# Patient Record
Sex: MALE | Race: WHITE | NOT HISPANIC OR LATINO | Employment: OTHER | URBAN - NONMETROPOLITAN AREA
[De-identification: names, ages, dates, MRNs, and addresses within clinical notes are randomized per-mention and may not be internally consistent; named-entity substitution may affect disease eponyms.]

---

## 2020-01-01 ENCOUNTER — APPOINTMENT (OUTPATIENT)
Dept: GENERAL RADIOLOGY | Facility: HOSPITAL | Age: 60
End: 2020-01-01

## 2020-01-01 ENCOUNTER — ANESTHESIA (OUTPATIENT)
Dept: PERIOP | Facility: HOSPITAL | Age: 60
End: 2020-01-01

## 2020-01-01 ENCOUNTER — PREP FOR SURGERY (OUTPATIENT)
Dept: OTHER | Facility: HOSPITAL | Age: 60
End: 2020-01-01

## 2020-01-01 ENCOUNTER — APPOINTMENT (OUTPATIENT)
Dept: ULTRASOUND IMAGING | Facility: HOSPITAL | Age: 60
End: 2020-01-01

## 2020-01-01 ENCOUNTER — APPOINTMENT (OUTPATIENT)
Dept: CT IMAGING | Facility: HOSPITAL | Age: 60
End: 2020-01-01

## 2020-01-01 ENCOUNTER — APPOINTMENT (OUTPATIENT)
Dept: CARDIOLOGY | Facility: HOSPITAL | Age: 60
End: 2020-01-01

## 2020-01-01 ENCOUNTER — ANESTHESIA (OUTPATIENT)
Dept: ICU | Facility: HOSPITAL | Age: 60
End: 2020-01-01

## 2020-01-01 ENCOUNTER — HOSPITAL ENCOUNTER (OUTPATIENT)
Facility: HOSPITAL | Age: 60
Setting detail: SURGERY ADMIT
End: 2020-01-01
Attending: SPECIALIST | Admitting: SPECIALIST

## 2020-01-01 ENCOUNTER — APPOINTMENT (OUTPATIENT)
Dept: INTERVENTIONAL RADIOLOGY/VASCULAR | Facility: HOSPITAL | Age: 60
End: 2020-01-01

## 2020-01-01 ENCOUNTER — TRANSCRIBE ORDERS (OUTPATIENT)
Dept: GENERAL RADIOLOGY | Facility: HOSPITAL | Age: 60
End: 2020-01-01

## 2020-01-01 ENCOUNTER — ANESTHESIA EVENT (OUTPATIENT)
Dept: PERIOP | Facility: HOSPITAL | Age: 60
End: 2020-01-01

## 2020-01-01 ENCOUNTER — HOSPITAL ENCOUNTER (INPATIENT)
Facility: HOSPITAL | Age: 60
LOS: 8 days | End: 2020-11-15
Attending: EMERGENCY MEDICINE | Admitting: HOSPITALIST

## 2020-01-01 ENCOUNTER — ANESTHESIA EVENT (OUTPATIENT)
Dept: ICU | Facility: HOSPITAL | Age: 60
End: 2020-01-01

## 2020-01-01 ENCOUNTER — HOSPITAL ENCOUNTER (OUTPATIENT)
Facility: HOSPITAL | Age: 60
Setting detail: HOSPITAL OUTPATIENT SURGERY
End: 2020-01-01
Attending: THORACIC SURGERY (CARDIOTHORACIC VASCULAR SURGERY) | Admitting: THORACIC SURGERY (CARDIOTHORACIC VASCULAR SURGERY)

## 2020-01-01 ENCOUNTER — HOSPITAL ENCOUNTER (OUTPATIENT)
Facility: HOSPITAL | Age: 60
Discharge: HOME HEALTH | End: 2020-09-22
Attending: INTERNAL MEDICINE | Admitting: INTERNAL MEDICINE

## 2020-01-01 ENCOUNTER — LAB (OUTPATIENT)
Dept: LAB | Facility: HOSPITAL | Age: 60
End: 2020-01-01

## 2020-01-01 ENCOUNTER — OFFICE VISIT (OUTPATIENT)
Dept: CARDIAC SURGERY | Facility: CLINIC | Age: 60
End: 2020-01-01

## 2020-01-01 ENCOUNTER — HOSPITAL ENCOUNTER (INPATIENT)
Facility: HOSPITAL | Age: 60
LOS: 6 days | Discharge: NURSING, REHAB OR LONG-TERM CARE | End: 2020-08-12
Attending: FAMILY MEDICINE | Admitting: FAMILY MEDICINE

## 2020-01-01 VITALS
DIASTOLIC BLOOD PRESSURE: 72 MMHG | HEIGHT: 71 IN | HEART RATE: 60 BPM | SYSTOLIC BLOOD PRESSURE: 174 MMHG | WEIGHT: 207.5 LBS | TEMPERATURE: 96.8 F | OXYGEN SATURATION: 96 % | RESPIRATION RATE: 14 BRPM | BODY MASS INDEX: 29.05 KG/M2

## 2020-01-01 VITALS
HEART RATE: 72 BPM | OXYGEN SATURATION: 97 % | TEMPERATURE: 97.9 F | BODY MASS INDEX: 24.74 KG/M2 | DIASTOLIC BLOOD PRESSURE: 86 MMHG | SYSTOLIC BLOOD PRESSURE: 188 MMHG | RESPIRATION RATE: 18 BRPM | HEIGHT: 71 IN | WEIGHT: 176.7 LBS

## 2020-01-01 VITALS
BODY MASS INDEX: 31.08 KG/M2 | DIASTOLIC BLOOD PRESSURE: 70 MMHG | HEIGHT: 71 IN | TEMPERATURE: 95.4 F | RESPIRATION RATE: 14 BRPM | SYSTOLIC BLOOD PRESSURE: 155 MMHG | WEIGHT: 222 LBS

## 2020-01-01 VITALS
HEART RATE: 73 BPM | OXYGEN SATURATION: 100 % | SYSTOLIC BLOOD PRESSURE: 134 MMHG | BODY MASS INDEX: 27.27 KG/M2 | HEIGHT: 71 IN | DIASTOLIC BLOOD PRESSURE: 76 MMHG | WEIGHT: 194.8 LBS

## 2020-01-01 DIAGNOSIS — J96.01 ACUTE RESPIRATORY FAILURE WITH HYPOXIA (HCC): ICD-10-CM

## 2020-01-01 DIAGNOSIS — J96.22 ACUTE ON CHRONIC RESPIRATORY FAILURE WITH HYPOXIA AND HYPERCAPNIA (HCC): Primary | ICD-10-CM

## 2020-01-01 DIAGNOSIS — F17.200 TOBACCO DEPENDENCE: ICD-10-CM

## 2020-01-01 DIAGNOSIS — Z99.11: Primary | ICD-10-CM

## 2020-01-01 DIAGNOSIS — I70.235 ATHEROSCLEROSIS OF NATIVE ARTERY OF RIGHT LOWER EXTREMITY WITH ULCERATION OF OTHER PART OF FOOT (HCC): ICD-10-CM

## 2020-01-01 DIAGNOSIS — E78.2 MIXED HYPERLIPIDEMIA: ICD-10-CM

## 2020-01-01 DIAGNOSIS — J96.21 ACUTE ON CHRONIC RESPIRATORY FAILURE WITH HYPOXIA AND HYPERCAPNIA (HCC): Primary | ICD-10-CM

## 2020-01-01 DIAGNOSIS — J44.1 COPD WITH ACUTE EXACERBATION (HCC): Primary | ICD-10-CM

## 2020-01-01 DIAGNOSIS — R06.89 RESPIRATORY INSUFFICIENCY: Primary | ICD-10-CM

## 2020-01-01 DIAGNOSIS — Z01.818 PREOP TESTING: ICD-10-CM

## 2020-01-01 DIAGNOSIS — R13.10 DYSPHAGIA, UNSPECIFIED TYPE: ICD-10-CM

## 2020-01-01 DIAGNOSIS — R06.89 RESPIRATORY INSUFFICIENCY: ICD-10-CM

## 2020-01-01 DIAGNOSIS — L97.511 DIABETIC ULCER OF TOE OF RIGHT FOOT ASSOCIATED WITH TYPE 2 DIABETES MELLITUS, LIMITED TO BREAKDOWN OF SKIN (HCC): ICD-10-CM

## 2020-01-01 DIAGNOSIS — J41.0 SIMPLE CHRONIC BRONCHITIS (HCC): ICD-10-CM

## 2020-01-01 DIAGNOSIS — I70.235 ATHEROSCLEROSIS OF NATIVE ARTERY OF RIGHT LOWER EXTREMITY WITH ULCERATION OF OTHER PART OF FOOT (HCC): Primary | ICD-10-CM

## 2020-01-01 DIAGNOSIS — I50.23 ACUTE ON CHRONIC SYSTOLIC CONGESTIVE HEART FAILURE (HCC): ICD-10-CM

## 2020-01-01 DIAGNOSIS — F20.0 PARANOID SCHIZOPHRENIA (HCC): ICD-10-CM

## 2020-01-01 DIAGNOSIS — J96.01 SEPSIS WITH ACUTE HYPOXIC RESPIRATORY FAILURE WITHOUT SEPTIC SHOCK, DUE TO UNSPECIFIED ORGANISM (HCC): ICD-10-CM

## 2020-01-01 DIAGNOSIS — Z01.818 PREOP TESTING: Primary | ICD-10-CM

## 2020-01-01 DIAGNOSIS — I73.9 PVD (PERIPHERAL VASCULAR DISEASE) (HCC): Primary | ICD-10-CM

## 2020-01-01 DIAGNOSIS — Z99.11 VENTILATOR DEPENDENCE (HCC): ICD-10-CM

## 2020-01-01 DIAGNOSIS — E11.621 DIABETIC ULCER OF TOE OF RIGHT FOOT ASSOCIATED WITH TYPE 2 DIABETES MELLITUS, LIMITED TO BREAKDOWN OF SKIN (HCC): ICD-10-CM

## 2020-01-01 DIAGNOSIS — I10 ESSENTIAL HYPERTENSION: ICD-10-CM

## 2020-01-01 DIAGNOSIS — R65.20 SEPSIS WITH ACUTE HYPOXIC RESPIRATORY FAILURE WITHOUT SEPTIC SHOCK, DUE TO UNSPECIFIED ORGANISM (HCC): ICD-10-CM

## 2020-01-01 DIAGNOSIS — A41.9 SEPSIS WITH ACUTE HYPOXIC RESPIRATORY FAILURE WITHOUT SEPTIC SHOCK, DUE TO UNSPECIFIED ORGANISM (HCC): ICD-10-CM

## 2020-01-01 LAB
% EOS HANSEL STAIN: 2 %
ABO GROUP BLD: NORMAL
ABO GROUP BLD: NORMAL
ALBUMIN SERPL-MCNC: 2.8 G/DL (ref 3.5–5.2)
ALBUMIN SERPL-MCNC: 2.9 G/DL (ref 3.5–5.2)
ALBUMIN SERPL-MCNC: 3 G/DL (ref 3.5–5.2)
ALBUMIN SERPL-MCNC: 3 G/DL (ref 3.5–5.2)
ALBUMIN SERPL-MCNC: 3.1 G/DL (ref 3.5–5.2)
ALBUMIN SERPL-MCNC: 3.2 G/DL (ref 3.5–5.2)
ALBUMIN SERPL-MCNC: 3.3 G/DL (ref 3.5–5.2)
ALBUMIN SERPL-MCNC: 3.6 G/DL (ref 3.5–5.2)
ALBUMIN SERPL-MCNC: 3.8 G/DL (ref 3.5–5.2)
ALBUMIN/GLOB SERPL: 0.9 G/DL
ALBUMIN/GLOB SERPL: 1 G/DL
ALBUMIN/GLOB SERPL: 1.1 G/DL
ALBUMIN/GLOB SERPL: 1.1 G/DL
ALBUMIN/GLOB SERPL: 1.2 G/DL
ALBUMIN/GLOB SERPL: 1.3 G/DL
ALBUMIN/GLOB SERPL: 1.4 G/DL
ALP SERPL-CCNC: 40 U/L (ref 39–117)
ALP SERPL-CCNC: 42 U/L (ref 39–117)
ALP SERPL-CCNC: 43 U/L (ref 39–117)
ALP SERPL-CCNC: 45 U/L (ref 39–117)
ALP SERPL-CCNC: 49 U/L (ref 39–117)
ALP SERPL-CCNC: 50 U/L (ref 39–117)
ALP SERPL-CCNC: 52 U/L (ref 39–117)
ALP SERPL-CCNC: 56 U/L (ref 39–117)
ALP SERPL-CCNC: 60 U/L (ref 39–117)
ALP SERPL-CCNC: 61 U/L (ref 39–117)
ALP SERPL-CCNC: 64 U/L (ref 39–117)
ALP SERPL-CCNC: 64 U/L (ref 39–117)
ALP SERPL-CCNC: 65 U/L (ref 39–117)
ALP SERPL-CCNC: 66 U/L (ref 39–117)
ALP SERPL-CCNC: 81 U/L (ref 39–117)
ALP SERPL-CCNC: 88 U/L (ref 39–117)
ALT SERPL W P-5'-P-CCNC: 10 U/L (ref 1–41)
ALT SERPL W P-5'-P-CCNC: 11 U/L (ref 1–41)
ALT SERPL W P-5'-P-CCNC: 11 U/L (ref 1–41)
ALT SERPL W P-5'-P-CCNC: 13 U/L (ref 1–41)
ALT SERPL W P-5'-P-CCNC: 26 U/L (ref 1–41)
ALT SERPL W P-5'-P-CCNC: 31 U/L (ref 1–41)
ALT SERPL W P-5'-P-CCNC: 36 U/L (ref 1–41)
ALT SERPL W P-5'-P-CCNC: 43 U/L (ref 1–41)
ALT SERPL W P-5'-P-CCNC: 47 U/L (ref 1–41)
ALT SERPL W P-5'-P-CCNC: 50 U/L (ref 1–41)
ALT SERPL W P-5'-P-CCNC: 55 U/L (ref 1–41)
ALT SERPL W P-5'-P-CCNC: 6 U/L (ref 1–41)
ALT SERPL W P-5'-P-CCNC: 8 U/L (ref 1–41)
ALT SERPL W P-5'-P-CCNC: 9 U/L (ref 1–41)
ANION GAP SERPL CALCULATED.3IONS-SCNC: 10 MMOL/L (ref 5–15)
ANION GAP SERPL CALCULATED.3IONS-SCNC: 11 MMOL/L (ref 5–15)
ANION GAP SERPL CALCULATED.3IONS-SCNC: 12 MMOL/L (ref 5–15)
ANION GAP SERPL CALCULATED.3IONS-SCNC: 13 MMOL/L (ref 5–15)
ANION GAP SERPL CALCULATED.3IONS-SCNC: 15 MMOL/L (ref 5–15)
ANION GAP SERPL CALCULATED.3IONS-SCNC: 15 MMOL/L (ref 5–15)
ANION GAP SERPL CALCULATED.3IONS-SCNC: 16 MMOL/L (ref 5–15)
ANION GAP SERPL CALCULATED.3IONS-SCNC: 17 MMOL/L (ref 5–15)
ANION GAP SERPL CALCULATED.3IONS-SCNC: 17 MMOL/L (ref 5–15)
ANION GAP SERPL CALCULATED.3IONS-SCNC: 18 MMOL/L (ref 5–15)
ANION GAP SERPL CALCULATED.3IONS-SCNC: 8 MMOL/L (ref 5–15)
ANION GAP SERPL CALCULATED.3IONS-SCNC: 9 MMOL/L (ref 5–15)
ARTERIAL PATENCY WRIST A: ABNORMAL
ARTERIAL PATENCY WRIST A: POSITIVE
AST SERPL-CCNC: 104 U/L (ref 1–40)
AST SERPL-CCNC: 11 U/L (ref 1–40)
AST SERPL-CCNC: 13 U/L (ref 1–40)
AST SERPL-CCNC: 13 U/L (ref 1–40)
AST SERPL-CCNC: 137 U/L (ref 1–40)
AST SERPL-CCNC: 15 U/L (ref 1–40)
AST SERPL-CCNC: 16 U/L (ref 1–40)
AST SERPL-CCNC: 17 U/L (ref 1–40)
AST SERPL-CCNC: 17 U/L (ref 1–40)
AST SERPL-CCNC: 18 U/L (ref 1–40)
AST SERPL-CCNC: 20 U/L (ref 1–40)
AST SERPL-CCNC: 22 U/L (ref 1–40)
AST SERPL-CCNC: 30 U/L (ref 1–40)
AST SERPL-CCNC: 32 U/L (ref 1–40)
AST SERPL-CCNC: 35 U/L (ref 1–40)
AST SERPL-CCNC: 64 U/L (ref 1–40)
ATMOSPHERIC PRESS: 739 MMHG
ATMOSPHERIC PRESS: 744 MMHG
ATMOSPHERIC PRESS: 745 MMHG
ATMOSPHERIC PRESS: 748 MMHG
ATMOSPHERIC PRESS: 748 MMHG
ATMOSPHERIC PRESS: 749 MMHG
ATMOSPHERIC PRESS: 750 MMHG
ATMOSPHERIC PRESS: 751 MMHG
ATMOSPHERIC PRESS: 752 MMHG
ATMOSPHERIC PRESS: 753 MMHG
B PARAPERT DNA SPEC QL NAA+PROBE: NOT DETECTED
B PERT DNA SPEC QL NAA+PROBE: NOT DETECTED
BACTERIA SPEC AEROBE CULT: ABNORMAL
BACTERIA SPEC AEROBE CULT: NO GROWTH
BACTERIA SPEC AEROBE CULT: NORMAL
BACTERIA SPEC RESP CULT: ABNORMAL
BACTERIA SPEC RESP CULT: NORMAL
BACTERIA SPEC RESP CULT: NORMAL
BACTERIA UR QL AUTO: ABNORMAL /HPF
BASE EXCESS BLDA CALC-SCNC: -0.1 MMOL/L (ref 0–2)
BASE EXCESS BLDA CALC-SCNC: -1.1 MMOL/L (ref 0–2)
BASE EXCESS BLDA CALC-SCNC: -1.6 MMOL/L (ref 0–2)
BASE EXCESS BLDA CALC-SCNC: -1.7 MMOL/L (ref 0–2)
BASE EXCESS BLDA CALC-SCNC: -1.8 MMOL/L (ref 0–2)
BASE EXCESS BLDA CALC-SCNC: -10.1 MMOL/L (ref 0–2)
BASE EXCESS BLDA CALC-SCNC: -14.8 MMOL/L (ref 0–2)
BASE EXCESS BLDA CALC-SCNC: -2 MMOL/L (ref 0–2)
BASE EXCESS BLDA CALC-SCNC: -3 MMOL/L (ref 0–2)
BASE EXCESS BLDA CALC-SCNC: -4.6 MMOL/L (ref 0–2)
BASE EXCESS BLDA CALC-SCNC: -4.8 MMOL/L (ref 0–2)
BASE EXCESS BLDA CALC-SCNC: -5.2 MMOL/L (ref 0–2)
BASE EXCESS BLDA CALC-SCNC: -5.8 MMOL/L (ref 0–2)
BASE EXCESS BLDA CALC-SCNC: -6.3 MMOL/L (ref 0–2)
BASE EXCESS BLDA CALC-SCNC: -9.8 MMOL/L (ref 0–2)
BASE EXCESS BLDA CALC-SCNC: 0.2 MMOL/L (ref 0–2)
BASE EXCESS BLDA CALC-SCNC: 0.9 MMOL/L (ref 0–2)
BASE EXCESS BLDA CALC-SCNC: 1 MMOL/L (ref 0–2)
BASE EXCESS BLDA CALC-SCNC: 1.3 MMOL/L (ref 0–2)
BASE EXCESS BLDA CALC-SCNC: 1.7 MMOL/L (ref 0–2)
BASE EXCESS BLDA CALC-SCNC: 2.4 MMOL/L (ref 0–2)
BASE EXCESS BLDA CALC-SCNC: 2.5 MMOL/L (ref 0–2)
BASE EXCESS BLDA CALC-SCNC: 2.6 MMOL/L (ref 0–2)
BASE EXCESS BLDA CALC-SCNC: 2.6 MMOL/L (ref 0–2)
BASE EXCESS BLDA CALC-SCNC: 3.3 MMOL/L (ref 0–2)
BASE EXCESS BLDA CALC-SCNC: 5.6 MMOL/L (ref 0–2)
BASE EXCESS BLDA CALC-SCNC: 8 MMOL/L (ref 0–2)
BASE EXCESS BLDA CALC-SCNC: 9.5 MMOL/L (ref 0–2)
BASOPHILS # BLD AUTO: 0 10*3/MM3 (ref 0–0.2)
BASOPHILS # BLD AUTO: 0.01 10*3/MM3 (ref 0–0.2)
BASOPHILS # BLD AUTO: 0.02 10*3/MM3 (ref 0–0.2)
BASOPHILS # BLD AUTO: 0.03 10*3/MM3 (ref 0–0.2)
BASOPHILS # BLD AUTO: 0.04 10*3/MM3 (ref 0–0.2)
BASOPHILS # BLD AUTO: 0.05 10*3/MM3 (ref 0–0.2)
BASOPHILS # BLD AUTO: 0.06 10*3/MM3 (ref 0–0.2)
BASOPHILS # BLD AUTO: 0.07 10*3/MM3 (ref 0–0.2)
BASOPHILS # BLD AUTO: 0.08 10*3/MM3 (ref 0–0.2)
BASOPHILS # BLD AUTO: 0.1 10*3/MM3 (ref 0–0.2)
BASOPHILS # BLD AUTO: 0.11 10*3/MM3 (ref 0–0.2)
BASOPHILS # BLD AUTO: 0.13 10*3/MM3 (ref 0–0.2)
BASOPHILS # BLD AUTO: 0.14 10*3/MM3 (ref 0–0.2)
BASOPHILS # BLD MANUAL: 0.17 10*3/MM3 (ref 0–0.2)
BASOPHILS NFR BLD AUTO: 0 % (ref 0–1.5)
BASOPHILS NFR BLD AUTO: 0.1 % (ref 0–1.5)
BASOPHILS NFR BLD AUTO: 0.2 % (ref 0–1.5)
BASOPHILS NFR BLD AUTO: 0.3 % (ref 0–1.5)
BASOPHILS NFR BLD AUTO: 0.4 % (ref 0–1.5)
BASOPHILS NFR BLD AUTO: 0.5 % (ref 0–1.5)
BASOPHILS NFR BLD AUTO: 0.6 % (ref 0–1.5)
BASOPHILS NFR BLD AUTO: 0.6 % (ref 0–1.5)
BASOPHILS NFR BLD AUTO: 0.7 % (ref 0–1.5)
BASOPHILS NFR BLD AUTO: 0.8 % (ref 0–1.5)
BASOPHILS NFR BLD AUTO: 0.8 % (ref 0–1.5)
BASOPHILS NFR BLD AUTO: 1 % (ref 0–1.5)
BASOPHILS NFR BLD AUTO: 1.1 % (ref 0–1.5)
BASOPHILS NFR BLD AUTO: 1.1 % (ref 0–1.5)
BASOPHILS NFR BLD AUTO: 1.4 % (ref 0–1.5)
BDY SITE: ABNORMAL
BH BB BLOOD EXPIRATION DATE: NORMAL
BH BB BLOOD EXPIRATION DATE: NORMAL
BH BB BLOOD TYPE BARCODE: 5100
BH BB BLOOD TYPE BARCODE: 5100
BH BB DISPENSE STATUS: NORMAL
BH BB DISPENSE STATUS: NORMAL
BH BB PRODUCT CODE: NORMAL
BH BB PRODUCT CODE: NORMAL
BH BB UNIT NUMBER: NORMAL
BH BB UNIT NUMBER: NORMAL
BH CV ECHO MEAS - AO MAX PG (FULL): 5.6 MMHG
BH CV ECHO MEAS - AO MAX PG: 12.5 MMHG
BH CV ECHO MEAS - AO MEAN PG (FULL): 1 MMHG
BH CV ECHO MEAS - AO MEAN PG: 5 MMHG
BH CV ECHO MEAS - AO ROOT AREA (BSA CORRECTED): 1.7
BH CV ECHO MEAS - AO ROOT AREA: 10.2 CM^2
BH CV ECHO MEAS - AO ROOT DIAM: 3.6 CM
BH CV ECHO MEAS - AO V2 MAX: 177 CM/SEC
BH CV ECHO MEAS - AO V2 MEAN: 104 CM/SEC
BH CV ECHO MEAS - AO V2 VTI: 33.9 CM
BH CV ECHO MEAS - AVA(I,A): 2.9 CM^2
BH CV ECHO MEAS - AVA(I,D): 2.9 CM^2
BH CV ECHO MEAS - AVA(V,A): 2.8 CM^2
BH CV ECHO MEAS - AVA(V,D): 2.8 CM^2
BH CV ECHO MEAS - BSA(HAYCOCK): 2.2 M^2
BH CV ECHO MEAS - BSA: 2.2 M^2
BH CV ECHO MEAS - BZI_BMI: 29.8 KILOGRAMS/M^2
BH CV ECHO MEAS - BZI_METRIC_HEIGHT: 180.3 CM
BH CV ECHO MEAS - BZI_METRIC_WEIGHT: 97.1 KG
BH CV ECHO MEAS - EDV(CUBED): 135.8 ML
BH CV ECHO MEAS - EDV(MOD-SP4): 174 ML
BH CV ECHO MEAS - EDV(TEICH): 126.1 ML
BH CV ECHO MEAS - EF(CUBED): 43.5 %
BH CV ECHO MEAS - EF(MOD-SP4): 57.5 %
BH CV ECHO MEAS - EF(TEICH): 35.9 %
BH CV ECHO MEAS - ESV(CUBED): 76.8 ML
BH CV ECHO MEAS - ESV(MOD-SP4): 74 ML
BH CV ECHO MEAS - ESV(TEICH): 80.8 ML
BH CV ECHO MEAS - FS: 17.3 %
BH CV ECHO MEAS - IVS/LVPW: 0.84
BH CV ECHO MEAS - IVSD: 0.79 CM
BH CV ECHO MEAS - LA DIMENSION: 4 CM
BH CV ECHO MEAS - LA/AO: 1.1
BH CV ECHO MEAS - LAT PEAK E' VEL: 11.3 CM/SEC
BH CV ECHO MEAS - LV DIASTOLIC VOL/BSA (35-75): 80.2 ML/M^2
BH CV ECHO MEAS - LV MASS(C)D: 158.2 GRAMS
BH CV ECHO MEAS - LV MASS(C)DI: 72.9 GRAMS/M^2
BH CV ECHO MEAS - LV MAX PG: 7 MMHG
BH CV ECHO MEAS - LV MEAN PG: 4 MMHG
BH CV ECHO MEAS - LV SYSTOLIC VOL/BSA (12-30): 34.1 ML/M^2
BH CV ECHO MEAS - LV V1 MAX: 132 CM/SEC
BH CV ECHO MEAS - LV V1 MEAN: 92 CM/SEC
BH CV ECHO MEAS - LV V1 VTI: 26.2 CM
BH CV ECHO MEAS - LVIDD: 5.1 CM
BH CV ECHO MEAS - LVIDS: 4.3 CM
BH CV ECHO MEAS - LVLD AP4: 9.5 CM
BH CV ECHO MEAS - LVLS AP4: 8.2 CM
BH CV ECHO MEAS - LVOT AREA (M): 3.8 CM^2
BH CV ECHO MEAS - LVOT AREA: 3.8 CM^2
BH CV ECHO MEAS - LVOT DIAM: 2.2 CM
BH CV ECHO MEAS - LVPWD: 0.94 CM
BH CV ECHO MEAS - MED PEAK E' VEL: 9.57 CM/SEC
BH CV ECHO MEAS - MR MAX PG: 122.8 MMHG
BH CV ECHO MEAS - MR MAX VEL: 554 CM/SEC
BH CV ECHO MEAS - MV A MAX VEL: 84.8 CM/SEC
BH CV ECHO MEAS - MV DEC TIME: 0.18 SEC
BH CV ECHO MEAS - MV E MAX VEL: 161 CM/SEC
BH CV ECHO MEAS - MV E/A: 1.9
BH CV ECHO MEAS - SI(AO): 159 ML/M^2
BH CV ECHO MEAS - SI(CUBED): 27.2 ML/M^2
BH CV ECHO MEAS - SI(LVOT): 45.9 ML/M^2
BH CV ECHO MEAS - SI(MOD-SP4): 46.1 ML/M^2
BH CV ECHO MEAS - SI(TEICH): 20.9 ML/M^2
BH CV ECHO MEAS - SV(AO): 345.1 ML
BH CV ECHO MEAS - SV(CUBED): 59 ML
BH CV ECHO MEAS - SV(LVOT): 99.6 ML
BH CV ECHO MEAS - SV(MOD-SP4): 100 ML
BH CV ECHO MEAS - SV(TEICH): 45.3 ML
BH CV ECHO MEAS - TR MAX VEL: 108 CM/SEC
BH CV ECHO MEASUREMENTS AVERAGE E/E' RATIO: 15.43
BILIRUB SERPL-MCNC: 0.2 MG/DL (ref 0–1.2)
BILIRUB SERPL-MCNC: 0.3 MG/DL (ref 0–1.2)
BILIRUB SERPL-MCNC: 0.4 MG/DL (ref 0–1.2)
BILIRUB SERPL-MCNC: 0.7 MG/DL (ref 0–1.2)
BILIRUB UR QL STRIP: NEGATIVE
BLD GP AB SCN SERPL QL: NEGATIVE
BLD GP AB SCN SERPL QL: NEGATIVE
BODY TEMPERATURE: 37 C
BUN SERPL-MCNC: 14 MG/DL (ref 6–20)
BUN SERPL-MCNC: 16 MG/DL (ref 6–20)
BUN SERPL-MCNC: 16 MG/DL (ref 6–20)
BUN SERPL-MCNC: 19 MG/DL (ref 6–20)
BUN SERPL-MCNC: 21 MG/DL (ref 6–20)
BUN SERPL-MCNC: 34 MG/DL (ref 6–20)
BUN SERPL-MCNC: 36 MG/DL (ref 6–20)
BUN SERPL-MCNC: 37 MG/DL (ref 6–20)
BUN SERPL-MCNC: 39 MG/DL (ref 6–20)
BUN SERPL-MCNC: 40 MG/DL (ref 6–20)
BUN SERPL-MCNC: 41 MG/DL (ref 6–20)
BUN SERPL-MCNC: 42 MG/DL (ref 6–20)
BUN SERPL-MCNC: 43 MG/DL (ref 6–20)
BUN SERPL-MCNC: 43 MG/DL (ref 6–20)
BUN SERPL-MCNC: 44 MG/DL (ref 6–20)
BUN SERPL-MCNC: 44 MG/DL (ref 6–20)
BUN SERPL-MCNC: 47 MG/DL (ref 6–20)
BUN SERPL-MCNC: 47 MG/DL (ref 6–20)
BUN SERPL-MCNC: 49 MG/DL (ref 6–20)
BUN SERPL-MCNC: 49 MG/DL (ref 6–20)
BUN SERPL-MCNC: 50 MG/DL (ref 6–20)
BUN SERPL-MCNC: 52 MG/DL (ref 6–20)
BUN SERPL-MCNC: 53 MG/DL (ref 6–20)
BUN SERPL-MCNC: 55 MG/DL (ref 6–20)
BUN SERPL-MCNC: 55 MG/DL (ref 6–20)
BUN SERPL-MCNC: 56 MG/DL (ref 6–20)
BUN SERPL-MCNC: 58 MG/DL (ref 6–20)
BUN SERPL-MCNC: 59 MG/DL (ref 6–20)
BUN SERPL-MCNC: 59 MG/DL (ref 6–20)
BUN SERPL-MCNC: 61 MG/DL (ref 6–20)
BUN SERPL-MCNC: 63 MG/DL (ref 6–20)
BUN SERPL-MCNC: 63 MG/DL (ref 6–20)
BUN SERPL-MCNC: 64 MG/DL (ref 6–20)
BUN SERPL-MCNC: 66 MG/DL (ref 6–20)
BUN SERPL-MCNC: 67 MG/DL (ref 6–20)
BUN SERPL-MCNC: 72 MG/DL (ref 6–20)
BUN SERPL-MCNC: 87 MG/DL (ref 6–20)
BUN SERPL-MCNC: 92 MG/DL (ref 6–20)
BUN SERPL-MCNC: 92 MG/DL (ref 6–20)
BUN SERPL-MCNC: 93 MG/DL (ref 6–20)
BUN SERPL-MCNC: 97 MG/DL (ref 6–20)
BUN/CREAT SERPL: 10.4 (ref 7–25)
BUN/CREAT SERPL: 11.8 (ref 7–25)
BUN/CREAT SERPL: 12.6 (ref 7–25)
BUN/CREAT SERPL: 13.2 (ref 7–25)
BUN/CREAT SERPL: 14.2 (ref 7–25)
BUN/CREAT SERPL: 16.1 (ref 7–25)
BUN/CREAT SERPL: 16.5 (ref 7–25)
BUN/CREAT SERPL: 16.5 (ref 7–25)
BUN/CREAT SERPL: 17.1 (ref 7–25)
BUN/CREAT SERPL: 17.1 (ref 7–25)
BUN/CREAT SERPL: 17.4 (ref 7–25)
BUN/CREAT SERPL: 17.7 (ref 7–25)
BUN/CREAT SERPL: 18.2 (ref 7–25)
BUN/CREAT SERPL: 18.5 (ref 7–25)
BUN/CREAT SERPL: 18.9 (ref 7–25)
BUN/CREAT SERPL: 19.4 (ref 7–25)
BUN/CREAT SERPL: 20.3 (ref 7–25)
BUN/CREAT SERPL: 21.7 (ref 7–25)
BUN/CREAT SERPL: 22 (ref 7–25)
BUN/CREAT SERPL: 22.2 (ref 7–25)
BUN/CREAT SERPL: 22.3 (ref 7–25)
BUN/CREAT SERPL: 22.5 (ref 7–25)
BUN/CREAT SERPL: 22.7 (ref 7–25)
BUN/CREAT SERPL: 23.2 (ref 7–25)
BUN/CREAT SERPL: 25 (ref 7–25)
BUN/CREAT SERPL: 25.7 (ref 7–25)
BUN/CREAT SERPL: 26.1 (ref 7–25)
BUN/CREAT SERPL: 29.2 (ref 7–25)
BUN/CREAT SERPL: 29.4 (ref 7–25)
BUN/CREAT SERPL: 29.8 (ref 7–25)
BUN/CREAT SERPL: 30.1 (ref 7–25)
BUN/CREAT SERPL: 30.1 (ref 7–25)
BUN/CREAT SERPL: 30.8 (ref 7–25)
BUN/CREAT SERPL: 32 (ref 7–25)
BUN/CREAT SERPL: 32.7 (ref 7–25)
BUN/CREAT SERPL: 34.6 (ref 7–25)
BUN/CREAT SERPL: 34.9 (ref 7–25)
BUN/CREAT SERPL: 35.5 (ref 7–25)
BUN/CREAT SERPL: 36 (ref 7–25)
BUN/CREAT SERPL: 36.6 (ref 7–25)
BUN/CREAT SERPL: 37.2 (ref 7–25)
BUN/CREAT SERPL: 37.2 (ref 7–25)
BUN/CREAT SERPL: 37.3 (ref 7–25)
BUN/CREAT SERPL: 40.4 (ref 7–25)
BUN/CREAT SERPL: 46.8 (ref 7–25)
BURR CELLS BLD QL SMEAR: ABNORMAL
C PNEUM DNA NPH QL NAA+NON-PROBE: NOT DETECTED
CALCIUM SPEC-SCNC: 7.7 MG/DL (ref 8.6–10.5)
CALCIUM SPEC-SCNC: 7.8 MG/DL (ref 8.6–10.5)
CALCIUM SPEC-SCNC: 7.8 MG/DL (ref 8.6–10.5)
CALCIUM SPEC-SCNC: 8 MG/DL (ref 8.6–10.5)
CALCIUM SPEC-SCNC: 8.1 MG/DL (ref 8.6–10.5)
CALCIUM SPEC-SCNC: 8.2 MG/DL (ref 8.6–10.5)
CALCIUM SPEC-SCNC: 8.3 MG/DL (ref 8.6–10.5)
CALCIUM SPEC-SCNC: 8.4 MG/DL (ref 8.6–10.5)
CALCIUM SPEC-SCNC: 8.5 MG/DL (ref 8.6–10.5)
CALCIUM SPEC-SCNC: 8.6 MG/DL (ref 8.6–10.5)
CALCIUM SPEC-SCNC: 8.7 MG/DL (ref 8.6–10.5)
CALCIUM SPEC-SCNC: 8.7 MG/DL (ref 8.6–10.5)
CALCIUM SPEC-SCNC: 8.8 MG/DL (ref 8.6–10.5)
CALCIUM SPEC-SCNC: 8.8 MG/DL (ref 8.6–10.5)
CALCIUM SPEC-SCNC: 8.9 MG/DL (ref 8.6–10.5)
CALCIUM SPEC-SCNC: 9.1 MG/DL (ref 8.6–10.5)
CALCIUM SPEC-SCNC: 9.7 MG/DL (ref 8.6–10.5)
CALCIUM SPEC-SCNC: 9.8 MG/DL (ref 8.6–10.5)
CHLORIDE SERPL-SCNC: 100 MMOL/L (ref 98–107)
CHLORIDE SERPL-SCNC: 100 MMOL/L (ref 98–107)
CHLORIDE SERPL-SCNC: 101 MMOL/L (ref 98–107)
CHLORIDE SERPL-SCNC: 101 MMOL/L (ref 98–107)
CHLORIDE SERPL-SCNC: 102 MMOL/L (ref 98–107)
CHLORIDE SERPL-SCNC: 103 MMOL/L (ref 98–107)
CHLORIDE SERPL-SCNC: 104 MMOL/L (ref 98–107)
CHLORIDE SERPL-SCNC: 107 MMOL/L (ref 98–107)
CHLORIDE SERPL-SCNC: 107 MMOL/L (ref 98–107)
CHLORIDE SERPL-SCNC: 82 MMOL/L (ref 98–107)
CHLORIDE SERPL-SCNC: 83 MMOL/L (ref 98–107)
CHLORIDE SERPL-SCNC: 84 MMOL/L (ref 98–107)
CHLORIDE SERPL-SCNC: 84 MMOL/L (ref 98–107)
CHLORIDE SERPL-SCNC: 85 MMOL/L (ref 98–107)
CHLORIDE SERPL-SCNC: 87 MMOL/L (ref 98–107)
CHLORIDE SERPL-SCNC: 88 MMOL/L (ref 98–107)
CHLORIDE SERPL-SCNC: 89 MMOL/L (ref 98–107)
CHLORIDE SERPL-SCNC: 89 MMOL/L (ref 98–107)
CHLORIDE SERPL-SCNC: 90 MMOL/L (ref 98–107)
CHLORIDE SERPL-SCNC: 90 MMOL/L (ref 98–107)
CHLORIDE SERPL-SCNC: 92 MMOL/L (ref 98–107)
CHLORIDE SERPL-SCNC: 94 MMOL/L (ref 98–107)
CHLORIDE SERPL-SCNC: 95 MMOL/L (ref 98–107)
CHLORIDE SERPL-SCNC: 95 MMOL/L (ref 98–107)
CHLORIDE SERPL-SCNC: 96 MMOL/L (ref 98–107)
CHLORIDE SERPL-SCNC: 97 MMOL/L (ref 98–107)
CHLORIDE SERPL-SCNC: 98 MMOL/L (ref 98–107)
CHLORIDE SERPL-SCNC: 98 MMOL/L (ref 98–107)
CHLORIDE SERPL-SCNC: 99 MMOL/L (ref 98–107)
CLARITY UR: ABNORMAL
CLARITY UR: CLEAR
CO2 SERPL-SCNC: 16 MMOL/L (ref 22–29)
CO2 SERPL-SCNC: 17 MMOL/L (ref 22–29)
CO2 SERPL-SCNC: 17 MMOL/L (ref 22–29)
CO2 SERPL-SCNC: 18 MMOL/L (ref 22–29)
CO2 SERPL-SCNC: 18 MMOL/L (ref 22–29)
CO2 SERPL-SCNC: 19 MMOL/L (ref 22–29)
CO2 SERPL-SCNC: 20 MMOL/L (ref 22–29)
CO2 SERPL-SCNC: 21 MMOL/L (ref 22–29)
CO2 SERPL-SCNC: 22 MMOL/L (ref 22–29)
CO2 SERPL-SCNC: 22 MMOL/L (ref 22–29)
CO2 SERPL-SCNC: 23 MMOL/L (ref 22–29)
CO2 SERPL-SCNC: 23 MMOL/L (ref 22–29)
CO2 SERPL-SCNC: 24 MMOL/L (ref 22–29)
CO2 SERPL-SCNC: 25 MMOL/L (ref 22–29)
CO2 SERPL-SCNC: 26 MMOL/L (ref 22–29)
CO2 SERPL-SCNC: 27 MMOL/L (ref 22–29)
CO2 SERPL-SCNC: 28 MMOL/L (ref 22–29)
CO2 SERPL-SCNC: 29 MMOL/L (ref 22–29)
CO2 SERPL-SCNC: 30 MMOL/L (ref 22–29)
CO2 SERPL-SCNC: 31 MMOL/L (ref 22–29)
CO2 SERPL-SCNC: 32 MMOL/L (ref 22–29)
CO2 SERPL-SCNC: 32 MMOL/L (ref 22–29)
CO2 SERPL-SCNC: 34 MMOL/L (ref 22–29)
COARSE GRAN CASTS URNS QL MICRO: ABNORMAL /LPF
COLOR UR: ABNORMAL
COLOR UR: YELLOW
COVID LABCORP PRIORITY: NORMAL
CRE SCREEN PCR: NOT DETECTED
CREAT SERPL-MCNC: 1.21 MG/DL (ref 0.76–1.27)
CREAT SERPL-MCNC: 1.35 MG/DL (ref 0.76–1.27)
CREAT SERPL-MCNC: 1.36 MG/DL (ref 0.76–1.27)
CREAT SERPL-MCNC: 1.46 MG/DL (ref 0.76–1.27)
CREAT SERPL-MCNC: 1.48 MG/DL (ref 0.76–1.27)
CREAT SERPL-MCNC: 1.51 MG/DL (ref 0.76–1.27)
CREAT SERPL-MCNC: 1.52 MG/DL (ref 0.76–1.27)
CREAT SERPL-MCNC: 1.57 MG/DL (ref 0.76–1.27)
CREAT SERPL-MCNC: 1.6 MG/DL (ref 0.76–1.27)
CREAT SERPL-MCNC: 1.64 MG/DL (ref 0.76–1.27)
CREAT SERPL-MCNC: 1.66 MG/DL (ref 0.76–1.27)
CREAT SERPL-MCNC: 1.67 MG/DL (ref 0.76–1.27)
CREAT SERPL-MCNC: 1.69 MG/DL (ref 0.76–1.27)
CREAT SERPL-MCNC: 1.72 MG/DL (ref 0.76–1.27)
CREAT SERPL-MCNC: 1.72 MG/DL (ref 0.76–1.27)
CREAT SERPL-MCNC: 1.75 MG/DL (ref 0.76–1.27)
CREAT SERPL-MCNC: 1.81 MG/DL (ref 0.76–1.27)
CREAT SERPL-MCNC: 1.83 MG/DL (ref 0.76–1.27)
CREAT SERPL-MCNC: 1.86 MG/DL (ref 0.76–1.27)
CREAT SERPL-MCNC: 1.87 MG/DL (ref 0.76–1.27)
CREAT SERPL-MCNC: 1.88 MG/DL (ref 0.76–1.27)
CREAT SERPL-MCNC: 1.96 MG/DL (ref 0.76–1.27)
CREAT SERPL-MCNC: 1.98 MG/DL (ref 0.76–1.27)
CREAT SERPL-MCNC: 1.99 MG/DL (ref 0.76–1.27)
CREAT SERPL-MCNC: 2.05 MG/DL (ref 0.76–1.27)
CREAT SERPL-MCNC: 2.08 MG/DL (ref 0.76–1.27)
CREAT SERPL-MCNC: 2.11 MG/DL (ref 0.76–1.27)
CREAT SERPL-MCNC: 2.16 MG/DL (ref 0.76–1.27)
CREAT SERPL-MCNC: 2.26 MG/DL (ref 0.76–1.27)
CREAT SERPL-MCNC: 2.28 MG/DL (ref 0.76–1.27)
CREAT SERPL-MCNC: 2.33 MG/DL (ref 0.76–1.27)
CREAT SERPL-MCNC: 2.36 MG/DL (ref 0.76–1.27)
CREAT SERPL-MCNC: 2.41 MG/DL (ref 0.76–1.27)
CREAT SERPL-MCNC: 2.41 MG/DL (ref 0.76–1.27)
CREAT SERPL-MCNC: 2.42 MG/DL (ref 0.76–1.27)
CREAT SERPL-MCNC: 2.46 MG/DL (ref 0.76–1.27)
CREAT SERPL-MCNC: 2.48 MG/DL (ref 0.76–1.27)
CREAT SERPL-MCNC: 2.5 MG/DL (ref 0.76–1.27)
CREAT SERPL-MCNC: 2.54 MG/DL (ref 0.76–1.27)
CREAT SERPL-MCNC: 2.54 MG/DL (ref 0.76–1.27)
CREAT SERPL-MCNC: 2.55 MG/DL (ref 0.76–1.27)
CREAT SERPL-MCNC: 2.58 MG/DL (ref 0.76–1.27)
CREAT SERPL-MCNC: 2.59 MG/DL (ref 0.76–1.27)
CREAT SERPL-MCNC: 2.59 MG/DL (ref 0.76–1.27)
CREAT SERPL-MCNC: 2.6 MG/DL (ref 0.76–1.27)
CREAT SERPL-MCNC: 2.76 MG/DL (ref 0.76–1.27)
CREAT UR-MCNC: 103.8 MG/DL
CROSSMATCH INTERPRETATION: NORMAL
CROSSMATCH INTERPRETATION: NORMAL
CRP SERPL-MCNC: 0.86 MG/DL (ref 0–0.5)
D-DIMER, QUANTITATIVE (MAD,POW, STR): 1748 NG/ML (FEU) (ref 0–470)
D-LACTATE SERPL-SCNC: 1 MMOL/L (ref 0.5–2)
D-LACTATE SERPL-SCNC: 1.1 MMOL/L (ref 0.5–2)
D-LACTATE SERPL-SCNC: 1.3 MMOL/L (ref 0.5–2)
D-LACTATE SERPL-SCNC: 1.3 MMOL/L (ref 0.5–2)
DEPRECATED RDW RBC AUTO: 37.3 FL (ref 37–54)
DEPRECATED RDW RBC AUTO: 38.6 FL (ref 37–54)
DEPRECATED RDW RBC AUTO: 38.7 FL (ref 37–54)
DEPRECATED RDW RBC AUTO: 38.9 FL (ref 37–54)
DEPRECATED RDW RBC AUTO: 39.4 FL (ref 37–54)
DEPRECATED RDW RBC AUTO: 41 FL (ref 37–54)
DEPRECATED RDW RBC AUTO: 41.1 FL (ref 37–54)
DEPRECATED RDW RBC AUTO: 41.9 FL (ref 37–54)
DEPRECATED RDW RBC AUTO: 42.5 FL (ref 37–54)
DEPRECATED RDW RBC AUTO: 42.7 FL (ref 37–54)
DEPRECATED RDW RBC AUTO: 43 FL (ref 37–54)
DEPRECATED RDW RBC AUTO: 45 FL (ref 37–54)
DEPRECATED RDW RBC AUTO: 45.8 FL (ref 37–54)
DEPRECATED RDW RBC AUTO: 45.9 FL (ref 37–54)
DEPRECATED RDW RBC AUTO: 46 FL (ref 37–54)
DEPRECATED RDW RBC AUTO: 46.4 FL (ref 37–54)
DEPRECATED RDW RBC AUTO: 46.7 FL (ref 37–54)
DEPRECATED RDW RBC AUTO: 46.8 FL (ref 37–54)
DEPRECATED RDW RBC AUTO: 46.9 FL (ref 37–54)
DEPRECATED RDW RBC AUTO: 46.9 FL (ref 37–54)
DEPRECATED RDW RBC AUTO: 47 FL (ref 37–54)
DEPRECATED RDW RBC AUTO: 47.1 FL (ref 37–54)
DEPRECATED RDW RBC AUTO: 47.3 FL (ref 37–54)
DEPRECATED RDW RBC AUTO: 47.8 FL (ref 37–54)
DEPRECATED RDW RBC AUTO: 47.9 FL (ref 37–54)
DEPRECATED RDW RBC AUTO: 48.2 FL (ref 37–54)
DEPRECATED RDW RBC AUTO: 48.8 FL (ref 37–54)
DEPRECATED RDW RBC AUTO: 50.3 FL (ref 37–54)
DEPRECATED RDW RBC AUTO: 50.8 FL (ref 37–54)
DEPRECATED RDW RBC AUTO: 51.5 FL (ref 37–54)
DEPRECATED RDW RBC AUTO: 51.6 FL (ref 37–54)
DEPRECATED RDW RBC AUTO: 51.7 FL (ref 37–54)
DEPRECATED RDW RBC AUTO: 53.6 FL (ref 37–54)
DEPRECATED RDW RBC AUTO: 53.8 FL (ref 37–54)
EOSINOPHIL # BLD AUTO: 0 10*3/MM3 (ref 0–0.4)
EOSINOPHIL # BLD AUTO: 0.01 10*3/MM3 (ref 0–0.4)
EOSINOPHIL # BLD AUTO: 0.02 10*3/MM3 (ref 0–0.4)
EOSINOPHIL # BLD AUTO: 0.02 10*3/MM3 (ref 0–0.4)
EOSINOPHIL # BLD AUTO: 0.03 10*3/MM3 (ref 0–0.4)
EOSINOPHIL # BLD AUTO: 0.04 10*3/MM3 (ref 0–0.4)
EOSINOPHIL # BLD AUTO: 0.05 10*3/MM3 (ref 0–0.4)
EOSINOPHIL # BLD AUTO: 0.05 10*3/MM3 (ref 0–0.4)
EOSINOPHIL # BLD AUTO: 0.06 10*3/MM3 (ref 0–0.4)
EOSINOPHIL # BLD AUTO: 0.07 10*3/MM3 (ref 0–0.4)
EOSINOPHIL # BLD AUTO: 0.08 10*3/MM3 (ref 0–0.4)
EOSINOPHIL # BLD AUTO: 0.1 10*3/MM3 (ref 0–0.4)
EOSINOPHIL # BLD AUTO: 0.11 10*3/MM3 (ref 0–0.4)
EOSINOPHIL # BLD AUTO: 0.12 10*3/MM3 (ref 0–0.4)
EOSINOPHIL # BLD AUTO: 0.12 10*3/MM3 (ref 0–0.4)
EOSINOPHIL # BLD AUTO: 0.13 10*3/MM3 (ref 0–0.4)
EOSINOPHIL # BLD AUTO: 0.22 10*3/MM3 (ref 0–0.4)
EOSINOPHIL # BLD AUTO: 0.31 10*3/MM3 (ref 0–0.4)
EOSINOPHIL # BLD AUTO: 0.32 10*3/MM3 (ref 0–0.4)
EOSINOPHIL # BLD AUTO: 0.34 10*3/MM3 (ref 0–0.4)
EOSINOPHIL # BLD AUTO: 0.41 10*3/MM3 (ref 0–0.4)
EOSINOPHIL # BLD AUTO: 0.57 10*3/MM3 (ref 0–0.4)
EOSINOPHIL # BLD AUTO: 0.58 10*3/MM3 (ref 0–0.4)
EOSINOPHIL # BLD AUTO: 0.58 10*3/MM3 (ref 0–0.4)
EOSINOPHIL # BLD AUTO: 0.59 10*3/MM3 (ref 0–0.4)
EOSINOPHIL # BLD AUTO: 0.65 10*3/MM3 (ref 0–0.4)
EOSINOPHIL # BLD AUTO: 0.66 10*3/MM3 (ref 0–0.4)
EOSINOPHIL # BLD AUTO: 0.7 10*3/MM3 (ref 0–0.4)
EOSINOPHIL # BLD AUTO: 0.83 10*3/MM3 (ref 0–0.4)
EOSINOPHIL # BLD AUTO: 0.86 10*3/MM3 (ref 0–0.4)
EOSINOPHIL # BLD AUTO: 1.33 10*3/MM3 (ref 0–0.4)
EOSINOPHIL # BLD AUTO: 1.34 10*3/MM3 (ref 0–0.4)
EOSINOPHIL # BLD AUTO: 1.46 10*3/MM3 (ref 0–0.4)
EOSINOPHIL NFR BLD AUTO: 0 % (ref 0.3–6.2)
EOSINOPHIL NFR BLD AUTO: 0 % (ref 0.3–6.2)
EOSINOPHIL NFR BLD AUTO: 0.1 % (ref 0.3–6.2)
EOSINOPHIL NFR BLD AUTO: 0.3 % (ref 0.3–6.2)
EOSINOPHIL NFR BLD AUTO: 0.4 % (ref 0.3–6.2)
EOSINOPHIL NFR BLD AUTO: 0.6 % (ref 0.3–6.2)
EOSINOPHIL NFR BLD AUTO: 0.6 % (ref 0.3–6.2)
EOSINOPHIL NFR BLD AUTO: 0.7 % (ref 0.3–6.2)
EOSINOPHIL NFR BLD AUTO: 0.8 % (ref 0.3–6.2)
EOSINOPHIL NFR BLD AUTO: 0.9 % (ref 0.3–6.2)
EOSINOPHIL NFR BLD AUTO: 1.8 % (ref 0.3–6.2)
EOSINOPHIL NFR BLD AUTO: 2.1 % (ref 0.3–6.2)
EOSINOPHIL NFR BLD AUTO: 2.5 % (ref 0.3–6.2)
EOSINOPHIL NFR BLD AUTO: 2.6 % (ref 0.3–6.2)
EOSINOPHIL NFR BLD AUTO: 2.9 % (ref 0.3–6.2)
EOSINOPHIL NFR BLD AUTO: 3.2 % (ref 0.3–6.2)
EOSINOPHIL NFR BLD AUTO: 4.6 % (ref 0.3–6.2)
EOSINOPHIL NFR BLD AUTO: 6.1 % (ref 0.3–6.2)
EOSINOPHIL NFR BLD AUTO: 6.2 % (ref 0.3–6.2)
EOSINOPHIL NFR BLD AUTO: 6.5 % (ref 0.3–6.2)
EOSINOPHIL NFR BLD AUTO: 6.6 % (ref 0.3–6.2)
EOSINOPHIL NFR BLD AUTO: 6.8 % (ref 0.3–6.2)
EOSINOPHIL NFR BLD AUTO: 7.1 % (ref 0.3–6.2)
EOSINOPHIL NFR BLD AUTO: 8.7 % (ref 0.3–6.2)
EOSINOPHIL NFR BLD AUTO: 8.9 % (ref 0.3–6.2)
EOSINOPHIL NFR BLD AUTO: 9 % (ref 0.3–6.2)
EOSINOPHIL NFR BLD AUTO: 9.7 % (ref 0.3–6.2)
EPAP: 10
EPAP: 10
ERYTHROCYTE [DISTWIDTH] IN BLOOD BY AUTOMATED COUNT: 11.8 % (ref 12.3–15.4)
ERYTHROCYTE [DISTWIDTH] IN BLOOD BY AUTOMATED COUNT: 11.9 % (ref 12.3–15.4)
ERYTHROCYTE [DISTWIDTH] IN BLOOD BY AUTOMATED COUNT: 12 % (ref 12.3–15.4)
ERYTHROCYTE [DISTWIDTH] IN BLOOD BY AUTOMATED COUNT: 12 % (ref 12.3–15.4)
ERYTHROCYTE [DISTWIDTH] IN BLOOD BY AUTOMATED COUNT: 12.1 % (ref 12.3–15.4)
ERYTHROCYTE [DISTWIDTH] IN BLOOD BY AUTOMATED COUNT: 12.4 % (ref 12.3–15.4)
ERYTHROCYTE [DISTWIDTH] IN BLOOD BY AUTOMATED COUNT: 12.4 % (ref 12.3–15.4)
ERYTHROCYTE [DISTWIDTH] IN BLOOD BY AUTOMATED COUNT: 12.9 % (ref 12.3–15.4)
ERYTHROCYTE [DISTWIDTH] IN BLOOD BY AUTOMATED COUNT: 12.9 % (ref 12.3–15.4)
ERYTHROCYTE [DISTWIDTH] IN BLOOD BY AUTOMATED COUNT: 13.1 % (ref 12.3–15.4)
ERYTHROCYTE [DISTWIDTH] IN BLOOD BY AUTOMATED COUNT: 13.2 % (ref 12.3–15.4)
ERYTHROCYTE [DISTWIDTH] IN BLOOD BY AUTOMATED COUNT: 13.4 % (ref 12.3–15.4)
ERYTHROCYTE [DISTWIDTH] IN BLOOD BY AUTOMATED COUNT: 13.4 % (ref 12.3–15.4)
ERYTHROCYTE [DISTWIDTH] IN BLOOD BY AUTOMATED COUNT: 13.5 % (ref 12.3–15.4)
ERYTHROCYTE [DISTWIDTH] IN BLOOD BY AUTOMATED COUNT: 13.8 % (ref 12.3–15.4)
ERYTHROCYTE [DISTWIDTH] IN BLOOD BY AUTOMATED COUNT: 13.8 % (ref 12.3–15.4)
ERYTHROCYTE [DISTWIDTH] IN BLOOD BY AUTOMATED COUNT: 13.9 % (ref 12.3–15.4)
ERYTHROCYTE [DISTWIDTH] IN BLOOD BY AUTOMATED COUNT: 13.9 % (ref 12.3–15.4)
ERYTHROCYTE [DISTWIDTH] IN BLOOD BY AUTOMATED COUNT: 14 % (ref 12.3–15.4)
ERYTHROCYTE [DISTWIDTH] IN BLOOD BY AUTOMATED COUNT: 14.1 % (ref 12.3–15.4)
ERYTHROCYTE [DISTWIDTH] IN BLOOD BY AUTOMATED COUNT: 14.4 % (ref 12.3–15.4)
ERYTHROCYTE [DISTWIDTH] IN BLOOD BY AUTOMATED COUNT: 14.6 % (ref 12.3–15.4)
ERYTHROCYTE [DISTWIDTH] IN BLOOD BY AUTOMATED COUNT: 14.8 % (ref 12.3–15.4)
ERYTHROCYTE [DISTWIDTH] IN BLOOD BY AUTOMATED COUNT: 14.9 % (ref 12.3–15.4)
ERYTHROCYTE [DISTWIDTH] IN BLOOD BY AUTOMATED COUNT: 15.1 % (ref 12.3–15.4)
ERYTHROCYTE [DISTWIDTH] IN BLOOD BY AUTOMATED COUNT: 15.1 % (ref 12.3–15.4)
ERYTHROCYTE [DISTWIDTH] IN BLOOD BY AUTOMATED COUNT: 15.6 % (ref 12.3–15.4)
ERYTHROCYTE [DISTWIDTH] IN BLOOD BY AUTOMATED COUNT: 16.2 % (ref 12.3–15.4)
ERYTHROCYTE [SEDIMENTATION RATE] IN BLOOD: 40 MM/HR (ref 0–15)
FLUAV H1 2009 PAND RNA NPH QL NAA+PROBE: NOT DETECTED
FLUAV H1 HA GENE NPH QL NAA+PROBE: NOT DETECTED
FLUAV H3 RNA NPH QL NAA+PROBE: NOT DETECTED
FLUAV SUBTYP SPEC NAA+PROBE: NOT DETECTED
FLUBV RNA ISLT QL NAA+PROBE: NOT DETECTED
FOLATE SERPL-MCNC: 13.6 NG/ML (ref 4.78–24.2)
GAS FLOW AIRWAY: 15 LPM
GFR SERPL CREATININE-BSD FRML MDRD: 24 ML/MIN/1.73
GFR SERPL CREATININE-BSD FRML MDRD: 25 ML/MIN/1.73
GFR SERPL CREATININE-BSD FRML MDRD: 26 ML/MIN/1.73
GFR SERPL CREATININE-BSD FRML MDRD: 27 ML/MIN/1.73
GFR SERPL CREATININE-BSD FRML MDRD: 28 ML/MIN/1.73
GFR SERPL CREATININE-BSD FRML MDRD: 29 ML/MIN/1.73
GFR SERPL CREATININE-BSD FRML MDRD: 30 ML/MIN/1.73
GFR SERPL CREATININE-BSD FRML MDRD: 30 ML/MIN/1.73
GFR SERPL CREATININE-BSD FRML MDRD: 32 ML/MIN/1.73
GFR SERPL CREATININE-BSD FRML MDRD: 32 ML/MIN/1.73
GFR SERPL CREATININE-BSD FRML MDRD: 33 ML/MIN/1.73
GFR SERPL CREATININE-BSD FRML MDRD: 34 ML/MIN/1.73
GFR SERPL CREATININE-BSD FRML MDRD: 34 ML/MIN/1.73
GFR SERPL CREATININE-BSD FRML MDRD: 35 ML/MIN/1.73
GFR SERPL CREATININE-BSD FRML MDRD: 37 ML/MIN/1.73
GFR SERPL CREATININE-BSD FRML MDRD: 37 ML/MIN/1.73
GFR SERPL CREATININE-BSD FRML MDRD: 38 ML/MIN/1.73
GFR SERPL CREATININE-BSD FRML MDRD: 38 ML/MIN/1.73
GFR SERPL CREATININE-BSD FRML MDRD: 39 ML/MIN/1.73
GFR SERPL CREATININE-BSD FRML MDRD: 40 ML/MIN/1.73
GFR SERPL CREATININE-BSD FRML MDRD: 41 ML/MIN/1.73
GFR SERPL CREATININE-BSD FRML MDRD: 41 ML/MIN/1.73
GFR SERPL CREATININE-BSD FRML MDRD: 42 ML/MIN/1.73
GFR SERPL CREATININE-BSD FRML MDRD: 42 ML/MIN/1.73
GFR SERPL CREATININE-BSD FRML MDRD: 43 ML/MIN/1.73
GFR SERPL CREATININE-BSD FRML MDRD: 43 ML/MIN/1.73
GFR SERPL CREATININE-BSD FRML MDRD: 45 ML/MIN/1.73
GFR SERPL CREATININE-BSD FRML MDRD: 46 ML/MIN/1.73
GFR SERPL CREATININE-BSD FRML MDRD: 47 ML/MIN/1.73
GFR SERPL CREATININE-BSD FRML MDRD: 48 ML/MIN/1.73
GFR SERPL CREATININE-BSD FRML MDRD: 49 ML/MIN/1.73
GFR SERPL CREATININE-BSD FRML MDRD: 50 ML/MIN/1.73
GFR SERPL CREATININE-BSD FRML MDRD: 54 ML/MIN/1.73
GFR SERPL CREATININE-BSD FRML MDRD: 54 ML/MIN/1.73
GFR SERPL CREATININE-BSD FRML MDRD: 62 ML/MIN/1.73
GLOBULIN UR ELPH-MCNC: 2.3 GM/DL
GLOBULIN UR ELPH-MCNC: 2.4 GM/DL
GLOBULIN UR ELPH-MCNC: 2.5 GM/DL
GLOBULIN UR ELPH-MCNC: 2.5 GM/DL
GLOBULIN UR ELPH-MCNC: 2.6 GM/DL
GLOBULIN UR ELPH-MCNC: 2.8 GM/DL
GLOBULIN UR ELPH-MCNC: 2.9 GM/DL
GLOBULIN UR ELPH-MCNC: 3 GM/DL
GLOBULIN UR ELPH-MCNC: 3 GM/DL
GLOBULIN UR ELPH-MCNC: 3.2 GM/DL
GLOBULIN UR ELPH-MCNC: 3.2 GM/DL
GLOBULIN UR ELPH-MCNC: 3.3 GM/DL
GLUCOSE BLDC GLUCOMTR-MCNC: 102 MG/DL (ref 70–130)
GLUCOSE BLDC GLUCOMTR-MCNC: 104 MG/DL (ref 70–130)
GLUCOSE BLDC GLUCOMTR-MCNC: 105 MG/DL (ref 70–130)
GLUCOSE BLDC GLUCOMTR-MCNC: 109 MG/DL (ref 70–130)
GLUCOSE BLDC GLUCOMTR-MCNC: 109 MG/DL (ref 70–130)
GLUCOSE BLDC GLUCOMTR-MCNC: 110 MG/DL (ref 70–130)
GLUCOSE BLDC GLUCOMTR-MCNC: 110 MG/DL (ref 70–130)
GLUCOSE BLDC GLUCOMTR-MCNC: 113 MG/DL (ref 70–130)
GLUCOSE BLDC GLUCOMTR-MCNC: 115 MG/DL (ref 70–130)
GLUCOSE BLDC GLUCOMTR-MCNC: 119 MG/DL (ref 70–130)
GLUCOSE BLDC GLUCOMTR-MCNC: 120 MG/DL (ref 70–130)
GLUCOSE BLDC GLUCOMTR-MCNC: 121 MG/DL (ref 70–130)
GLUCOSE BLDC GLUCOMTR-MCNC: 121 MG/DL (ref 70–130)
GLUCOSE BLDC GLUCOMTR-MCNC: 124 MG/DL (ref 70–130)
GLUCOSE BLDC GLUCOMTR-MCNC: 124 MG/DL (ref 70–130)
GLUCOSE BLDC GLUCOMTR-MCNC: 125 MG/DL (ref 70–130)
GLUCOSE BLDC GLUCOMTR-MCNC: 126 MG/DL (ref 70–130)
GLUCOSE BLDC GLUCOMTR-MCNC: 127 MG/DL (ref 70–130)
GLUCOSE BLDC GLUCOMTR-MCNC: 127 MG/DL (ref 70–130)
GLUCOSE BLDC GLUCOMTR-MCNC: 129 MG/DL (ref 70–130)
GLUCOSE BLDC GLUCOMTR-MCNC: 131 MG/DL (ref 70–130)
GLUCOSE BLDC GLUCOMTR-MCNC: 134 MG/DL (ref 70–130)
GLUCOSE BLDC GLUCOMTR-MCNC: 136 MG/DL (ref 70–130)
GLUCOSE BLDC GLUCOMTR-MCNC: 137 MG/DL (ref 70–130)
GLUCOSE BLDC GLUCOMTR-MCNC: 139 MG/DL (ref 70–130)
GLUCOSE BLDC GLUCOMTR-MCNC: 140 MG/DL (ref 70–130)
GLUCOSE BLDC GLUCOMTR-MCNC: 143 MG/DL (ref 70–130)
GLUCOSE BLDC GLUCOMTR-MCNC: 144 MG/DL (ref 70–130)
GLUCOSE BLDC GLUCOMTR-MCNC: 145 MG/DL (ref 70–130)
GLUCOSE BLDC GLUCOMTR-MCNC: 147 MG/DL (ref 70–130)
GLUCOSE BLDC GLUCOMTR-MCNC: 147 MG/DL (ref 70–130)
GLUCOSE BLDC GLUCOMTR-MCNC: 148 MG/DL (ref 70–130)
GLUCOSE BLDC GLUCOMTR-MCNC: 148 MG/DL (ref 70–130)
GLUCOSE BLDC GLUCOMTR-MCNC: 149 MG/DL (ref 70–130)
GLUCOSE BLDC GLUCOMTR-MCNC: 150 MG/DL (ref 70–130)
GLUCOSE BLDC GLUCOMTR-MCNC: 151 MG/DL (ref 70–130)
GLUCOSE BLDC GLUCOMTR-MCNC: 152 MG/DL (ref 70–130)
GLUCOSE BLDC GLUCOMTR-MCNC: 153 MG/DL (ref 70–130)
GLUCOSE BLDC GLUCOMTR-MCNC: 155 MG/DL (ref 70–130)
GLUCOSE BLDC GLUCOMTR-MCNC: 155 MG/DL (ref 70–130)
GLUCOSE BLDC GLUCOMTR-MCNC: 156 MG/DL (ref 70–130)
GLUCOSE BLDC GLUCOMTR-MCNC: 157 MG/DL (ref 70–130)
GLUCOSE BLDC GLUCOMTR-MCNC: 157 MG/DL (ref 70–130)
GLUCOSE BLDC GLUCOMTR-MCNC: 158 MG/DL (ref 70–130)
GLUCOSE BLDC GLUCOMTR-MCNC: 159 MG/DL (ref 70–130)
GLUCOSE BLDC GLUCOMTR-MCNC: 160 MG/DL (ref 70–130)
GLUCOSE BLDC GLUCOMTR-MCNC: 161 MG/DL (ref 70–130)
GLUCOSE BLDC GLUCOMTR-MCNC: 162 MG/DL (ref 70–130)
GLUCOSE BLDC GLUCOMTR-MCNC: 162 MG/DL (ref 70–130)
GLUCOSE BLDC GLUCOMTR-MCNC: 164 MG/DL (ref 70–130)
GLUCOSE BLDC GLUCOMTR-MCNC: 165 MG/DL (ref 70–130)
GLUCOSE BLDC GLUCOMTR-MCNC: 165 MG/DL (ref 70–130)
GLUCOSE BLDC GLUCOMTR-MCNC: 166 MG/DL (ref 70–130)
GLUCOSE BLDC GLUCOMTR-MCNC: 168 MG/DL (ref 70–130)
GLUCOSE BLDC GLUCOMTR-MCNC: 169 MG/DL (ref 70–130)
GLUCOSE BLDC GLUCOMTR-MCNC: 170 MG/DL (ref 70–130)
GLUCOSE BLDC GLUCOMTR-MCNC: 171 MG/DL (ref 70–130)
GLUCOSE BLDC GLUCOMTR-MCNC: 172 MG/DL (ref 70–130)
GLUCOSE BLDC GLUCOMTR-MCNC: 172 MG/DL (ref 70–130)
GLUCOSE BLDC GLUCOMTR-MCNC: 173 MG/DL (ref 70–130)
GLUCOSE BLDC GLUCOMTR-MCNC: 175 MG/DL (ref 70–130)
GLUCOSE BLDC GLUCOMTR-MCNC: 175 MG/DL (ref 70–130)
GLUCOSE BLDC GLUCOMTR-MCNC: 176 MG/DL (ref 70–130)
GLUCOSE BLDC GLUCOMTR-MCNC: 176 MG/DL (ref 70–130)
GLUCOSE BLDC GLUCOMTR-MCNC: 178 MG/DL (ref 70–130)
GLUCOSE BLDC GLUCOMTR-MCNC: 179 MG/DL (ref 70–130)
GLUCOSE BLDC GLUCOMTR-MCNC: 179 MG/DL (ref 70–130)
GLUCOSE BLDC GLUCOMTR-MCNC: 180 MG/DL (ref 70–130)
GLUCOSE BLDC GLUCOMTR-MCNC: 181 MG/DL (ref 70–130)
GLUCOSE BLDC GLUCOMTR-MCNC: 181 MG/DL (ref 70–130)
GLUCOSE BLDC GLUCOMTR-MCNC: 182 MG/DL (ref 70–130)
GLUCOSE BLDC GLUCOMTR-MCNC: 183 MG/DL (ref 70–130)
GLUCOSE BLDC GLUCOMTR-MCNC: 184 MG/DL (ref 70–130)
GLUCOSE BLDC GLUCOMTR-MCNC: 185 MG/DL (ref 70–130)
GLUCOSE BLDC GLUCOMTR-MCNC: 186 MG/DL (ref 70–130)
GLUCOSE BLDC GLUCOMTR-MCNC: 187 MG/DL (ref 70–130)
GLUCOSE BLDC GLUCOMTR-MCNC: 188 MG/DL (ref 70–130)
GLUCOSE BLDC GLUCOMTR-MCNC: 189 MG/DL (ref 70–130)
GLUCOSE BLDC GLUCOMTR-MCNC: 189 MG/DL (ref 70–130)
GLUCOSE BLDC GLUCOMTR-MCNC: 192 MG/DL (ref 70–130)
GLUCOSE BLDC GLUCOMTR-MCNC: 193 MG/DL (ref 70–130)
GLUCOSE BLDC GLUCOMTR-MCNC: 195 MG/DL (ref 70–130)
GLUCOSE BLDC GLUCOMTR-MCNC: 196 MG/DL (ref 70–130)
GLUCOSE BLDC GLUCOMTR-MCNC: 196 MG/DL (ref 70–130)
GLUCOSE BLDC GLUCOMTR-MCNC: 197 MG/DL (ref 70–130)
GLUCOSE BLDC GLUCOMTR-MCNC: 198 MG/DL (ref 70–130)
GLUCOSE BLDC GLUCOMTR-MCNC: 198 MG/DL (ref 70–130)
GLUCOSE BLDC GLUCOMTR-MCNC: 199 MG/DL (ref 70–130)
GLUCOSE BLDC GLUCOMTR-MCNC: 200 MG/DL (ref 70–130)
GLUCOSE BLDC GLUCOMTR-MCNC: 200 MG/DL (ref 70–130)
GLUCOSE BLDC GLUCOMTR-MCNC: 203 MG/DL (ref 70–130)
GLUCOSE BLDC GLUCOMTR-MCNC: 204 MG/DL (ref 70–130)
GLUCOSE BLDC GLUCOMTR-MCNC: 204 MG/DL (ref 70–130)
GLUCOSE BLDC GLUCOMTR-MCNC: 205 MG/DL (ref 70–130)
GLUCOSE BLDC GLUCOMTR-MCNC: 209 MG/DL (ref 70–130)
GLUCOSE BLDC GLUCOMTR-MCNC: 210 MG/DL (ref 70–130)
GLUCOSE BLDC GLUCOMTR-MCNC: 212 MG/DL (ref 70–130)
GLUCOSE BLDC GLUCOMTR-MCNC: 214 MG/DL (ref 70–130)
GLUCOSE BLDC GLUCOMTR-MCNC: 214 MG/DL (ref 70–130)
GLUCOSE BLDC GLUCOMTR-MCNC: 217 MG/DL (ref 70–130)
GLUCOSE BLDC GLUCOMTR-MCNC: 220 MG/DL (ref 70–130)
GLUCOSE BLDC GLUCOMTR-MCNC: 223 MG/DL (ref 70–130)
GLUCOSE BLDC GLUCOMTR-MCNC: 223 MG/DL (ref 70–130)
GLUCOSE BLDC GLUCOMTR-MCNC: 224 MG/DL (ref 70–130)
GLUCOSE BLDC GLUCOMTR-MCNC: 225 MG/DL (ref 70–130)
GLUCOSE BLDC GLUCOMTR-MCNC: 228 MG/DL (ref 70–130)
GLUCOSE BLDC GLUCOMTR-MCNC: 229 MG/DL (ref 70–130)
GLUCOSE BLDC GLUCOMTR-MCNC: 230 MG/DL (ref 70–130)
GLUCOSE BLDC GLUCOMTR-MCNC: 232 MG/DL (ref 70–130)
GLUCOSE BLDC GLUCOMTR-MCNC: 232 MG/DL (ref 70–130)
GLUCOSE BLDC GLUCOMTR-MCNC: 235 MG/DL (ref 70–130)
GLUCOSE BLDC GLUCOMTR-MCNC: 237 MG/DL (ref 70–130)
GLUCOSE BLDC GLUCOMTR-MCNC: 245 MG/DL (ref 70–130)
GLUCOSE BLDC GLUCOMTR-MCNC: 256 MG/DL (ref 70–130)
GLUCOSE BLDC GLUCOMTR-MCNC: 256 MG/DL (ref 70–130)
GLUCOSE BLDC GLUCOMTR-MCNC: 257 MG/DL (ref 70–130)
GLUCOSE BLDC GLUCOMTR-MCNC: 257 MG/DL (ref 70–130)
GLUCOSE BLDC GLUCOMTR-MCNC: 263 MG/DL (ref 70–130)
GLUCOSE BLDC GLUCOMTR-MCNC: 278 MG/DL (ref 70–130)
GLUCOSE BLDC GLUCOMTR-MCNC: 294 MG/DL (ref 70–130)
GLUCOSE BLDC GLUCOMTR-MCNC: 360 MG/DL (ref 70–130)
GLUCOSE BLDC GLUCOMTR-MCNC: 88 MG/DL (ref 70–130)
GLUCOSE BLDC GLUCOMTR-MCNC: 90 MG/DL (ref 70–130)
GLUCOSE BLDC GLUCOMTR-MCNC: 96 MG/DL (ref 70–130)
GLUCOSE SERPL-MCNC: 109 MG/DL (ref 65–99)
GLUCOSE SERPL-MCNC: 111 MG/DL (ref 65–99)
GLUCOSE SERPL-MCNC: 113 MG/DL (ref 65–99)
GLUCOSE SERPL-MCNC: 114 MG/DL (ref 65–99)
GLUCOSE SERPL-MCNC: 116 MG/DL (ref 65–99)
GLUCOSE SERPL-MCNC: 116 MG/DL (ref 65–99)
GLUCOSE SERPL-MCNC: 117 MG/DL (ref 65–99)
GLUCOSE SERPL-MCNC: 121 MG/DL (ref 65–99)
GLUCOSE SERPL-MCNC: 122 MG/DL (ref 65–99)
GLUCOSE SERPL-MCNC: 125 MG/DL (ref 65–99)
GLUCOSE SERPL-MCNC: 129 MG/DL (ref 65–99)
GLUCOSE SERPL-MCNC: 133 MG/DL (ref 65–99)
GLUCOSE SERPL-MCNC: 136 MG/DL (ref 65–99)
GLUCOSE SERPL-MCNC: 138 MG/DL (ref 65–99)
GLUCOSE SERPL-MCNC: 139 MG/DL (ref 65–99)
GLUCOSE SERPL-MCNC: 140 MG/DL (ref 65–99)
GLUCOSE SERPL-MCNC: 146 MG/DL (ref 65–99)
GLUCOSE SERPL-MCNC: 146 MG/DL (ref 65–99)
GLUCOSE SERPL-MCNC: 148 MG/DL (ref 65–99)
GLUCOSE SERPL-MCNC: 152 MG/DL (ref 65–99)
GLUCOSE SERPL-MCNC: 152 MG/DL (ref 65–99)
GLUCOSE SERPL-MCNC: 153 MG/DL (ref 65–99)
GLUCOSE SERPL-MCNC: 157 MG/DL (ref 65–99)
GLUCOSE SERPL-MCNC: 161 MG/DL (ref 65–99)
GLUCOSE SERPL-MCNC: 162 MG/DL (ref 65–99)
GLUCOSE SERPL-MCNC: 163 MG/DL (ref 65–99)
GLUCOSE SERPL-MCNC: 171 MG/DL (ref 65–99)
GLUCOSE SERPL-MCNC: 172 MG/DL (ref 65–99)
GLUCOSE SERPL-MCNC: 174 MG/DL (ref 65–99)
GLUCOSE SERPL-MCNC: 180 MG/DL (ref 65–99)
GLUCOSE SERPL-MCNC: 186 MG/DL (ref 65–99)
GLUCOSE SERPL-MCNC: 189 MG/DL (ref 65–99)
GLUCOSE SERPL-MCNC: 196 MG/DL (ref 65–99)
GLUCOSE SERPL-MCNC: 196 MG/DL (ref 65–99)
GLUCOSE SERPL-MCNC: 208 MG/DL (ref 65–99)
GLUCOSE SERPL-MCNC: 208 MG/DL (ref 65–99)
GLUCOSE SERPL-MCNC: 226 MG/DL (ref 65–99)
GLUCOSE SERPL-MCNC: 229 MG/DL (ref 65–99)
GLUCOSE SERPL-MCNC: 230 MG/DL (ref 65–99)
GLUCOSE SERPL-MCNC: 35 MG/DL (ref 65–99)
GLUCOSE SERPL-MCNC: 352 MG/DL (ref 65–99)
GLUCOSE SERPL-MCNC: 96 MG/DL (ref 65–99)
GLUCOSE SERPL-MCNC: 98 MG/DL (ref 65–99)
GLUCOSE UR STRIP-MCNC: NEGATIVE MG/DL
GRAM STN SPEC: ABNORMAL
GRAM STN SPEC: NORMAL
HADV DNA SPEC NAA+PROBE: NOT DETECTED
HANSEL STAIN: POSITIVE
HAPTOGLOB SERPL-MCNC: 262 MG/DL (ref 30–200)
HCO3 BLDA-SCNC: 14.6 MMOL/L (ref 20–26)
HCO3 BLDA-SCNC: 16.5 MMOL/L (ref 20–26)
HCO3 BLDA-SCNC: 17.6 MMOL/L (ref 20–26)
HCO3 BLDA-SCNC: 19.6 MMOL/L (ref 20–26)
HCO3 BLDA-SCNC: 20 MMOL/L (ref 20–26)
HCO3 BLDA-SCNC: 20.2 MMOL/L (ref 20–26)
HCO3 BLDA-SCNC: 21.6 MMOL/L (ref 20–26)
HCO3 BLDA-SCNC: 21.7 MMOL/L (ref 20–26)
HCO3 BLDA-SCNC: 21.7 MMOL/L (ref 20–26)
HCO3 BLDA-SCNC: 22.7 MMOL/L (ref 20–26)
HCO3 BLDA-SCNC: 23.4 MMOL/L (ref 20–26)
HCO3 BLDA-SCNC: 24.1 MMOL/L (ref 20–26)
HCO3 BLDA-SCNC: 25.2 MMOL/L (ref 20–26)
HCO3 BLDA-SCNC: 25.2 MMOL/L (ref 20–26)
HCO3 BLDA-SCNC: 25.7 MMOL/L (ref 20–26)
HCO3 BLDA-SCNC: 25.8 MMOL/L (ref 20–26)
HCO3 BLDA-SCNC: 26.2 MMOL/L (ref 20–26)
HCO3 BLDA-SCNC: 26.9 MMOL/L (ref 20–26)
HCO3 BLDA-SCNC: 27.1 MMOL/L (ref 20–26)
HCO3 BLDA-SCNC: 27.4 MMOL/L (ref 20–26)
HCO3 BLDA-SCNC: 27.7 MMOL/L (ref 20–26)
HCO3 BLDA-SCNC: 27.7 MMOL/L (ref 20–26)
HCO3 BLDA-SCNC: 28.2 MMOL/L (ref 20–26)
HCO3 BLDA-SCNC: 28.2 MMOL/L (ref 20–26)
HCO3 BLDA-SCNC: 28.3 MMOL/L (ref 20–26)
HCO3 BLDA-SCNC: 30.4 MMOL/L (ref 20–26)
HCO3 BLDA-SCNC: 32.1 MMOL/L (ref 20–26)
HCO3 BLDA-SCNC: 33.4 MMOL/L (ref 20–26)
HCOV 229E RNA SPEC QL NAA+PROBE: NOT DETECTED
HCOV HKU1 RNA SPEC QL NAA+PROBE: NOT DETECTED
HCOV NL63 RNA SPEC QL NAA+PROBE: NOT DETECTED
HCOV OC43 RNA SPEC QL NAA+PROBE: NOT DETECTED
HCT VFR BLD AUTO: 19.3 % (ref 37.5–51)
HCT VFR BLD AUTO: 19.7 % (ref 37.5–51)
HCT VFR BLD AUTO: 19.9 % (ref 37.5–51)
HCT VFR BLD AUTO: 21 % (ref 37.5–51)
HCT VFR BLD AUTO: 21 % (ref 37.5–51)
HCT VFR BLD AUTO: 21.1 % (ref 37.5–51)
HCT VFR BLD AUTO: 21.3 % (ref 37.5–51)
HCT VFR BLD AUTO: 21.4 % (ref 37.5–51)
HCT VFR BLD AUTO: 21.6 % (ref 37.5–51)
HCT VFR BLD AUTO: 22 % (ref 37.5–51)
HCT VFR BLD AUTO: 22.3 % (ref 37.5–51)
HCT VFR BLD AUTO: 22.4 % (ref 37.5–51)
HCT VFR BLD AUTO: 22.5 % (ref 37.5–51)
HCT VFR BLD AUTO: 22.7 % (ref 37.5–51)
HCT VFR BLD AUTO: 22.8 % (ref 37.5–51)
HCT VFR BLD AUTO: 22.8 % (ref 37.5–51)
HCT VFR BLD AUTO: 22.9 % (ref 37.5–51)
HCT VFR BLD AUTO: 22.9 % (ref 37.5–51)
HCT VFR BLD AUTO: 23.1 % (ref 37.5–51)
HCT VFR BLD AUTO: 23.2 % (ref 37.5–51)
HCT VFR BLD AUTO: 23.4 % (ref 37.5–51)
HCT VFR BLD AUTO: 23.4 % (ref 37.5–51)
HCT VFR BLD AUTO: 24.2 % (ref 37.5–51)
HCT VFR BLD AUTO: 24.3 % (ref 37.5–51)
HCT VFR BLD AUTO: 24.7 % (ref 37.5–51)
HCT VFR BLD AUTO: 25.1 % (ref 37.5–51)
HCT VFR BLD AUTO: 25.1 % (ref 37.5–51)
HCT VFR BLD AUTO: 25.2 % (ref 37.5–51)
HCT VFR BLD AUTO: 25.3 % (ref 37.5–51)
HCT VFR BLD AUTO: 25.4 % (ref 37.5–51)
HCT VFR BLD AUTO: 26 % (ref 37.5–51)
HCT VFR BLD AUTO: 26.2 % (ref 37.5–51)
HCT VFR BLD AUTO: 26.4 % (ref 37.5–51)
HCT VFR BLD AUTO: 26.5 % (ref 37.5–51)
HCT VFR BLD AUTO: 26.5 % (ref 37.5–51)
HCT VFR BLD AUTO: 26.7 % (ref 37.5–51)
HCT VFR BLD AUTO: 26.9 % (ref 37.5–51)
HCT VFR BLD AUTO: 27.1 % (ref 37.5–51)
HCT VFR BLD AUTO: 27.5 % (ref 37.5–51)
HCT VFR BLD AUTO: 27.6 % (ref 37.5–51)
HCT VFR BLD AUTO: 27.6 % (ref 37.5–51)
HCT VFR BLD AUTO: 27.7 % (ref 37.5–51)
HCT VFR BLD AUTO: 27.8 % (ref 37.5–51)
HCT VFR BLD AUTO: 28.7 % (ref 37.5–51)
HCT VFR BLD AUTO: 29.2 % (ref 37.5–51)
HCT VFR BLD AUTO: 29.6 % (ref 37.5–51)
HCT VFR BLD AUTO: 29.9 % (ref 37.5–51)
HCT VFR BLD AUTO: 29.9 % (ref 37.5–51)
HCT VFR BLD AUTO: 30.9 % (ref 37.5–51)
HEMOCCULT STL QL: NEGATIVE
HEMOCCULT STL QL: NEGATIVE
HEMOCCULT STL QL: POSITIVE
HGB BLD-MCNC: 10 G/DL (ref 13–17.7)
HGB BLD-MCNC: 10.1 G/DL (ref 13–17.7)
HGB BLD-MCNC: 10.1 G/DL (ref 13–17.7)
HGB BLD-MCNC: 10.6 G/DL (ref 13–17.7)
HGB BLD-MCNC: 6.8 G/DL (ref 13–17.7)
HGB BLD-MCNC: 6.9 G/DL (ref 13–17.7)
HGB BLD-MCNC: 7 G/DL (ref 13–17.7)
HGB BLD-MCNC: 7.1 G/DL (ref 13–17.7)
HGB BLD-MCNC: 7.1 G/DL (ref 13–17.7)
HGB BLD-MCNC: 7.2 G/DL (ref 13–17.7)
HGB BLD-MCNC: 7.3 G/DL (ref 13–17.7)
HGB BLD-MCNC: 7.4 G/DL (ref 13–17.7)
HGB BLD-MCNC: 7.5 G/DL (ref 13–17.7)
HGB BLD-MCNC: 7.5 G/DL (ref 13–17.7)
HGB BLD-MCNC: 7.6 G/DL (ref 13–17.7)
HGB BLD-MCNC: 7.7 G/DL (ref 13–17.7)
HGB BLD-MCNC: 7.8 G/DL (ref 13–17.7)
HGB BLD-MCNC: 7.9 G/DL (ref 13–17.7)
HGB BLD-MCNC: 7.9 G/DL (ref 13–17.7)
HGB BLD-MCNC: 8 G/DL (ref 13–17.7)
HGB BLD-MCNC: 8.1 G/DL (ref 13–17.7)
HGB BLD-MCNC: 8.2 G/DL (ref 13–17.7)
HGB BLD-MCNC: 8.3 G/DL (ref 13–17.7)
HGB BLD-MCNC: 8.5 G/DL (ref 13–17.7)
HGB BLD-MCNC: 8.5 G/DL (ref 13–17.7)
HGB BLD-MCNC: 8.6 G/DL (ref 13–17.7)
HGB BLD-MCNC: 8.6 G/DL (ref 13–17.7)
HGB BLD-MCNC: 8.7 G/DL (ref 13–17.7)
HGB BLD-MCNC: 8.9 G/DL (ref 13–17.7)
HGB BLD-MCNC: 9 G/DL (ref 13–17.7)
HGB BLD-MCNC: 9.2 G/DL (ref 13–17.7)
HGB BLD-MCNC: 9.2 G/DL (ref 13–17.7)
HGB BLD-MCNC: 9.3 G/DL (ref 13–17.7)
HGB BLD-MCNC: 9.5 G/DL (ref 13–17.7)
HGB BLD-MCNC: 9.6 G/DL (ref 13–17.7)
HGB BLD-MCNC: 9.6 G/DL (ref 13–17.7)
HGB BLD-MCNC: 9.9 G/DL (ref 13–17.7)
HGB UR QL STRIP.AUTO: ABNORMAL
HGB UR QL STRIP.AUTO: ABNORMAL
HGB UR QL STRIP.AUTO: NEGATIVE
HGB UR QL STRIP.AUTO: NEGATIVE
HMPV RNA NPH QL NAA+NON-PROBE: NOT DETECTED
HOLD SPECIMEN: NORMAL
HPIV1 RNA SPEC QL NAA+PROBE: NOT DETECTED
HPIV2 RNA SPEC QL NAA+PROBE: NOT DETECTED
HPIV3 RNA NPH QL NAA+PROBE: NOT DETECTED
HPIV4 P GENE NPH QL NAA+PROBE: NOT DETECTED
HYALINE CASTS UR QL AUTO: ABNORMAL /LPF
IMM GRANULOCYTES # BLD AUTO: 0.02 10*3/MM3 (ref 0–0.05)
IMM GRANULOCYTES # BLD AUTO: 0.02 10*3/MM3 (ref 0–0.05)
IMM GRANULOCYTES # BLD AUTO: 0.03 10*3/MM3 (ref 0–0.05)
IMM GRANULOCYTES # BLD AUTO: 0.04 10*3/MM3 (ref 0–0.05)
IMM GRANULOCYTES # BLD AUTO: 0.04 10*3/MM3 (ref 0–0.05)
IMM GRANULOCYTES # BLD AUTO: 0.05 10*3/MM3 (ref 0–0.05)
IMM GRANULOCYTES # BLD AUTO: 0.05 10*3/MM3 (ref 0–0.05)
IMM GRANULOCYTES # BLD AUTO: 0.06 10*3/MM3 (ref 0–0.05)
IMM GRANULOCYTES # BLD AUTO: 0.07 10*3/MM3 (ref 0–0.05)
IMM GRANULOCYTES # BLD AUTO: 0.09 10*3/MM3 (ref 0–0.05)
IMM GRANULOCYTES # BLD AUTO: 0.11 10*3/MM3 (ref 0–0.05)
IMM GRANULOCYTES # BLD AUTO: 0.12 10*3/MM3 (ref 0–0.05)
IMM GRANULOCYTES # BLD AUTO: 0.12 10*3/MM3 (ref 0–0.05)
IMM GRANULOCYTES # BLD AUTO: 0.13 10*3/MM3 (ref 0–0.05)
IMM GRANULOCYTES # BLD AUTO: 0.13 10*3/MM3 (ref 0–0.05)
IMM GRANULOCYTES # BLD AUTO: 0.16 10*3/MM3 (ref 0–0.05)
IMM GRANULOCYTES # BLD AUTO: 0.17 10*3/MM3 (ref 0–0.05)
IMM GRANULOCYTES # BLD AUTO: 0.19 10*3/MM3 (ref 0–0.05)
IMM GRANULOCYTES # BLD AUTO: 0.19 10*3/MM3 (ref 0–0.05)
IMM GRANULOCYTES # BLD AUTO: 0.24 10*3/MM3 (ref 0–0.05)
IMM GRANULOCYTES # BLD AUTO: 0.26 10*3/MM3 (ref 0–0.05)
IMM GRANULOCYTES # BLD AUTO: 0.26 10*3/MM3 (ref 0–0.05)
IMM GRANULOCYTES # BLD AUTO: 0.28 10*3/MM3 (ref 0–0.05)
IMM GRANULOCYTES # BLD AUTO: 0.32 10*3/MM3 (ref 0–0.05)
IMM GRANULOCYTES # BLD AUTO: 0.34 10*3/MM3 (ref 0–0.05)
IMM GRANULOCYTES # BLD AUTO: 0.38 10*3/MM3 (ref 0–0.05)
IMM GRANULOCYTES # BLD AUTO: 0.46 10*3/MM3 (ref 0–0.05)
IMM GRANULOCYTES # BLD AUTO: 0.48 10*3/MM3 (ref 0–0.05)
IMM GRANULOCYTES # BLD AUTO: 0.54 10*3/MM3 (ref 0–0.05)
IMM GRANULOCYTES NFR BLD AUTO: 0.2 % (ref 0–0.5)
IMM GRANULOCYTES NFR BLD AUTO: 0.4 % (ref 0–0.5)
IMM GRANULOCYTES NFR BLD AUTO: 0.5 % (ref 0–0.5)
IMM GRANULOCYTES NFR BLD AUTO: 0.6 % (ref 0–0.5)
IMM GRANULOCYTES NFR BLD AUTO: 0.7 % (ref 0–0.5)
IMM GRANULOCYTES NFR BLD AUTO: 0.9 % (ref 0–0.5)
IMM GRANULOCYTES NFR BLD AUTO: 1 % (ref 0–0.5)
IMM GRANULOCYTES NFR BLD AUTO: 1.1 % (ref 0–0.5)
IMM GRANULOCYTES NFR BLD AUTO: 1.2 % (ref 0–0.5)
IMM GRANULOCYTES NFR BLD AUTO: 1.7 % (ref 0–0.5)
IMM GRANULOCYTES NFR BLD AUTO: 1.8 % (ref 0–0.5)
IMM GRANULOCYTES NFR BLD AUTO: 2.1 % (ref 0–0.5)
IMM GRANULOCYTES NFR BLD AUTO: 2.9 % (ref 0–0.5)
IMM GRANULOCYTES NFR BLD AUTO: 3.1 % (ref 0–0.5)
IMP STRAIN: NOT DETECTED
INHALED O2 CONCENTRATION: 100 %
INHALED O2 CONCENTRATION: 30 %
INHALED O2 CONCENTRATION: 35 %
INHALED O2 CONCENTRATION: 40 %
INHALED O2 CONCENTRATION: 50 %
INHALED O2 CONCENTRATION: 60 %
INHALED O2 CONCENTRATION: 60 %
INHALED O2 CONCENTRATION: 80 %
IRON 24H UR-MRATE: 25 MCG/DL (ref 59–158)
IRON 24H UR-MRATE: 50 MCG/DL (ref 59–158)
IRON SATN MFR SERPL: 11 % (ref 20–50)
IRON SATN MFR SERPL: 27 % (ref 20–50)
KETONES UR QL STRIP: NEGATIVE
KPC STRAIN: NOT DETECTED
L PNEUMO1 AG UR QL IA: NEGATIVE
LEFT ATRIUM VOLUME INDEX: 42.3 ML/M2
LEFT ATRIUM VOLUME: 91.8 CM3
LEUKOCYTE ESTERASE UR QL STRIP.AUTO: ABNORMAL
LEUKOCYTE ESTERASE UR QL STRIP.AUTO: NEGATIVE
LYMPHOCYTES # BLD AUTO: 0.33 10*3/MM3 (ref 0.7–3.1)
LYMPHOCYTES # BLD AUTO: 0.38 10*3/MM3 (ref 0.7–3.1)
LYMPHOCYTES # BLD AUTO: 0.51 10*3/MM3 (ref 0.7–3.1)
LYMPHOCYTES # BLD AUTO: 0.66 10*3/MM3 (ref 0.7–3.1)
LYMPHOCYTES # BLD AUTO: 0.69 10*3/MM3 (ref 0.7–3.1)
LYMPHOCYTES # BLD AUTO: 0.7 10*3/MM3 (ref 0.7–3.1)
LYMPHOCYTES # BLD AUTO: 0.71 10*3/MM3 (ref 0.7–3.1)
LYMPHOCYTES # BLD AUTO: 0.73 10*3/MM3 (ref 0.7–3.1)
LYMPHOCYTES # BLD AUTO: 0.75 10*3/MM3 (ref 0.7–3.1)
LYMPHOCYTES # BLD AUTO: 0.8 10*3/MM3 (ref 0.7–3.1)
LYMPHOCYTES # BLD AUTO: 0.82 10*3/MM3 (ref 0.7–3.1)
LYMPHOCYTES # BLD AUTO: 0.86 10*3/MM3 (ref 0.7–3.1)
LYMPHOCYTES # BLD AUTO: 0.87 10*3/MM3 (ref 0.7–3.1)
LYMPHOCYTES # BLD AUTO: 0.96 10*3/MM3 (ref 0.7–3.1)
LYMPHOCYTES # BLD AUTO: 1.09 10*3/MM3 (ref 0.7–3.1)
LYMPHOCYTES # BLD AUTO: 1.1 10*3/MM3 (ref 0.7–3.1)
LYMPHOCYTES # BLD AUTO: 1.14 10*3/MM3 (ref 0.7–3.1)
LYMPHOCYTES # BLD AUTO: 1.45 10*3/MM3 (ref 0.7–3.1)
LYMPHOCYTES # BLD AUTO: 1.5 10*3/MM3 (ref 0.7–3.1)
LYMPHOCYTES # BLD AUTO: 1.52 10*3/MM3 (ref 0.7–3.1)
LYMPHOCYTES # BLD AUTO: 1.53 10*3/MM3 (ref 0.7–3.1)
LYMPHOCYTES # BLD AUTO: 1.57 10*3/MM3 (ref 0.7–3.1)
LYMPHOCYTES # BLD AUTO: 1.63 10*3/MM3 (ref 0.7–3.1)
LYMPHOCYTES # BLD AUTO: 1.64 10*3/MM3 (ref 0.7–3.1)
LYMPHOCYTES # BLD AUTO: 1.76 10*3/MM3 (ref 0.7–3.1)
LYMPHOCYTES # BLD AUTO: 1.8 10*3/MM3 (ref 0.7–3.1)
LYMPHOCYTES # BLD AUTO: 1.86 10*3/MM3 (ref 0.7–3.1)
LYMPHOCYTES # BLD AUTO: 1.95 10*3/MM3 (ref 0.7–3.1)
LYMPHOCYTES # BLD AUTO: 2.01 10*3/MM3 (ref 0.7–3.1)
LYMPHOCYTES # BLD AUTO: 2.07 10*3/MM3 (ref 0.7–3.1)
LYMPHOCYTES # BLD AUTO: 2.09 10*3/MM3 (ref 0.7–3.1)
LYMPHOCYTES # BLD AUTO: 2.13 10*3/MM3 (ref 0.7–3.1)
LYMPHOCYTES # BLD AUTO: 2.24 10*3/MM3 (ref 0.7–3.1)
LYMPHOCYTES # BLD AUTO: 2.3 10*3/MM3 (ref 0.7–3.1)
LYMPHOCYTES # BLD AUTO: 2.4 10*3/MM3 (ref 0.7–3.1)
LYMPHOCYTES # BLD MANUAL: 1.41 10*3/MM3 (ref 0.7–3.1)
LYMPHOCYTES NFR BLD AUTO: 10 % (ref 19.6–45.3)
LYMPHOCYTES NFR BLD AUTO: 10.5 % (ref 19.6–45.3)
LYMPHOCYTES NFR BLD AUTO: 11.2 % (ref 19.6–45.3)
LYMPHOCYTES NFR BLD AUTO: 12.3 % (ref 19.6–45.3)
LYMPHOCYTES NFR BLD AUTO: 13.7 % (ref 19.6–45.3)
LYMPHOCYTES NFR BLD AUTO: 13.9 % (ref 19.6–45.3)
LYMPHOCYTES NFR BLD AUTO: 14.4 % (ref 19.6–45.3)
LYMPHOCYTES NFR BLD AUTO: 15.1 % (ref 19.6–45.3)
LYMPHOCYTES NFR BLD AUTO: 15.6 % (ref 19.6–45.3)
LYMPHOCYTES NFR BLD AUTO: 16.2 % (ref 19.6–45.3)
LYMPHOCYTES NFR BLD AUTO: 16.7 % (ref 19.6–45.3)
LYMPHOCYTES NFR BLD AUTO: 17.3 % (ref 19.6–45.3)
LYMPHOCYTES NFR BLD AUTO: 17.6 % (ref 19.6–45.3)
LYMPHOCYTES NFR BLD AUTO: 2.6 % (ref 19.6–45.3)
LYMPHOCYTES NFR BLD AUTO: 21.2 % (ref 19.6–45.3)
LYMPHOCYTES NFR BLD AUTO: 22.6 % (ref 19.6–45.3)
LYMPHOCYTES NFR BLD AUTO: 3 % (ref 19.6–45.3)
LYMPHOCYTES NFR BLD AUTO: 3.9 % (ref 19.6–45.3)
LYMPHOCYTES NFR BLD AUTO: 4.1 % (ref 19.6–45.3)
LYMPHOCYTES NFR BLD AUTO: 4.1 % (ref 19.6–45.3)
LYMPHOCYTES NFR BLD AUTO: 4.2 % (ref 19.6–45.3)
LYMPHOCYTES NFR BLD AUTO: 4.2 % (ref 19.6–45.3)
LYMPHOCYTES NFR BLD AUTO: 4.5 % (ref 19.6–45.3)
LYMPHOCYTES NFR BLD AUTO: 4.9 % (ref 19.6–45.3)
LYMPHOCYTES NFR BLD AUTO: 5 % (ref 19.6–45.3)
LYMPHOCYTES NFR BLD AUTO: 5.2 % (ref 19.6–45.3)
LYMPHOCYTES NFR BLD AUTO: 5.2 % (ref 19.6–45.3)
LYMPHOCYTES NFR BLD AUTO: 5.3 % (ref 19.6–45.3)
LYMPHOCYTES NFR BLD AUTO: 5.7 % (ref 19.6–45.3)
LYMPHOCYTES NFR BLD AUTO: 5.8 % (ref 19.6–45.3)
LYMPHOCYTES NFR BLD AUTO: 6.4 % (ref 19.6–45.3)
LYMPHOCYTES NFR BLD AUTO: 7.2 % (ref 19.6–45.3)
LYMPHOCYTES NFR BLD AUTO: 7.7 % (ref 19.6–45.3)
LYMPHOCYTES NFR BLD AUTO: 8 % (ref 19.6–45.3)
LYMPHOCYTES NFR BLD AUTO: 9 % (ref 19.6–45.3)
LYMPHOCYTES NFR BLD MANUAL: 6.1 % (ref 5–12)
LYMPHOCYTES NFR BLD MANUAL: 8.1 % (ref 19.6–45.3)
Lab: ABNORMAL
Lab: NORMAL
M PNEUMO IGG SER IA-ACNC: NOT DETECTED
MAGNESIUM SERPL-MCNC: 1.6 MG/DL (ref 1.6–2.6)
MAGNESIUM SERPL-MCNC: 1.6 MG/DL (ref 1.6–2.6)
MAGNESIUM SERPL-MCNC: 1.7 MG/DL (ref 1.6–2.6)
MAGNESIUM SERPL-MCNC: 1.8 MG/DL (ref 1.6–2.6)
MAGNESIUM SERPL-MCNC: 1.9 MG/DL (ref 1.6–2.6)
MAGNESIUM SERPL-MCNC: 2.3 MG/DL (ref 1.6–2.6)
MAXIMAL PREDICTED HEART RATE: 162 BPM
MCH RBC QN AUTO: 30.2 PG (ref 26.6–33)
MCH RBC QN AUTO: 30.5 PG (ref 26.6–33)
MCH RBC QN AUTO: 30.7 PG (ref 26.6–33)
MCH RBC QN AUTO: 30.8 PG (ref 26.6–33)
MCH RBC QN AUTO: 30.9 PG (ref 26.6–33)
MCH RBC QN AUTO: 30.9 PG (ref 26.6–33)
MCH RBC QN AUTO: 31 PG (ref 26.6–33)
MCH RBC QN AUTO: 31.1 PG (ref 26.6–33)
MCH RBC QN AUTO: 31.2 PG (ref 26.6–33)
MCH RBC QN AUTO: 31.2 PG (ref 26.6–33)
MCH RBC QN AUTO: 31.3 PG (ref 26.6–33)
MCH RBC QN AUTO: 31.4 PG (ref 26.6–33)
MCH RBC QN AUTO: 31.5 PG (ref 26.6–33)
MCH RBC QN AUTO: 31.6 PG (ref 26.6–33)
MCH RBC QN AUTO: 31.7 PG (ref 26.6–33)
MCH RBC QN AUTO: 31.8 PG (ref 26.6–33)
MCH RBC QN AUTO: 32.5 PG (ref 26.6–33)
MCHC RBC AUTO-ENTMCNC: 31.9 G/DL (ref 31.5–35.7)
MCHC RBC AUTO-ENTMCNC: 32 G/DL (ref 31.5–35.7)
MCHC RBC AUTO-ENTMCNC: 32.5 G/DL (ref 31.5–35.7)
MCHC RBC AUTO-ENTMCNC: 33 G/DL (ref 31.5–35.7)
MCHC RBC AUTO-ENTMCNC: 33.1 G/DL (ref 31.5–35.7)
MCHC RBC AUTO-ENTMCNC: 33.2 G/DL (ref 31.5–35.7)
MCHC RBC AUTO-ENTMCNC: 33.3 G/DL (ref 31.5–35.7)
MCHC RBC AUTO-ENTMCNC: 33.3 G/DL (ref 31.5–35.7)
MCHC RBC AUTO-ENTMCNC: 33.4 G/DL (ref 31.5–35.7)
MCHC RBC AUTO-ENTMCNC: 33.4 G/DL (ref 31.5–35.7)
MCHC RBC AUTO-ENTMCNC: 33.5 G/DL (ref 31.5–35.7)
MCHC RBC AUTO-ENTMCNC: 33.5 G/DL (ref 31.5–35.7)
MCHC RBC AUTO-ENTMCNC: 33.7 G/DL (ref 31.5–35.7)
MCHC RBC AUTO-ENTMCNC: 33.7 G/DL (ref 31.5–35.7)
MCHC RBC AUTO-ENTMCNC: 33.8 G/DL (ref 31.5–35.7)
MCHC RBC AUTO-ENTMCNC: 33.9 G/DL (ref 31.5–35.7)
MCHC RBC AUTO-ENTMCNC: 34 G/DL (ref 31.5–35.7)
MCHC RBC AUTO-ENTMCNC: 34 G/DL (ref 31.5–35.7)
MCHC RBC AUTO-ENTMCNC: 34.1 G/DL (ref 31.5–35.7)
MCHC RBC AUTO-ENTMCNC: 34.2 G/DL (ref 31.5–35.7)
MCHC RBC AUTO-ENTMCNC: 34.3 G/DL (ref 31.5–35.7)
MCHC RBC AUTO-ENTMCNC: 34.3 G/DL (ref 31.5–35.7)
MCHC RBC AUTO-ENTMCNC: 34.4 G/DL (ref 31.5–35.7)
MCHC RBC AUTO-ENTMCNC: 34.4 G/DL (ref 31.5–35.7)
MCHC RBC AUTO-ENTMCNC: 34.5 G/DL (ref 31.5–35.7)
MCHC RBC AUTO-ENTMCNC: 34.5 G/DL (ref 31.5–35.7)
MCHC RBC AUTO-ENTMCNC: 34.7 G/DL (ref 31.5–35.7)
MCHC RBC AUTO-ENTMCNC: 34.7 G/DL (ref 31.5–35.7)
MCHC RBC AUTO-ENTMCNC: 35.2 G/DL (ref 31.5–35.7)
MCHC RBC AUTO-ENTMCNC: 36.4 G/DL (ref 31.5–35.7)
MCHC RBC AUTO-ENTMCNC: 36.5 G/DL (ref 31.5–35.7)
MCHC RBC AUTO-ENTMCNC: 36.8 G/DL (ref 31.5–35.7)
MCV RBC AUTO: 86.5 FL (ref 79–97)
MCV RBC AUTO: 86.8 FL (ref 79–97)
MCV RBC AUTO: 87.3 FL (ref 79–97)
MCV RBC AUTO: 87.5 FL (ref 79–97)
MCV RBC AUTO: 88.4 FL (ref 79–97)
MCV RBC AUTO: 88.9 FL (ref 79–97)
MCV RBC AUTO: 89.8 FL (ref 79–97)
MCV RBC AUTO: 90.4 FL (ref 79–97)
MCV RBC AUTO: 90.5 FL (ref 79–97)
MCV RBC AUTO: 90.6 FL (ref 79–97)
MCV RBC AUTO: 90.8 FL (ref 79–97)
MCV RBC AUTO: 90.9 FL (ref 79–97)
MCV RBC AUTO: 91 FL (ref 79–97)
MCV RBC AUTO: 91.1 FL (ref 79–97)
MCV RBC AUTO: 91.3 FL (ref 79–97)
MCV RBC AUTO: 91.3 FL (ref 79–97)
MCV RBC AUTO: 91.4 FL (ref 79–97)
MCV RBC AUTO: 91.5 FL (ref 79–97)
MCV RBC AUTO: 91.5 FL (ref 79–97)
MCV RBC AUTO: 91.6 FL (ref 79–97)
MCV RBC AUTO: 92.4 FL (ref 79–97)
MCV RBC AUTO: 92.9 FL (ref 79–97)
MCV RBC AUTO: 93.1 FL (ref 79–97)
MCV RBC AUTO: 93.1 FL (ref 79–97)
MCV RBC AUTO: 94 FL (ref 79–97)
MCV RBC AUTO: 94 FL (ref 79–97)
MCV RBC AUTO: 94.1 FL (ref 79–97)
MCV RBC AUTO: 94.1 FL (ref 79–97)
MCV RBC AUTO: 94.2 FL (ref 79–97)
MCV RBC AUTO: 94.6 FL (ref 79–97)
MCV RBC AUTO: 95.3 FL (ref 79–97)
MCV RBC AUTO: 95.4 FL (ref 79–97)
MCV RBC AUTO: 95.6 FL (ref 79–97)
MCV RBC AUTO: 96 FL (ref 79–97)
MCV RBC AUTO: 96.8 FL (ref 79–97)
MCV RBC AUTO: 97.9 FL (ref 79–97)
MODALITY: ABNORMAL
MONOCYTES # BLD AUTO: 0.42 10*3/MM3 (ref 0.1–0.9)
MONOCYTES # BLD AUTO: 0.51 10*3/MM3 (ref 0.1–0.9)
MONOCYTES # BLD AUTO: 0.81 10*3/MM3 (ref 0.1–0.9)
MONOCYTES # BLD AUTO: 0.87 10*3/MM3 (ref 0.1–0.9)
MONOCYTES # BLD AUTO: 0.91 10*3/MM3 (ref 0.1–0.9)
MONOCYTES # BLD AUTO: 0.94 10*3/MM3 (ref 0.1–0.9)
MONOCYTES # BLD AUTO: 0.98 10*3/MM3 (ref 0.1–0.9)
MONOCYTES # BLD AUTO: 1.02 10*3/MM3 (ref 0.1–0.9)
MONOCYTES # BLD AUTO: 1.06 10*3/MM3 (ref 0.1–0.9)
MONOCYTES # BLD AUTO: 1.12 10*3/MM3 (ref 0.1–0.9)
MONOCYTES # BLD AUTO: 1.12 10*3/MM3 (ref 0.1–0.9)
MONOCYTES # BLD AUTO: 1.13 10*3/MM3 (ref 0.1–0.9)
MONOCYTES # BLD AUTO: 1.15 10*3/MM3 (ref 0.1–0.9)
MONOCYTES # BLD AUTO: 1.17 10*3/MM3 (ref 0.1–0.9)
MONOCYTES # BLD AUTO: 1.18 10*3/MM3 (ref 0.1–0.9)
MONOCYTES # BLD AUTO: 1.23 10*3/MM3 (ref 0.1–0.9)
MONOCYTES # BLD AUTO: 1.33 10*3/MM3 (ref 0.1–0.9)
MONOCYTES # BLD AUTO: 1.34 10*3/MM3 (ref 0.1–0.9)
MONOCYTES # BLD AUTO: 1.49 10*3/MM3 (ref 0.1–0.9)
MONOCYTES # BLD AUTO: 1.49 10*3/MM3 (ref 0.1–0.9)
MONOCYTES # BLD AUTO: 1.51 10*3/MM3 (ref 0.1–0.9)
MONOCYTES # BLD AUTO: 1.53 10*3/MM3 (ref 0.1–0.9)
MONOCYTES # BLD AUTO: 1.57 10*3/MM3 (ref 0.1–0.9)
MONOCYTES # BLD AUTO: 1.65 10*3/MM3 (ref 0.1–0.9)
MONOCYTES # BLD AUTO: 1.67 10*3/MM3 (ref 0.1–0.9)
MONOCYTES # BLD AUTO: 1.68 10*3/MM3 (ref 0.1–0.9)
MONOCYTES # BLD AUTO: 1.72 10*3/MM3 (ref 0.1–0.9)
MONOCYTES # BLD AUTO: 1.76 10*3/MM3 (ref 0.1–0.9)
MONOCYTES # BLD AUTO: 1.82 10*3/MM3 (ref 0.1–0.9)
MONOCYTES # BLD AUTO: 1.84 10*3/MM3 (ref 0.1–0.9)
MONOCYTES # BLD AUTO: 1.96 10*3/MM3 (ref 0.1–0.9)
MONOCYTES # BLD AUTO: 2.08 10*3/MM3 (ref 0.1–0.9)
MONOCYTES # BLD AUTO: 2.08 10*3/MM3 (ref 0.1–0.9)
MONOCYTES # BLD AUTO: 2.09 10*3/MM3 (ref 0.1–0.9)
MONOCYTES # BLD AUTO: 2.72 10*3/MM3 (ref 0.1–0.9)
MONOCYTES # BLD AUTO: 3.25 10*3/MM3 (ref 0.1–0.9)
MONOCYTES NFR BLD AUTO: 10 % (ref 5–12)
MONOCYTES NFR BLD AUTO: 10.2 % (ref 5–12)
MONOCYTES NFR BLD AUTO: 10.3 % (ref 5–12)
MONOCYTES NFR BLD AUTO: 10.6 % (ref 5–12)
MONOCYTES NFR BLD AUTO: 10.9 % (ref 5–12)
MONOCYTES NFR BLD AUTO: 11.3 % (ref 5–12)
MONOCYTES NFR BLD AUTO: 11.3 % (ref 5–12)
MONOCYTES NFR BLD AUTO: 11.4 % (ref 5–12)
MONOCYTES NFR BLD AUTO: 11.5 % (ref 5–12)
MONOCYTES NFR BLD AUTO: 11.5 % (ref 5–12)
MONOCYTES NFR BLD AUTO: 11.9 % (ref 5–12)
MONOCYTES NFR BLD AUTO: 12.3 % (ref 5–12)
MONOCYTES NFR BLD AUTO: 13.3 % (ref 5–12)
MONOCYTES NFR BLD AUTO: 13.8 % (ref 5–12)
MONOCYTES NFR BLD AUTO: 13.8 % (ref 5–12)
MONOCYTES NFR BLD AUTO: 14.1 % (ref 5–12)
MONOCYTES NFR BLD AUTO: 2.6 % (ref 5–12)
MONOCYTES NFR BLD AUTO: 3 % (ref 5–12)
MONOCYTES NFR BLD AUTO: 5.9 % (ref 5–12)
MONOCYTES NFR BLD AUTO: 6.1 % (ref 5–12)
MONOCYTES NFR BLD AUTO: 6.2 % (ref 5–12)
MONOCYTES NFR BLD AUTO: 7.1 % (ref 5–12)
MONOCYTES NFR BLD AUTO: 7.5 % (ref 5–12)
MONOCYTES NFR BLD AUTO: 7.8 % (ref 5–12)
MONOCYTES NFR BLD AUTO: 8.2 % (ref 5–12)
MONOCYTES NFR BLD AUTO: 8.5 % (ref 5–12)
MONOCYTES NFR BLD AUTO: 9 % (ref 5–12)
MONOCYTES NFR BLD AUTO: 9.1 % (ref 5–12)
MONOCYTES NFR BLD AUTO: 9.2 % (ref 5–12)
MONOCYTES NFR BLD AUTO: 9.2 % (ref 5–12)
MONOCYTES NFR BLD AUTO: 9.5 % (ref 5–12)
MONOCYTES NFR BLD AUTO: 9.7 % (ref 5–12)
MONOCYTES NFR BLD AUTO: 9.9 % (ref 5–12)
MRSA DNA SPEC QL NAA+PROBE: NEGATIVE
MUCOUS THREADS URNS QL MICRO: ABNORMAL /HPF
NDM STRAIN: NOT DETECTED
NEUTROPHILS # BLD AUTO: 14.77 10*3/MM3 (ref 1.7–7)
NEUTROPHILS NFR BLD AUTO: 10.37 10*3/MM3 (ref 1.7–7)
NEUTROPHILS NFR BLD AUTO: 10.99 10*3/MM3 (ref 1.7–7)
NEUTROPHILS NFR BLD AUTO: 11.07 10*3/MM3 (ref 1.7–7)
NEUTROPHILS NFR BLD AUTO: 11.18 10*3/MM3 (ref 1.7–7)
NEUTROPHILS NFR BLD AUTO: 11.38 10*3/MM3 (ref 1.7–7)
NEUTROPHILS NFR BLD AUTO: 11.59 10*3/MM3 (ref 1.7–7)
NEUTROPHILS NFR BLD AUTO: 12.07 10*3/MM3 (ref 1.7–7)
NEUTROPHILS NFR BLD AUTO: 12.39 10*3/MM3 (ref 1.7–7)
NEUTROPHILS NFR BLD AUTO: 12.41 10*3/MM3 (ref 1.7–7)
NEUTROPHILS NFR BLD AUTO: 12.82 10*3/MM3 (ref 1.7–7)
NEUTROPHILS NFR BLD AUTO: 12.91 10*3/MM3 (ref 1.7–7)
NEUTROPHILS NFR BLD AUTO: 13.98 10*3/MM3 (ref 1.7–7)
NEUTROPHILS NFR BLD AUTO: 15.03 10*3/MM3 (ref 1.7–7)
NEUTROPHILS NFR BLD AUTO: 15.11 10*3/MM3 (ref 1.7–7)
NEUTROPHILS NFR BLD AUTO: 15.62 10*3/MM3 (ref 1.7–7)
NEUTROPHILS NFR BLD AUTO: 17.6 10*3/MM3 (ref 1.7–7)
NEUTROPHILS NFR BLD AUTO: 18.08 10*3/MM3 (ref 1.7–7)
NEUTROPHILS NFR BLD AUTO: 20.47 10*3/MM3 (ref 1.7–7)
NEUTROPHILS NFR BLD AUTO: 21.53 10*3/MM3 (ref 1.7–7)
NEUTROPHILS NFR BLD AUTO: 22.85 10*3/MM3 (ref 1.7–7)
NEUTROPHILS NFR BLD AUTO: 23.21 10*3/MM3 (ref 1.7–7)
NEUTROPHILS NFR BLD AUTO: 5.13 10*3/MM3 (ref 1.7–7)
NEUTROPHILS NFR BLD AUTO: 5.23 10*3/MM3 (ref 1.7–7)
NEUTROPHILS NFR BLD AUTO: 5.77 10*3/MM3 (ref 1.7–7)
NEUTROPHILS NFR BLD AUTO: 5.97 10*3/MM3 (ref 1.7–7)
NEUTROPHILS NFR BLD AUTO: 54.5 % (ref 42.7–76)
NEUTROPHILS NFR BLD AUTO: 60.2 % (ref 42.7–76)
NEUTROPHILS NFR BLD AUTO: 61.2 % (ref 42.7–76)
NEUTROPHILS NFR BLD AUTO: 61.3 % (ref 42.7–76)
NEUTROPHILS NFR BLD AUTO: 61.6 % (ref 42.7–76)
NEUTROPHILS NFR BLD AUTO: 62.3 % (ref 42.7–76)
NEUTROPHILS NFR BLD AUTO: 62.9 % (ref 42.7–76)
NEUTROPHILS NFR BLD AUTO: 62.9 % (ref 42.7–76)
NEUTROPHILS NFR BLD AUTO: 63.5 % (ref 42.7–76)
NEUTROPHILS NFR BLD AUTO: 66.3 % (ref 42.7–76)
NEUTROPHILS NFR BLD AUTO: 67.3 % (ref 42.7–76)
NEUTROPHILS NFR BLD AUTO: 7.13 10*3/MM3 (ref 1.7–7)
NEUTROPHILS NFR BLD AUTO: 7.18 10*3/MM3 (ref 1.7–7)
NEUTROPHILS NFR BLD AUTO: 71.8 % (ref 42.7–76)
NEUTROPHILS NFR BLD AUTO: 72.9 % (ref 42.7–76)
NEUTROPHILS NFR BLD AUTO: 75.9 % (ref 42.7–76)
NEUTROPHILS NFR BLD AUTO: 76.3 % (ref 42.7–76)
NEUTROPHILS NFR BLD AUTO: 78.1 % (ref 42.7–76)
NEUTROPHILS NFR BLD AUTO: 79.5 % (ref 42.7–76)
NEUTROPHILS NFR BLD AUTO: 79.7 % (ref 42.7–76)
NEUTROPHILS NFR BLD AUTO: 8.09 10*3/MM3 (ref 1.7–7)
NEUTROPHILS NFR BLD AUTO: 8.34 10*3/MM3 (ref 1.7–7)
NEUTROPHILS NFR BLD AUTO: 8.34 10*3/MM3 (ref 1.7–7)
NEUTROPHILS NFR BLD AUTO: 8.47 10*3/MM3 (ref 1.7–7)
NEUTROPHILS NFR BLD AUTO: 80.4 % (ref 42.7–76)
NEUTROPHILS NFR BLD AUTO: 80.9 % (ref 42.7–76)
NEUTROPHILS NFR BLD AUTO: 81.9 % (ref 42.7–76)
NEUTROPHILS NFR BLD AUTO: 82.3 % (ref 42.7–76)
NEUTROPHILS NFR BLD AUTO: 83.3 % (ref 42.7–76)
NEUTROPHILS NFR BLD AUTO: 83.4 % (ref 42.7–76)
NEUTROPHILS NFR BLD AUTO: 84.3 % (ref 42.7–76)
NEUTROPHILS NFR BLD AUTO: 85.7 % (ref 42.7–76)
NEUTROPHILS NFR BLD AUTO: 85.8 % (ref 42.7–76)
NEUTROPHILS NFR BLD AUTO: 85.9 % (ref 42.7–76)
NEUTROPHILS NFR BLD AUTO: 86.1 % (ref 42.7–76)
NEUTROPHILS NFR BLD AUTO: 87.1 % (ref 42.7–76)
NEUTROPHILS NFR BLD AUTO: 87.6 % (ref 42.7–76)
NEUTROPHILS NFR BLD AUTO: 88.9 % (ref 42.7–76)
NEUTROPHILS NFR BLD AUTO: 89.7 % (ref 42.7–76)
NEUTROPHILS NFR BLD AUTO: 9.04 10*3/MM3 (ref 1.7–7)
NEUTROPHILS NFR BLD AUTO: 9.05 10*3/MM3 (ref 1.7–7)
NEUTROPHILS NFR BLD AUTO: 9.21 10*3/MM3 (ref 1.7–7)
NEUTROPHILS NFR BLD AUTO: 9.9 10*3/MM3 (ref 1.7–7)
NEUTROPHILS NFR BLD AUTO: 90.5 % (ref 42.7–76)
NEUTROPHILS NFR BLD AUTO: 90.8 % (ref 42.7–76)
NEUTROPHILS NFR BLD MANUAL: 84.8 % (ref 42.7–76)
NITRITE UR QL STRIP: NEGATIVE
NOTIFIED BY: ABNORMAL
NOTIFIED WHO: ABNORMAL
NRBC BLD AUTO-RTO: 0 /100 WBC (ref 0–0.2)
NRBC BLD AUTO-RTO: 0.1 /100 WBC (ref 0–0.2)
NT-PROBNP SERPL-MCNC: 6805 PG/ML (ref 0–900)
NT-PROBNP SERPL-MCNC: ABNORMAL PG/ML (ref 0–900)
OSMOLALITY SERPL: 257 MOSM/KG (ref 289–308)
OSMOLALITY UR: 269 MOSM/KG (ref 601–850)
OXA 48 STRAIN: NOT DETECTED
PAW @ PEAK INSP FLOW SETTING VENT: 18 CMH2O
PAW @ PEAK INSP FLOW SETTING VENT: 21 CMH2O
PAW @ PEAK INSP FLOW SETTING VENT: 25 CMH2O
PAW @ PEAK INSP FLOW SETTING VENT: 29 CMH2O
PCO2 BLDA: 32.8 MM HG (ref 35–45)
PCO2 BLDA: 34.6 MM HG (ref 35–45)
PCO2 BLDA: 36.1 MM HG (ref 35–45)
PCO2 BLDA: 36.2 MM HG (ref 35–45)
PCO2 BLDA: 36.4 MM HG (ref 35–45)
PCO2 BLDA: 36.4 MM HG (ref 35–45)
PCO2 BLDA: 36.8 MM HG (ref 35–45)
PCO2 BLDA: 38.7 MM HG (ref 35–45)
PCO2 BLDA: 41.6 MM HG (ref 35–45)
PCO2 BLDA: 41.8 MM HG (ref 35–45)
PCO2 BLDA: 41.8 MM HG (ref 35–45)
PCO2 BLDA: 41.9 MM HG (ref 35–45)
PCO2 BLDA: 41.9 MM HG (ref 35–45)
PCO2 BLDA: 43.7 MM HG (ref 35–45)
PCO2 BLDA: 44 MM HG (ref 35–45)
PCO2 BLDA: 45.3 MM HG (ref 35–45)
PCO2 BLDA: 45.4 MM HG (ref 35–45)
PCO2 BLDA: 47 MM HG (ref 35–45)
PCO2 BLDA: 47.9 MM HG (ref 35–45)
PCO2 BLDA: 48.5 MM HG (ref 35–45)
PCO2 BLDA: 50.4 MM HG (ref 35–45)
PCO2 BLDA: 51.9 MM HG (ref 35–45)
PCO2 BLDA: 51.9 MM HG (ref 35–45)
PCO2 BLDA: 52.2 MM HG (ref 35–45)
PCO2 BLDA: 54.3 MM HG (ref 35–45)
PCO2 BLDA: 56 MM HG (ref 35–45)
PCO2 BLDA: 56.9 MM HG (ref 35–45)
PCO2 BLDA: 59.8 MM HG (ref 35–45)
PCO2 TEMP ADJ BLD: 34.6 MM HG (ref 35–45)
PCO2 TEMP ADJ BLD: 36.1 MM HG (ref 35–45)
PCO2 TEMP ADJ BLD: 36.2 MM HG (ref 35–45)
PCO2 TEMP ADJ BLD: 36.4 MM HG (ref 35–45)
PCO2 TEMP ADJ BLD: 36.4 MM HG (ref 35–45)
PCO2 TEMP ADJ BLD: 36.8 MM HG (ref 35–45)
PCO2 TEMP ADJ BLD: 38.7 MM HG (ref 35–45)
PCO2 TEMP ADJ BLD: 41.8 MM HG (ref 35–45)
PCO2 TEMP ADJ BLD: 41.8 MM HG (ref 35–45)
PCO2 TEMP ADJ BLD: 41.9 MM HG (ref 35–45)
PCO2 TEMP ADJ BLD: 41.9 MM HG (ref 35–45)
PCO2 TEMP ADJ BLD: 44 MM HG (ref 35–45)
PCO2 TEMP ADJ BLD: 45.3 MM HG (ref 35–45)
PCO2 TEMP ADJ BLD: 45.4 MM HG (ref 35–45)
PCO2 TEMP ADJ BLD: 47.9 MM HG (ref 35–45)
PCO2 TEMP ADJ BLD: 48.5 MM HG (ref 35–45)
PCO2 TEMP ADJ BLD: 50.4 MM HG (ref 35–45)
PCO2 TEMP ADJ BLD: 51.9 MM HG (ref 35–45)
PCO2 TEMP ADJ BLD: 52.2 MM HG (ref 35–45)
PCO2 TEMP ADJ BLD: 54.3 MM HG (ref 35–45)
PCO2 TEMP ADJ BLD: 56.9 MM HG (ref 35–45)
PCO2 TEMP ADJ BLD: 59.8 MM HG (ref 35–45)
PEEP RESPIRATORY: 10 CM[H2O]
PEEP RESPIRATORY: 10 CM[H2O]
PEEP RESPIRATORY: 15 CM[H2O]
PEEP RESPIRATORY: 5 CM[H2O]
PH BLDA: 7.07 PH UNITS (ref 7.35–7.45)
PH BLDA: 7.18 PH UNITS (ref 7.35–7.45)
PH BLDA: 7.2 PH UNITS (ref 7.35–7.45)
PH BLDA: 7.23 PH UNITS (ref 7.35–7.45)
PH BLDA: 7.26 PH UNITS (ref 7.35–7.45)
PH BLDA: 7.26 PH UNITS (ref 7.35–7.45)
PH BLDA: 7.28 PH UNITS (ref 7.35–7.45)
PH BLDA: 7.29 PH UNITS (ref 7.35–7.45)
PH BLDA: 7.29 PH UNITS (ref 7.35–7.45)
PH BLDA: 7.33 PH UNITS (ref 7.35–7.45)
PH BLDA: 7.35 PH UNITS (ref 7.35–7.45)
PH BLDA: 7.36 PH UNITS (ref 7.35–7.45)
PH BLDA: 7.37 PH UNITS (ref 7.35–7.45)
PH BLDA: 7.38 PH UNITS (ref 7.35–7.45)
PH BLDA: 7.39 PH UNITS (ref 7.35–7.45)
PH BLDA: 7.41 PH UNITS (ref 7.35–7.45)
PH BLDA: 7.42 PH UNITS (ref 7.35–7.45)
PH BLDA: 7.43 PH UNITS (ref 7.35–7.45)
PH BLDA: 7.44 PH UNITS (ref 7.35–7.45)
PH BLDA: 7.46 PH UNITS (ref 7.35–7.45)
PH BLDA: 7.49 PH UNITS (ref 7.35–7.45)
PH BLDA: 7.51 PH UNITS (ref 7.35–7.45)
PH UR STRIP.AUTO: <=5 [PH] (ref 5–8)
PH, TEMP CORRECTED: 7.07 PH UNITS (ref 7.35–7.45)
PH, TEMP CORRECTED: 7.26 PH UNITS (ref 7.35–7.45)
PH, TEMP CORRECTED: 7.26 PH UNITS (ref 7.35–7.45)
PH, TEMP CORRECTED: 7.28 PH UNITS (ref 7.35–7.45)
PH, TEMP CORRECTED: 7.29 PH UNITS (ref 7.35–7.45)
PH, TEMP CORRECTED: 7.29 PH UNITS (ref 7.35–7.45)
PH, TEMP CORRECTED: 7.33 PH UNITS (ref 7.35–7.45)
PH, TEMP CORRECTED: 7.35 PH UNITS (ref 7.35–7.45)
PH, TEMP CORRECTED: 7.37 PH UNITS (ref 7.35–7.45)
PH, TEMP CORRECTED: 7.38 PH UNITS (ref 7.35–7.45)
PH, TEMP CORRECTED: 7.38 PH UNITS (ref 7.35–7.45)
PH, TEMP CORRECTED: 7.39 PH UNITS (ref 7.35–7.45)
PH, TEMP CORRECTED: 7.41 PH UNITS (ref 7.35–7.45)
PH, TEMP CORRECTED: 7.42 PH UNITS (ref 7.35–7.45)
PH, TEMP CORRECTED: 7.43 PH UNITS (ref 7.35–7.45)
PH, TEMP CORRECTED: 7.44 PH UNITS (ref 7.35–7.45)
PH, TEMP CORRECTED: 7.46 PH UNITS (ref 7.35–7.45)
PH, TEMP CORRECTED: 7.49 PH UNITS (ref 7.35–7.45)
PH, TEMP CORRECTED: 7.51 PH UNITS (ref 7.35–7.45)
PHOSPHATE SERPL-MCNC: 2.5 MG/DL (ref 2.5–4.5)
PHOSPHATE SERPL-MCNC: 4.7 MG/DL (ref 2.5–4.5)
PHOSPHATE SERPL-MCNC: 4.9 MG/DL (ref 2.5–4.5)
PHOSPHATE SERPL-MCNC: 6 MG/DL (ref 2.5–4.5)
PLAT MORPH BLD: NORMAL
PLATELET # BLD AUTO: 163 10*3/MM3 (ref 140–450)
PLATELET # BLD AUTO: 172 10*3/MM3 (ref 140–450)
PLATELET # BLD AUTO: 178 10*3/MM3 (ref 140–450)
PLATELET # BLD AUTO: 209 10*3/MM3 (ref 140–450)
PLATELET # BLD AUTO: 214 10*3/MM3 (ref 140–450)
PLATELET # BLD AUTO: 233 10*3/MM3 (ref 140–450)
PLATELET # BLD AUTO: 238 10*3/MM3 (ref 140–450)
PLATELET # BLD AUTO: 245 10*3/MM3 (ref 140–450)
PLATELET # BLD AUTO: 245 10*3/MM3 (ref 140–450)
PLATELET # BLD AUTO: 256 10*3/MM3 (ref 140–450)
PLATELET # BLD AUTO: 259 10*3/MM3 (ref 140–450)
PLATELET # BLD AUTO: 262 10*3/MM3 (ref 140–450)
PLATELET # BLD AUTO: 268 10*3/MM3 (ref 140–450)
PLATELET # BLD AUTO: 285 10*3/MM3 (ref 140–450)
PLATELET # BLD AUTO: 287 10*3/MM3 (ref 140–450)
PLATELET # BLD AUTO: 289 10*3/MM3 (ref 140–450)
PLATELET # BLD AUTO: 294 10*3/MM3 (ref 140–450)
PLATELET # BLD AUTO: 304 10*3/MM3 (ref 140–450)
PLATELET # BLD AUTO: 308 10*3/MM3 (ref 140–450)
PLATELET # BLD AUTO: 313 10*3/MM3 (ref 140–450)
PLATELET # BLD AUTO: 313 10*3/MM3 (ref 140–450)
PLATELET # BLD AUTO: 316 10*3/MM3 (ref 140–450)
PLATELET # BLD AUTO: 320 10*3/MM3 (ref 140–450)
PLATELET # BLD AUTO: 321 10*3/MM3 (ref 140–450)
PLATELET # BLD AUTO: 334 10*3/MM3 (ref 140–450)
PLATELET # BLD AUTO: 342 10*3/MM3 (ref 140–450)
PLATELET # BLD AUTO: 342 10*3/MM3 (ref 140–450)
PLATELET # BLD AUTO: 346 10*3/MM3 (ref 140–450)
PLATELET # BLD AUTO: 352 10*3/MM3 (ref 140–450)
PLATELET # BLD AUTO: 358 10*3/MM3 (ref 140–450)
PLATELET # BLD AUTO: 358 10*3/MM3 (ref 140–450)
PLATELET # BLD AUTO: 362 10*3/MM3 (ref 140–450)
PLATELET # BLD AUTO: 364 10*3/MM3 (ref 140–450)
PLATELET # BLD AUTO: 375 10*3/MM3 (ref 140–450)
PLATELET # BLD AUTO: 375 10*3/MM3 (ref 140–450)
PLATELET # BLD AUTO: 417 10*3/MM3 (ref 140–450)
PMV BLD AUTO: 10 FL (ref 6–12)
PMV BLD AUTO: 10.1 FL (ref 6–12)
PMV BLD AUTO: 10.2 FL (ref 6–12)
PMV BLD AUTO: 10.3 FL (ref 6–12)
PMV BLD AUTO: 10.4 FL (ref 6–12)
PMV BLD AUTO: 10.4 FL (ref 6–12)
PMV BLD AUTO: 10.6 FL (ref 6–12)
PMV BLD AUTO: 10.7 FL (ref 6–12)
PMV BLD AUTO: 11.2 FL (ref 6–12)
PMV BLD AUTO: 11.3 FL (ref 6–12)
PMV BLD AUTO: 11.4 FL (ref 6–12)
PMV BLD AUTO: 11.4 FL (ref 6–12)
PMV BLD AUTO: 11.5 FL (ref 6–12)
PMV BLD AUTO: 11.5 FL (ref 6–12)
PMV BLD AUTO: 11.6 FL (ref 6–12)
PMV BLD AUTO: 11.7 FL (ref 6–12)
PMV BLD AUTO: 12 FL (ref 6–12)
PMV BLD AUTO: 9.6 FL (ref 6–12)
PMV BLD AUTO: 9.7 FL (ref 6–12)
PMV BLD AUTO: 9.7 FL (ref 6–12)
PMV BLD AUTO: 9.8 FL (ref 6–12)
PMV BLD AUTO: 9.9 FL (ref 6–12)
PMV BLD AUTO: 9.9 FL (ref 6–12)
PO2 BLDA: 121 MM HG (ref 83–108)
PO2 BLDA: 126 MM HG (ref 83–108)
PO2 BLDA: 133 MM HG (ref 83–108)
PO2 BLDA: 150 MM HG (ref 83–108)
PO2 BLDA: 153 MM HG (ref 83–108)
PO2 BLDA: 170 MM HG (ref 83–108)
PO2 BLDA: 230 MM HG (ref 83–108)
PO2 BLDA: 272 MM HG (ref 83–108)
PO2 BLDA: 46.6 MM HG (ref 83–108)
PO2 BLDA: 49.3 MM HG (ref 83–108)
PO2 BLDA: 52.2 MM HG (ref 83–108)
PO2 BLDA: 59 MM HG (ref 83–108)
PO2 BLDA: 59.9 MM HG (ref 83–108)
PO2 BLDA: 65.6 MM HG (ref 83–108)
PO2 BLDA: 67 MM HG (ref 83–108)
PO2 BLDA: 67.2 MM HG (ref 83–108)
PO2 BLDA: 70.8 MM HG (ref 83–108)
PO2 BLDA: 75.2 MM HG (ref 83–108)
PO2 BLDA: 76 MM HG (ref 83–108)
PO2 BLDA: 78.4 MM HG (ref 83–108)
PO2 BLDA: 81.5 MM HG (ref 83–108)
PO2 BLDA: 81.6 MM HG (ref 83–108)
PO2 BLDA: 82.7 MM HG (ref 83–108)
PO2 BLDA: 92.7 MM HG (ref 83–108)
PO2 BLDA: 94.4 MM HG (ref 83–108)
PO2 BLDA: 97.2 MM HG (ref 83–108)
PO2 BLDA: 97.2 MM HG (ref 83–108)
PO2 BLDA: 97.5 MM HG (ref 83–108)
PO2 TEMP ADJ BLD: 133 MM HG (ref 83–108)
PO2 TEMP ADJ BLD: 150 MM HG (ref 83–108)
PO2 TEMP ADJ BLD: 153 MM HG (ref 83–108)
PO2 TEMP ADJ BLD: 170 MM HG (ref 83–108)
PO2 TEMP ADJ BLD: 272 MM HG (ref 83–108)
PO2 TEMP ADJ BLD: 46.6 MM HG (ref 83–108)
PO2 TEMP ADJ BLD: 49.3 MM HG (ref 83–108)
PO2 TEMP ADJ BLD: 52.2 MM HG (ref 83–108)
PO2 TEMP ADJ BLD: 59 MM HG (ref 83–108)
PO2 TEMP ADJ BLD: 65.6 MM HG (ref 83–108)
PO2 TEMP ADJ BLD: 67 MM HG (ref 83–108)
PO2 TEMP ADJ BLD: 67.2 MM HG (ref 83–108)
PO2 TEMP ADJ BLD: 70.8 MM HG (ref 83–108)
PO2 TEMP ADJ BLD: 75.2 MM HG (ref 83–108)
PO2 TEMP ADJ BLD: 76 MM HG (ref 83–108)
PO2 TEMP ADJ BLD: 78.4 MM HG (ref 83–108)
PO2 TEMP ADJ BLD: 81.5 MM HG (ref 83–108)
PO2 TEMP ADJ BLD: 81.6 MM HG (ref 83–108)
PO2 TEMP ADJ BLD: 82.7 MM HG (ref 83–108)
PO2 TEMP ADJ BLD: 94.4 MM HG (ref 83–108)
PO2 TEMP ADJ BLD: 97.2 MM HG (ref 83–108)
PO2 TEMP ADJ BLD: 97.5 MM HG (ref 83–108)
POIKILOCYTOSIS BLD QL SMEAR: ABNORMAL
POLYCHROMASIA BLD QL SMEAR: ABNORMAL
POTASSIUM SERPL-SCNC: 3.4 MMOL/L (ref 3.5–5.2)
POTASSIUM SERPL-SCNC: 3.4 MMOL/L (ref 3.5–5.2)
POTASSIUM SERPL-SCNC: 3.5 MMOL/L (ref 3.5–5.2)
POTASSIUM SERPL-SCNC: 3.6 MMOL/L (ref 3.5–5.2)
POTASSIUM SERPL-SCNC: 3.6 MMOL/L (ref 3.5–5.2)
POTASSIUM SERPL-SCNC: 3.7 MMOL/L (ref 3.5–5.2)
POTASSIUM SERPL-SCNC: 3.7 MMOL/L (ref 3.5–5.2)
POTASSIUM SERPL-SCNC: 3.8 MMOL/L (ref 3.5–5.2)
POTASSIUM SERPL-SCNC: 3.9 MMOL/L (ref 3.5–5.2)
POTASSIUM SERPL-SCNC: 4 MMOL/L (ref 3.5–5.2)
POTASSIUM SERPL-SCNC: 4.1 MMOL/L (ref 3.5–5.2)
POTASSIUM SERPL-SCNC: 4.1 MMOL/L (ref 3.5–5.2)
POTASSIUM SERPL-SCNC: 4.2 MMOL/L (ref 3.5–5.2)
POTASSIUM SERPL-SCNC: 4.5 MMOL/L (ref 3.5–5.2)
POTASSIUM SERPL-SCNC: 4.5 MMOL/L (ref 3.5–5.2)
POTASSIUM SERPL-SCNC: 4.7 MMOL/L (ref 3.5–5.2)
POTASSIUM SERPL-SCNC: 4.8 MMOL/L (ref 3.5–5.2)
POTASSIUM SERPL-SCNC: 4.9 MMOL/L (ref 3.5–5.2)
POTASSIUM SERPL-SCNC: 5 MMOL/L (ref 3.5–5.2)
POTASSIUM SERPL-SCNC: 5.1 MMOL/L (ref 3.5–5.2)
POTASSIUM SERPL-SCNC: 5.2 MMOL/L (ref 3.5–5.2)
POTASSIUM SERPL-SCNC: 5.2 MMOL/L (ref 3.5–5.2)
POTASSIUM SERPL-SCNC: 5.3 MMOL/L (ref 3.5–5.2)
POTASSIUM SERPL-SCNC: 5.4 MMOL/L (ref 3.5–5.2)
POTASSIUM SERPL-SCNC: 5.4 MMOL/L (ref 3.5–5.2)
POTASSIUM SERPL-SCNC: 5.5 MMOL/L (ref 3.5–5.2)
POTASSIUM SERPL-SCNC: 5.6 MMOL/L (ref 3.5–5.2)
POTASSIUM SERPL-SCNC: 5.6 MMOL/L (ref 3.5–5.2)
POTASSIUM SERPL-SCNC: 5.7 MMOL/L (ref 3.5–5.2)
POTASSIUM SERPL-SCNC: 5.7 MMOL/L (ref 3.5–5.2)
POTASSIUM SERPL-SCNC: 6.2 MMOL/L (ref 3.5–5.2)
PREALB SERPL-MCNC: 39.1 MG/DL (ref 20–40)
PROCALCITONIN SERPL-MCNC: 0.14 NG/ML (ref 0–0.25)
PROT SERPL-MCNC: 5.1 G/DL (ref 6–8.5)
PROT SERPL-MCNC: 5.4 G/DL (ref 6–8.5)
PROT SERPL-MCNC: 5.6 G/DL (ref 6–8.5)
PROT SERPL-MCNC: 5.7 G/DL (ref 6–8.5)
PROT SERPL-MCNC: 5.8 G/DL (ref 6–8.5)
PROT SERPL-MCNC: 5.9 G/DL (ref 6–8.5)
PROT SERPL-MCNC: 6 G/DL (ref 6–8.5)
PROT SERPL-MCNC: 6.1 G/DL (ref 6–8.5)
PROT SERPL-MCNC: 6.2 G/DL (ref 6–8.5)
PROT SERPL-MCNC: 6.2 G/DL (ref 6–8.5)
PROT SERPL-MCNC: 6.3 G/DL (ref 6–8.5)
PROT SERPL-MCNC: 6.6 G/DL (ref 6–8.5)
PROT UR QL STRIP: ABNORMAL
PROT UR-MCNC: 139.8 MG/DL
QT INTERVAL: 352 MS
QT INTERVAL: 366 MS
QTC INTERVAL: 474 MS
QTC INTERVAL: 486 MS
RBC # BLD AUTO: 2.2 10*6/MM3 (ref 4.14–5.8)
RBC # BLD AUTO: 2.2 10*6/MM3 (ref 4.14–5.8)
RBC # BLD AUTO: 2.23 10*6/MM3 (ref 4.14–5.8)
RBC # BLD AUTO: 2.27 10*6/MM3 (ref 4.14–5.8)
RBC # BLD AUTO: 2.36 10*6/MM3 (ref 4.14–5.8)
RBC # BLD AUTO: 2.38 10*6/MM3 (ref 4.14–5.8)
RBC # BLD AUTO: 2.4 10*6/MM3 (ref 4.14–5.8)
RBC # BLD AUTO: 2.41 10*6/MM3 (ref 4.14–5.8)
RBC # BLD AUTO: 2.42 10*6/MM3 (ref 4.14–5.8)
RBC # BLD AUTO: 2.45 10*6/MM3 (ref 4.14–5.8)
RBC # BLD AUTO: 2.49 10*6/MM3 (ref 4.14–5.8)
RBC # BLD AUTO: 2.55 10*6/MM3 (ref 4.14–5.8)
RBC # BLD AUTO: 2.55 10*6/MM3 (ref 4.14–5.8)
RBC # BLD AUTO: 2.56 10*6/MM3 (ref 4.14–5.8)
RBC # BLD AUTO: 2.6 10*6/MM3 (ref 4.14–5.8)
RBC # BLD AUTO: 2.63 10*6/MM3 (ref 4.14–5.8)
RBC # BLD AUTO: 2.7 10*6/MM3 (ref 4.14–5.8)
RBC # BLD AUTO: 2.74 10*6/MM3 (ref 4.14–5.8)
RBC # BLD AUTO: 2.76 10*6/MM3 (ref 4.14–5.8)
RBC # BLD AUTO: 2.76 10*6/MM3 (ref 4.14–5.8)
RBC # BLD AUTO: 2.77 10*6/MM3 (ref 4.14–5.8)
RBC # BLD AUTO: 2.79 10*6/MM3 (ref 4.14–5.8)
RBC # BLD AUTO: 2.8 10*6/MM3 (ref 4.14–5.8)
RBC # BLD AUTO: 2.85 10*6/MM3 (ref 4.14–5.8)
RBC # BLD AUTO: 2.89 10*6/MM3 (ref 4.14–5.8)
RBC # BLD AUTO: 2.89 10*6/MM3 (ref 4.14–5.8)
RBC # BLD AUTO: 2.91 10*6/MM3 (ref 4.14–5.8)
RBC # BLD AUTO: 2.94 10*6/MM3 (ref 4.14–5.8)
RBC # BLD AUTO: 3.02 10*6/MM3 (ref 4.14–5.8)
RBC # BLD AUTO: 3.05 10*6/MM3 (ref 4.14–5.8)
RBC # BLD AUTO: 3.18 10*6/MM3 (ref 4.14–5.8)
RBC # BLD AUTO: 3.21 10*6/MM3 (ref 4.14–5.8)
RBC # BLD AUTO: 3.23 10*6/MM3 (ref 4.14–5.8)
RBC # BLD AUTO: 3.23 10*6/MM3 (ref 4.14–5.8)
RBC # UR: ABNORMAL /HPF
REF LAB TEST METHOD: ABNORMAL
RETICS # AUTO: 0.07 10*6/MM3 (ref 0.02–0.13)
RETICS/RBC NFR AUTO: 3.18 % (ref 0.7–1.9)
RH BLD: POSITIVE
RH BLD: POSITIVE
RHINOVIRUS RNA SPEC NAA+PROBE: NOT DETECTED
RSV RNA NPH QL NAA+NON-PROBE: NOT DETECTED
S PNEUM AG SPEC QL LA: NEGATIVE
SAO2 % BLDCOA: 100 % (ref 94–99)
SAO2 % BLDCOA: 79.1 % (ref 94–99)
SAO2 % BLDCOA: 80.9 % (ref 94–99)
SAO2 % BLDCOA: 86.5 % (ref 94–99)
SAO2 % BLDCOA: 89.1 % (ref 94–99)
SAO2 % BLDCOA: 89.9 % (ref 94–99)
SAO2 % BLDCOA: 90 % (ref 94–99)
SAO2 % BLDCOA: 91.5 % (ref 94–99)
SAO2 % BLDCOA: 93.5 % (ref 94–99)
SAO2 % BLDCOA: 94.9 % (ref 94–99)
SAO2 % BLDCOA: 95.4 % (ref 94–99)
SAO2 % BLDCOA: 95.5 % (ref 94–99)
SAO2 % BLDCOA: 95.8 % (ref 94–99)
SAO2 % BLDCOA: 95.9 % (ref 94–99)
SAO2 % BLDCOA: 96.1 % (ref 94–99)
SAO2 % BLDCOA: 96.6 % (ref 94–99)
SAO2 % BLDCOA: 96.9 % (ref 94–99)
SAO2 % BLDCOA: 97.4 % (ref 94–99)
SAO2 % BLDCOA: 97.5 % (ref 94–99)
SAO2 % BLDCOA: 98 % (ref 94–99)
SAO2 % BLDCOA: 98.2 % (ref 94–99)
SAO2 % BLDCOA: 98.5 % (ref 94–99)
SAO2 % BLDCOA: 98.9 % (ref 94–99)
SAO2 % BLDCOA: 98.9 % (ref 94–99)
SAO2 % BLDCOA: 99.2 % (ref 94–99)
SAO2 % BLDCOA: 99.5 % (ref 94–99)
SARS-COV-2 N GENE RESP QL NAA+PROBE: NOT DETECTED
SARS-COV-2 RDRP RESP QL NAA+PROBE: NOT DETECTED
SARS-COV-2 RDRP RESP QL NAA+PROBE: NOT DETECTED
SARS-COV-2 RNA RESP QL NAA+PROBE: NOT DETECTED
SARS-COV-2 RNA RESP QL NAA+PROBE: NOT DETECTED
SET MECH RESP RATE: 10
SET MECH RESP RATE: 12
SET MECH RESP RATE: 14
SET MECH RESP RATE: 16
SET MECH RESP RATE: 18
SET MECH RESP RATE: 20
SET MECH RESP RATE: 22
SODIUM SERPL-SCNC: 114 MMOL/L (ref 136–145)
SODIUM SERPL-SCNC: 115 MMOL/L (ref 136–145)
SODIUM SERPL-SCNC: 115 MMOL/L (ref 136–145)
SODIUM SERPL-SCNC: 116 MMOL/L (ref 136–145)
SODIUM SERPL-SCNC: 116 MMOL/L (ref 136–145)
SODIUM SERPL-SCNC: 117 MMOL/L (ref 136–145)
SODIUM SERPL-SCNC: 117 MMOL/L (ref 136–145)
SODIUM SERPL-SCNC: 119 MMOL/L (ref 136–145)
SODIUM SERPL-SCNC: 121 MMOL/L (ref 136–145)
SODIUM SERPL-SCNC: 122 MMOL/L (ref 136–145)
SODIUM SERPL-SCNC: 123 MMOL/L (ref 136–145)
SODIUM SERPL-SCNC: 125 MMOL/L (ref 136–145)
SODIUM SERPL-SCNC: 125 MMOL/L (ref 136–145)
SODIUM SERPL-SCNC: 126 MMOL/L (ref 136–145)
SODIUM SERPL-SCNC: 126 MMOL/L (ref 136–145)
SODIUM SERPL-SCNC: 127 MMOL/L (ref 136–145)
SODIUM SERPL-SCNC: 127 MMOL/L (ref 136–145)
SODIUM SERPL-SCNC: 128 MMOL/L (ref 136–145)
SODIUM SERPL-SCNC: 129 MMOL/L (ref 136–145)
SODIUM SERPL-SCNC: 130 MMOL/L (ref 136–145)
SODIUM SERPL-SCNC: 131 MMOL/L (ref 136–145)
SODIUM SERPL-SCNC: 132 MMOL/L (ref 136–145)
SODIUM SERPL-SCNC: 134 MMOL/L (ref 136–145)
SODIUM SERPL-SCNC: 136 MMOL/L (ref 136–145)
SODIUM SERPL-SCNC: 137 MMOL/L (ref 136–145)
SODIUM SERPL-SCNC: 138 MMOL/L (ref 136–145)
SODIUM SERPL-SCNC: 138 MMOL/L (ref 136–145)
SODIUM SERPL-SCNC: 139 MMOL/L (ref 136–145)
SODIUM SERPL-SCNC: 140 MMOL/L (ref 136–145)
SODIUM SERPL-SCNC: 141 MMOL/L (ref 136–145)
SODIUM SERPL-SCNC: 142 MMOL/L (ref 136–145)
SODIUM SERPL-SCNC: 143 MMOL/L (ref 136–145)
SODIUM SERPL-SCNC: 146 MMOL/L (ref 136–145)
SODIUM SERPL-SCNC: 147 MMOL/L (ref 136–145)
SODIUM SERPL-SCNC: 150 MMOL/L (ref 136–145)
SODIUM SERPL-SCNC: 150 MMOL/L (ref 136–145)
SODIUM UR-SCNC: 52 MMOL/L
SODIUM UR-SCNC: <20 MMOL/L
SP GR UR STRIP: 1.01 (ref 1–1.03)
SP GR UR STRIP: 1.02 (ref 1–1.03)
SQUAMOUS #/AREA URNS HPF: ABNORMAL /HPF
STRESS TARGET HR: 138 BPM
T&S EXPIRATION DATE: NORMAL
T&S EXPIRATION DATE: NORMAL
TIBC SERPL-MCNC: 186 MCG/DL (ref 298–536)
TIBC SERPL-MCNC: 231 MCG/DL (ref 298–536)
TRANS CELLS #/AREA URNS HPF: ABNORMAL /HPF
TRANSFERRIN SERPL-MCNC: 125 MG/DL (ref 200–360)
TRANSFERRIN SERPL-MCNC: 155 MG/DL (ref 200–360)
TROPONIN T SERPL-MCNC: 0.07 NG/ML (ref 0–0.03)
TROPONIN T SERPL-MCNC: 0.26 NG/ML (ref 0–0.03)
TSH SERPL DL<=0.05 MIU/L-ACNC: 2.58 UIU/ML (ref 0.27–4.2)
UNIT  ABO: NORMAL
UNIT  ABO: NORMAL
UNIT  RH: NORMAL
UNIT  RH: NORMAL
URATE SERPL-MCNC: 7.4 MG/DL (ref 3.4–7)
URATE SERPL-MCNC: 7.4 MG/DL (ref 3.4–7)
URATE SERPL-MCNC: 7.5 MG/DL (ref 3.4–7)
UROBILINOGEN UR QL STRIP: ABNORMAL
UUN 24H UR-MCNC: 164 MG/DL
VANCOMYCIN PEAK SERPL-MCNC: 27.8 MCG/ML (ref 20–40)
VANCOMYCIN PEAK SERPL-MCNC: 32.2 MCG/ML (ref 20–40)
VANCOMYCIN PEAK SERPL-MCNC: 41.2 MCG/ML (ref 20–40)
VANCOMYCIN SERPL-MCNC: 19.9 MCG/ML (ref 5–40)
VANCOMYCIN SERPL-MCNC: 21.8 MCG/ML (ref 5–40)
VANCOMYCIN TROUGH SERPL-MCNC: 16.1 MCG/ML (ref 5–20)
VANCOMYCIN TROUGH SERPL-MCNC: 21.1 MCG/ML (ref 5–20)
VANCOMYCIN TROUGH SERPL-MCNC: 21.9 MCG/ML (ref 5–20)
VANCOMYCIN TROUGH SERPL-MCNC: 23 MCG/ML (ref 5–20)
VANCOMYCIN TROUGH SERPL-MCNC: 24.8 MCG/ML (ref 5–20)
VENTILATOR MODE: ABNORMAL
VENTILATOR MODE: AC
VIM STRAIN: NOT DETECTED
VIT B12 BLD-MCNC: 749 PG/ML (ref 211–946)
VT ON VENT VENT: 450 ML
VT ON VENT VENT: 500 ML
VT ON VENT VENT: 502 ML
VT ON VENT VENT: 550 ML
VT ON VENT VENT: 600 ML
WBC # BLD AUTO: 10.58 10*3/MM3 (ref 3.4–10.8)
WBC # BLD AUTO: 10.67 10*3/MM3 (ref 3.4–10.8)
WBC # BLD AUTO: 10.81 10*3/MM3 (ref 3.4–10.8)
WBC # BLD AUTO: 12.05 10*3/MM3 (ref 3.4–10.8)
WBC # BLD AUTO: 12.43 10*3/MM3 (ref 3.4–10.8)
WBC # BLD AUTO: 12.57 10*3/MM3 (ref 3.4–10.8)
WBC # BLD AUTO: 12.75 10*3/MM3 (ref 3.4–10.8)
WBC # BLD AUTO: 13.26 10*3/MM3 (ref 3.4–10.8)
WBC # BLD AUTO: 13.59 10*3/MM3 (ref 3.4–10.8)
WBC # BLD AUTO: 13.88 10*3/MM3 (ref 3.4–10.8)
WBC # BLD AUTO: 14.11 10*3/MM3 (ref 3.4–10.8)
WBC # BLD AUTO: 14.17 10*3/MM3 (ref 3.4–10.8)
WBC # BLD AUTO: 14.73 10*3/MM3 (ref 3.4–10.8)
WBC # BLD AUTO: 14.78 10*3/MM3 (ref 3.4–10.8)
WBC # BLD AUTO: 14.91 10*3/MM3 (ref 3.4–10.8)
WBC # BLD AUTO: 15.04 10*3/MM3 (ref 3.4–10.8)
WBC # BLD AUTO: 15.06 10*3/MM3 (ref 3.4–10.8)
WBC # BLD AUTO: 15.89 10*3/MM3 (ref 3.4–10.8)
WBC # BLD AUTO: 16.23 10*3/MM3 (ref 3.4–10.8)
WBC # BLD AUTO: 16.36 10*3/MM3 (ref 3.4–10.8)
WBC # BLD AUTO: 17.34 10*3/MM3 (ref 3.4–10.8)
WBC # BLD AUTO: 17.42 10*3/MM3 (ref 3.4–10.8)
WBC # BLD AUTO: 17.97 10*3/MM3 (ref 3.4–10.8)
WBC # BLD AUTO: 18.21 10*3/MM3 (ref 3.4–10.8)
WBC # BLD AUTO: 18.9 10*3/MM3 (ref 3.4–10.8)
WBC # BLD AUTO: 19.45 10*3/MM3 (ref 3.4–10.8)
WBC # BLD AUTO: 21.09 10*3/MM3 (ref 3.4–10.8)
WBC # BLD AUTO: 24.01 10*3/MM3 (ref 3.4–10.8)
WBC # BLD AUTO: 24.55 10*3/MM3 (ref 3.4–10.8)
WBC # BLD AUTO: 27.13 10*3/MM3 (ref 3.4–10.8)
WBC # BLD AUTO: 28.88 10*3/MM3 (ref 3.4–10.8)
WBC # BLD AUTO: 8.5 10*3/MM3 (ref 3.4–10.8)
WBC # BLD AUTO: 9.17 10*3/MM3 (ref 3.4–10.8)
WBC # BLD AUTO: 9.41 10*3/MM3 (ref 3.4–10.8)
WBC # BLD AUTO: 9.59 10*3/MM3 (ref 3.4–10.8)
WBC # BLD AUTO: 9.86 10*3/MM3 (ref 3.4–10.8)
WBC MORPH BLD: NORMAL
WBC UR QL AUTO: ABNORMAL /HPF
WHOLE BLOOD HOLD SPECIMEN: NORMAL

## 2020-01-01 PROCEDURE — 99233 SBSQ HOSP IP/OBS HIGH 50: CPT | Performed by: INTERNAL MEDICINE

## 2020-01-01 PROCEDURE — 94799 UNLISTED PULMONARY SVC/PX: CPT

## 2020-01-01 PROCEDURE — 92507 TX SP LANG VOICE COMM INDIV: CPT

## 2020-01-01 PROCEDURE — 84550 ASSAY OF BLOOD/URIC ACID: CPT | Performed by: NURSE PRACTITIONER

## 2020-01-01 PROCEDURE — 82962 GLUCOSE BLOOD TEST: CPT

## 2020-01-01 PROCEDURE — 85025 COMPLETE CBC W/AUTO DIFF WBC: CPT | Performed by: INTERNAL MEDICINE

## 2020-01-01 PROCEDURE — 97116 GAIT TRAINING THERAPY: CPT

## 2020-01-01 PROCEDURE — 63710000001 INSULIN LISPRO (HUMAN) PER 5 UNITS: Performed by: OTOLARYNGOLOGY

## 2020-01-01 PROCEDURE — 25010000002 ENOXAPARIN PER 10 MG: Performed by: EMERGENCY MEDICINE

## 2020-01-01 PROCEDURE — 25010000002 FENTANYL CITRATE (PF) 100 MCG/2ML SOLUTION 50 ML VIAL: Performed by: INTERNAL MEDICINE

## 2020-01-01 PROCEDURE — 84300 ASSAY OF URINE SODIUM: CPT | Performed by: INTERNAL MEDICINE

## 2020-01-01 PROCEDURE — 93306 TTE W/DOPPLER COMPLETE: CPT

## 2020-01-01 PROCEDURE — 94003 VENT MGMT INPAT SUBQ DAY: CPT

## 2020-01-01 PROCEDURE — C9803 HOPD COVID-19 SPEC COLLECT: HCPCS

## 2020-01-01 PROCEDURE — 81001 URINALYSIS AUTO W/SCOPE: CPT | Performed by: INTERNAL MEDICINE

## 2020-01-01 PROCEDURE — 87899 AGENT NOS ASSAY W/OPTIC: CPT | Performed by: INTERNAL MEDICINE

## 2020-01-01 PROCEDURE — 84540 ASSAY OF URINE/UREA-N: CPT | Performed by: INTERNAL MEDICINE

## 2020-01-01 PROCEDURE — 25010000002 FUROSEMIDE PER 20 MG: Performed by: INTERNAL MEDICINE

## 2020-01-01 PROCEDURE — 25010000002 LEVOFLOXACIN PER 250 MG: Performed by: NURSE PRACTITIONER

## 2020-01-01 PROCEDURE — 31600 PLANNED TRACHEOSTOMY: CPT | Performed by: OTOLARYNGOLOGY

## 2020-01-01 PROCEDURE — 99223 1ST HOSP IP/OBS HIGH 75: CPT | Performed by: INTERNAL MEDICINE

## 2020-01-01 PROCEDURE — 25010000002 ALBUMIN HUMAN 25% PER 50 ML: Performed by: INTERNAL MEDICINE

## 2020-01-01 PROCEDURE — 51702 INSERT TEMP BLADDER CATH: CPT

## 2020-01-01 PROCEDURE — 25010000002 METOCLOPRAMIDE PER 10 MG: Performed by: OTOLARYNGOLOGY

## 2020-01-01 PROCEDURE — 80048 BASIC METABOLIC PNL TOTAL CA: CPT | Performed by: INTERNAL MEDICINE

## 2020-01-01 PROCEDURE — 94760 N-INVAS EAR/PLS OXIMETRY 1: CPT

## 2020-01-01 PROCEDURE — 63710000001 PREDNISONE PER 1 MG: Performed by: NURSE PRACTITIONER

## 2020-01-01 PROCEDURE — 25010000002 LORAZEPAM PER 2 MG: Performed by: INTERNAL MEDICINE

## 2020-01-01 PROCEDURE — 36415 COLL VENOUS BLD VENIPUNCTURE: CPT | Performed by: STUDENT IN AN ORGANIZED HEALTH CARE EDUCATION/TRAINING PROGRAM

## 2020-01-01 PROCEDURE — 71045 X-RAY EXAM CHEST 1 VIEW: CPT

## 2020-01-01 PROCEDURE — 85018 HEMOGLOBIN: CPT | Performed by: INTERNAL MEDICINE

## 2020-01-01 PROCEDURE — 5A12012 PERFORMANCE OF CARDIAC OUTPUT, SINGLE, MANUAL: ICD-10-PCS | Performed by: SURGERY

## 2020-01-01 PROCEDURE — 25010000002 PIPERACILLIN SOD-TAZOBACTAM PER 1 G: Performed by: HOSPITALIST

## 2020-01-01 PROCEDURE — 80053 COMPREHEN METABOLIC PANEL: CPT | Performed by: INTERNAL MEDICINE

## 2020-01-01 PROCEDURE — 97535 SELF CARE MNGMENT TRAINING: CPT

## 2020-01-01 PROCEDURE — 87040 BLOOD CULTURE FOR BACTERIA: CPT | Performed by: INTERNAL MEDICINE

## 2020-01-01 PROCEDURE — 83735 ASSAY OF MAGNESIUM: CPT | Performed by: STUDENT IN AN ORGANIZED HEALTH CARE EDUCATION/TRAINING PROGRAM

## 2020-01-01 PROCEDURE — 25010000002 METHYLPREDNISOLONE PER 40 MG: Performed by: INTERNAL MEDICINE

## 2020-01-01 PROCEDURE — 25010000002 FUROSEMIDE PER 20 MG: Performed by: EMERGENCY MEDICINE

## 2020-01-01 PROCEDURE — 87205 SMEAR GRAM STAIN: CPT | Performed by: INTERNAL MEDICINE

## 2020-01-01 PROCEDURE — 63710000001 INSULIN DETEMIR PER 5 UNITS: Performed by: HOSPITALIST

## 2020-01-01 PROCEDURE — 82803 BLOOD GASES ANY COMBINATION: CPT

## 2020-01-01 PROCEDURE — 94770: CPT

## 2020-01-01 PROCEDURE — 97110 THERAPEUTIC EXERCISES: CPT

## 2020-01-01 PROCEDURE — 25010000002 VANCOMYCIN: Performed by: INTERNAL MEDICINE

## 2020-01-01 PROCEDURE — 85045 AUTOMATED RETICULOCYTE COUNT: CPT | Performed by: INTERNAL MEDICINE

## 2020-01-01 PROCEDURE — 83880 ASSAY OF NATRIURETIC PEPTIDE: CPT | Performed by: INTERNAL MEDICINE

## 2020-01-01 PROCEDURE — 87070 CULTURE OTHR SPECIMN AEROBIC: CPT | Performed by: HOSPITALIST

## 2020-01-01 PROCEDURE — 94002 VENT MGMT INPAT INIT DAY: CPT

## 2020-01-01 PROCEDURE — 63710000001 INSULIN LISPRO (HUMAN) PER 5 UNITS: Performed by: FAMILY MEDICINE

## 2020-01-01 PROCEDURE — 25010000002 CEFEPIME 2 G/NS 100 ML SOLUTION: Performed by: INTERNAL MEDICINE

## 2020-01-01 PROCEDURE — 80053 COMPREHEN METABOLIC PANEL: CPT | Performed by: STUDENT IN AN ORGANIZED HEALTH CARE EDUCATION/TRAINING PROGRAM

## 2020-01-01 PROCEDURE — 92526 ORAL FUNCTION THERAPY: CPT

## 2020-01-01 PROCEDURE — 25010000002 IRON SUCROSE PER 1 MG: Performed by: INTERNAL MEDICINE

## 2020-01-01 PROCEDURE — 5A1955Z RESPIRATORY VENTILATION, GREATER THAN 96 CONSECUTIVE HOURS: ICD-10-PCS | Performed by: FAMILY MEDICINE

## 2020-01-01 PROCEDURE — 63710000001 INSULIN ASPART PER 5 UNITS: Performed by: HOSPITALIST

## 2020-01-01 PROCEDURE — 25010000002 FUROSEMIDE PER 20 MG: Performed by: STUDENT IN AN ORGANIZED HEALTH CARE EDUCATION/TRAINING PROGRAM

## 2020-01-01 PROCEDURE — 83880 ASSAY OF NATRIURETIC PEPTIDE: CPT | Performed by: HOSPITALIST

## 2020-01-01 PROCEDURE — 73630 X-RAY EXAM OF FOOT: CPT

## 2020-01-01 PROCEDURE — 25010000002 METOCLOPRAMIDE PER 10 MG: Performed by: INTERNAL MEDICINE

## 2020-01-01 PROCEDURE — 92597 ORAL SPEECH DEVICE EVAL: CPT

## 2020-01-01 PROCEDURE — 84550 ASSAY OF BLOOD/URIC ACID: CPT | Performed by: INTERNAL MEDICINE

## 2020-01-01 PROCEDURE — 85014 HEMATOCRIT: CPT | Performed by: INTERNAL MEDICINE

## 2020-01-01 PROCEDURE — 25010000002 PROPOFOL 10 MG/ML EMULSION: Performed by: FAMILY MEDICINE

## 2020-01-01 PROCEDURE — 87186 SC STD MICRODIL/AGAR DIL: CPT | Performed by: EMERGENCY MEDICINE

## 2020-01-01 PROCEDURE — 63710000001 PREDNISONE PER 5 MG: Performed by: INTERNAL MEDICINE

## 2020-01-01 PROCEDURE — 80048 BASIC METABOLIC PNL TOTAL CA: CPT | Performed by: NURSE PRACTITIONER

## 2020-01-01 PROCEDURE — 25010000002 PROPOFOL 1000 MG/100ML EMULSION: Performed by: INTERNAL MEDICINE

## 2020-01-01 PROCEDURE — 25010000002 MORPHINE SULFATE (PF) 2 MG/ML SOLUTION: Performed by: FAMILY MEDICINE

## 2020-01-01 PROCEDURE — 74018 RADEX ABDOMEN 1 VIEW: CPT

## 2020-01-01 PROCEDURE — 25010000002 ONDANSETRON PER 1 MG: Performed by: EMERGENCY MEDICINE

## 2020-01-01 PROCEDURE — 25010000002 MORPHINE SULFATE (PF) 2 MG/ML SOLUTION: Performed by: INTERNAL MEDICINE

## 2020-01-01 PROCEDURE — 25010000002 PIPERACILLIN SOD-TAZOBACTAM PER 1 G: Performed by: INTERNAL MEDICINE

## 2020-01-01 PROCEDURE — 31500 INSERT EMERGENCY AIRWAY: CPT | Performed by: SURGERY

## 2020-01-01 PROCEDURE — 87070 CULTURE OTHR SPECIMN AEROBIC: CPT | Performed by: INTERNAL MEDICINE

## 2020-01-01 PROCEDURE — 25010000002 FENTANYL CITRATE (PF) 100 MCG/2ML SOLUTION: Performed by: NURSE ANESTHETIST, CERTIFIED REGISTERED

## 2020-01-01 PROCEDURE — 93005 ELECTROCARDIOGRAM TRACING: CPT | Performed by: EMERGENCY MEDICINE

## 2020-01-01 PROCEDURE — 25010000002 PIPERACILLIN SOD-TAZOBACTAM PER 1 G: Performed by: FAMILY MEDICINE

## 2020-01-01 PROCEDURE — 80048 BASIC METABOLIC PNL TOTAL CA: CPT | Performed by: OTOLARYNGOLOGY

## 2020-01-01 PROCEDURE — 25010000002 VANCOMYCIN 5 G RECONSTITUTED SOLUTION: Performed by: HOSPITALIST

## 2020-01-01 PROCEDURE — 25010000002 CALCIUM GLUCONATE PER 10 ML: Performed by: FAMILY MEDICINE

## 2020-01-01 PROCEDURE — 76775 US EXAM ABDO BACK WALL LIM: CPT

## 2020-01-01 PROCEDURE — 63710000001 INSULIN LISPRO (HUMAN) PER 5 UNITS: Performed by: INTERNAL MEDICINE

## 2020-01-01 PROCEDURE — 87205 SMEAR GRAM STAIN: CPT | Performed by: HOSPITALIST

## 2020-01-01 PROCEDURE — 84132 ASSAY OF SERUM POTASSIUM: CPT | Performed by: HOSPITALIST

## 2020-01-01 PROCEDURE — 85379 FIBRIN DEGRADATION QUANT: CPT | Performed by: EMERGENCY MEDICINE

## 2020-01-01 PROCEDURE — 25010000002 FUROSEMIDE PER 20 MG: Performed by: HOSPITALIST

## 2020-01-01 PROCEDURE — 82570 ASSAY OF URINE CREATININE: CPT | Performed by: INTERNAL MEDICINE

## 2020-01-01 PROCEDURE — 84100 ASSAY OF PHOSPHORUS: CPT | Performed by: INTERNAL MEDICINE

## 2020-01-01 PROCEDURE — 85025 COMPLETE CBC W/AUTO DIFF WBC: CPT | Performed by: STUDENT IN AN ORGANIZED HEALTH CARE EDUCATION/TRAINING PROGRAM

## 2020-01-01 PROCEDURE — 5A1945Z RESPIRATORY VENTILATION, 24-96 CONSECUTIVE HOURS: ICD-10-PCS | Performed by: SURGERY

## 2020-01-01 PROCEDURE — 63710000001 ONDANSETRON PER 8 MG: Performed by: STUDENT IN AN ORGANIZED HEALTH CARE EDUCATION/TRAINING PROGRAM

## 2020-01-01 PROCEDURE — 25010000002 MAGNESIUM SULFATE IN D5W 1G/100ML (PREMIX) 1-5 GM/100ML-% SOLUTION: Performed by: EMERGENCY MEDICINE

## 2020-01-01 PROCEDURE — 87185 SC STD ENZYME DETCJ PER NZM: CPT | Performed by: INTERNAL MEDICINE

## 2020-01-01 PROCEDURE — 87147 CULTURE TYPE IMMUNOLOGIC: CPT | Performed by: INTERNAL MEDICINE

## 2020-01-01 PROCEDURE — 83605 ASSAY OF LACTIC ACID: CPT | Performed by: EMERGENCY MEDICINE

## 2020-01-01 PROCEDURE — 25010000002 PROPOFOL 1000 MG/100ML EMULSION: Performed by: OTOLARYNGOLOGY

## 2020-01-01 PROCEDURE — 87186 SC STD MICRODIL/AGAR DIL: CPT | Performed by: INTERNAL MEDICINE

## 2020-01-01 PROCEDURE — 0BH17EZ INSERTION OF ENDOTRACHEAL AIRWAY INTO TRACHEA, VIA NATURAL OR ARTIFICIAL OPENING: ICD-10-PCS | Performed by: FAMILY MEDICINE

## 2020-01-01 PROCEDURE — 25010000002 FENTANYL CITRATE (PF) 100 MCG/2ML SOLUTION 5 ML VIAL: Performed by: INTERNAL MEDICINE

## 2020-01-01 PROCEDURE — 25010000002 PERFLUTREN 6.52 MG/ML SUSPENSION: Performed by: INTERNAL MEDICINE

## 2020-01-01 PROCEDURE — 97530 THERAPEUTIC ACTIVITIES: CPT

## 2020-01-01 PROCEDURE — 92950 HEART/LUNG RESUSCITATION CPR: CPT

## 2020-01-01 PROCEDURE — 99232 SBSQ HOSP IP/OBS MODERATE 35: CPT | Performed by: OTOLARYNGOLOGY

## 2020-01-01 PROCEDURE — C1751 CATH, INF, PER/CENT/MIDLINE: HCPCS

## 2020-01-01 PROCEDURE — 36600 WITHDRAWAL OF ARTERIAL BLOOD: CPT

## 2020-01-01 PROCEDURE — 25010000002 HYDRALAZINE PER 20 MG: Performed by: OTOLARYNGOLOGY

## 2020-01-01 PROCEDURE — 87641 MR-STAPH DNA AMP PROBE: CPT | Performed by: STUDENT IN AN ORGANIZED HEALTH CARE EDUCATION/TRAINING PROGRAM

## 2020-01-01 PROCEDURE — 25010000002 MORPHINE SULFATE (PF) 2 MG/ML SOLUTION: Performed by: OTOLARYNGOLOGY

## 2020-01-01 PROCEDURE — 97535 SELF CARE MNGMENT TRAINING: CPT | Performed by: OCCUPATIONAL THERAPIST

## 2020-01-01 PROCEDURE — 80053 COMPREHEN METABOLIC PANEL: CPT | Performed by: NURSE PRACTITIONER

## 2020-01-01 PROCEDURE — 86900 BLOOD TYPING SEROLOGIC ABO: CPT

## 2020-01-01 PROCEDURE — 86901 BLOOD TYPING SEROLOGIC RH(D): CPT | Performed by: INTERNAL MEDICINE

## 2020-01-01 PROCEDURE — 25010000002 MIDAZOLAM PER 1 MG

## 2020-01-01 PROCEDURE — 76937 US GUIDE VASCULAR ACCESS: CPT

## 2020-01-01 PROCEDURE — 86140 C-REACTIVE PROTEIN: CPT | Performed by: INTERNAL MEDICINE

## 2020-01-01 PROCEDURE — 80202 ASSAY OF VANCOMYCIN: CPT | Performed by: INTERNAL MEDICINE

## 2020-01-01 PROCEDURE — 83880 ASSAY OF NATRIURETIC PEPTIDE: CPT | Performed by: EMERGENCY MEDICINE

## 2020-01-01 PROCEDURE — 83605 ASSAY OF LACTIC ACID: CPT | Performed by: INTERNAL MEDICINE

## 2020-01-01 PROCEDURE — 83880 ASSAY OF NATRIURETIC PEPTIDE: CPT | Performed by: FAMILY MEDICINE

## 2020-01-01 PROCEDURE — 86923 COMPATIBILITY TEST ELECTRIC: CPT

## 2020-01-01 PROCEDURE — 25010000002 ONDANSETRON PER 1 MG: Performed by: INTERNAL MEDICINE

## 2020-01-01 PROCEDURE — 99285 EMERGENCY DEPT VISIT HI MDM: CPT

## 2020-01-01 PROCEDURE — 97163 PT EVAL HIGH COMPLEX 45 MIN: CPT | Performed by: PHYSICAL THERAPIST

## 2020-01-01 PROCEDURE — 36410 VNPNXR 3YR/> PHY/QHP DX/THER: CPT

## 2020-01-01 PROCEDURE — 86900 BLOOD TYPING SEROLOGIC ABO: CPT | Performed by: INTERNAL MEDICINE

## 2020-01-01 PROCEDURE — 25010000002 MORPHINE PER 10 MG: Performed by: EMERGENCY MEDICINE

## 2020-01-01 PROCEDURE — 25010000002 CEFEPIME PER 500 MG: Performed by: HOSPITALIST

## 2020-01-01 PROCEDURE — 84466 ASSAY OF TRANSFERRIN: CPT | Performed by: INTERNAL MEDICINE

## 2020-01-01 PROCEDURE — 94660 CPAP INITIATION&MGMT: CPT

## 2020-01-01 PROCEDURE — 99222 1ST HOSP IP/OBS MODERATE 55: CPT | Performed by: INTERNAL MEDICINE

## 2020-01-01 PROCEDURE — 99204 OFFICE O/P NEW MOD 45 MIN: CPT | Performed by: NURSE PRACTITIONER

## 2020-01-01 PROCEDURE — 85651 RBC SED RATE NONAUTOMATED: CPT | Performed by: INTERNAL MEDICINE

## 2020-01-01 PROCEDURE — 31500 INSERT EMERGENCY AIRWAY: CPT

## 2020-01-01 PROCEDURE — 04HY32Z INSERTION OF MONITORING DEVICE INTO LOWER ARTERY, PERCUTANEOUS APPROACH: ICD-10-PCS | Performed by: ANESTHESIOLOGY

## 2020-01-01 PROCEDURE — 99222 1ST HOSP IP/OBS MODERATE 55: CPT | Performed by: PODIATRIST

## 2020-01-01 PROCEDURE — 25010000002 HYDRALAZINE PER 20 MG: Performed by: INTERNAL MEDICINE

## 2020-01-01 PROCEDURE — 84484 ASSAY OF TROPONIN QUANT: CPT | Performed by: EMERGENCY MEDICINE

## 2020-01-01 PROCEDURE — 83735 ASSAY OF MAGNESIUM: CPT | Performed by: NURSE PRACTITIONER

## 2020-01-01 PROCEDURE — 80202 ASSAY OF VANCOMYCIN: CPT | Performed by: HOSPITALIST

## 2020-01-01 PROCEDURE — 74019 RADEX ABDOMEN 2 VIEWS: CPT

## 2020-01-01 PROCEDURE — 25010000002 MIDAZOLAM PER 1 MG: Performed by: INTERNAL MEDICINE

## 2020-01-01 PROCEDURE — 94640 AIRWAY INHALATION TREATMENT: CPT

## 2020-01-01 PROCEDURE — 83605 ASSAY OF LACTIC ACID: CPT | Performed by: FAMILY MEDICINE

## 2020-01-01 PROCEDURE — 25010000002 MIDAZOLAM 50 MG/10ML SOLUTION: Performed by: INTERNAL MEDICINE

## 2020-01-01 PROCEDURE — 25010000002 PIPERACILLIN SOD-TAZOBACTAM PER 1 G: Performed by: EMERGENCY MEDICINE

## 2020-01-01 PROCEDURE — 71275 CT ANGIOGRAPHY CHEST: CPT

## 2020-01-01 PROCEDURE — 84100 ASSAY OF PHOSPHORUS: CPT | Performed by: STUDENT IN AN ORGANIZED HEALTH CARE EDUCATION/TRAINING PROGRAM

## 2020-01-01 PROCEDURE — 87081 CULTURE SCREEN ONLY: CPT | Performed by: HOSPITALIST

## 2020-01-01 PROCEDURE — 87070 CULTURE OTHR SPECIMN AEROBIC: CPT | Performed by: EMERGENCY MEDICINE

## 2020-01-01 PROCEDURE — 85007 BL SMEAR W/DIFF WBC COUNT: CPT | Performed by: INTERNAL MEDICINE

## 2020-01-01 PROCEDURE — 85025 COMPLETE CBC W/AUTO DIFF WBC: CPT | Performed by: OTOLARYNGOLOGY

## 2020-01-01 PROCEDURE — 74230 X-RAY XM SWLNG FUNCJ C+: CPT

## 2020-01-01 PROCEDURE — 83010 ASSAY OF HAPTOGLOBIN QUANT: CPT | Performed by: INTERNAL MEDICINE

## 2020-01-01 PROCEDURE — 83935 ASSAY OF URINE OSMOLALITY: CPT | Performed by: INTERNAL MEDICINE

## 2020-01-01 PROCEDURE — 25010000002 FUROSEMIDE PER 20 MG

## 2020-01-01 PROCEDURE — 99222 1ST HOSP IP/OBS MODERATE 55: CPT | Performed by: OTOLARYNGOLOGY

## 2020-01-01 PROCEDURE — 25010000002 FUROSEMIDE PER 20 MG: Performed by: NURSE PRACTITIONER

## 2020-01-01 PROCEDURE — 93306 TTE W/DOPPLER COMPLETE: CPT | Performed by: INTERNAL MEDICINE

## 2020-01-01 PROCEDURE — 83540 ASSAY OF IRON: CPT | Performed by: INTERNAL MEDICINE

## 2020-01-01 PROCEDURE — 99201: CPT

## 2020-01-01 PROCEDURE — 82607 VITAMIN B-12: CPT | Performed by: INTERNAL MEDICINE

## 2020-01-01 PROCEDURE — 25010000002 DOPAMINE PER 40 MG: Performed by: NURSE PRACTITIONER

## 2020-01-01 PROCEDURE — 25010000002 VANCOMYCIN: Performed by: STUDENT IN AN ORGANIZED HEALTH CARE EDUCATION/TRAINING PROGRAM

## 2020-01-01 PROCEDURE — 25010000002 CEFTRIAXONE: Performed by: INTERNAL MEDICINE

## 2020-01-01 PROCEDURE — 87147 CULTURE TYPE IMMUNOLOGIC: CPT | Performed by: EMERGENCY MEDICINE

## 2020-01-01 PROCEDURE — P9047 ALBUMIN (HUMAN), 25%, 50ML: HCPCS | Performed by: INTERNAL MEDICINE

## 2020-01-01 PROCEDURE — 25010000002 CEFTRIAXONE PER 250 MG: Performed by: INTERNAL MEDICINE

## 2020-01-01 PROCEDURE — 97164 PT RE-EVAL EST PLAN CARE: CPT

## 2020-01-01 PROCEDURE — 84156 ASSAY OF PROTEIN URINE: CPT | Performed by: NURSE PRACTITIONER

## 2020-01-01 PROCEDURE — 87635 SARS-COV-2 COVID-19 AMP PRB: CPT | Performed by: INTERNAL MEDICINE

## 2020-01-01 PROCEDURE — 87077 CULTURE AEROBIC IDENTIFY: CPT | Performed by: EMERGENCY MEDICINE

## 2020-01-01 PROCEDURE — 25010000002 VANCOMYCIN 1 G RECONSTITUTED SOLUTION 1 EACH VIAL: Performed by: INTERNAL MEDICINE

## 2020-01-01 PROCEDURE — P9016 RBC LEUKOCYTES REDUCED: HCPCS

## 2020-01-01 PROCEDURE — 93005 ELECTROCARDIOGRAM TRACING: CPT | Performed by: FAMILY MEDICINE

## 2020-01-01 PROCEDURE — U0003 INFECTIOUS AGENT DETECTION BY NUCLEIC ACID (DNA OR RNA); SEVERE ACUTE RESPIRATORY SYNDROME CORONAVIRUS 2 (SARS-COV-2) (CORONAVIRUS DISEASE [COVID-19]), AMPLIFIED PROBE TECHNIQUE, MAKING USE OF HIGH THROUGHPUT TECHNOLOGIES AS DESCRIBED BY CMS-2020-01-R: HCPCS

## 2020-01-01 PROCEDURE — 25010000002 HALOPERIDOL LACTATE PER 5 MG: Performed by: HOSPITALIST

## 2020-01-01 PROCEDURE — 25010000002 METHYLPREDNISOLONE PER 125 MG: Performed by: EMERGENCY MEDICINE

## 2020-01-01 PROCEDURE — 05HY33Z INSERTION OF INFUSION DEVICE INTO UPPER VEIN, PERCUTANEOUS APPROACH: ICD-10-PCS | Performed by: HOSPITALIST

## 2020-01-01 PROCEDURE — 82272 OCCULT BLD FECES 1-3 TESTS: CPT | Performed by: INTERNAL MEDICINE

## 2020-01-01 PROCEDURE — 86850 RBC ANTIBODY SCREEN: CPT | Performed by: INTERNAL MEDICINE

## 2020-01-01 PROCEDURE — 87205 SMEAR GRAM STAIN: CPT | Performed by: EMERGENCY MEDICINE

## 2020-01-01 PROCEDURE — 83880 ASSAY OF NATRIURETIC PEPTIDE: CPT | Performed by: OTOLARYNGOLOGY

## 2020-01-01 PROCEDURE — 83735 ASSAY OF MAGNESIUM: CPT | Performed by: INTERNAL MEDICINE

## 2020-01-01 PROCEDURE — 87086 URINE CULTURE/COLONY COUNT: CPT | Performed by: INTERNAL MEDICINE

## 2020-01-01 PROCEDURE — 82565 ASSAY OF CREATININE: CPT | Performed by: INTERNAL MEDICINE

## 2020-01-01 PROCEDURE — 92610 EVALUATE SWALLOWING FUNCTION: CPT

## 2020-01-01 PROCEDURE — 82746 ASSAY OF FOLIC ACID SERUM: CPT | Performed by: INTERNAL MEDICINE

## 2020-01-01 PROCEDURE — 0 IOPAMIDOL PER 1 ML: Performed by: EMERGENCY MEDICINE

## 2020-01-01 PROCEDURE — 84134 ASSAY OF PREALBUMIN: CPT | Performed by: INTERNAL MEDICINE

## 2020-01-01 PROCEDURE — 25010000003 CEFAZOLIN PER 500 MG: Performed by: NURSE ANESTHETIST, CERTIFIED REGISTERED

## 2020-01-01 PROCEDURE — 86901 BLOOD TYPING SEROLOGIC RH(D): CPT

## 2020-01-01 PROCEDURE — 25010000002 VANCOMYCIN 1 G RECONSTITUTED SOLUTION: Performed by: HOSPITALIST

## 2020-01-01 PROCEDURE — 63710000001 PREDNISONE PER 5 MG: Performed by: OTOLARYNGOLOGY

## 2020-01-01 PROCEDURE — 97530 THERAPEUTIC ACTIVITIES: CPT | Performed by: OCCUPATIONAL THERAPIST

## 2020-01-01 PROCEDURE — 63710000001 INSULIN REGULAR HUMAN PER 5 UNITS: Performed by: FAMILY MEDICINE

## 2020-01-01 PROCEDURE — 80048 BASIC METABOLIC PNL TOTAL CA: CPT | Performed by: FAMILY MEDICINE

## 2020-01-01 PROCEDURE — 80202 ASSAY OF VANCOMYCIN: CPT | Performed by: STUDENT IN AN ORGANIZED HEALTH CARE EDUCATION/TRAINING PROGRAM

## 2020-01-01 PROCEDURE — 25010000002 MORPHINE PER 10 MG: Performed by: HOSPITALIST

## 2020-01-01 PROCEDURE — 84443 ASSAY THYROID STIM HORMONE: CPT | Performed by: CLINICAL NURSE SPECIALIST

## 2020-01-01 PROCEDURE — 0BH17EZ INSERTION OF ENDOTRACHEAL AIRWAY INTO TRACHEA, VIA NATURAL OR ARTIFICIAL OPENING: ICD-10-PCS | Performed by: SURGERY

## 2020-01-01 PROCEDURE — 84100 ASSAY OF PHOSPHORUS: CPT | Performed by: NURSE PRACTITIONER

## 2020-01-01 PROCEDURE — 93010 ELECTROCARDIOGRAM REPORT: CPT | Performed by: INTERNAL MEDICINE

## 2020-01-01 PROCEDURE — 80053 COMPREHEN METABOLIC PANEL: CPT | Performed by: EMERGENCY MEDICINE

## 2020-01-01 PROCEDURE — 87635 SARS-COV-2 COVID-19 AMP PRB: CPT | Performed by: FAMILY MEDICINE

## 2020-01-01 PROCEDURE — 93005 ELECTROCARDIOGRAM TRACING: CPT | Performed by: INTERNAL MEDICINE

## 2020-01-01 PROCEDURE — 87635 SARS-COV-2 COVID-19 AMP PRB: CPT | Performed by: EMERGENCY MEDICINE

## 2020-01-01 PROCEDURE — 25010000002 FENTANYL CITRATE (PF) 100 MCG/2ML SOLUTION: Performed by: ANESTHESIOLOGY

## 2020-01-01 PROCEDURE — 84550 ASSAY OF BLOOD/URIC ACID: CPT | Performed by: CLINICAL NURSE SPECIALIST

## 2020-01-01 PROCEDURE — 92611 MOTION FLUOROSCOPY/SWALLOW: CPT

## 2020-01-01 PROCEDURE — 97167 OT EVAL HIGH COMPLEX 60 MIN: CPT | Performed by: OCCUPATIONAL THERAPIST

## 2020-01-01 PROCEDURE — 85025 COMPLETE CBC W/AUTO DIFF WBC: CPT | Performed by: EMERGENCY MEDICINE

## 2020-01-01 PROCEDURE — 83735 ASSAY OF MAGNESIUM: CPT | Performed by: HOSPITALIST

## 2020-01-01 PROCEDURE — 25010000002 LORAZEPAM PER 2 MG: Performed by: HOSPITALIST

## 2020-01-01 PROCEDURE — 85025 COMPLETE CBC W/AUTO DIFF WBC: CPT | Performed by: FAMILY MEDICINE

## 2020-01-01 PROCEDURE — 0100U HC BIOFIRE FILMARRAY RESP PANEL 2: CPT | Performed by: INTERNAL MEDICINE

## 2020-01-01 PROCEDURE — 83930 ASSAY OF BLOOD OSMOLALITY: CPT | Performed by: INTERNAL MEDICINE

## 2020-01-01 PROCEDURE — 25010000002 VANCOMYCIN 5 G RECONSTITUTED SOLUTION: Performed by: EMERGENCY MEDICINE

## 2020-01-01 PROCEDURE — 87077 CULTURE AEROBIC IDENTIFY: CPT | Performed by: INTERNAL MEDICINE

## 2020-01-01 PROCEDURE — 86920 COMPATIBILITY TEST SPIN: CPT

## 2020-01-01 PROCEDURE — C1751 CATH, INF, PER/CENT/MIDLINE: HCPCS | Performed by: ANESTHESIOLOGY

## 2020-01-01 PROCEDURE — 25010000002 VANCOMYCIN 5 G RECONSTITUTED SOLUTION: Performed by: STUDENT IN AN ORGANIZED HEALTH CARE EDUCATION/TRAINING PROGRAM

## 2020-01-01 PROCEDURE — 87040 BLOOD CULTURE FOR BACTERIA: CPT | Performed by: EMERGENCY MEDICINE

## 2020-01-01 PROCEDURE — 25010000002 EPINEPHRINE 1 MG/10ML SOLUTION PREFILLED SYRINGE: Performed by: FAMILY MEDICINE

## 2020-01-01 PROCEDURE — 25010000002 METHYLNALTREXONE 12 MG/0.6ML SOLUTION: Performed by: INTERNAL MEDICINE

## 2020-01-01 PROCEDURE — 84145 PROCALCITONIN (PCT): CPT | Performed by: INTERNAL MEDICINE

## 2020-01-01 PROCEDURE — 97168 OT RE-EVAL EST PLAN CARE: CPT | Performed by: OCCUPATIONAL THERAPIST

## 2020-01-01 PROCEDURE — 02HV33Z INSERTION OF INFUSION DEVICE INTO SUPERIOR VENA CAVA, PERCUTANEOUS APPROACH: ICD-10-PCS | Performed by: ANESTHESIOLOGY

## 2020-01-01 RX ORDER — BUMETANIDE 0.25 MG/ML
4 INJECTION INTRAMUSCULAR; INTRAVENOUS ONCE
Status: DISCONTINUED | OUTPATIENT
Start: 2020-01-01 | End: 2020-01-01

## 2020-01-01 RX ORDER — PANTOPRAZOLE SODIUM 40 MG/10ML
40 INJECTION, POWDER, LYOPHILIZED, FOR SOLUTION INTRAVENOUS DAILY
Status: DISCONTINUED | OUTPATIENT
Start: 2020-01-01 | End: 2020-01-01 | Stop reason: ALTCHOICE

## 2020-01-01 RX ORDER — ONDANSETRON 2 MG/ML
4 INJECTION INTRAMUSCULAR; INTRAVENOUS AS NEEDED
Status: DISCONTINUED | OUTPATIENT
Start: 2020-01-01 | End: 2020-01-01 | Stop reason: HOSPADM

## 2020-01-01 RX ORDER — BENZTROPINE MESYLATE 1 MG/1
1 TABLET ORAL 2 TIMES DAILY
COMMUNITY

## 2020-01-01 RX ORDER — ASCORBIC ACID 500 MG
500 TABLET ORAL EVERY MORNING
Status: DISCONTINUED | OUTPATIENT
Start: 2020-01-01 | End: 2020-01-01

## 2020-01-01 RX ORDER — ACETAMINOPHEN 325 MG/1
650 TABLET ORAL EVERY 4 HOURS PRN
Status: DISCONTINUED | OUTPATIENT
Start: 2020-01-01 | End: 2020-01-01 | Stop reason: HOSPADM

## 2020-01-01 RX ORDER — EPINEPHRINE 0.1 MG/ML
SYRINGE (ML) INJECTION
Status: COMPLETED | OUTPATIENT
Start: 2020-01-01 | End: 2020-01-01

## 2020-01-01 RX ORDER — BUMETANIDE 0.25 MG/ML
1 INJECTION INTRAMUSCULAR; INTRAVENOUS
Status: DISPENSED | OUTPATIENT
Start: 2020-01-01 | End: 2020-01-01

## 2020-01-01 RX ORDER — ROPINIROLE 1 MG/1
2 TABLET, FILM COATED ORAL NIGHTLY
Status: DISCONTINUED | OUTPATIENT
Start: 2020-01-01 | End: 2020-01-01

## 2020-01-01 RX ORDER — AMOXICILLIN 250 MG
1 CAPSULE ORAL NIGHTLY PRN
Status: ON HOLD | COMMUNITY
End: 2020-01-01

## 2020-01-01 RX ORDER — ALBUMIN (HUMAN) 12.5 G/50ML
12.5 SOLUTION INTRAVENOUS 3 TIMES DAILY
Status: COMPLETED | OUTPATIENT
Start: 2020-01-01 | End: 2020-01-01

## 2020-01-01 RX ORDER — L.ACID,PARA/B.BIFIDUM/S.THERM 8B CELL
1 CAPSULE ORAL DAILY
Status: DISCONTINUED | OUTPATIENT
Start: 2020-01-01 | End: 2020-01-01

## 2020-01-01 RX ORDER — LISINOPRIL 10 MG/1
10 TABLET ORAL
Status: DISCONTINUED | OUTPATIENT
Start: 2020-01-01 | End: 2020-01-01

## 2020-01-01 RX ORDER — SODIUM CHLORIDE 9 MG/ML
250 INJECTION, SOLUTION INTRAVENOUS CONTINUOUS
Status: CANCELLED | OUTPATIENT
Start: 2020-01-01

## 2020-01-01 RX ORDER — L. ACIDOPHILUS/L.BULGARICUS 1MM CELL
1 TABLET ORAL DAILY
Status: DISCONTINUED | OUTPATIENT
Start: 2020-01-01 | End: 2020-01-01

## 2020-01-01 RX ORDER — HYDROXYZINE PAMOATE 50 MG/1
50 CAPSULE ORAL
Status: ON HOLD | COMMUNITY
End: 2020-01-01

## 2020-01-01 RX ORDER — MORPHINE SULFATE 2 MG/ML
2 INJECTION, SOLUTION INTRAMUSCULAR; INTRAVENOUS
Status: DISCONTINUED | OUTPATIENT
Start: 2020-01-01 | End: 2020-01-01

## 2020-01-01 RX ORDER — CETIRIZINE HYDROCHLORIDE 10 MG/1
10 TABLET ORAL DAILY
COMMUNITY

## 2020-01-01 RX ORDER — FUROSEMIDE 10 MG/ML
40 INJECTION INTRAMUSCULAR; INTRAVENOUS
Status: DISCONTINUED | OUTPATIENT
Start: 2020-01-01 | End: 2020-01-01

## 2020-01-01 RX ORDER — ONDANSETRON 4 MG/1
4 TABLET, FILM COATED ORAL EVERY 6 HOURS PRN
Status: DISCONTINUED | OUTPATIENT
Start: 2020-01-01 | End: 2020-01-01

## 2020-01-01 RX ORDER — LORAZEPAM 1 MG/1
1 TABLET ORAL EVERY 6 HOURS PRN
Status: DISCONTINUED | OUTPATIENT
Start: 2020-01-01 | End: 2020-01-01

## 2020-01-01 RX ORDER — PANTOPRAZOLE SODIUM 40 MG/10ML
40 INJECTION, POWDER, LYOPHILIZED, FOR SOLUTION INTRAVENOUS EVERY 12 HOURS SCHEDULED
Status: DISCONTINUED | OUTPATIENT
Start: 2020-01-01 | End: 2020-01-01

## 2020-01-01 RX ORDER — ASPIRIN 325 MG
325 TABLET ORAL DAILY
Status: DISCONTINUED | OUTPATIENT
Start: 2020-01-01 | End: 2020-01-01

## 2020-01-01 RX ORDER — LANSOPRAZOLE
30 KIT EVERY MORNING
Status: DISCONTINUED | OUTPATIENT
Start: 2020-01-01 | End: 2020-01-01 | Stop reason: HOSPADM

## 2020-01-01 RX ORDER — BUMETANIDE 1 MG/1
1 TABLET ORAL DAILY
Status: DISCONTINUED | OUTPATIENT
Start: 2020-01-01 | End: 2020-01-01

## 2020-01-01 RX ORDER — SODIUM POLYSTYRENE SULFONATE 15 G/60ML
15 SUSPENSION ORAL; RECTAL ONCE
Status: COMPLETED | OUTPATIENT
Start: 2020-01-01 | End: 2020-01-01

## 2020-01-01 RX ORDER — METHYLPREDNISOLONE SODIUM SUCCINATE 40 MG/ML
40 INJECTION, POWDER, LYOPHILIZED, FOR SOLUTION INTRAMUSCULAR; INTRAVENOUS EVERY 12 HOURS
Qty: 1 EACH | Refills: 0 | Status: ON HOLD
Start: 2020-01-01 | End: 2020-01-01

## 2020-01-01 RX ORDER — METOCLOPRAMIDE HYDROCHLORIDE 5 MG/ML
5 INJECTION INTRAMUSCULAR; INTRAVENOUS EVERY 8 HOURS SCHEDULED
Status: DISCONTINUED | OUTPATIENT
Start: 2020-01-01 | End: 2020-01-01 | Stop reason: SDUPTHER

## 2020-01-01 RX ORDER — DIPHENHYDRAMINE HCL 50 MG
50 CAPSULE ORAL NIGHTLY PRN
Status: ON HOLD | COMMUNITY
End: 2020-01-01

## 2020-01-01 RX ORDER — BUSPIRONE HYDROCHLORIDE 10 MG/1
10 TABLET ORAL 3 TIMES DAILY
COMMUNITY

## 2020-01-01 RX ORDER — BUDESONIDE AND FORMOTEROL FUMARATE DIHYDRATE 160; 4.5 UG/1; UG/1
2 AEROSOL RESPIRATORY (INHALATION)
COMMUNITY

## 2020-01-01 RX ORDER — AMLODIPINE BESYLATE 10 MG/1
10 TABLET ORAL
Status: DISCONTINUED | OUTPATIENT
Start: 2020-01-01 | End: 2020-01-01

## 2020-01-01 RX ORDER — HYDRALAZINE HYDROCHLORIDE 10 MG/1
5 TABLET, FILM COATED ORAL EVERY 8 HOURS SCHEDULED
Status: DISCONTINUED | OUTPATIENT
Start: 2020-01-01 | End: 2020-01-01

## 2020-01-01 RX ORDER — SODIUM CHLORIDE 9 MG/ML
75 INJECTION, SOLUTION INTRAVENOUS CONTINUOUS
Status: DISCONTINUED | OUTPATIENT
Start: 2020-01-01 | End: 2020-01-01

## 2020-01-01 RX ORDER — TAMSULOSIN HYDROCHLORIDE 0.4 MG/1
0.4 CAPSULE ORAL DAILY
Status: DISCONTINUED | OUTPATIENT
Start: 2020-01-01 | End: 2020-01-01

## 2020-01-01 RX ORDER — BUDESONIDE 0.5 MG/2ML
0.5 INHALANT ORAL
Status: DISCONTINUED | OUTPATIENT
Start: 2020-01-01 | End: 2020-01-01

## 2020-01-01 RX ORDER — IPRATROPIUM BROMIDE AND ALBUTEROL SULFATE 2.5; .5 MG/3ML; MG/3ML
3 SOLUTION RESPIRATORY (INHALATION)
Qty: 360 ML | Refills: 0
Start: 2020-01-01

## 2020-01-01 RX ORDER — SODIUM CHLORIDE FOR INHALATION 3 %
4 VIAL, NEBULIZER (ML) INHALATION
Status: DISCONTINUED | OUTPATIENT
Start: 2020-01-01 | End: 2020-01-01 | Stop reason: HOSPADM

## 2020-01-01 RX ORDER — RISPERIDONE 0.5 MG/1
1 TABLET ORAL NIGHTLY
Status: DISCONTINUED | OUTPATIENT
Start: 2020-01-01 | End: 2020-01-01

## 2020-01-01 RX ORDER — SODIUM CHLORIDE 0.9 % (FLUSH) 0.9 %
3 SYRINGE (ML) INJECTION EVERY 12 HOURS SCHEDULED
Status: DISCONTINUED | OUTPATIENT
Start: 2020-01-01 | End: 2020-01-01

## 2020-01-01 RX ORDER — DEXMEDETOMIDINE HYDROCHLORIDE 4 UG/ML
.2-1.5 INJECTION, SOLUTION INTRAVENOUS
Status: DISCONTINUED | OUTPATIENT
Start: 2020-01-01 | End: 2020-01-01

## 2020-01-01 RX ORDER — PREDNISONE 20 MG/1
20 TABLET ORAL EVERY MORNING
COMMUNITY

## 2020-01-01 RX ORDER — HYDRALAZINE HYDROCHLORIDE 25 MG/1
75 TABLET, FILM COATED ORAL EVERY 8 HOURS SCHEDULED
Status: DISCONTINUED | OUTPATIENT
Start: 2020-01-01 | End: 2020-01-01 | Stop reason: HOSPADM

## 2020-01-01 RX ORDER — HYDRALAZINE HYDROCHLORIDE 25 MG/1
50 TABLET, FILM COATED ORAL EVERY 8 HOURS SCHEDULED
Status: DISCONTINUED | OUTPATIENT
Start: 2020-01-01 | End: 2020-01-01

## 2020-01-01 RX ORDER — PREDNISONE 10 MG/1
10 TABLET ORAL DAILY
Status: DISCONTINUED | OUTPATIENT
Start: 2020-01-01 | End: 2020-01-01

## 2020-01-01 RX ORDER — FENTANYL CITRATE 50 UG/ML
25 INJECTION, SOLUTION INTRAMUSCULAR; INTRAVENOUS
Status: DISCONTINUED | OUTPATIENT
Start: 2020-01-01 | End: 2020-01-01 | Stop reason: HOSPADM

## 2020-01-01 RX ORDER — FUROSEMIDE 10 MG/ML
40 INJECTION INTRAMUSCULAR; INTRAVENOUS 2 TIMES DAILY
Status: DISCONTINUED | OUTPATIENT
Start: 2020-01-01 | End: 2020-01-01

## 2020-01-01 RX ORDER — ASPIRIN 81 MG/1
81 TABLET ORAL DAILY
Status: DISCONTINUED | OUTPATIENT
Start: 2020-01-01 | End: 2020-01-01

## 2020-01-01 RX ORDER — MAGNESIUM SULFATE 1 G/100ML
1 INJECTION INTRAVENOUS ONCE
Status: COMPLETED | OUTPATIENT
Start: 2020-01-01 | End: 2020-01-01

## 2020-01-01 RX ORDER — HYDRALAZINE HYDROCHLORIDE 25 MG/1
75 TABLET, FILM COATED ORAL EVERY 8 HOURS SCHEDULED
Status: DISCONTINUED | OUTPATIENT
Start: 2020-01-01 | End: 2020-01-01

## 2020-01-01 RX ORDER — RISPERIDONE 1 MG/1
0.5 TABLET ORAL EVERY 12 HOURS SCHEDULED
Status: DISCONTINUED | OUTPATIENT
Start: 2020-01-01 | End: 2020-01-01

## 2020-01-01 RX ORDER — SODIUM CHLORIDE 0.9 % (FLUSH) 0.9 %
10 SYRINGE (ML) INJECTION AS NEEDED
Status: DISCONTINUED | OUTPATIENT
Start: 2020-01-01 | End: 2020-01-01

## 2020-01-01 RX ORDER — FENTANYL 50 UG/H
1 PATCH TRANSDERMAL
Status: DISCONTINUED | OUTPATIENT
Start: 2020-01-01 | End: 2020-01-01

## 2020-01-01 RX ORDER — FINASTERIDE 5 MG/1
5 TABLET, FILM COATED ORAL DAILY
Status: ON HOLD | COMMUNITY
End: 2020-01-01

## 2020-01-01 RX ORDER — MELATONIN
1000 DAILY
Status: DISCONTINUED | OUTPATIENT
Start: 2020-01-01 | End: 2020-01-01

## 2020-01-01 RX ORDER — MECLIZINE HCL 12.5 MG/1
12.5 TABLET ORAL EVERY 4 HOURS PRN
COMMUNITY
End: 2020-01-01 | Stop reason: HOSPADM

## 2020-01-01 RX ORDER — BUMETANIDE 0.25 MG/ML
2 INJECTION INTRAMUSCULAR; INTRAVENOUS
Status: DISPENSED | OUTPATIENT
Start: 2020-01-01 | End: 2020-01-01

## 2020-01-01 RX ORDER — CETIRIZINE HYDROCHLORIDE 10 MG/1
10 TABLET ORAL DAILY
Status: DISCONTINUED | OUTPATIENT
Start: 2020-01-01 | End: 2020-01-01

## 2020-01-01 RX ORDER — SODIUM CHLORIDE 0.9 % (FLUSH) 0.9 %
10 SYRINGE (ML) INJECTION EVERY 12 HOURS SCHEDULED
Status: DISCONTINUED | OUTPATIENT
Start: 2020-01-01 | End: 2020-01-01

## 2020-01-01 RX ORDER — SPIRONOLACTONE 25 MG/1
25 TABLET ORAL DAILY
Status: CANCELLED | OUTPATIENT
Start: 2020-01-01

## 2020-01-01 RX ORDER — SODIUM CHLORIDE FOR INHALATION 3 %
4 VIAL, NEBULIZER (ML) INHALATION
Status: DISCONTINUED | OUTPATIENT
Start: 2020-01-01 | End: 2020-01-01

## 2020-01-01 RX ORDER — POTASSIUM CHLORIDE 750 MG/1
10 CAPSULE, EXTENDED RELEASE ORAL DAILY
Status: DISCONTINUED | OUTPATIENT
Start: 2020-01-01 | End: 2020-01-01 | Stop reason: HOSPADM

## 2020-01-01 RX ORDER — METHYLPREDNISOLONE SODIUM SUCCINATE 125 MG/2ML
125 INJECTION, POWDER, LYOPHILIZED, FOR SOLUTION INTRAMUSCULAR; INTRAVENOUS ONCE
Status: COMPLETED | OUTPATIENT
Start: 2020-01-01 | End: 2020-01-01

## 2020-01-01 RX ORDER — BUDESONIDE 0.5 MG/2ML
0.5 INHALANT ORAL
Status: DISCONTINUED | OUTPATIENT
Start: 2020-01-01 | End: 2020-01-01 | Stop reason: SDUPTHER

## 2020-01-01 RX ORDER — ROPINIROLE 2 MG/1
2 TABLET, FILM COATED ORAL NIGHTLY
COMMUNITY

## 2020-01-01 RX ORDER — WITCH HAZEL 50 %
1 PADS, MEDICATED (EA) TOPICAL 2 TIMES DAILY
COMMUNITY

## 2020-01-01 RX ORDER — LOPERAMIDE HYDROCHLORIDE 2 MG/1
2 CAPSULE ORAL 4 TIMES DAILY PRN
COMMUNITY

## 2020-01-01 RX ORDER — BUMETANIDE 1 MG/1
1 TABLET ORAL
Status: DISPENSED | OUTPATIENT
Start: 2020-01-01 | End: 2020-01-01

## 2020-01-01 RX ORDER — CARVEDILOL 12.5 MG/1
12.5 TABLET ORAL 2 TIMES DAILY WITH MEALS
Status: CANCELLED | OUTPATIENT
Start: 2020-01-01

## 2020-01-01 RX ORDER — METHYLPREDNISOLONE SODIUM SUCCINATE 40 MG/ML
40 INJECTION, POWDER, LYOPHILIZED, FOR SOLUTION INTRAMUSCULAR; INTRAVENOUS EVERY 12 HOURS
Status: DISCONTINUED | OUTPATIENT
Start: 2020-01-01 | End: 2020-01-01 | Stop reason: HOSPADM

## 2020-01-01 RX ORDER — LORAZEPAM 2 MG/ML
1 INJECTION INTRAMUSCULAR
Status: DISCONTINUED | OUTPATIENT
Start: 2020-01-01 | End: 2020-01-01

## 2020-01-01 RX ORDER — PREDNISONE 20 MG/1
20 TABLET ORAL EVERY 12 HOURS SCHEDULED
Status: DISCONTINUED | OUTPATIENT
Start: 2020-01-01 | End: 2020-01-01

## 2020-01-01 RX ORDER — FUROSEMIDE 10 MG/ML
80 INJECTION INTRAMUSCULAR; INTRAVENOUS ONCE
Status: COMPLETED | OUTPATIENT
Start: 2020-01-01 | End: 2020-01-01

## 2020-01-01 RX ORDER — FENTANYL CITRATE 50 UG/ML
INJECTION, SOLUTION INTRAMUSCULAR; INTRAVENOUS AS NEEDED
Status: DISCONTINUED | OUTPATIENT
Start: 2020-01-01 | End: 2020-01-01 | Stop reason: SURG

## 2020-01-01 RX ORDER — METOCLOPRAMIDE 5 MG/1
5 TABLET ORAL EVERY 8 HOURS SCHEDULED
Status: DISCONTINUED | OUTPATIENT
Start: 2020-01-01 | End: 2020-01-01

## 2020-01-01 RX ORDER — FLUTICASONE PROPIONATE 50 MCG
2 SPRAY, SUSPENSION (ML) NASAL DAILY
Status: DISCONTINUED | OUTPATIENT
Start: 2020-01-01 | End: 2020-01-01 | Stop reason: HOSPADM

## 2020-01-01 RX ORDER — ONDANSETRON 2 MG/ML
4 INJECTION INTRAMUSCULAR; INTRAVENOUS EVERY 6 HOURS PRN
Status: DISCONTINUED | OUTPATIENT
Start: 2020-01-01 | End: 2020-01-01

## 2020-01-01 RX ORDER — FUROSEMIDE 10 MG/ML
40 INJECTION INTRAMUSCULAR; INTRAVENOUS
Status: ACTIVE | OUTPATIENT
Start: 2020-01-01 | End: 2020-01-01

## 2020-01-01 RX ORDER — SODIUM CHLORIDE 450 MG/100ML
70 INJECTION, SOLUTION INTRAVENOUS CONTINUOUS
Status: DISCONTINUED | OUTPATIENT
Start: 2020-01-01 | End: 2020-01-01

## 2020-01-01 RX ORDER — MELATONIN
1000 DAILY
COMMUNITY

## 2020-01-01 RX ORDER — BUMETANIDE 0.25 MG/ML
1 INJECTION INTRAMUSCULAR; INTRAVENOUS DAILY
Status: DISCONTINUED | OUTPATIENT
Start: 2020-01-01 | End: 2020-01-01 | Stop reason: HOSPADM

## 2020-01-01 RX ORDER — SODIUM CHLORIDE 0.9 % (FLUSH) 0.9 %
3-10 SYRINGE (ML) INJECTION AS NEEDED
Status: DISCONTINUED | OUTPATIENT
Start: 2020-01-01 | End: 2020-01-01

## 2020-01-01 RX ORDER — PREDNISONE 10 MG/1
10 TABLET ORAL EVERY 12 HOURS SCHEDULED
Status: DISCONTINUED | OUTPATIENT
Start: 2020-01-01 | End: 2020-01-01

## 2020-01-01 RX ORDER — PANTOPRAZOLE SODIUM 40 MG/1
40 TABLET, DELAYED RELEASE ORAL EVERY MORNING
COMMUNITY

## 2020-01-01 RX ORDER — SODIUM CHLORIDE 9 MG/ML
125 INJECTION, SOLUTION INTRAVENOUS CONTINUOUS
Status: DISCONTINUED | OUTPATIENT
Start: 2020-01-01 | End: 2020-01-01

## 2020-01-01 RX ORDER — METOCLOPRAMIDE 5 MG/1
5 TABLET ORAL EVERY 8 HOURS SCHEDULED
Status: DISCONTINUED | OUTPATIENT
Start: 2020-01-01 | End: 2020-01-01 | Stop reason: HOSPADM

## 2020-01-01 RX ORDER — MORPHINE SULFATE 2 MG/ML
2 INJECTION, SOLUTION INTRAMUSCULAR; INTRAVENOUS
Status: DISCONTINUED | OUTPATIENT
Start: 2020-01-01 | End: 2020-01-01 | Stop reason: HOSPADM

## 2020-01-01 RX ORDER — ACETYLCYSTEINE 100 MG/ML
1200 SOLUTION ORAL; RESPIRATORY (INHALATION) ONCE
Status: CANCELLED | OUTPATIENT
Start: 2020-01-01

## 2020-01-01 RX ORDER — SODIUM POLYSTYRENE SULFONATE 15 G/60ML
30 SUSPENSION ORAL; RECTAL ONCE
Status: DISCONTINUED | OUTPATIENT
Start: 2020-01-01 | End: 2020-01-01

## 2020-01-01 RX ORDER — FAMOTIDINE 10 MG/ML
20 INJECTION, SOLUTION INTRAVENOUS EVERY 12 HOURS SCHEDULED
Status: DISCONTINUED | OUTPATIENT
Start: 2020-01-01 | End: 2020-01-01

## 2020-01-01 RX ORDER — BUSPIRONE HYDROCHLORIDE 5 MG/1
10 TABLET ORAL 3 TIMES DAILY
Status: ON HOLD | COMMUNITY
End: 2020-01-01

## 2020-01-01 RX ORDER — BUMETANIDE 0.25 MG/ML
4 INJECTION INTRAMUSCULAR; INTRAVENOUS
Status: DISCONTINUED | OUTPATIENT
Start: 2020-01-01 | End: 2020-01-01

## 2020-01-01 RX ORDER — ASPIRIN 325 MG
325 TABLET ORAL DAILY
Status: ON HOLD | COMMUNITY
End: 2020-01-01

## 2020-01-01 RX ORDER — LEVOFLOXACIN 5 MG/ML
750 INJECTION, SOLUTION INTRAVENOUS EVERY 24 HOURS
Status: DISCONTINUED | OUTPATIENT
Start: 2020-01-01 | End: 2020-01-01

## 2020-01-01 RX ORDER — METOCLOPRAMIDE 5 MG/1
5 TABLET ORAL
Status: DISCONTINUED | OUTPATIENT
Start: 2020-01-01 | End: 2020-01-01

## 2020-01-01 RX ORDER — CALCIUM CARBONATE 200(500)MG
2 TABLET,CHEWABLE ORAL 2 TIMES DAILY PRN
Status: DISCONTINUED | OUTPATIENT
Start: 2020-01-01 | End: 2020-01-01

## 2020-01-01 RX ORDER — POTASSIUM CHLORIDE 20MEQ/15ML
10 LIQUID (ML) ORAL DAILY
Status: DISCONTINUED | OUTPATIENT
Start: 2020-01-01 | End: 2020-01-01 | Stop reason: ALTCHOICE

## 2020-01-01 RX ORDER — TRAZODONE HYDROCHLORIDE 100 MG/1
100 TABLET ORAL NIGHTLY
COMMUNITY

## 2020-01-01 RX ORDER — FENTANYL 25 UG/H
1 PATCH TRANSDERMAL
Status: DISCONTINUED | OUTPATIENT
Start: 2020-01-01 | End: 2020-01-01 | Stop reason: SDUPTHER

## 2020-01-01 RX ORDER — HYDROCODONE BITARTRATE AND ACETAMINOPHEN 5; 325 MG/1; MG/1
1 TABLET ORAL EVERY 6 HOURS PRN
Status: DISCONTINUED | OUTPATIENT
Start: 2020-01-01 | End: 2020-01-01

## 2020-01-01 RX ORDER — RISPERIDONE 1 MG/1
1 TABLET ORAL
COMMUNITY

## 2020-01-01 RX ORDER — ZINC SULFATE 50(220)MG
220 CAPSULE ORAL DAILY
Status: DISCONTINUED | OUTPATIENT
Start: 2020-01-01 | End: 2020-01-01

## 2020-01-01 RX ORDER — NYSTATIN 100000 [USP'U]/G
1 POWDER TOPICAL 2 TIMES DAILY PRN
Status: ON HOLD | COMMUNITY
End: 2020-01-01

## 2020-01-01 RX ORDER — HYDRALAZINE HYDROCHLORIDE 25 MG/1
25 TABLET, FILM COATED ORAL EVERY 8 HOURS SCHEDULED
Status: DISCONTINUED | OUTPATIENT
Start: 2020-01-01 | End: 2020-01-01

## 2020-01-01 RX ORDER — LORAZEPAM 2 MG/ML
1 INJECTION INTRAMUSCULAR EVERY 4 HOURS PRN
Status: DISCONTINUED | OUTPATIENT
Start: 2020-01-01 | End: 2020-01-01

## 2020-01-01 RX ORDER — LABETALOL HYDROCHLORIDE 5 MG/ML
5 INJECTION, SOLUTION INTRAVENOUS
Status: DISCONTINUED | OUTPATIENT
Start: 2020-01-01 | End: 2020-01-01 | Stop reason: HOSPADM

## 2020-01-01 RX ORDER — BUSPIRONE HYDROCHLORIDE 10 MG/1
10 TABLET ORAL EVERY 8 HOURS SCHEDULED
Status: DISCONTINUED | OUTPATIENT
Start: 2020-01-01 | End: 2020-01-01

## 2020-01-01 RX ORDER — DEXTROSE MONOHYDRATE 50 MG/ML
20 INJECTION, SOLUTION INTRAVENOUS CONTINUOUS
Status: DISCONTINUED | OUTPATIENT
Start: 2020-01-01 | End: 2020-01-01

## 2020-01-01 RX ORDER — SODIUM CHLORIDE 0.9 % (FLUSH) 0.9 %
10 SYRINGE (ML) INJECTION AS NEEDED
Status: DISCONTINUED | OUTPATIENT
Start: 2020-01-01 | End: 2020-01-01 | Stop reason: HOSPADM

## 2020-01-01 RX ORDER — FENTANYL 12 UG/H
1 PATCH TRANSDERMAL
Status: DISPENSED | OUTPATIENT
Start: 2020-01-01 | End: 2020-01-01

## 2020-01-01 RX ORDER — POTASSIUM CHLORIDE 7.45 MG/ML
10 INJECTION INTRAVENOUS
Status: DISCONTINUED | OUTPATIENT
Start: 2020-01-01 | End: 2020-01-01

## 2020-01-01 RX ORDER — OMEPRAZOLE 20 MG/1
20 CAPSULE, DELAYED RELEASE ORAL DAILY
Status: ON HOLD | COMMUNITY
End: 2020-01-01

## 2020-01-01 RX ORDER — CHLORHEXIDINE GLUCONATE 0.12 MG/ML
15 RINSE ORAL EVERY 12 HOURS SCHEDULED
Status: DISCONTINUED | OUTPATIENT
Start: 2020-01-01 | End: 2020-01-01

## 2020-01-01 RX ORDER — HALOPERIDOL 5 MG/1
7.5 TABLET ORAL
COMMUNITY

## 2020-01-01 RX ORDER — MORPHINE SULFATE 2 MG/ML
2 INJECTION, SOLUTION INTRAMUSCULAR; INTRAVENOUS ONCE
Status: COMPLETED | OUTPATIENT
Start: 2020-01-01 | End: 2020-01-01

## 2020-01-01 RX ORDER — FAMOTIDINE 20 MG/1
20 TABLET, FILM COATED ORAL DAILY
Status: DISCONTINUED | OUTPATIENT
Start: 2020-01-01 | End: 2020-01-01

## 2020-01-01 RX ORDER — NICOTINE 21 MG/24HR
1 PATCH, TRANSDERMAL 24 HOURS TRANSDERMAL EVERY 24 HOURS
COMMUNITY

## 2020-01-01 RX ORDER — HYDRALAZINE HYDROCHLORIDE 50 MG/1
50 TABLET, FILM COATED ORAL ONCE
Status: COMPLETED | OUTPATIENT
Start: 2020-01-01 | End: 2020-01-01

## 2020-01-01 RX ORDER — FUROSEMIDE 10 MG/ML
40 INJECTION INTRAMUSCULAR; INTRAVENOUS ONCE
Status: COMPLETED | OUTPATIENT
Start: 2020-01-01 | End: 2020-01-01

## 2020-01-01 RX ORDER — SPIRONOLACTONE 25 MG/1
25 TABLET ORAL EVERY MORNING
COMMUNITY

## 2020-01-01 RX ORDER — ALBUTEROL SULFATE 2.5 MG/3ML
2.5 SOLUTION RESPIRATORY (INHALATION) EVERY 6 HOURS PRN
Status: DISCONTINUED | OUTPATIENT
Start: 2020-01-01 | End: 2020-01-01 | Stop reason: HOSPADM

## 2020-01-01 RX ORDER — IPRATROPIUM BROMIDE AND ALBUTEROL SULFATE 2.5; .5 MG/3ML; MG/3ML
3 SOLUTION RESPIRATORY (INHALATION)
Status: DISCONTINUED | OUTPATIENT
Start: 2020-01-01 | End: 2020-01-01

## 2020-01-01 RX ORDER — CEFAZOLIN SODIUM 1 G/3ML
INJECTION, POWDER, FOR SOLUTION INTRAMUSCULAR; INTRAVENOUS AS NEEDED
Status: DISCONTINUED | OUTPATIENT
Start: 2020-01-01 | End: 2020-01-01 | Stop reason: SURG

## 2020-01-01 RX ORDER — NICOTINE POLACRILEX 4 MG
15 LOZENGE BUCCAL
Status: DISCONTINUED | OUTPATIENT
Start: 2020-01-01 | End: 2020-01-01 | Stop reason: HOSPADM

## 2020-01-01 RX ORDER — ASPIRIN 81 MG/1
81 TABLET ORAL DAILY
COMMUNITY

## 2020-01-01 RX ORDER — HYDRALAZINE HYDROCHLORIDE 20 MG/ML
10 INJECTION INTRAMUSCULAR; INTRAVENOUS EVERY 6 HOURS PRN
Status: DISCONTINUED | OUTPATIENT
Start: 2020-01-01 | End: 2020-01-01 | Stop reason: HOSPADM

## 2020-01-01 RX ORDER — NICOTINE 21 MG/24HR
1 PATCH, TRANSDERMAL 24 HOURS TRANSDERMAL EVERY 24 HOURS
Status: DISCONTINUED | OUTPATIENT
Start: 2020-01-01 | End: 2020-01-01

## 2020-01-01 RX ORDER — ALBUTEROL SULFATE 90 UG/1
2 AEROSOL, METERED RESPIRATORY (INHALATION) 3 TIMES DAILY PRN
COMMUNITY

## 2020-01-01 RX ORDER — DEXTROSE MONOHYDRATE 25 G/50ML
25 INJECTION, SOLUTION INTRAVENOUS
Status: DISCONTINUED | OUTPATIENT
Start: 2020-01-01 | End: 2020-01-01 | Stop reason: HOSPADM

## 2020-01-01 RX ORDER — LIDOCAINE HYDROCHLORIDE AND EPINEPHRINE 10; 10 MG/ML; UG/ML
INJECTION, SOLUTION INFILTRATION; PERINEURAL AS NEEDED
Status: DISCONTINUED | OUTPATIENT
Start: 2020-01-01 | End: 2020-01-01 | Stop reason: HOSPADM

## 2020-01-01 RX ORDER — FOLIC ACID 1 MG/1
1 TABLET ORAL DAILY
Status: ON HOLD | COMMUNITY
End: 2020-01-01

## 2020-01-01 RX ORDER — RISPERIDONE 0.5 MG/1
1 TABLET ORAL NIGHTLY
Status: DISCONTINUED | OUTPATIENT
Start: 2020-01-01 | End: 2020-01-01 | Stop reason: HOSPADM

## 2020-01-01 RX ORDER — DICYCLOMINE HCL 20 MG
20 TABLET ORAL 3 TIMES DAILY
Status: ON HOLD | COMMUNITY
End: 2020-01-01

## 2020-01-01 RX ORDER — DEXTROSE MONOHYDRATE 25 G/50ML
25 INJECTION, SOLUTION INTRAVENOUS
Status: DISCONTINUED | OUTPATIENT
Start: 2020-01-01 | End: 2020-01-01

## 2020-01-01 RX ORDER — FENTANYL 12 UG/H
1 PATCH TRANSDERMAL
Status: DISCONTINUED | OUTPATIENT
Start: 2020-01-01 | End: 2020-01-01 | Stop reason: HOSPADM

## 2020-01-01 RX ORDER — SODIUM CHLORIDE 450 MG/100ML
1000 INJECTION, SOLUTION INTRAVENOUS
Status: DISPENSED | OUTPATIENT
Start: 2020-01-01 | End: 2020-01-01

## 2020-01-01 RX ORDER — FAMOTIDINE 10 MG/ML
20 INJECTION, SOLUTION INTRAVENOUS DAILY
Status: DISCONTINUED | OUTPATIENT
Start: 2020-01-01 | End: 2020-01-01 | Stop reason: HOSPADM

## 2020-01-01 RX ORDER — TAMSULOSIN HYDROCHLORIDE 0.4 MG/1
0.4 CAPSULE ORAL DAILY
Status: DISCONTINUED | OUTPATIENT
Start: 2020-01-01 | End: 2020-01-01 | Stop reason: SDUPTHER

## 2020-01-01 RX ORDER — DEXTROSE MONOHYDRATE 25 G/50ML
50 INJECTION, SOLUTION INTRAVENOUS ONCE
Status: COMPLETED | OUTPATIENT
Start: 2020-01-01 | End: 2020-01-01

## 2020-01-01 RX ORDER — POTASSIUM CHLORIDE 750 MG/1
40 CAPSULE, EXTENDED RELEASE ORAL AS NEEDED
Status: DISCONTINUED | OUTPATIENT
Start: 2020-01-01 | End: 2020-01-01

## 2020-01-01 RX ORDER — PANTOPRAZOLE SODIUM 40 MG/10ML
40 INJECTION, POWDER, LYOPHILIZED, FOR SOLUTION INTRAVENOUS
Status: DISCONTINUED | OUTPATIENT
Start: 2020-01-01 | End: 2020-01-01

## 2020-01-01 RX ORDER — ATORVASTATIN CALCIUM 40 MG/1
40 TABLET, FILM COATED ORAL NIGHTLY
COMMUNITY

## 2020-01-01 RX ORDER — ONDANSETRON 2 MG/ML
4 INJECTION INTRAMUSCULAR; INTRAVENOUS ONCE
Status: COMPLETED | OUTPATIENT
Start: 2020-01-01 | End: 2020-01-01

## 2020-01-01 RX ORDER — MORPHINE SULFATE 2 MG/ML
2 INJECTION, SOLUTION INTRAMUSCULAR; INTRAVENOUS
Status: DISCONTINUED | OUTPATIENT
Start: 2020-01-01 | End: 2020-01-01 | Stop reason: SDUPTHER

## 2020-01-01 RX ORDER — METHYLPREDNISOLONE SODIUM SUCCINATE 40 MG/ML
40 INJECTION, POWDER, LYOPHILIZED, FOR SOLUTION INTRAMUSCULAR; INTRAVENOUS EVERY 6 HOURS
Status: DISCONTINUED | OUTPATIENT
Start: 2020-01-01 | End: 2020-01-01

## 2020-01-01 RX ORDER — BUMETANIDE 0.25 MG/ML
1 INJECTION INTRAMUSCULAR; INTRAVENOUS DAILY
Status: DISCONTINUED | OUTPATIENT
Start: 2020-01-01 | End: 2020-01-01 | Stop reason: SDUPTHER

## 2020-01-01 RX ORDER — LIDOCAINE HYDROCHLORIDE 10 MG/ML
0.5 INJECTION, SOLUTION EPIDURAL; INFILTRATION; INTRACAUDAL; PERINEURAL ONCE AS NEEDED
Status: DISCONTINUED | OUTPATIENT
Start: 2020-01-01 | End: 2020-01-01

## 2020-01-01 RX ORDER — FLUTICASONE PROPIONATE 50 MCG
1 SPRAY, SUSPENSION (ML) NASAL DAILY
Status: ON HOLD | COMMUNITY
End: 2020-01-01

## 2020-01-01 RX ORDER — MIDAZOLAM HYDROCHLORIDE 1 MG/ML
1 INJECTION INTRAMUSCULAR; INTRAVENOUS ONCE
Status: COMPLETED | OUTPATIENT
Start: 2020-01-01 | End: 2020-01-01

## 2020-01-01 RX ORDER — BUSPIRONE HYDROCHLORIDE 5 MG/1
5 TABLET ORAL EVERY 8 HOURS SCHEDULED
Status: DISCONTINUED | OUTPATIENT
Start: 2020-01-01 | End: 2020-01-01

## 2020-01-01 RX ORDER — ALBUTEROL SULFATE 90 UG/1
2 AEROSOL, METERED RESPIRATORY (INHALATION) ONCE
Status: DISCONTINUED | OUTPATIENT
Start: 2020-01-01 | End: 2020-01-01

## 2020-01-01 RX ORDER — BENZTROPINE MESYLATE 1 MG/1
1 TABLET ORAL EVERY 12 HOURS SCHEDULED
Status: DISCONTINUED | OUTPATIENT
Start: 2020-01-01 | End: 2020-01-01

## 2020-01-01 RX ORDER — CITALOPRAM 20 MG/1
20 TABLET ORAL DAILY
Status: ON HOLD | COMMUNITY
End: 2020-01-01

## 2020-01-01 RX ORDER — MAGNESIUM HYDROXIDE 1200 MG/15ML
LIQUID ORAL AS NEEDED
Status: DISCONTINUED | OUTPATIENT
Start: 2020-01-01 | End: 2020-01-01 | Stop reason: HOSPADM

## 2020-01-01 RX ORDER — PANTOPRAZOLE SODIUM 40 MG/1
40 TABLET, DELAYED RELEASE ORAL EVERY MORNING
Status: DISCONTINUED | OUTPATIENT
Start: 2020-01-01 | End: 2020-01-01

## 2020-01-01 RX ORDER — NOREPINEPHRINE BIT/0.9 % NACL 8 MG/250ML
.02-.3 INFUSION BOTTLE (ML) INTRAVENOUS
Status: DISCONTINUED | OUTPATIENT
Start: 2020-01-01 | End: 2020-01-01

## 2020-01-01 RX ORDER — LANOLIN ALCOHOL/MO/W.PET/CERES
1000 CREAM (GRAM) TOPICAL DAILY
Status: ON HOLD | COMMUNITY
End: 2020-01-01

## 2020-01-01 RX ORDER — LORAZEPAM 2 MG/ML
2 INJECTION INTRAMUSCULAR
Status: DISCONTINUED | OUTPATIENT
Start: 2020-01-01 | End: 2020-01-01 | Stop reason: HOSPADM

## 2020-01-01 RX ORDER — IPRATROPIUM BROMIDE AND ALBUTEROL SULFATE 2.5; .5 MG/3ML; MG/3ML
3 SOLUTION RESPIRATORY (INHALATION)
Status: DISCONTINUED | OUTPATIENT
Start: 2020-01-01 | End: 2020-01-01 | Stop reason: SDUPTHER

## 2020-01-01 RX ORDER — ATORVASTATIN CALCIUM 40 MG/1
40 TABLET, FILM COATED ORAL DAILY
Status: DISCONTINUED | OUTPATIENT
Start: 2020-01-01 | End: 2020-01-01

## 2020-01-01 RX ORDER — AMLODIPINE BESYLATE 10 MG/1
10 TABLET ORAL
Status: DISCONTINUED | OUTPATIENT
Start: 2020-01-01 | End: 2020-01-01 | Stop reason: HOSPADM

## 2020-01-01 RX ORDER — NICOTINE 21 MG/24HR
1 PATCH, TRANSDERMAL 24 HOURS TRANSDERMAL
Status: DISCONTINUED | OUTPATIENT
Start: 2020-01-01 | End: 2020-01-01 | Stop reason: DRUGHIGH

## 2020-01-01 RX ORDER — POTASSIUM CHLORIDE 20MEQ/15ML
20 LIQUID (ML) ORAL DAILY
Status: DISCONTINUED | OUTPATIENT
Start: 2020-01-01 | End: 2020-01-01 | Stop reason: SDUPTHER

## 2020-01-01 RX ORDER — BUMETANIDE 1 MG/1
0.5 TABLET ORAL DAILY
Status: DISCONTINUED | OUTPATIENT
Start: 2020-01-01 | End: 2020-01-01 | Stop reason: HOSPADM

## 2020-01-01 RX ORDER — NALOXONE HCL 0.4 MG/ML
0.04 VIAL (ML) INJECTION AS NEEDED
Status: DISCONTINUED | OUTPATIENT
Start: 2020-01-01 | End: 2020-01-01 | Stop reason: HOSPADM

## 2020-01-01 RX ORDER — FAMOTIDINE 20 MG/1
20 TABLET, FILM COATED ORAL
Status: DISCONTINUED | OUTPATIENT
Start: 2020-01-01 | End: 2020-01-01

## 2020-01-01 RX ORDER — MIDAZOLAM HYDROCHLORIDE 1 MG/ML
INJECTION INTRAMUSCULAR; INTRAVENOUS
Status: COMPLETED
Start: 2020-01-01 | End: 2020-01-01

## 2020-01-01 RX ORDER — AMOXICILLIN 250 MG
1 CAPSULE ORAL 2 TIMES DAILY
Status: DISCONTINUED | OUTPATIENT
Start: 2020-01-01 | End: 2020-01-01

## 2020-01-01 RX ORDER — NITROGLYCERIN 20 MG/100ML
10-100 INJECTION INTRAVENOUS
Status: DISCONTINUED | OUTPATIENT
Start: 2020-01-01 | End: 2020-01-01

## 2020-01-01 RX ORDER — SACCHAROMYCES BOULARDII 250 MG
250 CAPSULE ORAL 2 TIMES DAILY
Status: DISCONTINUED | OUTPATIENT
Start: 2020-01-01 | End: 2020-01-01 | Stop reason: HOSPADM

## 2020-01-01 RX ORDER — HYDRALAZINE HYDROCHLORIDE 25 MG/1
25 TABLET, FILM COATED ORAL 3 TIMES DAILY
COMMUNITY

## 2020-01-01 RX ORDER — POTASSIUM CHLORIDE 1.5 G/1.77G
40 POWDER, FOR SOLUTION ORAL AS NEEDED
Status: DISCONTINUED | OUTPATIENT
Start: 2020-01-01 | End: 2020-01-01

## 2020-01-01 RX ORDER — ASCORBIC ACID 500 MG
500 TABLET ORAL EVERY MORNING
COMMUNITY

## 2020-01-01 RX ORDER — CARVEDILOL 12.5 MG/1
12.5 TABLET ORAL 2 TIMES DAILY WITH MEALS
COMMUNITY

## 2020-01-01 RX ORDER — METOCLOPRAMIDE 5 MG/1
5 TABLET ORAL 3 TIMES DAILY
COMMUNITY

## 2020-01-01 RX ORDER — SIMVASTATIN 20 MG
20 TABLET ORAL NIGHTLY
Status: ON HOLD | COMMUNITY
End: 2020-01-01

## 2020-01-01 RX ORDER — POTASSIUM CHLORIDE 20MEQ/15ML
40 LIQUID (ML) ORAL
Status: DISPENSED | OUTPATIENT
Start: 2020-01-01 | End: 2020-01-01

## 2020-01-01 RX ORDER — DOCUSATE SODIUM 100 MG/1
100 CAPSULE, LIQUID FILLED ORAL 2 TIMES DAILY PRN
Status: DISCONTINUED | OUTPATIENT
Start: 2020-01-01 | End: 2020-01-01

## 2020-01-01 RX ORDER — CITALOPRAM 20 MG/1
20 TABLET ORAL DAILY
Status: DISCONTINUED | OUTPATIENT
Start: 2020-01-01 | End: 2020-01-01

## 2020-01-01 RX ORDER — PANTOPRAZOLE SODIUM 40 MG/10ML
80 INJECTION, POWDER, LYOPHILIZED, FOR SOLUTION INTRAVENOUS ONCE
Status: DISCONTINUED | OUTPATIENT
Start: 2020-01-01 | End: 2020-01-01

## 2020-01-01 RX ORDER — HYDROCODONE BITARTRATE AND ACETAMINOPHEN 10; 325 MG/1; MG/1
1 TABLET ORAL EVERY 8 HOURS PRN
COMMUNITY

## 2020-01-01 RX ORDER — BUDESONIDE 0.25 MG/2ML
0.5 INHALANT ORAL
Qty: 2 ML | Refills: 0
Start: 2020-01-01

## 2020-01-01 RX ORDER — CETIRIZINE HYDROCHLORIDE 10 MG/1
10 TABLET ORAL DAILY
Status: DISCONTINUED | OUTPATIENT
Start: 2020-01-01 | End: 2020-01-01 | Stop reason: HOSPADM

## 2020-01-01 RX ORDER — ROCURONIUM BROMIDE 10 MG/ML
INJECTION, SOLUTION INTRAVENOUS AS NEEDED
Status: DISCONTINUED | OUTPATIENT
Start: 2020-01-01 | End: 2020-01-01 | Stop reason: SURG

## 2020-01-01 RX ORDER — BUDESONIDE 0.25 MG/2ML
0.5 INHALANT ORAL
Status: DISCONTINUED | OUTPATIENT
Start: 2020-01-01 | End: 2020-01-01 | Stop reason: HOSPADM

## 2020-01-01 RX ORDER — IPRATROPIUM BROMIDE AND ALBUTEROL SULFATE 2.5; .5 MG/3ML; MG/3ML
3 SOLUTION RESPIRATORY (INHALATION)
Status: DISCONTINUED | OUTPATIENT
Start: 2020-01-01 | End: 2020-01-01 | Stop reason: HOSPADM

## 2020-01-01 RX ORDER — INSULIN GLARGINE 100 [IU]/ML
14 INJECTION, SOLUTION SUBCUTANEOUS NIGHTLY
COMMUNITY

## 2020-01-01 RX ORDER — DOXYCYCLINE HYCLATE 100 MG/1
100 CAPSULE ORAL 2 TIMES DAILY
Qty: 28 CAPSULE | Refills: 0 | Status: SHIPPED | OUTPATIENT
Start: 2020-01-01

## 2020-01-01 RX ORDER — POLYETHYLENE GLYCOL 3350 17 G/17G
17 POWDER, FOR SOLUTION ORAL DAILY
Status: DISCONTINUED | OUTPATIENT
Start: 2020-01-01 | End: 2020-01-01

## 2020-01-01 RX ORDER — LORAZEPAM 2 MG/ML
1 INJECTION INTRAMUSCULAR
Status: DISPENSED | OUTPATIENT
Start: 2020-01-01 | End: 2020-01-01

## 2020-01-01 RX ORDER — CALCIUM POLYCARBOPHIL 625 MG 625 MG/1
625 TABLET ORAL 2 TIMES DAILY
Status: ON HOLD | COMMUNITY
End: 2020-01-01

## 2020-01-01 RX ORDER — ALBUTEROL SULFATE 90 UG/1
2 AEROSOL, METERED RESPIRATORY (INHALATION) 3 TIMES DAILY PRN
Status: DISCONTINUED | OUTPATIENT
Start: 2020-01-01 | End: 2020-01-01

## 2020-01-01 RX ORDER — BUMETANIDE 1 MG/1
2 TABLET ORAL DAILY
Status: CANCELLED | OUTPATIENT
Start: 2020-01-01

## 2020-01-01 RX ORDER — FAMOTIDINE 10 MG/ML
20 INJECTION, SOLUTION INTRAVENOUS DAILY
Status: ON HOLD
Start: 2020-01-01 | End: 2020-01-01

## 2020-01-01 RX ORDER — RISPERIDONE 0.5 MG/1
0.5 TABLET, ORALLY DISINTEGRATING ORAL 2 TIMES DAILY
Status: ON HOLD | COMMUNITY
End: 2020-01-01

## 2020-01-01 RX ORDER — HYDROCHLOROTHIAZIDE 25 MG/1
25 TABLET ORAL DAILY
Status: DISCONTINUED | OUTPATIENT
Start: 2020-01-01 | End: 2020-01-01 | Stop reason: ALTCHOICE

## 2020-01-01 RX ORDER — METHYLPREDNISOLONE SODIUM SUCCINATE 40 MG/ML
40 INJECTION, POWDER, LYOPHILIZED, FOR SOLUTION INTRAMUSCULAR; INTRAVENOUS EVERY 12 HOURS
Status: DISCONTINUED | OUTPATIENT
Start: 2020-01-01 | End: 2020-01-01

## 2020-01-01 RX ORDER — AMOXICILLIN AND CLAVULANATE POTASSIUM 875; 125 MG/1; MG/1
1 TABLET, FILM COATED ORAL EVERY 12 HOURS SCHEDULED
Status: DISCONTINUED | OUTPATIENT
Start: 2020-01-01 | End: 2020-01-01

## 2020-01-01 RX ORDER — RISPERIDONE 1 MG/1
1 TABLET ORAL NIGHTLY
Status: DISCONTINUED | OUTPATIENT
Start: 2020-01-01 | End: 2020-01-01

## 2020-01-01 RX ORDER — BUMETANIDE 0.25 MG/ML
1 INJECTION INTRAMUSCULAR; INTRAVENOUS EVERY 12 HOURS
Status: DISCONTINUED | OUTPATIENT
Start: 2020-01-01 | End: 2020-01-01

## 2020-01-01 RX ORDER — LACTULOSE 10 G/15ML
20 SOLUTION ORAL EVERY 6 HOURS PRN
Status: ON HOLD | COMMUNITY
End: 2020-01-01

## 2020-01-01 RX ORDER — ONDANSETRON 2 MG/ML
4 INJECTION INTRAMUSCULAR; INTRAVENOUS EVERY 6 HOURS PRN
Status: DISCONTINUED | OUTPATIENT
Start: 2020-01-01 | End: 2020-01-01 | Stop reason: HOSPADM

## 2020-01-01 RX ORDER — SODIUM POLYSTYRENE SULFONATE 15 G/60ML
15 SUSPENSION ORAL; RECTAL ONCE
Status: DISCONTINUED | OUTPATIENT
Start: 2020-01-01 | End: 2020-01-01 | Stop reason: SDUPTHER

## 2020-01-01 RX ORDER — TAMSULOSIN HYDROCHLORIDE 0.4 MG/1
1 CAPSULE ORAL
COMMUNITY

## 2020-01-01 RX ORDER — FAMOTIDINE 10 MG/ML
20 INJECTION, SOLUTION INTRAVENOUS DAILY
Status: DISCONTINUED | OUTPATIENT
Start: 2020-01-01 | End: 2020-01-01 | Stop reason: ALTCHOICE

## 2020-01-01 RX ORDER — MAGNESIUM CARB/ALUMINUM HYDROX 105-160MG
296 TABLET,CHEWABLE ORAL
Status: ACTIVE | OUTPATIENT
Start: 2020-01-01 | End: 2020-01-01

## 2020-01-01 RX ORDER — METHYLPREDNISOLONE SODIUM SUCCINATE 40 MG/ML
40 INJECTION, POWDER, LYOPHILIZED, FOR SOLUTION INTRAMUSCULAR; INTRAVENOUS EVERY 6 HOURS
Status: DISCONTINUED | OUTPATIENT
Start: 2020-01-01 | End: 2020-01-01 | Stop reason: SDUPTHER

## 2020-01-01 RX ORDER — ZINC GLUCONATE 50 MG
1 TABLET ORAL EVERY MORNING
COMMUNITY

## 2020-01-01 RX ORDER — SODIUM CHLORIDE, SODIUM LACTATE, POTASSIUM CHLORIDE, CALCIUM CHLORIDE 600; 310; 30; 20 MG/100ML; MG/100ML; MG/100ML; MG/100ML
INJECTION, SOLUTION INTRAVENOUS CONTINUOUS PRN
Status: DISCONTINUED | OUTPATIENT
Start: 2020-01-01 | End: 2020-01-01 | Stop reason: SURG

## 2020-01-01 RX ORDER — POLYETHYLENE GLYCOL 3350 17 G/17G
17 POWDER, FOR SOLUTION ORAL 2 TIMES DAILY PRN
Status: DISCONTINUED | OUTPATIENT
Start: 2020-01-01 | End: 2020-01-01 | Stop reason: HOSPADM

## 2020-01-01 RX ORDER — IPRATROPIUM BROMIDE AND ALBUTEROL SULFATE 2.5; .5 MG/3ML; MG/3ML
3 SOLUTION RESPIRATORY (INHALATION) EVERY 6 HOURS PRN
Status: DISCONTINUED | OUTPATIENT
Start: 2020-01-01 | End: 2020-01-01 | Stop reason: HOSPADM

## 2020-01-01 RX ORDER — ACETAMINOPHEN 160 MG/5ML
650 SOLUTION ORAL EVERY 4 HOURS PRN
Status: DISCONTINUED | OUTPATIENT
Start: 2020-01-01 | End: 2020-01-01

## 2020-01-01 RX ORDER — BISACODYL 10 MG
10 SUPPOSITORY, RECTAL RECTAL DAILY
Status: DISCONTINUED | OUTPATIENT
Start: 2020-01-01 | End: 2020-01-01

## 2020-01-01 RX ORDER — AMOXICILLIN 250 MG
1 CAPSULE ORAL 2 TIMES DAILY PRN
Status: DISCONTINUED | OUTPATIENT
Start: 2020-01-01 | End: 2020-01-01 | Stop reason: HOSPADM

## 2020-01-01 RX ORDER — SODIUM CHLORIDE 0.9 % (FLUSH) 0.9 %
20 SYRINGE (ML) INJECTION AS NEEDED
Status: DISCONTINUED | OUTPATIENT
Start: 2020-01-01 | End: 2020-01-01

## 2020-01-01 RX ORDER — SCOLOPAMINE TRANSDERMAL SYSTEM 1 MG/1
1 PATCH, EXTENDED RELEASE TRANSDERMAL
Status: DISCONTINUED | OUTPATIENT
Start: 2020-01-01 | End: 2020-01-01 | Stop reason: HOSPADM

## 2020-01-01 RX ORDER — FUROSEMIDE 10 MG/ML
INJECTION INTRAMUSCULAR; INTRAVENOUS
Status: DISPENSED
Start: 2020-01-01 | End: 2020-01-01

## 2020-01-01 RX ORDER — BUMETANIDE 2 MG/1
2 TABLET ORAL EVERY MORNING
COMMUNITY

## 2020-01-01 RX ORDER — FLUMAZENIL 0.1 MG/ML
0.2 INJECTION INTRAVENOUS AS NEEDED
Status: DISCONTINUED | OUTPATIENT
Start: 2020-01-01 | End: 2020-01-01 | Stop reason: HOSPADM

## 2020-01-01 RX ORDER — CELECOXIB 200 MG/1
200 CAPSULE ORAL DAILY
COMMUNITY
End: 2020-01-01 | Stop reason: HOSPADM

## 2020-01-01 RX ORDER — POLYETHYLENE GLYCOL 3350 17 G/17G
17 POWDER, FOR SOLUTION ORAL 2 TIMES DAILY
Status: DISCONTINUED | OUTPATIENT
Start: 2020-01-01 | End: 2020-01-01

## 2020-01-01 RX ORDER — SODIUM CHLORIDE, SODIUM LACTATE, POTASSIUM CHLORIDE, CALCIUM CHLORIDE 600; 310; 30; 20 MG/100ML; MG/100ML; MG/100ML; MG/100ML
100 INJECTION, SOLUTION INTRAVENOUS CONTINUOUS
Status: DISCONTINUED | OUTPATIENT
Start: 2020-01-01 | End: 2020-01-01

## 2020-01-01 RX ORDER — IBUPROFEN 400 MG/1
400 TABLET ORAL DAILY PRN
COMMUNITY
End: 2020-01-01 | Stop reason: HOSPADM

## 2020-01-01 RX ORDER — SODIUM CHLORIDE 9 MG/ML
INJECTION, SOLUTION INTRAVENOUS
Status: DISPENSED
Start: 2020-01-01 | End: 2020-01-01

## 2020-01-01 RX ORDER — HALOPERIDOL 5 MG/ML
1 INJECTION INTRAMUSCULAR
Status: DISCONTINUED | OUTPATIENT
Start: 2020-01-01 | End: 2020-01-01 | Stop reason: HOSPADM

## 2020-01-01 RX ORDER — ONDANSETRON 4 MG/1
4 TABLET, FILM COATED ORAL EVERY 6 HOURS PRN
Status: DISCONTINUED | OUTPATIENT
Start: 2020-01-01 | End: 2020-01-01 | Stop reason: HOSPADM

## 2020-01-01 RX ORDER — SODIUM CHLORIDE 0.9 % (FLUSH) 0.9 %
10 SYRINGE (ML) INJECTION EVERY 12 HOURS SCHEDULED
Status: DISCONTINUED | OUTPATIENT
Start: 2020-01-01 | End: 2020-01-01 | Stop reason: HOSPADM

## 2020-01-01 RX ORDER — BUMETANIDE 0.25 MG/ML
1 INJECTION INTRAMUSCULAR; INTRAVENOUS DAILY
Status: DISCONTINUED | OUTPATIENT
Start: 2020-01-01 | End: 2020-01-01

## 2020-01-01 RX ORDER — FUROSEMIDE 20 MG/1
20 TABLET ORAL DAILY
Status: ON HOLD | COMMUNITY
End: 2020-01-01

## 2020-01-01 RX ORDER — LISINOPRIL 20 MG/1
20 TABLET ORAL DAILY
COMMUNITY
End: 2020-01-01 | Stop reason: HOSPADM

## 2020-01-01 RX ORDER — FUROSEMIDE 10 MG/ML
40 INJECTION INTRAMUSCULAR; INTRAVENOUS
Status: DISPENSED | OUTPATIENT
Start: 2020-01-01 | End: 2020-01-01

## 2020-01-01 RX ORDER — ALBUTEROL SULFATE 90 UG/1
2 AEROSOL, METERED RESPIRATORY (INHALATION)
Status: DISCONTINUED | OUTPATIENT
Start: 2020-01-01 | End: 2020-01-01

## 2020-01-01 RX ORDER — NICOTINE 21 MG/24HR
1 PATCH, TRANSDERMAL 24 HOURS TRANSDERMAL
Status: DISCONTINUED | OUTPATIENT
Start: 2020-01-01 | End: 2020-01-01 | Stop reason: HOSPADM

## 2020-01-01 RX ORDER — CARVEDILOL 12.5 MG/1
12.5 TABLET ORAL 2 TIMES DAILY WITH MEALS
Status: DISCONTINUED | OUTPATIENT
Start: 2020-01-01 | End: 2020-01-01

## 2020-01-01 RX ORDER — LACTULOSE 10 G/15ML
20 SOLUTION ORAL 2 TIMES DAILY
Status: DISCONTINUED | OUTPATIENT
Start: 2020-01-01 | End: 2020-01-01

## 2020-01-01 RX ORDER — CHLORAL HYDRATE 500 MG
2 CAPSULE ORAL 2 TIMES DAILY
COMMUNITY

## 2020-01-01 RX ORDER — FUROSEMIDE 10 MG/ML
40 INJECTION INTRAMUSCULAR; INTRAVENOUS EVERY 12 HOURS
Status: DISCONTINUED | OUTPATIENT
Start: 2020-01-01 | End: 2020-01-01

## 2020-01-01 RX ORDER — NICOTINE POLACRILEX 4 MG
15 LOZENGE BUCCAL
Status: DISCONTINUED | OUTPATIENT
Start: 2020-01-01 | End: 2020-01-01

## 2020-01-01 RX ORDER — BUSPIRONE HYDROCHLORIDE 10 MG/1
10 TABLET ORAL EVERY 8 HOURS SCHEDULED
Status: DISCONTINUED | OUTPATIENT
Start: 2020-01-01 | End: 2020-01-01 | Stop reason: HOSPADM

## 2020-01-01 RX ORDER — ROPINIROLE 1 MG/1
2 TABLET, FILM COATED ORAL NIGHTLY
Status: DISCONTINUED | OUTPATIENT
Start: 2020-01-01 | End: 2020-01-01 | Stop reason: HOSPADM

## 2020-01-01 RX ORDER — CITALOPRAM 20 MG/1
20 TABLET ORAL DAILY
Status: DISCONTINUED | OUTPATIENT
Start: 2020-01-01 | End: 2020-01-01 | Stop reason: HOSPADM

## 2020-01-01 RX ORDER — DOPAMINE HYDROCHLORIDE 160 MG/100ML
2-5 INJECTION, SOLUTION INTRAVENOUS
Status: DISCONTINUED | OUTPATIENT
Start: 2020-01-01 | End: 2020-01-01

## 2020-01-01 RX ORDER — SPIRONOLACTONE 25 MG/1
25 TABLET ORAL EVERY MORNING
Status: DISCONTINUED | OUTPATIENT
Start: 2020-01-01 | End: 2020-01-01

## 2020-01-01 RX ORDER — EPHEDRINE SULFATE 50 MG/ML
INJECTION, SOLUTION INTRAVENOUS AS NEEDED
Status: DISCONTINUED | OUTPATIENT
Start: 2020-01-01 | End: 2020-01-01 | Stop reason: SURG

## 2020-01-01 RX ORDER — METOCLOPRAMIDE HYDROCHLORIDE 5 MG/ML
10 INJECTION INTRAMUSCULAR; INTRAVENOUS 3 TIMES DAILY
Status: DISCONTINUED | OUTPATIENT
Start: 2020-01-01 | End: 2020-01-01

## 2020-01-01 RX ADMIN — SODIUM CHLORIDE, PRESERVATIVE FREE 10 ML: 5 INJECTION INTRAVENOUS at 21:14

## 2020-01-01 RX ADMIN — METOCLOPRAMIDE HYDROCHLORIDE 5 MG: 5 TABLET ORAL at 11:25

## 2020-01-01 RX ADMIN — IPRATROPIUM BROMIDE AND ALBUTEROL SULFATE 3 ML: 2.5; .5 SOLUTION RESPIRATORY (INHALATION) at 19:07

## 2020-01-01 RX ADMIN — SODIUM CHLORIDE 30 MG/HR: 9 INJECTION, SOLUTION INTRAVENOUS at 05:38

## 2020-01-01 RX ADMIN — NICOTINE 1 PATCH: 21 PATCH, EXTENDED RELEASE TRANSDERMAL at 08:11

## 2020-01-01 RX ADMIN — PANTOPRAZOLE SODIUM 40 MG: 40 TABLET, DELAYED RELEASE ORAL at 06:52

## 2020-01-01 RX ADMIN — BUDESONIDE 0.5 MG: 0.25 INHALANT RESPIRATORY (INHALATION) at 20:33

## 2020-01-01 RX ADMIN — METHYLPREDNISOLONE SODIUM SUCCINATE 125 MG: 125 INJECTION, POWDER, FOR SOLUTION INTRAMUSCULAR; INTRAVENOUS at 20:10

## 2020-01-01 RX ADMIN — CITALOPRAM 20 MG: 20 TABLET, FILM COATED ORAL at 10:39

## 2020-01-01 RX ADMIN — SODIUM CHLORIDE, PRESERVATIVE FREE 10 ML: 5 INJECTION INTRAVENOUS at 20:33

## 2020-01-01 RX ADMIN — RISPERIDONE 1 MG: 1 TABLET ORAL at 21:18

## 2020-01-01 RX ADMIN — NICOTINE 1 PATCH: 21 PATCH, EXTENDED RELEASE TRANSDERMAL at 09:29

## 2020-01-01 RX ADMIN — METOCLOPRAMIDE HYDROCHLORIDE 5 MG: 5 TABLET ORAL at 17:12

## 2020-01-01 RX ADMIN — SODIUM CHLORIDE 75 ML/HR: 9 INJECTION, SOLUTION INTRAVENOUS at 07:18

## 2020-01-01 RX ADMIN — POTASSIUM CHLORIDE 40 MEQ: 1.5 POWDER, FOR SOLUTION ORAL at 06:10

## 2020-01-01 RX ADMIN — PERFLUTREN: 6.52 INJECTION, SUSPENSION INTRAVENOUS at 09:52

## 2020-01-01 RX ADMIN — CEFEPIME HYDROCHLORIDE 2 G: 2 INJECTION, POWDER, FOR SOLUTION INTRAVENOUS at 06:22

## 2020-01-01 RX ADMIN — HALOPERIDOL 7.5 MG: 5 TABLET ORAL at 02:43

## 2020-01-01 RX ADMIN — METOCLOPRAMIDE HYDROCHLORIDE 5 MG: 5 TABLET ORAL at 21:14

## 2020-01-01 RX ADMIN — ATORVASTATIN CALCIUM 40 MG: 40 TABLET, FILM COATED ORAL at 08:32

## 2020-01-01 RX ADMIN — FENTANYL CITRATE 100 MCG: 50 INJECTION, SOLUTION INTRAMUSCULAR; INTRAVENOUS at 07:55

## 2020-01-01 RX ADMIN — MORPHINE SULFATE 2 MG: 2 INJECTION, SOLUTION INTRAMUSCULAR; INTRAVENOUS at 19:56

## 2020-01-01 RX ADMIN — SODIUM CHLORIDE 12.5 MG/HR: 9 INJECTION, SOLUTION INTRAVENOUS at 11:37

## 2020-01-01 RX ADMIN — NICOTINE 1 PATCH: 14 PATCH, EXTENDED RELEASE TRANSDERMAL at 09:40

## 2020-01-01 RX ADMIN — HALOPERIDOL 7.5 MG: 5 TABLET ORAL at 21:47

## 2020-01-01 RX ADMIN — INSULIN DETEMIR 14 UNITS: 100 INJECTION, SOLUTION SUBCUTANEOUS at 21:49

## 2020-01-01 RX ADMIN — BISACODYL 10 MG: 10 SUPPOSITORY RECTAL at 09:03

## 2020-01-01 RX ADMIN — PROPOFOL 50 MCG/KG/MIN: 10 INJECTION, EMULSION INTRAVENOUS at 06:44

## 2020-01-01 RX ADMIN — Medication: at 20:47

## 2020-01-01 RX ADMIN — CARVEDILOL 12.5 MG: 12.5 TABLET, FILM COATED ORAL at 17:21

## 2020-01-01 RX ADMIN — HYDRALAZINE HYDROCHLORIDE 75 MG: 25 TABLET, FILM COATED ORAL at 14:19

## 2020-01-01 RX ADMIN — METOCLOPRAMIDE HYDROCHLORIDE 5 MG: 5 TABLET ORAL at 17:22

## 2020-01-01 RX ADMIN — HYDRALAZINE HYDROCHLORIDE 75 MG: 25 TABLET, FILM COATED ORAL at 21:12

## 2020-01-01 RX ADMIN — BUDESONIDE 0.5 MG: 0.5 INHALANT RESPIRATORY (INHALATION) at 10:31

## 2020-01-01 RX ADMIN — ACETAMINOPHEN 650 MG: 325 TABLET, FILM COATED ORAL at 17:21

## 2020-01-01 RX ADMIN — BUMETANIDE 1 MG: 0.25 INJECTION INTRAMUSCULAR; INTRAVENOUS at 08:03

## 2020-01-01 RX ADMIN — PROPOFOL 55 MCG/KG/MIN: 10 INJECTION, EMULSION INTRAVENOUS at 17:06

## 2020-01-01 RX ADMIN — SODIUM CHLORIDE, PRESERVATIVE FREE 10 ML: 5 INJECTION INTRAVENOUS at 09:40

## 2020-01-01 RX ADMIN — SODIUM CHLORIDE SOLN NEBU 3% 4 ML: 3 NEBU SOLN at 07:07

## 2020-01-01 RX ADMIN — ATORVASTATIN CALCIUM 40 MG: 40 TABLET, FILM COATED ORAL at 09:33

## 2020-01-01 RX ADMIN — Medication 220 MG: at 11:53

## 2020-01-01 RX ADMIN — VANCOMYCIN HYDROCHLORIDE 750 MG: 500 INJECTION, POWDER, LYOPHILIZED, FOR SOLUTION INTRAVENOUS at 16:55

## 2020-01-01 RX ADMIN — POTASSIUM CHLORIDE 40 MEQ: 1.5 POWDER, FOR SOLUTION ORAL at 14:41

## 2020-01-01 RX ADMIN — FENTANYL CITRATE 50 MCG/HR: 50 INJECTION, SOLUTION INTRAMUSCULAR; INTRAVENOUS at 11:04

## 2020-01-01 RX ADMIN — IPRATROPIUM BROMIDE AND ALBUTEROL SULFATE 3 ML: 2.5; .5 SOLUTION RESPIRATORY (INHALATION) at 06:40

## 2020-01-01 RX ADMIN — LISINOPRIL 10 MG: 10 TABLET ORAL at 08:28

## 2020-01-01 RX ADMIN — Medication 1 TABLET: at 09:02

## 2020-01-01 RX ADMIN — INSULIN LISPRO 2 UNITS: 100 INJECTION, SOLUTION INTRAVENOUS; SUBCUTANEOUS at 11:51

## 2020-01-01 RX ADMIN — Medication 1 MG: at 04:36

## 2020-01-01 RX ADMIN — IPRATROPIUM BROMIDE AND ALBUTEROL SULFATE 3 ML: 2.5; .5 SOLUTION RESPIRATORY (INHALATION) at 10:38

## 2020-01-01 RX ADMIN — CARVEDILOL 12.5 MG: 12.5 TABLET, FILM COATED ORAL at 18:17

## 2020-01-01 RX ADMIN — BUSPIRONE HYDROCHLORIDE 10 MG: 10 TABLET ORAL at 21:13

## 2020-01-01 RX ADMIN — TAMSULOSIN HYDROCHLORIDE 0.4 MG: 0.4 CAPSULE ORAL at 08:32

## 2020-01-01 RX ADMIN — ASPIRIN 81 MG: 81 TABLET ORAL at 10:36

## 2020-01-01 RX ADMIN — FAMOTIDINE 20 MG: 10 INJECTION INTRAVENOUS at 09:13

## 2020-01-01 RX ADMIN — SODIUM CHLORIDE, PRESERVATIVE FREE 10 ML: 5 INJECTION INTRAVENOUS at 09:13

## 2020-01-01 RX ADMIN — PROPOFOL 50 MCG/KG/MIN: 10 INJECTION, EMULSION INTRAVENOUS at 00:12

## 2020-01-01 RX ADMIN — Medication 1000 UNITS: at 09:00

## 2020-01-01 RX ADMIN — Medication 500 MG: at 06:24

## 2020-01-01 RX ADMIN — PROPOFOL 55 MCG/KG/MIN: 10 INJECTION, EMULSION INTRAVENOUS at 20:22

## 2020-01-01 RX ADMIN — SODIUM CHLORIDE, PRESERVATIVE FREE 10 ML: 5 INJECTION INTRAVENOUS at 08:24

## 2020-01-01 RX ADMIN — INSULIN ASPART 2 UNITS: 100 INJECTION, SOLUTION INTRAVENOUS; SUBCUTANEOUS at 06:47

## 2020-01-01 RX ADMIN — SODIUM BICARBONATE 50 MEQ: 84 INJECTION INTRAVENOUS at 15:36

## 2020-01-01 RX ADMIN — PROPOFOL 70 MCG/KG/MIN: 10 INJECTION, EMULSION INTRAVENOUS at 23:43

## 2020-01-01 RX ADMIN — PROPOFOL 40 MCG/KG/MIN: 10 INJECTION, EMULSION INTRAVENOUS at 16:57

## 2020-01-01 RX ADMIN — METHYLPREDNISOLONE SODIUM SUCCINATE 40 MG: 40 INJECTION, POWDER, FOR SOLUTION INTRAMUSCULAR; INTRAVENOUS at 17:05

## 2020-01-01 RX ADMIN — MIDAZOLAM HYDROCHLORIDE 1 MG: 2 INJECTION, SOLUTION INTRAMUSCULAR; INTRAVENOUS at 09:58

## 2020-01-01 RX ADMIN — BUDESONIDE 0.5 MG: 0.5 INHALANT RESPIRATORY (INHALATION) at 18:37

## 2020-01-01 RX ADMIN — LACTULOSE 20 G: 20 SOLUTION ORAL at 20:45

## 2020-01-01 RX ADMIN — SODIUM CHLORIDE SOLN NEBU 3% 4 ML: 3 NEBU SOLN at 13:35

## 2020-01-01 RX ADMIN — Medication 1000 UNITS: at 11:54

## 2020-01-01 RX ADMIN — POLYETHYLENE GLYCOL 3350 17 G: 17 POWDER, FOR SOLUTION ORAL at 08:27

## 2020-01-01 RX ADMIN — Medication 1 TABLET: at 08:36

## 2020-01-01 RX ADMIN — SODIUM CHLORIDE SOLN NEBU 3% 4 ML: 3 NEBU SOLN at 20:54

## 2020-01-01 RX ADMIN — MORPHINE SULFATE 2 MG: 2 INJECTION, SOLUTION INTRAMUSCULAR; INTRAVENOUS at 15:40

## 2020-01-01 RX ADMIN — POLYETHYLENE GLYCOL 3350 17 G: 17 POWDER, FOR SOLUTION ORAL at 09:29

## 2020-01-01 RX ADMIN — SODIUM CHLORIDE 10 MG/HR: 9 INJECTION, SOLUTION INTRAVENOUS at 21:27

## 2020-01-01 RX ADMIN — BUMETANIDE 1 MG: 0.25 INJECTION INTRAMUSCULAR; INTRAVENOUS at 13:13

## 2020-01-01 RX ADMIN — MIDAZOLAM HYDROCHLORIDE 1 MG: 2 INJECTION, SOLUTION INTRAMUSCULAR; INTRAVENOUS at 09:59

## 2020-01-01 RX ADMIN — PIPERACILLIN SODIUM AND TAZOBACTAM SODIUM 3.38 G: 3; .375 INJECTION, POWDER, LYOPHILIZED, FOR SOLUTION INTRAVENOUS at 12:15

## 2020-01-01 RX ADMIN — HALOPERIDOL 7.5 MG: 5 TABLET ORAL at 20:44

## 2020-01-01 RX ADMIN — SODIUM CHLORIDE, POTASSIUM CHLORIDE, SODIUM LACTATE AND CALCIUM CHLORIDE: 600; 310; 30; 20 INJECTION, SOLUTION INTRAVENOUS at 07:05

## 2020-01-01 RX ADMIN — METOCLOPRAMIDE HYDROCHLORIDE 5 MG: 5 TABLET ORAL at 21:17

## 2020-01-01 RX ADMIN — HYDRALAZINE HYDROCHLORIDE 75 MG: 25 TABLET, FILM COATED ORAL at 06:52

## 2020-01-01 RX ADMIN — PROPOFOL 60 MCG/KG/MIN: 10 INJECTION, EMULSION INTRAVENOUS at 02:54

## 2020-01-01 RX ADMIN — CARVEDILOL 12.5 MG: 12.5 TABLET, FILM COATED ORAL at 08:29

## 2020-01-01 RX ADMIN — BUSPIRONE HYDROCHLORIDE 10 MG: 10 TABLET ORAL at 13:34

## 2020-01-01 RX ADMIN — FAMOTIDINE 20 MG: 20 TABLET, FILM COATED ORAL at 10:37

## 2020-01-01 RX ADMIN — Medication 1000 UNITS: at 08:37

## 2020-01-01 RX ADMIN — ONDANSETRON HYDROCHLORIDE 4 MG: 4 TABLET, FILM COATED ORAL at 02:53

## 2020-01-01 RX ADMIN — PROPOFOL 60 MCG/KG/MIN: 10 INJECTION, EMULSION INTRAVENOUS at 00:25

## 2020-01-01 RX ADMIN — RISPERIDONE 1 MG: 1 TABLET ORAL at 21:31

## 2020-01-01 RX ADMIN — HYDRALAZINE HYDROCHLORIDE 75 MG: 25 TABLET, FILM COATED ORAL at 06:36

## 2020-01-01 RX ADMIN — LISINOPRIL 10 MG: 10 TABLET ORAL at 20:55

## 2020-01-01 RX ADMIN — METHYLPREDNISOLONE SODIUM SUCCINATE 40 MG: 40 INJECTION, POWDER, FOR SOLUTION INTRAMUSCULAR; INTRAVENOUS at 03:36

## 2020-01-01 RX ADMIN — METHYLPREDNISOLONE SODIUM SUCCINATE 40 MG: 40 INJECTION, POWDER, FOR SOLUTION INTRAMUSCULAR; INTRAVENOUS at 18:50

## 2020-01-01 RX ADMIN — DOXYCYCLINE 100 MG: 100 INJECTION, POWDER, LYOPHILIZED, FOR SOLUTION INTRAVENOUS at 19:25

## 2020-01-01 RX ADMIN — INSULIN LISPRO 2 UNITS: 100 INJECTION, SOLUTION INTRAVENOUS; SUBCUTANEOUS at 12:25

## 2020-01-01 RX ADMIN — Medication 220 MG: at 08:31

## 2020-01-01 RX ADMIN — RISPERIDONE 0.5 MG: 1 TABLET, FILM COATED ORAL at 10:36

## 2020-01-01 RX ADMIN — SODIUM CHLORIDE 75 ML/HR: 900 INJECTION, SOLUTION INTRAVENOUS at 04:16

## 2020-01-01 RX ADMIN — FUROSEMIDE 40 MG: 10 INJECTION, SOLUTION INTRAMUSCULAR; INTRAVENOUS at 13:24

## 2020-01-01 RX ADMIN — METOCLOPRAMIDE HYDROCHLORIDE 5 MG: 5 TABLET ORAL at 21:32

## 2020-01-01 RX ADMIN — BENZTROPINE MESYLATE 1 MG: 1 TABLET ORAL at 08:29

## 2020-01-01 RX ADMIN — IPRATROPIUM BROMIDE AND ALBUTEROL SULFATE 3 ML: 2.5; .5 SOLUTION RESPIRATORY (INHALATION) at 10:30

## 2020-01-01 RX ADMIN — FENTANYL CITRATE 250 MCG/HR: 50 INJECTION, SOLUTION INTRAMUSCULAR; INTRAVENOUS at 07:07

## 2020-01-01 RX ADMIN — METHYLPREDNISOLONE SODIUM SUCCINATE 40 MG: 40 INJECTION, POWDER, FOR SOLUTION INTRAMUSCULAR; INTRAVENOUS at 04:39

## 2020-01-01 RX ADMIN — HYDRALAZINE HYDROCHLORIDE 75 MG: 25 TABLET, FILM COATED ORAL at 15:34

## 2020-01-01 RX ADMIN — SODIUM CHLORIDE, PRESERVATIVE FREE 10 ML: 5 INJECTION INTRAVENOUS at 08:29

## 2020-01-01 RX ADMIN — INSULIN LISPRO 2 UNITS: 100 INJECTION, SOLUTION INTRAVENOUS; SUBCUTANEOUS at 00:15

## 2020-01-01 RX ADMIN — TAZOBACTAM SODIUM AND PIPERACILLIN SODIUM 3.38 G: 375; 3 INJECTION, SOLUTION INTRAVENOUS at 20:24

## 2020-01-01 RX ADMIN — Medication 500 MG: at 06:36

## 2020-01-01 RX ADMIN — PROPOFOL 50 MCG/KG/MIN: 10 INJECTION, EMULSION INTRAVENOUS at 17:19

## 2020-01-01 RX ADMIN — NICOTINE 1 PATCH: 21 PATCH, EXTENDED RELEASE TRANSDERMAL at 08:29

## 2020-01-01 RX ADMIN — SPIRONOLACTONE 25 MG: 25 TABLET ORAL at 06:36

## 2020-01-01 RX ADMIN — BENZTROPINE MESYLATE 1 MG: 1 TABLET ORAL at 21:55

## 2020-01-01 RX ADMIN — SODIUM BICARBONATE 100 MEQ: 84 INJECTION INTRAVENOUS at 02:57

## 2020-01-01 RX ADMIN — BUDESONIDE 0.5 MG: 0.25 INHALANT RESPIRATORY (INHALATION) at 06:26

## 2020-01-01 RX ADMIN — Medication 1000 UNITS: at 09:33

## 2020-01-01 RX ADMIN — IPRATROPIUM BROMIDE AND ALBUTEROL SULFATE 3 ML: 2.5; .5 SOLUTION RESPIRATORY (INHALATION) at 19:27

## 2020-01-01 RX ADMIN — SODIUM CHLORIDE 10 MG/HR: 9 INJECTION, SOLUTION INTRAVENOUS at 06:56

## 2020-01-01 RX ADMIN — BUDESONIDE 0.5 MG: 0.5 INHALANT RESPIRATORY (INHALATION) at 20:44

## 2020-01-01 RX ADMIN — ALBUMIN HUMAN 12.5 G: 0.25 SOLUTION INTRAVENOUS at 15:37

## 2020-01-01 RX ADMIN — SODIUM CHLORIDE, PRESERVATIVE FREE 10 ML: 5 INJECTION INTRAVENOUS at 21:41

## 2020-01-01 RX ADMIN — BUSPIRONE HYDROCHLORIDE 10 MG: 10 TABLET ORAL at 21:14

## 2020-01-01 RX ADMIN — HYDRALAZINE HYDROCHLORIDE 75 MG: 25 TABLET, FILM COATED ORAL at 06:05

## 2020-01-01 RX ADMIN — Medication 1 MG: at 04:33

## 2020-01-01 RX ADMIN — Medication 1000 UNITS: at 17:21

## 2020-01-01 RX ADMIN — COLLAGENASE SANTYL 1 APPLICATION: 250 OINTMENT TOPICAL at 20:18

## 2020-01-01 RX ADMIN — RISPERIDONE 1 MG: 1 TABLET ORAL at 02:16

## 2020-01-01 RX ADMIN — SCOPALAMINE 1 PATCH: 1 PATCH, EXTENDED RELEASE TRANSDERMAL at 15:19

## 2020-01-01 RX ADMIN — Medication 220 MG: at 09:34

## 2020-01-01 RX ADMIN — TAZOBACTAM SODIUM AND PIPERACILLIN SODIUM 3.38 G: 375; 3 INJECTION, SOLUTION INTRAVENOUS at 03:37

## 2020-01-01 RX ADMIN — BUDESONIDE 0.5 MG: 0.25 INHALANT RESPIRATORY (INHALATION) at 18:47

## 2020-01-01 RX ADMIN — HYDRALAZINE HYDROCHLORIDE 75 MG: 25 TABLET, FILM COATED ORAL at 21:07

## 2020-01-01 RX ADMIN — SODIUM CHLORIDE, PRESERVATIVE FREE 10 ML: 5 INJECTION INTRAVENOUS at 22:28

## 2020-01-01 RX ADMIN — TAZOBACTAM SODIUM AND PIPERACILLIN SODIUM 3.38 G: 375; 3 INJECTION, SOLUTION INTRAVENOUS at 03:57

## 2020-01-01 RX ADMIN — ROPINIROLE 2 MG: 1 TABLET, FILM COATED ORAL at 21:17

## 2020-01-01 RX ADMIN — IPRATROPIUM BROMIDE AND ALBUTEROL SULFATE 3 ML: 2.5; .5 SOLUTION RESPIRATORY (INHALATION) at 15:22

## 2020-01-01 RX ADMIN — NICOTINE 1 PATCH: 14 PATCH, EXTENDED RELEASE TRANSDERMAL at 10:35

## 2020-01-01 RX ADMIN — IRON SUCROSE 200 MG: 20 INJECTION, SOLUTION INTRAVENOUS at 12:25

## 2020-01-01 RX ADMIN — INSULIN ASPART 2 UNITS: 100 INJECTION, SOLUTION INTRAVENOUS; SUBCUTANEOUS at 06:35

## 2020-01-01 RX ADMIN — SODIUM CHLORIDE SOLN NEBU 3% 4 ML: 3 NEBU SOLN at 19:05

## 2020-01-01 RX ADMIN — ACETAMINOPHEN 650 MG: 325 TABLET, FILM COATED ORAL at 02:31

## 2020-01-01 RX ADMIN — HYDRALAZINE HYDROCHLORIDE 75 MG: 25 TABLET, FILM COATED ORAL at 13:34

## 2020-01-01 RX ADMIN — PIPERACILLIN SODIUM AND TAZOBACTAM SODIUM 3.38 G: 3; .375 INJECTION, POWDER, LYOPHILIZED, FOR SOLUTION INTRAVENOUS at 20:17

## 2020-01-01 RX ADMIN — MIDAZOLAM 2 MG/HR: 5 INJECTION INTRAMUSCULAR; INTRAVENOUS at 19:51

## 2020-01-01 RX ADMIN — COLLAGENASE SANTYL 1 APPLICATION: 250 OINTMENT TOPICAL at 21:00

## 2020-01-01 RX ADMIN — SODIUM CHLORIDE 5 MG/HR: 9 INJECTION, SOLUTION INTRAVENOUS at 15:11

## 2020-01-01 RX ADMIN — INSULIN LISPRO 2 UNITS: 100 INJECTION, SOLUTION INTRAVENOUS; SUBCUTANEOUS at 12:36

## 2020-01-01 RX ADMIN — Medication 1 TABLET: at 19:00

## 2020-01-01 RX ADMIN — TAZOBACTAM SODIUM AND PIPERACILLIN SODIUM 3.38 G: 375; 3 INJECTION, SOLUTION INTRAVENOUS at 11:13

## 2020-01-01 RX ADMIN — PROPOFOL 55 MCG/KG/MIN: 10 INJECTION, EMULSION INTRAVENOUS at 02:58

## 2020-01-01 RX ADMIN — RIVAROXABAN 10 MG: 10 TABLET, FILM COATED ORAL at 17:16

## 2020-01-01 RX ADMIN — SODIUM CHLORIDE 10 MG/HR: 9 INJECTION, SOLUTION INTRAVENOUS at 00:18

## 2020-01-01 RX ADMIN — SODIUM CHLORIDE, PRESERVATIVE FREE 10 ML: 5 INJECTION INTRAVENOUS at 20:25

## 2020-01-01 RX ADMIN — SODIUM CHLORIDE, PRESERVATIVE FREE 10 ML: 5 INJECTION INTRAVENOUS at 09:31

## 2020-01-01 RX ADMIN — VANCOMYCIN HYDROCHLORIDE 1000 MG: 1 INJECTION, POWDER, LYOPHILIZED, FOR SOLUTION INTRAVENOUS at 08:30

## 2020-01-01 RX ADMIN — PANTOPRAZOLE SODIUM 40 MG: 40 TABLET, DELAYED RELEASE ORAL at 08:10

## 2020-01-01 RX ADMIN — BENZTROPINE MESYLATE 1 MG: 1 TABLET ORAL at 09:02

## 2020-01-01 RX ADMIN — HYDRALAZINE HYDROCHLORIDE 50 MG: 50 TABLET, FILM COATED ORAL at 03:14

## 2020-01-01 RX ADMIN — COLLAGENASE SANTYL 1 APPLICATION: 250 OINTMENT TOPICAL at 21:19

## 2020-01-01 RX ADMIN — BUSPIRONE HYDROCHLORIDE 10 MG: 10 TABLET ORAL at 14:56

## 2020-01-01 RX ADMIN — PROPOFOL 70 MCG/KG/MIN: 10 INJECTION, EMULSION INTRAVENOUS at 04:49

## 2020-01-01 RX ADMIN — ATORVASTATIN CALCIUM 40 MG: 40 TABLET, FILM COATED ORAL at 11:53

## 2020-01-01 RX ADMIN — ROPINIROLE 2 MG: 1 TABLET, FILM COATED ORAL at 21:32

## 2020-01-01 RX ADMIN — ATORVASTATIN CALCIUM 40 MG: 40 TABLET, FILM COATED ORAL at 09:02

## 2020-01-01 RX ADMIN — ALBUMIN HUMAN 12.5 G: 0.25 SOLUTION INTRAVENOUS at 21:15

## 2020-01-01 RX ADMIN — METHYLPREDNISOLONE SODIUM SUCCINATE 40 MG: 40 INJECTION, POWDER, FOR SOLUTION INTRAMUSCULAR; INTRAVENOUS at 22:03

## 2020-01-01 RX ADMIN — INSULIN ASPART 8 UNITS: 100 INJECTION, SOLUTION INTRAVENOUS; SUBCUTANEOUS at 06:44

## 2020-01-01 RX ADMIN — SODIUM CHLORIDE, PRESERVATIVE FREE 10 ML: 5 INJECTION INTRAVENOUS at 12:06

## 2020-01-01 RX ADMIN — BUDESONIDE 0.5 MG: 0.25 INHALANT RESPIRATORY (INHALATION) at 06:45

## 2020-01-01 RX ADMIN — BUDESONIDE 0.5 MG: 0.5 INHALANT RESPIRATORY (INHALATION) at 07:06

## 2020-01-01 RX ADMIN — Medication 220 MG: at 08:37

## 2020-01-01 RX ADMIN — BUDESONIDE 0.5 MG: 0.5 INHALANT RESPIRATORY (INHALATION) at 19:05

## 2020-01-01 RX ADMIN — PROPOFOL 45 MCG/KG/MIN: 10 INJECTION, EMULSION INTRAVENOUS at 20:57

## 2020-01-01 RX ADMIN — SODIUM CHLORIDE SOLN NEBU 3% 4 ML: 3 NEBU SOLN at 10:13

## 2020-01-01 RX ADMIN — IPRATROPIUM BROMIDE AND ALBUTEROL SULFATE 3 ML: 2.5; .5 SOLUTION RESPIRATORY (INHALATION) at 18:47

## 2020-01-01 RX ADMIN — MORPHINE SULFATE 2 MG: 2 INJECTION, SOLUTION INTRAMUSCULAR; INTRAVENOUS at 05:42

## 2020-01-01 RX ADMIN — TAZOBACTAM SODIUM AND PIPERACILLIN SODIUM 3.38 G: 375; 3 INJECTION, SOLUTION INTRAVENOUS at 20:01

## 2020-01-01 RX ADMIN — PROPOFOL 70 MCG/KG/MIN: 10 INJECTION, EMULSION INTRAVENOUS at 15:51

## 2020-01-01 RX ADMIN — METHYLPREDNISOLONE SODIUM SUCCINATE 40 MG: 40 INJECTION, POWDER, FOR SOLUTION INTRAMUSCULAR; INTRAVENOUS at 09:21

## 2020-01-01 RX ADMIN — SODIUM CHLORIDE 12.5 MG/HR: 9 INJECTION, SOLUTION INTRAVENOUS at 02:54

## 2020-01-01 RX ADMIN — BUMETANIDE 1 MG: 0.25 INJECTION INTRAMUSCULAR; INTRAVENOUS at 01:45

## 2020-01-01 RX ADMIN — HYDRALAZINE HYDROCHLORIDE 75 MG: 25 TABLET, FILM COATED ORAL at 21:01

## 2020-01-01 RX ADMIN — POLYETHYLENE GLYCOL 3350 17 G: 17 POWDER, FOR SOLUTION ORAL at 11:52

## 2020-01-01 RX ADMIN — METHYLPREDNISOLONE SODIUM SUCCINATE 40 MG: 40 INJECTION, POWDER, FOR SOLUTION INTRAMUSCULAR; INTRAVENOUS at 09:40

## 2020-01-01 RX ADMIN — INSULIN ASPART 2 UNITS: 100 INJECTION, SOLUTION INTRAVENOUS; SUBCUTANEOUS at 12:43

## 2020-01-01 RX ADMIN — PANTOPRAZOLE SODIUM 40 MG: 40 TABLET, DELAYED RELEASE ORAL at 06:36

## 2020-01-01 RX ADMIN — ASPIRIN 81 MG: 81 TABLET, FILM COATED ORAL at 08:28

## 2020-01-01 RX ADMIN — INSULIN LISPRO 2 UNITS: 100 INJECTION, SOLUTION INTRAVENOUS; SUBCUTANEOUS at 11:47

## 2020-01-01 RX ADMIN — FENTANYL CITRATE 200 MCG/HR: 50 INJECTION, SOLUTION INTRAMUSCULAR; INTRAVENOUS at 13:00

## 2020-01-01 RX ADMIN — HALOPERIDOL 7.5 MG: 5 TABLET ORAL at 21:17

## 2020-01-01 RX ADMIN — METHYLPREDNISOLONE SODIUM SUCCINATE 40 MG: 40 INJECTION, POWDER, FOR SOLUTION INTRAMUSCULAR; INTRAVENOUS at 03:57

## 2020-01-01 RX ADMIN — INSULIN LISPRO 2 UNITS: 100 INJECTION, SOLUTION INTRAVENOUS; SUBCUTANEOUS at 17:27

## 2020-01-01 RX ADMIN — BISACODYL 10 MG: 10 SUPPOSITORY RECTAL at 20:21

## 2020-01-01 RX ADMIN — ACETAMINOPHEN 650 MG: 325 TABLET, FILM COATED ORAL at 15:22

## 2020-01-01 RX ADMIN — PROPOFOL 55 MCG/KG/MIN: 10 INJECTION, EMULSION INTRAVENOUS at 17:17

## 2020-01-01 RX ADMIN — TAZOBACTAM SODIUM AND PIPERACILLIN SODIUM 3.38 G: 375; 3 INJECTION, SOLUTION INTRAVENOUS at 11:52

## 2020-01-01 RX ADMIN — BUDESONIDE 0.25 MG: 0.25 INHALANT RESPIRATORY (INHALATION) at 06:55

## 2020-01-01 RX ADMIN — METOCLOPRAMIDE HYDROCHLORIDE 5 MG: 5 TABLET ORAL at 08:11

## 2020-01-01 RX ADMIN — METHYLPREDNISOLONE SODIUM SUCCINATE 40 MG: 40 INJECTION, POWDER, FOR SOLUTION INTRAMUSCULAR; INTRAVENOUS at 09:15

## 2020-01-01 RX ADMIN — SODIUM CHLORIDE, PRESERVATIVE FREE 10 ML: 5 INJECTION INTRAVENOUS at 20:00

## 2020-01-01 RX ADMIN — SODIUM BICARBONATE 50 MEQ: 84 INJECTION INTRAVENOUS at 18:17

## 2020-01-01 RX ADMIN — PROPOFOL 55 MCG/KG/MIN: 10 INJECTION, EMULSION INTRAVENOUS at 09:51

## 2020-01-01 RX ADMIN — ROCURONIUM BROMIDE 20 MG: 10 INJECTION INTRAVENOUS at 07:55

## 2020-01-01 RX ADMIN — PIPERACILLIN SODIUM AND TAZOBACTAM SODIUM 3.38 G: 3; .375 INJECTION, POWDER, LYOPHILIZED, FOR SOLUTION INTRAVENOUS at 02:09

## 2020-01-01 RX ADMIN — FUROSEMIDE 40 MG: 10 INJECTION, SOLUTION INTRAMUSCULAR; INTRAVENOUS at 02:52

## 2020-01-01 RX ADMIN — ALBUMIN HUMAN 12.5 G: 0.25 SOLUTION INTRAVENOUS at 10:09

## 2020-01-01 RX ADMIN — METOCLOPRAMIDE HYDROCHLORIDE 5 MG: 5 TABLET ORAL at 15:35

## 2020-01-01 RX ADMIN — SODIUM CHLORIDE, PRESERVATIVE FREE 10 ML: 5 INJECTION INTRAVENOUS at 22:29

## 2020-01-01 RX ADMIN — INSULIN LISPRO 4 UNITS: 100 INJECTION, SOLUTION INTRAVENOUS; SUBCUTANEOUS at 00:05

## 2020-01-01 RX ADMIN — Medication 500 MG: at 06:35

## 2020-01-01 RX ADMIN — LACTULOSE 20 G: 20 SOLUTION ORAL at 09:29

## 2020-01-01 RX ADMIN — SODIUM CHLORIDE 7.5 MG/HR: 9 INJECTION, SOLUTION INTRAVENOUS at 14:15

## 2020-01-01 RX ADMIN — HALOPERIDOL 7.5 MG: 5 TABLET ORAL at 21:12

## 2020-01-01 RX ADMIN — IRON SUCROSE 200 MG: 20 INJECTION, SOLUTION INTRAVENOUS at 13:08

## 2020-01-01 RX ADMIN — FENTANYL CITRATE 25 MCG: 50 INJECTION, SOLUTION INTRAMUSCULAR; INTRAVENOUS at 08:43

## 2020-01-01 RX ADMIN — VANCOMYCIN HYDROCHLORIDE 1500 MG: 10 INJECTION, POWDER, LYOPHILIZED, FOR SOLUTION INTRAVENOUS at 17:16

## 2020-01-01 RX ADMIN — METOCLOPRAMIDE HYDROCHLORIDE 5 MG: 5 TABLET ORAL at 18:57

## 2020-01-01 RX ADMIN — PROPOFOL 50 MCG/KG/MIN: 10 INJECTION, EMULSION INTRAVENOUS at 20:48

## 2020-01-01 RX ADMIN — IPRATROPIUM BROMIDE AND ALBUTEROL SULFATE 3 ML: 2.5; .5 SOLUTION RESPIRATORY (INHALATION) at 10:42

## 2020-01-01 RX ADMIN — BUDESONIDE 0.5 MG: 0.25 INHALANT RESPIRATORY (INHALATION) at 18:40

## 2020-01-01 RX ADMIN — IRON SUCROSE 200 MG: 20 INJECTION, SOLUTION INTRAVENOUS at 10:39

## 2020-01-01 RX ADMIN — FUROSEMIDE 80 MG: 10 INJECTION, SOLUTION INTRAMUSCULAR; INTRAVENOUS at 02:57

## 2020-01-01 RX ADMIN — MORPHINE SULFATE 2 MG: 2 INJECTION, SOLUTION INTRAMUSCULAR; INTRAVENOUS at 03:41

## 2020-01-01 RX ADMIN — METOCLOPRAMIDE HYDROCHLORIDE 5 MG: 5 TABLET ORAL at 21:13

## 2020-01-01 RX ADMIN — CEFEPIME HYDROCHLORIDE 2 G: 2 INJECTION, POWDER, FOR SOLUTION INTRAVENOUS at 10:03

## 2020-01-01 RX ADMIN — CEFAZOLIN 1 G: 330 INJECTION, POWDER, FOR SOLUTION INTRAMUSCULAR; INTRAVENOUS at 07:07

## 2020-01-01 RX ADMIN — IPRATROPIUM BROMIDE AND ALBUTEROL SULFATE 3 ML: 2.5; .5 SOLUTION RESPIRATORY (INHALATION) at 09:29

## 2020-01-01 RX ADMIN — METHYLPREDNISOLONE SODIUM SUCCINATE 40 MG: 40 INJECTION, POWDER, FOR SOLUTION INTRAMUSCULAR; INTRAVENOUS at 09:38

## 2020-01-01 RX ADMIN — METOCLOPRAMIDE HYDROCHLORIDE 5 MG: 5 TABLET ORAL at 12:45

## 2020-01-01 RX ADMIN — SODIUM CHLORIDE 10 MG/HR: 9 INJECTION, SOLUTION INTRAVENOUS at 18:45

## 2020-01-01 RX ADMIN — SODIUM CHLORIDE SOLN NEBU 3% 4 ML: 3 NEBU SOLN at 14:44

## 2020-01-01 RX ADMIN — RISPERIDONE 1 MG: 1 TABLET ORAL at 20:55

## 2020-01-01 RX ADMIN — INSULIN ASPART 2 UNITS: 100 INJECTION, SOLUTION INTRAVENOUS; SUBCUTANEOUS at 17:16

## 2020-01-01 RX ADMIN — ROPINIROLE 2 MG: 1 TABLET, FILM COATED ORAL at 20:24

## 2020-01-01 RX ADMIN — PROPOFOL 55 MCG/KG/MIN: 10 INJECTION, EMULSION INTRAVENOUS at 09:52

## 2020-01-01 RX ADMIN — RISPERIDONE 1 MG: 1 TABLET ORAL at 21:15

## 2020-01-01 RX ADMIN — METHYLPREDNISOLONE SODIUM SUCCINATE 40 MG: 40 INJECTION, POWDER, FOR SOLUTION INTRAMUSCULAR; INTRAVENOUS at 21:49

## 2020-01-01 RX ADMIN — NITROGLYCERIN 10 MCG/MIN: 20 INJECTION INTRAVENOUS at 20:46

## 2020-01-01 RX ADMIN — PROPOFOL 50 MCG/KG/MIN: 10 INJECTION, EMULSION INTRAVENOUS at 03:24

## 2020-01-01 RX ADMIN — METOCLOPRAMIDE HYDROCHLORIDE 5 MG: 5 TABLET ORAL at 11:36

## 2020-01-01 RX ADMIN — BUSPIRONE HYDROCHLORIDE 10 MG: 10 TABLET ORAL at 14:19

## 2020-01-01 RX ADMIN — MORPHINE SULFATE 2 MG: 2 INJECTION, SOLUTION INTRAMUSCULAR; INTRAVENOUS at 07:41

## 2020-01-01 RX ADMIN — BUDESONIDE 0.5 MG: 0.25 INHALANT RESPIRATORY (INHALATION) at 07:07

## 2020-01-01 RX ADMIN — PANTOPRAZOLE SODIUM 40 MG: 40 TABLET, DELAYED RELEASE ORAL at 06:05

## 2020-01-01 RX ADMIN — SODIUM CHLORIDE, PRESERVATIVE FREE 10 ML: 5 INJECTION INTRAVENOUS at 19:26

## 2020-01-01 RX ADMIN — FUROSEMIDE 40 MG: 10 INJECTION, SOLUTION INTRAMUSCULAR; INTRAVENOUS at 11:39

## 2020-01-01 RX ADMIN — LACTULOSE 20 G: 20 SOLUTION ORAL at 21:32

## 2020-01-01 RX ADMIN — BUSPIRONE HYDROCHLORIDE 10 MG: 10 TABLET ORAL at 06:36

## 2020-01-01 RX ADMIN — HALOPERIDOL 7.5 MG: 5 TABLET ORAL at 21:07

## 2020-01-01 RX ADMIN — SODIUM CHLORIDE 12.5 MG/HR: 9 INJECTION, SOLUTION INTRAVENOUS at 22:16

## 2020-01-01 RX ADMIN — SODIUM CHLORIDE SOLN NEBU 3% 4 ML: 3 NEBU SOLN at 19:53

## 2020-01-01 RX ADMIN — INSULIN LISPRO 2 UNITS: 100 INJECTION, SOLUTION INTRAVENOUS; SUBCUTANEOUS at 17:05

## 2020-01-01 RX ADMIN — INSULIN ASPART 6 UNITS: 100 INJECTION, SOLUTION INTRAVENOUS; SUBCUTANEOUS at 11:36

## 2020-01-01 RX ADMIN — PIPERACILLIN SODIUM AND TAZOBACTAM SODIUM 3.38 G: 3; .375 INJECTION, POWDER, LYOPHILIZED, FOR SOLUTION INTRAVENOUS at 20:39

## 2020-01-01 RX ADMIN — METHYLPREDNISOLONE SODIUM SUCCINATE 40 MG: 40 INJECTION, POWDER, FOR SOLUTION INTRAMUSCULAR; INTRAVENOUS at 22:43

## 2020-01-01 RX ADMIN — PROPOFOL 70 MCG/KG/MIN: 10 INJECTION, EMULSION INTRAVENOUS at 18:23

## 2020-01-01 RX ADMIN — SODIUM CHLORIDE 7.5 MG/HR: 9 INJECTION, SOLUTION INTRAVENOUS at 11:53

## 2020-01-01 RX ADMIN — CETIRIZINE HYDROCHLORIDE 10 MG: 10 TABLET, FILM COATED ORAL at 10:35

## 2020-01-01 RX ADMIN — BUDESONIDE 0.5 MG: 0.25 INHALANT RESPIRATORY (INHALATION) at 19:52

## 2020-01-01 RX ADMIN — FENTANYL CITRATE 200 MCG/HR: 50 INJECTION, SOLUTION INTRAMUSCULAR; INTRAVENOUS at 20:03

## 2020-01-01 RX ADMIN — INSULIN LISPRO 2 UNITS: 100 INJECTION, SOLUTION INTRAVENOUS; SUBCUTANEOUS at 11:13

## 2020-01-01 RX ADMIN — CEFEPIME HYDROCHLORIDE 2 G: 2 INJECTION, POWDER, FOR SOLUTION INTRAVENOUS at 19:53

## 2020-01-01 RX ADMIN — FUROSEMIDE 40 MG: 10 INJECTION, SOLUTION INTRAMUSCULAR; INTRAVENOUS at 02:17

## 2020-01-01 RX ADMIN — LACTULOSE 20 G: 20 SOLUTION ORAL at 08:28

## 2020-01-01 RX ADMIN — ACETAMINOPHEN 650 MG: 325 TABLET, FILM COATED ORAL at 03:09

## 2020-01-01 RX ADMIN — HYDRALAZINE HYDROCHLORIDE 75 MG: 25 TABLET, FILM COATED ORAL at 05:59

## 2020-01-01 RX ADMIN — Medication 1 TABLET: at 09:36

## 2020-01-01 RX ADMIN — PROPOFOL 50 MCG/KG/MIN: 10 INJECTION, EMULSION INTRAVENOUS at 13:15

## 2020-01-01 RX ADMIN — IPRATROPIUM BROMIDE AND ALBUTEROL SULFATE 3 ML: 2.5; .5 SOLUTION RESPIRATORY (INHALATION) at 07:06

## 2020-01-01 RX ADMIN — RISPERIDONE 1 MG: 1 TABLET ORAL at 20:45

## 2020-01-01 RX ADMIN — IPRATROPIUM BROMIDE AND ALBUTEROL SULFATE 3 ML: 2.5; .5 SOLUTION RESPIRATORY (INHALATION) at 14:44

## 2020-01-01 RX ADMIN — LACTULOSE 20 G: 20 SOLUTION ORAL at 09:02

## 2020-01-01 RX ADMIN — ATORVASTATIN CALCIUM 40 MG: 40 TABLET, FILM COATED ORAL at 08:36

## 2020-01-01 RX ADMIN — BUSPIRONE HYDROCHLORIDE 10 MG: 10 TABLET ORAL at 06:05

## 2020-01-01 RX ADMIN — ASPIRIN 81 MG: 81 TABLET, FILM COATED ORAL at 09:14

## 2020-01-01 RX ADMIN — CARVEDILOL 12.5 MG: 12.5 TABLET, FILM COATED ORAL at 17:16

## 2020-01-01 RX ADMIN — LACTULOSE 20 G: 20 SOLUTION ORAL at 21:14

## 2020-01-01 RX ADMIN — CHLORHEXIDINE GLUCONATE 0.12% ORAL RINSE 15 ML: 1.2 LIQUID ORAL at 09:02

## 2020-01-01 RX ADMIN — ASPIRIN 81 MG: 81 TABLET, FILM COATED ORAL at 09:03

## 2020-01-01 RX ADMIN — VANCOMYCIN HYDROCHLORIDE 500 MG: 5 INJECTION, POWDER, LYOPHILIZED, FOR SOLUTION INTRAVENOUS at 11:39

## 2020-01-01 RX ADMIN — TAZOBACTAM SODIUM AND PIPERACILLIN SODIUM 3.38 G: 375; 3 INJECTION, SOLUTION INTRAVENOUS at 12:28

## 2020-01-01 RX ADMIN — METHYLPREDNISOLONE SODIUM SUCCINATE 40 MG: 40 INJECTION, POWDER, FOR SOLUTION INTRAMUSCULAR; INTRAVENOUS at 04:06

## 2020-01-01 RX ADMIN — SODIUM CHLORIDE SOLN NEBU 3% 4 ML: 3 NEBU SOLN at 10:38

## 2020-01-01 RX ADMIN — NICOTINE 1 PATCH: 14 PATCH, EXTENDED RELEASE TRANSDERMAL at 08:24

## 2020-01-01 RX ADMIN — Medication 1 TABLET: at 08:28

## 2020-01-01 RX ADMIN — HYDROCODONE BITARTRATE AND ACETAMINOPHEN 1 TABLET: 5; 325 TABLET ORAL at 10:35

## 2020-01-01 RX ADMIN — TAZOBACTAM SODIUM AND PIPERACILLIN SODIUM 3.38 G: 375; 3 INJECTION, SOLUTION INTRAVENOUS at 05:17

## 2020-01-01 RX ADMIN — IPRATROPIUM BROMIDE AND ALBUTEROL SULFATE 3 ML: 2.5; .5 SOLUTION RESPIRATORY (INHALATION) at 07:04

## 2020-01-01 RX ADMIN — PROPOFOL 55 MCG/KG/MIN: 10 INJECTION, EMULSION INTRAVENOUS at 23:56

## 2020-01-01 RX ADMIN — POLYETHYLENE GLYCOL 3350 17 G: 17 POWDER, FOR SOLUTION ORAL at 15:10

## 2020-01-01 RX ADMIN — Medication 1 TABLET: at 08:32

## 2020-01-01 RX ADMIN — NICOTINE 1 PATCH: 21 PATCH, EXTENDED RELEASE TRANSDERMAL at 09:16

## 2020-01-01 RX ADMIN — PIPERACILLIN SODIUM AND TAZOBACTAM SODIUM 3.38 G: 3; .375 INJECTION, POWDER, LYOPHILIZED, FOR SOLUTION INTRAVENOUS at 11:52

## 2020-01-01 RX ADMIN — CEFEPIME HYDROCHLORIDE 2 G: 2 INJECTION, POWDER, FOR SOLUTION INTRAVENOUS at 18:17

## 2020-01-01 RX ADMIN — INSULIN HUMAN 10 UNITS: 100 INJECTION, SOLUTION PARENTERAL at 10:55

## 2020-01-01 RX ADMIN — BUDESONIDE 0.5 MG: 0.5 INHALANT RESPIRATORY (INHALATION) at 06:38

## 2020-01-01 RX ADMIN — LISINOPRIL 10 MG: 10 TABLET ORAL at 09:33

## 2020-01-01 RX ADMIN — METOCLOPRAMIDE HYDROCHLORIDE 5 MG: 5 TABLET ORAL at 12:17

## 2020-01-01 RX ADMIN — TAMSULOSIN HYDROCHLORIDE 0.4 MG: 0.4 CAPSULE ORAL at 09:13

## 2020-01-01 RX ADMIN — SODIUM CHLORIDE 7.5 MG/HR: 9 INJECTION, SOLUTION INTRAVENOUS at 03:26

## 2020-01-01 RX ADMIN — ATORVASTATIN CALCIUM 40 MG: 40 TABLET, FILM COATED ORAL at 08:28

## 2020-01-01 RX ADMIN — RIVAROXABAN 10 MG: 10 TABLET, FILM COATED ORAL at 20:45

## 2020-01-01 RX ADMIN — INSULIN ASPART 4 UNITS: 100 INJECTION, SOLUTION INTRAVENOUS; SUBCUTANEOUS at 06:46

## 2020-01-01 RX ADMIN — FUROSEMIDE 40 MG: 10 INJECTION, SOLUTION INTRAMUSCULAR; INTRAVENOUS at 13:34

## 2020-01-01 RX ADMIN — HYDRALAZINE HYDROCHLORIDE 75 MG: 25 TABLET, FILM COATED ORAL at 21:16

## 2020-01-01 RX ADMIN — IPRATROPIUM BROMIDE AND ALBUTEROL SULFATE 3 ML: 2.5; .5 SOLUTION RESPIRATORY (INHALATION) at 14:33

## 2020-01-01 RX ADMIN — NICOTINE 1 PATCH: 14 PATCH, EXTENDED RELEASE TRANSDERMAL at 09:13

## 2020-01-01 RX ADMIN — ASPIRIN 81 MG: 81 TABLET, FILM COATED ORAL at 08:37

## 2020-01-01 RX ADMIN — BUMETANIDE 1 MG: 0.25 INJECTION INTRAMUSCULAR; INTRAVENOUS at 09:08

## 2020-01-01 RX ADMIN — HYDRALAZINE HYDROCHLORIDE 75 MG: 25 TABLET, FILM COATED ORAL at 21:00

## 2020-01-01 RX ADMIN — SODIUM BICARBONATE 50 MEQ: 84 INJECTION, SOLUTION INTRAVENOUS at 04:43

## 2020-01-01 RX ADMIN — FENTANYL CITRATE 150 MCG/HR: 50 INJECTION, SOLUTION INTRAMUSCULAR; INTRAVENOUS at 06:09

## 2020-01-01 RX ADMIN — IPRATROPIUM BROMIDE AND ALBUTEROL SULFATE 3 ML: 2.5; .5 SOLUTION RESPIRATORY (INHALATION) at 16:22

## 2020-01-01 RX ADMIN — SODIUM CHLORIDE, PRESERVATIVE FREE 10 ML: 5 INJECTION INTRAVENOUS at 10:01

## 2020-01-01 RX ADMIN — INSULIN ASPART 4 UNITS: 100 INJECTION, SOLUTION INTRAVENOUS; SUBCUTANEOUS at 18:17

## 2020-01-01 RX ADMIN — LACTULOSE 20 G: 20 SOLUTION ORAL at 20:55

## 2020-01-01 RX ADMIN — Medication: at 16:15

## 2020-01-01 RX ADMIN — IPRATROPIUM BROMIDE AND ALBUTEROL SULFATE 3 ML: 2.5; .5 SOLUTION RESPIRATORY (INHALATION) at 18:40

## 2020-01-01 RX ADMIN — FLUTICASONE PROPIONATE 2 SPRAY: 50 SPRAY, METERED NASAL at 08:03

## 2020-01-01 RX ADMIN — Medication 220 MG: at 09:02

## 2020-01-01 RX ADMIN — BENZTROPINE MESYLATE 1 MG: 1 TABLET ORAL at 20:55

## 2020-01-01 RX ADMIN — BUDESONIDE 0.5 MG: 0.5 INHALANT RESPIRATORY (INHALATION) at 19:40

## 2020-01-01 RX ADMIN — BENZTROPINE MESYLATE 1 MG: 1 TABLET ORAL at 21:15

## 2020-01-01 RX ADMIN — TAMSULOSIN HYDROCHLORIDE 0.4 MG: 0.4 CAPSULE ORAL at 10:37

## 2020-01-01 RX ADMIN — FLUTICASONE PROPIONATE 2 SPRAY: 50 SPRAY, METERED NASAL at 08:23

## 2020-01-01 RX ADMIN — CETIRIZINE HYDROCHLORIDE 10 MG: 10 TABLET, FILM COATED ORAL at 11:54

## 2020-01-01 RX ADMIN — HALOPERIDOL 7.5 MG: 5 TABLET ORAL at 22:01

## 2020-01-01 RX ADMIN — CETIRIZINE HYDROCHLORIDE 10 MG: 10 TABLET, FILM COATED ORAL at 09:02

## 2020-01-01 RX ADMIN — HYDRALAZINE HYDROCHLORIDE 75 MG: 25 TABLET, FILM COATED ORAL at 14:56

## 2020-01-01 RX ADMIN — PROPOFOL 5 MCG/KG/MIN: 10 INJECTION, EMULSION INTRAVENOUS at 05:17

## 2020-01-01 RX ADMIN — IRON SUCROSE 200 MG: 20 INJECTION, SOLUTION INTRAVENOUS at 10:35

## 2020-01-01 RX ADMIN — PROPOFOL 30 MCG/KG/MIN: 10 INJECTION, EMULSION INTRAVENOUS at 12:57

## 2020-01-01 RX ADMIN — SODIUM BICARBONATE 50 MEQ: 84 INJECTION INTRAVENOUS at 10:22

## 2020-01-01 RX ADMIN — MIDAZOLAM 4 MG/HR: 5 INJECTION INTRAMUSCULAR; INTRAVENOUS at 06:10

## 2020-01-01 RX ADMIN — INSULIN LISPRO 2 UNITS: 100 INJECTION, SOLUTION INTRAVENOUS; SUBCUTANEOUS at 06:18

## 2020-01-01 RX ADMIN — BENZTROPINE MESYLATE 1 MG: 1 TABLET ORAL at 21:13

## 2020-01-01 RX ADMIN — INSULIN DETEMIR 14 UNITS: 100 INJECTION, SOLUTION SUBCUTANEOUS at 21:04

## 2020-01-01 RX ADMIN — METOCLOPRAMIDE HYDROCHLORIDE 5 MG: 5 TABLET ORAL at 17:30

## 2020-01-01 RX ADMIN — DEXTROSE MONOHYDRATE 25 G: 500 INJECTION PARENTERAL at 12:00

## 2020-01-01 RX ADMIN — FENTANYL CITRATE 200 MCG/HR: 50 INJECTION, SOLUTION INTRAMUSCULAR; INTRAVENOUS at 00:26

## 2020-01-01 RX ADMIN — BUSPIRONE HYDROCHLORIDE 10 MG: 10 TABLET ORAL at 06:24

## 2020-01-01 RX ADMIN — CARVEDILOL 12.5 MG: 12.5 TABLET, FILM COATED ORAL at 09:14

## 2020-01-01 RX ADMIN — BUSPIRONE HYDROCHLORIDE 10 MG: 10 TABLET ORAL at 06:52

## 2020-01-01 RX ADMIN — VASOPRESSIN 1 ML: 20 INJECTION INTRAVENOUS at 08:02

## 2020-01-01 RX ADMIN — VANCOMYCIN HYDROCHLORIDE 1750 MG: 500 INJECTION, POWDER, LYOPHILIZED, FOR SOLUTION INTRAVENOUS at 21:31

## 2020-01-01 RX ADMIN — SODIUM CHLORIDE, PRESERVATIVE FREE 10 ML: 5 INJECTION INTRAVENOUS at 20:46

## 2020-01-01 RX ADMIN — BENZTROPINE MESYLATE 1 MG: 1 TABLET ORAL at 08:37

## 2020-01-01 RX ADMIN — INSULIN DETEMIR 14 UNITS: 100 INJECTION, SOLUTION SUBCUTANEOUS at 20:56

## 2020-01-01 RX ADMIN — INSULIN DETEMIR 14 UNITS: 100 INJECTION, SOLUTION SUBCUTANEOUS at 21:13

## 2020-01-01 RX ADMIN — TAZOBACTAM SODIUM AND PIPERACILLIN SODIUM 3.38 G: 375; 3 INJECTION, SOLUTION INTRAVENOUS at 04:06

## 2020-01-01 RX ADMIN — BUMETANIDE 1 MG: 0.25 INJECTION INTRAMUSCULAR; INTRAVENOUS at 08:23

## 2020-01-01 RX ADMIN — SPIRONOLACTONE 25 MG: 25 TABLET ORAL at 06:52

## 2020-01-01 RX ADMIN — METHYLPREDNISOLONE SODIUM SUCCINATE 40 MG: 40 INJECTION, POWDER, FOR SOLUTION INTRAMUSCULAR; INTRAVENOUS at 15:39

## 2020-01-01 RX ADMIN — PROPOFOL 55 MCG/KG/MIN: 10 INJECTION, EMULSION INTRAVENOUS at 13:06

## 2020-01-01 RX ADMIN — HYDRALAZINE HYDROCHLORIDE 75 MG: 25 TABLET, FILM COATED ORAL at 21:22

## 2020-01-01 RX ADMIN — INSULIN ASPART 4 UNITS: 100 INJECTION, SOLUTION INTRAVENOUS; SUBCUTANEOUS at 12:14

## 2020-01-01 RX ADMIN — IPRATROPIUM BROMIDE AND ALBUTEROL SULFATE 3 ML: 2.5; .5 SOLUTION RESPIRATORY (INHALATION) at 06:55

## 2020-01-01 RX ADMIN — METHYLPREDNISOLONE SODIUM SUCCINATE 40 MG: 40 INJECTION, POWDER, FOR SOLUTION INTRAMUSCULAR; INTRAVENOUS at 16:05

## 2020-01-01 RX ADMIN — SODIUM CHLORIDE 125 ML/HR: 900 INJECTION, SOLUTION INTRAVENOUS at 20:22

## 2020-01-01 RX ADMIN — CARVEDILOL 12.5 MG: 12.5 TABLET, FILM COATED ORAL at 08:31

## 2020-01-01 RX ADMIN — PROPOFOL 70 MCG/KG/MIN: 10 INJECTION, EMULSION INTRAVENOUS at 07:39

## 2020-01-01 RX ADMIN — ROPINIROLE 2 MG: 1 TABLET, FILM COATED ORAL at 20:55

## 2020-01-01 RX ADMIN — HYDRALAZINE HYDROCHLORIDE 75 MG: 25 TABLET, FILM COATED ORAL at 13:24

## 2020-01-01 RX ADMIN — ROPINIROLE 2 MG: 1 TABLET, FILM COATED ORAL at 02:16

## 2020-01-01 RX ADMIN — PANTOPRAZOLE SODIUM 40 MG: 40 TABLET, DELAYED RELEASE ORAL at 06:24

## 2020-01-01 RX ADMIN — BUDESONIDE 0.5 MG: 0.5 INHALANT RESPIRATORY (INHALATION) at 20:59

## 2020-01-01 RX ADMIN — Medication 500 MG: at 09:06

## 2020-01-01 RX ADMIN — TAMSULOSIN HYDROCHLORIDE 0.4 MG: 0.4 CAPSULE ORAL at 08:37

## 2020-01-01 RX ADMIN — TAMSULOSIN HYDROCHLORIDE 0.4 MG: 0.4 CAPSULE ORAL at 09:32

## 2020-01-01 RX ADMIN — METHYLPREDNISOLONE SODIUM SUCCINATE 40 MG: 40 INJECTION, POWDER, FOR SOLUTION INTRAMUSCULAR; INTRAVENOUS at 23:02

## 2020-01-01 RX ADMIN — NICOTINE 1 PATCH: 21 PATCH, EXTENDED RELEASE TRANSDERMAL at 08:33

## 2020-01-01 RX ADMIN — LACTULOSE 20 G: 20 SOLUTION ORAL at 11:53

## 2020-01-01 RX ADMIN — HALOPERIDOL 7.5 MG: 5 TABLET ORAL at 20:55

## 2020-01-01 RX ADMIN — METHYLPREDNISOLONE SODIUM SUCCINATE 40 MG: 40 INJECTION, POWDER, FOR SOLUTION INTRAMUSCULAR; INTRAVENOUS at 22:09

## 2020-01-01 RX ADMIN — INSULIN LISPRO 2 UNITS: 100 INJECTION, SOLUTION INTRAVENOUS; SUBCUTANEOUS at 05:54

## 2020-01-01 RX ADMIN — BENZTROPINE MESYLATE 1 MG: 1 TABLET ORAL at 09:14

## 2020-01-01 RX ADMIN — MAGNESIUM SULFATE HEPTAHYDRATE 1 G: 1 INJECTION, SOLUTION INTRAVENOUS at 20:23

## 2020-01-01 RX ADMIN — SODIUM CHLORIDE, PRESERVATIVE FREE 10 ML: 5 INJECTION INTRAVENOUS at 20:01

## 2020-01-01 RX ADMIN — BUSPIRONE HYDROCHLORIDE 10 MG: 10 TABLET ORAL at 21:17

## 2020-01-01 RX ADMIN — METOCLOPRAMIDE HYDROCHLORIDE 5 MG: 5 TABLET ORAL at 12:15

## 2020-01-01 RX ADMIN — DEXTROSE MONOHYDRATE 25 G: 500 INJECTION PARENTERAL at 05:34

## 2020-01-01 RX ADMIN — INSULIN ASPART 4 UNITS: 100 INJECTION, SOLUTION INTRAVENOUS; SUBCUTANEOUS at 17:28

## 2020-01-01 RX ADMIN — MORPHINE SULFATE 2 MG: 2 INJECTION, SOLUTION INTRAMUSCULAR; INTRAVENOUS at 20:36

## 2020-01-01 RX ADMIN — MORPHINE SULFATE 2 MG: 2 INJECTION, SOLUTION INTRAMUSCULAR; INTRAVENOUS at 15:55

## 2020-01-01 RX ADMIN — DEXTROSE MONOHYDRATE 50 ML: 25 INJECTION, SOLUTION INTRAVENOUS at 10:55

## 2020-01-01 RX ADMIN — ONDANSETRON 4 MG: 2 INJECTION INTRAMUSCULAR; INTRAVENOUS at 20:35

## 2020-01-01 RX ADMIN — FAMOTIDINE 20 MG: 10 INJECTION INTRAVENOUS at 09:08

## 2020-01-01 RX ADMIN — SODIUM POLYSTYRENE SULFONATE 15 G: 15 SUSPENSION ORAL; RECTAL at 15:45

## 2020-01-01 RX ADMIN — BENZTROPINE MESYLATE 1 MG: 1 TABLET ORAL at 20:26

## 2020-01-01 RX ADMIN — Medication 500 MG: at 06:05

## 2020-01-01 RX ADMIN — FAMOTIDINE 20 MG: 10 INJECTION INTRAVENOUS at 09:40

## 2020-01-01 RX ADMIN — CEFEPIME HYDROCHLORIDE 2 G: 2 INJECTION, POWDER, FOR SOLUTION INTRAVENOUS at 07:59

## 2020-01-01 RX ADMIN — SPIRONOLACTONE 25 MG: 25 TABLET ORAL at 06:24

## 2020-01-01 RX ADMIN — HYDRALAZINE HYDROCHLORIDE 75 MG: 25 TABLET, FILM COATED ORAL at 21:31

## 2020-01-01 RX ADMIN — ATORVASTATIN CALCIUM 40 MG: 40 TABLET, FILM COATED ORAL at 09:14

## 2020-01-01 RX ADMIN — ASPIRIN 81 MG: 81 TABLET, FILM COATED ORAL at 08:31

## 2020-01-01 RX ADMIN — NICOTINE 1 PATCH: 21 PATCH, EXTENDED RELEASE TRANSDERMAL at 11:56

## 2020-01-01 RX ADMIN — BUSPIRONE HYDROCHLORIDE 10 MG: 10 TABLET ORAL at 13:24

## 2020-01-01 RX ADMIN — LORAZEPAM 1 MG: 2 INJECTION INTRAMUSCULAR; INTRAVENOUS at 18:30

## 2020-01-01 RX ADMIN — NICOTINE 1 PATCH: 14 PATCH, EXTENDED RELEASE TRANSDERMAL at 09:07

## 2020-01-01 RX ADMIN — IPRATROPIUM BROMIDE AND ALBUTEROL SULFATE 3 ML: 2.5; .5 SOLUTION RESPIRATORY (INHALATION) at 13:22

## 2020-01-01 RX ADMIN — CETIRIZINE HYDROCHLORIDE 10 MG: 10 TABLET, FILM COATED ORAL at 09:14

## 2020-01-01 RX ADMIN — PIPERACILLIN SODIUM AND TAZOBACTAM SODIUM 3.38 G: 3; .375 INJECTION, POWDER, LYOPHILIZED, FOR SOLUTION INTRAVENOUS at 21:16

## 2020-01-01 RX ADMIN — NICOTINE 1 PATCH: 14 PATCH, EXTENDED RELEASE TRANSDERMAL at 18:50

## 2020-01-01 RX ADMIN — BUDESONIDE 0.25 MG: 0.25 INHALANT RESPIRATORY (INHALATION) at 07:21

## 2020-01-01 RX ADMIN — BUSPIRONE HYDROCHLORIDE 10 MG: 10 TABLET ORAL at 05:59

## 2020-01-01 RX ADMIN — PROPOFOL 70 MCG/KG/MIN: 10 INJECTION, EMULSION INTRAVENOUS at 10:11

## 2020-01-01 RX ADMIN — SODIUM BICARBONATE: 84 INJECTION, SOLUTION INTRAVENOUS at 18:20

## 2020-01-01 RX ADMIN — PROPOFOL 55 MCG/KG/MIN: 10 INJECTION, EMULSION INTRAVENOUS at 13:12

## 2020-01-01 RX ADMIN — BISACODYL 10 MG: 10 SUPPOSITORY RECTAL at 11:54

## 2020-01-01 RX ADMIN — FUROSEMIDE 40 MG: 10 INJECTION, SOLUTION INTRAMUSCULAR; INTRAVENOUS at 20:37

## 2020-01-01 RX ADMIN — CARVEDILOL 12.5 MG: 12.5 TABLET, FILM COATED ORAL at 09:03

## 2020-01-01 RX ADMIN — CEFEPIME HYDROCHLORIDE 2 G: 2 INJECTION, POWDER, FOR SOLUTION INTRAVENOUS at 06:44

## 2020-01-01 RX ADMIN — BUSPIRONE HYDROCHLORIDE 10 MG: 10 TABLET ORAL at 21:31

## 2020-01-01 RX ADMIN — ONDANSETRON HYDROCHLORIDE 4 MG: 4 TABLET, FILM COATED ORAL at 00:21

## 2020-01-01 RX ADMIN — INSULIN LISPRO 2 UNITS: 100 INJECTION, SOLUTION INTRAVENOUS; SUBCUTANEOUS at 23:57

## 2020-01-01 RX ADMIN — Medication 500 MG: at 06:53

## 2020-01-01 RX ADMIN — LORAZEPAM 2 MG: 2 INJECTION INTRAMUSCULAR; INTRAVENOUS at 15:41

## 2020-01-01 RX ADMIN — POLYETHYLENE GLYCOL 3350 17 G: 17 POWDER, FOR SOLUTION ORAL at 09:02

## 2020-01-01 RX ADMIN — PROPOFOL 60 MCG/KG/MIN: 10 INJECTION, EMULSION INTRAVENOUS at 21:03

## 2020-01-01 RX ADMIN — Medication 1000 UNITS: at 08:29

## 2020-01-01 RX ADMIN — CETIRIZINE HYDROCHLORIDE 10 MG: 10 TABLET, FILM COATED ORAL at 08:03

## 2020-01-01 RX ADMIN — COLLAGENASE SANTYL 1 APPLICATION: 250 OINTMENT TOPICAL at 21:15

## 2020-01-01 RX ADMIN — PROPOFOL 55 MCG/KG/MIN: 10 INJECTION, EMULSION INTRAVENOUS at 06:55

## 2020-01-01 RX ADMIN — BENZTROPINE MESYLATE 1 MG: 1 TABLET ORAL at 08:31

## 2020-01-01 RX ADMIN — HYDRALAZINE HYDROCHLORIDE 75 MG: 25 TABLET, FILM COATED ORAL at 14:53

## 2020-01-01 RX ADMIN — IPRATROPIUM BROMIDE AND ALBUTEROL SULFATE 3 ML: 2.5; .5 SOLUTION RESPIRATORY (INHALATION) at 19:53

## 2020-01-01 RX ADMIN — BUSPIRONE HYDROCHLORIDE 10 MG: 10 TABLET ORAL at 21:00

## 2020-01-01 RX ADMIN — PROPOFOL 70 MCG/KG/MIN: 10 INJECTION, EMULSION INTRAVENOUS at 21:26

## 2020-01-01 RX ADMIN — FAMOTIDINE 20 MG: 10 INJECTION INTRAVENOUS at 08:23

## 2020-01-01 RX ADMIN — BUSPIRONE HYDROCHLORIDE 10 MG: 10 TABLET ORAL at 21:07

## 2020-01-01 RX ADMIN — TAMSULOSIN HYDROCHLORIDE 0.4 MG: 0.4 CAPSULE ORAL at 08:11

## 2020-01-01 RX ADMIN — IPRATROPIUM BROMIDE AND ALBUTEROL SULFATE 3 ML: 2.5; .5 SOLUTION RESPIRATORY (INHALATION) at 15:02

## 2020-01-01 RX ADMIN — FUROSEMIDE 40 MG: 10 INJECTION, SOLUTION INTRAMUSCULAR; INTRAVENOUS at 14:56

## 2020-01-01 RX ADMIN — Medication 220 MG: at 09:14

## 2020-01-01 RX ADMIN — ASPIRIN 81 MG: 81 TABLET, FILM COATED ORAL at 09:34

## 2020-01-01 RX ADMIN — VANCOMYCIN HYDROCHLORIDE 1500 MG: 10 INJECTION, POWDER, LYOPHILIZED, FOR SOLUTION INTRAVENOUS at 17:25

## 2020-01-01 RX ADMIN — TAZOBACTAM SODIUM AND PIPERACILLIN SODIUM 3.38 G: 375; 3 INJECTION, SOLUTION INTRAVENOUS at 20:33

## 2020-01-01 RX ADMIN — CARVEDILOL 12.5 MG: 12.5 TABLET, FILM COATED ORAL at 17:48

## 2020-01-01 RX ADMIN — INSULIN ASPART 4 UNITS: 100 INJECTION, SOLUTION INTRAVENOUS; SUBCUTANEOUS at 17:12

## 2020-01-01 RX ADMIN — LORAZEPAM 1 MG: 1 TABLET ORAL at 19:25

## 2020-01-01 RX ADMIN — PANTOPRAZOLE SODIUM 40 MG: 40 INJECTION, POWDER, FOR SOLUTION INTRAVENOUS at 09:01

## 2020-01-01 RX ADMIN — Medication 1000 UNITS: at 09:03

## 2020-01-01 RX ADMIN — BUDESONIDE 0.5 MG: 0.5 INHALANT RESPIRATORY (INHALATION) at 07:27

## 2020-01-01 RX ADMIN — BUSPIRONE HYDROCHLORIDE 10 MG: 10 TABLET ORAL at 21:01

## 2020-01-01 RX ADMIN — PROPOFOL 45 MCG/KG/MIN: 10 INJECTION, EMULSION INTRAVENOUS at 05:48

## 2020-01-01 RX ADMIN — IPRATROPIUM BROMIDE AND ALBUTEROL SULFATE 3 ML: 2.5; .5 SOLUTION RESPIRATORY (INHALATION) at 10:52

## 2020-01-01 RX ADMIN — HALOPERIDOL LACTATE 1 MG: 5 INJECTION, SOLUTION INTRAMUSCULAR at 15:40

## 2020-01-01 RX ADMIN — METOCLOPRAMIDE HYDROCHLORIDE 5 MG: 5 TABLET ORAL at 08:31

## 2020-01-01 RX ADMIN — CETIRIZINE HYDROCHLORIDE 10 MG: 10 TABLET, FILM COATED ORAL at 08:31

## 2020-01-01 RX ADMIN — IPRATROPIUM BROMIDE AND ALBUTEROL SULFATE 3 ML: 2.5; .5 SOLUTION RESPIRATORY (INHALATION) at 09:57

## 2020-01-01 RX ADMIN — INSULIN DETEMIR 14 UNITS: 100 INJECTION, SOLUTION SUBCUTANEOUS at 21:20

## 2020-01-01 RX ADMIN — CARVEDILOL 12.5 MG: 12.5 TABLET, FILM COATED ORAL at 10:10

## 2020-01-01 RX ADMIN — RISPERIDONE 0.5 MG: 1 TABLET, FILM COATED ORAL at 21:41

## 2020-01-01 RX ADMIN — Medication 1000 UNITS: at 09:14

## 2020-01-01 RX ADMIN — SODIUM CHLORIDE 10 MG/HR: 9 INJECTION, SOLUTION INTRAVENOUS at 22:44

## 2020-01-01 RX ADMIN — SODIUM CHLORIDE 1 G: 900 INJECTION INTRAVENOUS at 09:02

## 2020-01-01 RX ADMIN — RIVAROXABAN 10 MG: 10 TABLET, FILM COATED ORAL at 17:12

## 2020-01-01 RX ADMIN — PROPOFOL 40 MCG/KG/MIN: 10 INJECTION, EMULSION INTRAVENOUS at 00:00

## 2020-01-01 RX ADMIN — BUSPIRONE HYDROCHLORIDE 10 MG: 10 TABLET ORAL at 06:06

## 2020-01-01 RX ADMIN — SODIUM BICARBONATE: 84 INJECTION, SOLUTION INTRAVENOUS at 03:27

## 2020-01-01 RX ADMIN — Medication 220 MG: at 08:28

## 2020-01-01 RX ADMIN — IPRATROPIUM BROMIDE AND ALBUTEROL SULFATE 3 ML: 2.5; .5 SOLUTION RESPIRATORY (INHALATION) at 06:21

## 2020-01-01 RX ADMIN — Medication 1 TABLET: at 09:14

## 2020-01-01 RX ADMIN — SPIRONOLACTONE 25 MG: 25 TABLET ORAL at 06:06

## 2020-01-01 RX ADMIN — BUMETANIDE 1 MG: 0.25 INJECTION INTRAMUSCULAR; INTRAVENOUS at 12:56

## 2020-01-01 RX ADMIN — FAMOTIDINE 20 MG: 10 INJECTION INTRAVENOUS at 08:02

## 2020-01-01 RX ADMIN — NICOTINE 1 PATCH: 21 PATCH, EXTENDED RELEASE TRANSDERMAL at 08:36

## 2020-01-01 RX ADMIN — CHLORHEXIDINE GLUCONATE 0.12% ORAL RINSE 15 ML: 1.2 LIQUID ORAL at 00:00

## 2020-01-01 RX ADMIN — BUSPIRONE HYDROCHLORIDE 10 MG: 10 TABLET ORAL at 14:53

## 2020-01-01 RX ADMIN — METOCLOPRAMIDE HYDROCHLORIDE 5 MG: 5 TABLET ORAL at 18:17

## 2020-01-01 RX ADMIN — IPRATROPIUM BROMIDE AND ALBUTEROL SULFATE 3 ML: 2.5; .5 SOLUTION RESPIRATORY (INHALATION) at 20:30

## 2020-01-01 RX ADMIN — METOCLOPRAMIDE HYDROCHLORIDE 5 MG: 5 TABLET ORAL at 06:46

## 2020-01-01 RX ADMIN — RIVAROXABAN 10 MG: 10 TABLET, FILM COATED ORAL at 18:17

## 2020-01-01 RX ADMIN — SODIUM CHLORIDE, PRESERVATIVE FREE 10 ML: 5 INJECTION INTRAVENOUS at 09:05

## 2020-01-01 RX ADMIN — LORAZEPAM 1 MG: 2 INJECTION INTRAMUSCULAR; INTRAVENOUS at 04:16

## 2020-01-01 RX ADMIN — CETIRIZINE HYDROCHLORIDE 10 MG: 10 TABLET, FILM COATED ORAL at 09:33

## 2020-01-01 RX ADMIN — MIDAZOLAM 1 MG/HR: 5 INJECTION INTRAMUSCULAR; INTRAVENOUS at 08:06

## 2020-01-01 RX ADMIN — BUDESONIDE 0.5 MG: 0.5 INHALANT RESPIRATORY (INHALATION) at 07:59

## 2020-01-01 RX ADMIN — INSULIN ASPART 6 UNITS: 100 INJECTION, SOLUTION INTRAVENOUS; SUBCUTANEOUS at 11:52

## 2020-01-01 RX ADMIN — METOCLOPRAMIDE HYDROCHLORIDE 5 MG: 5 TABLET ORAL at 21:05

## 2020-01-01 RX ADMIN — NITROGLYCERIN 70 MCG/MIN: 20 INJECTION INTRAVENOUS at 09:28

## 2020-01-01 RX ADMIN — TAZOBACTAM SODIUM AND PIPERACILLIN SODIUM 3.38 G: 375; 3 INJECTION, SOLUTION INTRAVENOUS at 19:17

## 2020-01-01 RX ADMIN — BENZTROPINE MESYLATE 1 MG: 1 TABLET ORAL at 08:15

## 2020-01-01 RX ADMIN — EPHEDRINE SULFATE 15 MG: 50 INJECTION INTRAVENOUS at 08:01

## 2020-01-01 RX ADMIN — INSULIN ASPART 4 UNITS: 100 INJECTION, SOLUTION INTRAVENOUS; SUBCUTANEOUS at 11:22

## 2020-01-01 RX ADMIN — ROPINIROLE 2 MG: 1 TABLET, FILM COATED ORAL at 21:13

## 2020-01-01 RX ADMIN — SODIUM CHLORIDE 10 MG/HR: 9 INJECTION, SOLUTION INTRAVENOUS at 05:28

## 2020-01-01 RX ADMIN — DOPAMINE HYDROCHLORIDE 2 MCG/KG/MIN: 160 INJECTION, SOLUTION INTRAVENOUS at 10:10

## 2020-01-01 RX ADMIN — CETIRIZINE HYDROCHLORIDE 10 MG: 10 TABLET, FILM COATED ORAL at 08:29

## 2020-01-01 RX ADMIN — SODIUM CHLORIDE, PRESERVATIVE FREE 10 ML: 5 INJECTION INTRAVENOUS at 09:06

## 2020-01-01 RX ADMIN — PANTOPRAZOLE SODIUM 40 MG: 40 TABLET, DELAYED RELEASE ORAL at 06:35

## 2020-01-01 RX ADMIN — TAZOBACTAM SODIUM AND PIPERACILLIN SODIUM 3.38 G: 375; 3 INJECTION, SOLUTION INTRAVENOUS at 16:11

## 2020-01-01 RX ADMIN — ATORVASTATIN CALCIUM 40 MG: 40 TABLET, FILM COATED ORAL at 08:11

## 2020-01-01 RX ADMIN — IPRATROPIUM BROMIDE AND ALBUTEROL SULFATE 3 ML: 2.5; .5 SOLUTION RESPIRATORY (INHALATION) at 07:21

## 2020-01-01 RX ADMIN — BENZTROPINE MESYLATE 1 MG: 1 TABLET ORAL at 20:45

## 2020-01-01 RX ADMIN — FLUTICASONE PROPIONATE 2 SPRAY: 50 SPRAY, METERED NASAL at 11:51

## 2020-01-01 RX ADMIN — SODIUM CHLORIDE SOLN NEBU 3% 4 ML: 3 NEBU SOLN at 10:43

## 2020-01-01 RX ADMIN — TAZOBACTAM SODIUM AND PIPERACILLIN SODIUM 3.38 G: 375; 3 INJECTION, SOLUTION INTRAVENOUS at 03:35

## 2020-01-01 RX ADMIN — BISACODYL 10 MG: 10 SUPPOSITORY RECTAL at 08:29

## 2020-01-01 RX ADMIN — INSULIN ASPART 2 UNITS: 100 INJECTION, SOLUTION INTRAVENOUS; SUBCUTANEOUS at 18:17

## 2020-01-01 RX ADMIN — LORAZEPAM 1 MG: 1 TABLET ORAL at 01:21

## 2020-01-01 RX ADMIN — INSULIN DETEMIR 14 UNITS: 100 INJECTION, SOLUTION SUBCUTANEOUS at 21:07

## 2020-01-01 RX ADMIN — INSULIN DETEMIR 14 UNITS: 100 INJECTION, SOLUTION SUBCUTANEOUS at 21:11

## 2020-01-01 RX ADMIN — METOCLOPRAMIDE HYDROCHLORIDE 5 MG: 5 TABLET ORAL at 20:24

## 2020-01-01 RX ADMIN — TAZOBACTAM SODIUM AND PIPERACILLIN SODIUM 3.38 G: 375; 3 INJECTION, SOLUTION INTRAVENOUS at 19:56

## 2020-01-01 RX ADMIN — INSULIN LISPRO 4 UNITS: 100 INJECTION, SOLUTION INTRAVENOUS; SUBCUTANEOUS at 05:56

## 2020-01-01 RX ADMIN — BUMETANIDE 1 MG: 0.25 INJECTION INTRAMUSCULAR; INTRAVENOUS at 00:54

## 2020-01-01 RX ADMIN — PROPOFOL 50 MCG/KG/MIN: 10 INJECTION, EMULSION INTRAVENOUS at 10:24

## 2020-01-01 RX ADMIN — SODIUM CHLORIDE 7.5 MG/HR: 9 INJECTION, SOLUTION INTRAVENOUS at 00:07

## 2020-01-01 RX ADMIN — RIVAROXABAN 10 MG: 10 TABLET, FILM COATED ORAL at 17:21

## 2020-01-01 RX ADMIN — SODIUM CHLORIDE, PRESERVATIVE FREE 10 ML: 5 INJECTION INTRAVENOUS at 08:03

## 2020-01-01 RX ADMIN — ASPIRIN 81 MG: 81 TABLET, FILM COATED ORAL at 19:01

## 2020-01-01 RX ADMIN — SODIUM CHLORIDE, PRESERVATIVE FREE 10 ML: 5 INJECTION INTRAVENOUS at 09:12

## 2020-01-01 RX ADMIN — INSULIN ASPART 6 UNITS: 100 INJECTION, SOLUTION INTRAVENOUS; SUBCUTANEOUS at 12:15

## 2020-01-01 RX ADMIN — SODIUM CHLORIDE 75 ML/HR: 900 INJECTION, SOLUTION INTRAVENOUS at 17:12

## 2020-01-01 RX ADMIN — FUROSEMIDE 40 MG: 10 INJECTION, SOLUTION INTRAMUSCULAR; INTRAVENOUS at 15:35

## 2020-01-01 RX ADMIN — BENZTROPINE MESYLATE 1 MG: 1 TABLET ORAL at 09:33

## 2020-01-01 RX ADMIN — CETIRIZINE HYDROCHLORIDE 10 MG: 10 TABLET, FILM COATED ORAL at 17:21

## 2020-01-01 RX ADMIN — INSULIN LISPRO 2 UNITS: 100 INJECTION, SOLUTION INTRAVENOUS; SUBCUTANEOUS at 23:58

## 2020-01-01 RX ADMIN — SODIUM CHLORIDE, PRESERVATIVE FREE 10 ML: 5 INJECTION INTRAVENOUS at 20:28

## 2020-01-01 RX ADMIN — IOPAMIDOL 67 ML: 755 INJECTION, SOLUTION INTRAVENOUS at 21:32

## 2020-01-01 RX ADMIN — CEFEPIME HYDROCHLORIDE 2 G: 2 INJECTION, POWDER, FOR SOLUTION INTRAVENOUS at 18:42

## 2020-01-01 RX ADMIN — CETIRIZINE HYDROCHLORIDE 10 MG: 10 TABLET, FILM COATED ORAL at 09:07

## 2020-01-01 RX ADMIN — BENZTROPINE MESYLATE 1 MG: 1 TABLET ORAL at 21:17

## 2020-01-01 RX ADMIN — BENZTROPINE MESYLATE 1 MG: 1 TABLET ORAL at 11:53

## 2020-01-01 RX ADMIN — ROCURONIUM BROMIDE 50 MG: 10 INJECTION INTRAVENOUS at 07:06

## 2020-01-01 RX ADMIN — SODIUM CHLORIDE, PRESERVATIVE FREE 10 ML: 5 INJECTION INTRAVENOUS at 20:59

## 2020-01-01 RX ADMIN — NICOTINE 1 PATCH: 14 PATCH, EXTENDED RELEASE TRANSDERMAL at 08:03

## 2020-01-01 RX ADMIN — BUDESONIDE 0.25 MG: 0.25 INHALANT RESPIRATORY (INHALATION) at 10:42

## 2020-01-01 RX ADMIN — SODIUM CHLORIDE 12.5 MG/HR: 9 INJECTION, SOLUTION INTRAVENOUS at 08:12

## 2020-01-01 RX ADMIN — TAMSULOSIN HYDROCHLORIDE 0.4 MG: 0.4 CAPSULE ORAL at 08:29

## 2020-01-01 RX ADMIN — CALCIUM GLUCONATE 1 G: 98 INJECTION, SOLUTION INTRAVENOUS at 10:55

## 2020-01-01 RX ADMIN — PROPOFOL 55 MCG/KG/MIN: 10 INJECTION, EMULSION INTRAVENOUS at 15:38

## 2020-01-01 RX ADMIN — COLLAGENASE SANTYL 1 APPLICATION: 250 OINTMENT TOPICAL at 21:22

## 2020-01-01 RX ADMIN — PROPOFOL 40 MCG/KG/MIN: 10 INJECTION, EMULSION INTRAVENOUS at 10:05

## 2020-01-01 RX ADMIN — PIPERACILLIN SODIUM AND TAZOBACTAM SODIUM 3.38 G: 3; .375 INJECTION, POWDER, LYOPHILIZED, FOR SOLUTION INTRAVENOUS at 02:52

## 2020-01-01 RX ADMIN — INSULIN ASPART 6 UNITS: 100 INJECTION, SOLUTION INTRAVENOUS; SUBCUTANEOUS at 17:51

## 2020-01-01 RX ADMIN — PROPOFOL 70 MCG/KG/MIN: 10 INJECTION, EMULSION INTRAVENOUS at 02:27

## 2020-01-01 RX ADMIN — DOXYCYCLINE 100 MG: 100 INJECTION, POWDER, LYOPHILIZED, FOR SOLUTION INTRAVENOUS at 10:37

## 2020-01-01 RX ADMIN — SODIUM CHLORIDE 75 ML/HR: 900 INJECTION, SOLUTION INTRAVENOUS at 09:53

## 2020-01-01 RX ADMIN — BUDESONIDE 0.5 MG: 0.5 INHALANT RESPIRATORY (INHALATION) at 09:46

## 2020-01-01 RX ADMIN — METHYLPREDNISOLONE SODIUM SUCCINATE 40 MG: 40 INJECTION, POWDER, FOR SOLUTION INTRAMUSCULAR; INTRAVENOUS at 12:56

## 2020-01-01 RX ADMIN — METOCLOPRAMIDE HYDROCHLORIDE 5 MG: 5 TABLET ORAL at 11:52

## 2020-01-01 RX ADMIN — ENOXAPARIN SODIUM 90 MG: 100 INJECTION SUBCUTANEOUS at 21:32

## 2020-01-01 RX ADMIN — NITROGLYCERIN 60 MCG/MIN: 20 INJECTION INTRAVENOUS at 16:37

## 2020-01-01 RX ADMIN — SODIUM CHLORIDE 1000 ML: 9 INJECTION, SOLUTION INTRAVENOUS at 15:45

## 2020-01-01 RX ADMIN — PROPOFOL 50 MCG/KG/MIN: 10 INJECTION, EMULSION INTRAVENOUS at 13:27

## 2020-01-01 RX ADMIN — TAZOBACTAM SODIUM AND PIPERACILLIN SODIUM 3.38 G: 375; 3 INJECTION, SOLUTION INTRAVENOUS at 04:39

## 2020-01-01 RX ADMIN — METOCLOPRAMIDE HYDROCHLORIDE 5 MG: 5 TABLET ORAL at 06:52

## 2020-01-01 RX ADMIN — PROPOFOL 50 MCG/KG/MIN: 10 INJECTION, EMULSION INTRAVENOUS at 03:19

## 2020-01-01 RX ADMIN — SODIUM CHLORIDE 75 ML/HR: 900 INJECTION, SOLUTION INTRAVENOUS at 16:16

## 2020-01-01 RX ADMIN — SODIUM CHLORIDE, PRESERVATIVE FREE 10 ML: 5 INJECTION INTRAVENOUS at 02:17

## 2020-01-01 RX ADMIN — ONDANSETRON HYDROCHLORIDE 4 MG: 2 SOLUTION INTRAMUSCULAR; INTRAVENOUS at 07:31

## 2020-01-01 RX ADMIN — METOCLOPRAMIDE HYDROCHLORIDE 5 MG: 5 TABLET ORAL at 09:02

## 2020-01-01 RX ADMIN — PIPERACILLIN SODIUM AND TAZOBACTAM SODIUM 3.38 G: 3; .375 INJECTION, POWDER, LYOPHILIZED, FOR SOLUTION INTRAVENOUS at 02:17

## 2020-01-01 RX ADMIN — ROPINIROLE 2 MG: 1 TABLET, FILM COATED ORAL at 21:15

## 2020-01-01 RX ADMIN — SODIUM CHLORIDE, PRESERVATIVE FREE 10 ML: 5 INJECTION INTRAVENOUS at 21:15

## 2020-01-01 RX ADMIN — METHYLPREDNISOLONE SODIUM SUCCINATE 40 MG: 40 INJECTION, POWDER, FOR SOLUTION INTRAMUSCULAR; INTRAVENOUS at 03:35

## 2020-01-01 RX ADMIN — FUROSEMIDE 40 MG: 10 INJECTION, SOLUTION INTRAMUSCULAR; INTRAVENOUS at 02:09

## 2020-01-01 RX ADMIN — HYDRALAZINE HYDROCHLORIDE 75 MG: 25 TABLET, FILM COATED ORAL at 06:24

## 2020-01-01 RX ADMIN — METOCLOPRAMIDE HYDROCHLORIDE 5 MG: 5 TABLET ORAL at 06:36

## 2020-01-01 RX ADMIN — METOCLOPRAMIDE HYDROCHLORIDE 5 MG: 5 TABLET ORAL at 21:01

## 2020-01-01 RX ADMIN — VANCOMYCIN HYDROCHLORIDE 750 MG: 500 INJECTION, POWDER, LYOPHILIZED, FOR SOLUTION INTRAVENOUS at 19:57

## 2020-01-01 RX ADMIN — COLLAGENASE SANTYL 1 APPLICATION: 250 OINTMENT TOPICAL at 22:07

## 2020-01-01 RX ADMIN — ROPINIROLE 2 MG: 1 TABLET, FILM COATED ORAL at 21:05

## 2020-01-01 RX ADMIN — BUSPIRONE HYDROCHLORIDE 10 MG: 10 TABLET ORAL at 15:35

## 2020-01-01 RX ADMIN — PROPOFOL 55 MCG/KG/MIN: 10 INJECTION, EMULSION INTRAVENOUS at 13:07

## 2020-01-01 RX ADMIN — FLUTICASONE PROPIONATE 2 SPRAY: 50 SPRAY, METERED NASAL at 09:08

## 2020-01-01 RX ADMIN — PROPOFOL 50 MCG/KG/MIN: 10 INJECTION, EMULSION INTRAVENOUS at 06:43

## 2020-01-01 RX ADMIN — CETIRIZINE HYDROCHLORIDE 10 MG: 10 TABLET, FILM COATED ORAL at 08:55

## 2020-01-01 RX ADMIN — RISPERIDONE 1 MG: 1 TABLET ORAL at 20:24

## 2020-01-01 RX ADMIN — SODIUM BICARBONATE: 84 INJECTION, SOLUTION INTRAVENOUS at 02:27

## 2020-01-01 RX ADMIN — METHYLPREDNISOLONE SODIUM SUCCINATE 40 MG: 40 INJECTION, POWDER, FOR SOLUTION INTRAMUSCULAR; INTRAVENOUS at 15:52

## 2020-01-01 RX ADMIN — SODIUM CHLORIDE 12.5 MG/HR: 9 INJECTION, SOLUTION INTRAVENOUS at 16:16

## 2020-01-01 RX ADMIN — Medication 220 MG: at 17:21

## 2020-01-01 RX ADMIN — NICOTINE 1 PATCH: 21 PATCH, EXTENDED RELEASE TRANSDERMAL at 09:04

## 2020-01-01 RX ADMIN — ACETAMINOPHEN 650 MG: 325 TABLET, FILM COATED ORAL at 21:04

## 2020-01-01 RX ADMIN — RISPERIDONE 1 MG: 1 TABLET ORAL at 21:13

## 2020-01-01 RX ADMIN — INSULIN LISPRO 2 UNITS: 100 INJECTION, SOLUTION INTRAVENOUS; SUBCUTANEOUS at 17:17

## 2020-01-01 RX ADMIN — RIVAROXABAN 10 MG: 10 TABLET, FILM COATED ORAL at 17:49

## 2020-01-01 RX ADMIN — TAZOBACTAM SODIUM AND PIPERACILLIN SODIUM 3.38 G: 375; 3 INJECTION, SOLUTION INTRAVENOUS at 12:26

## 2020-01-01 RX ADMIN — BUDESONIDE 0.5 MG: 0.25 INHALANT RESPIRATORY (INHALATION) at 19:27

## 2020-01-01 RX ADMIN — HYDRALAZINE HYDROCHLORIDE 75 MG: 25 TABLET, FILM COATED ORAL at 06:35

## 2020-08-06 PROBLEM — F20.0 PARANOID SCHIZOPHRENIA (HCC): Status: ACTIVE | Noted: 2020-01-01

## 2020-08-06 PROBLEM — E87.5 HYPERKALEMIA: Status: ACTIVE | Noted: 2020-01-01

## 2020-08-06 PROBLEM — J44.9 COPD (CHRONIC OBSTRUCTIVE PULMONARY DISEASE) (HCC): Status: ACTIVE | Noted: 2020-01-01

## 2020-08-06 PROBLEM — N17.9 ACUTE KIDNEY INJURY (HCC): Status: ACTIVE | Noted: 2020-01-01

## 2020-08-06 PROBLEM — F17.200 TOBACCO DEPENDENCE: Status: ACTIVE | Noted: 2020-01-01

## 2020-08-06 PROBLEM — E78.5 HYPERLIPIDEMIA: Status: ACTIVE | Noted: 2020-01-01

## 2020-08-06 PROBLEM — I10 ESSENTIAL HYPERTENSION: Status: ACTIVE | Noted: 2020-01-01

## 2020-08-06 PROBLEM — R11.2 NAUSEA AND VOMITING: Status: ACTIVE | Noted: 2020-01-01

## 2020-08-06 PROBLEM — R09.02 HYPOXIA: Status: ACTIVE | Noted: 2020-01-01

## 2020-08-06 PROBLEM — D64.9 ANEMIA: Status: ACTIVE | Noted: 2020-01-01

## 2020-08-06 PROBLEM — E87.1 HYPONATREMIA: Status: ACTIVE | Noted: 2020-01-01

## 2020-08-06 NOTE — PROGRESS NOTES
Discharge Planning Assessment  [facility]     Patient Name: Mohan Villatoro  MRN: [id number]  Today's Date: 8/6/2020    Admit Date: 8/6/2020    Discharge Needs Assessment     Row Name 08/06/20 0921       Living Environment    Lives With  facility resident    Name(s) of Who Lives With Patient  [facility]    Current Living Arrangements  other (see comments) personal care home    Primary Care Provided by  other (see comments)    Provides Primary Care For  no one    Family Caregiver if Needed  none    Quality of Family Relationships  unable to assess    Able to Return to Prior Arrangements  yes       Resource/Environmental Concerns    Resource/Environmental Concerns  none       Transition Planning    Patient/Family Anticipates Transition to  other (see comments) personal care home    Patient/Family Anticipated Services at Transition      Transportation Anticipated  other (see comments) Personal care home can sometimes provide transportation depending on staff.  Will have to call and see day of dc.       Discharge Needs Assessment    Readmission Within the Last 30 Days  no previous admission in last 30 days    Concerns to be Addressed  care coordination/care conferences;discharge planning    Equipment Currently Used at Home  bipap/cpap    Discharge Facility/Level of Care Needs  other (see comments)    Current Discharge Risk  chronically ill    Discharge Coordination/Progress  Patient resides at [facility], which is a personal care home.  NIRMALA spoke with Winnie at facility who advised patient is his own decision maker.  Winnie also advised if patient were to have skilled needs at discharge they typically use [facility].  Winnie stated the facility can possibly provide transportation at discharge if they have enough staff.  We would just have to call day of discharge and see.  Nursing to call report to [phone] on day of discharge.  Plan at this time is for patient to return to personal  care home.        Discharge Plan    No documentation.       Destination      Coordination has not been started for this encounter.      Durable Medical Equipment      Coordination has not been started for this encounter.      Dialysis/Infusion      Coordination has not been started for this encounter.      Home Medical Care      Coordination has not been started for this encounter.      Therapy      Coordination has not been started for this encounter.      Community Resources      Coordination has not been started for this encounter.          Demographic Summary    No documentation.       Functional Status    No documentation.       Psychosocial    No documentation.       Abuse/Neglect    No documentation.       Legal    No documentation.       Substance Abuse    No documentation.       Patient Forms    No documentation.           DAFNE Rizo

## 2020-08-06 NOTE — H&P
"    [facility] Medicine Services  HISTORY AND PHYSICAL    Date of Admission: 8/6/2020  Primary Care Physician: Provider, No Known    Subjective     Chief Complaint: \"Trouble going to the bathroom\"    History of Present Illness  Patient is a 60-year-old  male who reportedly has a history of congestive heart failure (type unknown), insulin-dependent diabetes, and paranoid schizophrenia the presented to our hospital as a transfer from [facility] secondary to hyponatremia and concern for congestive heart failure.  At the outside hospital patient's lab studies revealed a sodium level of 114.  Patient is a nursing home resident (states he has been there for 12 years) and when I inquired as to why he has stayed at the nursing home he reports \"it's a long story.\"  His main symptom was having trouble going to the bathroom.  He reports that he was not voiding like he normally would void.  He also reported having some issues with constipation, however he did receive Kayexalate at the outside facility, and he since has had a bowel movement since arrival to our hospital.  He does report having some shortness of breath over the course of the past few days.  He denies any fevers or chills.  He does report a cough productive of sputum, but states that he has not paid attention to the color of the sputum he is bringing up.  It sounds like he may also have been experiencing some recent orthopnea.  No worsening lower extremity edema.  He states that he has a history of sleep apnea, but does not use a CPAP device on an outpatient basis.  He reports having trouble with recent nausea but no vomiting.  States that his appetite has been poor and that his oral intake is also been poor.  He denies being started on any new medications recently.  Denies any chest pain or chest pressure.  He states that he is not on supplemental oxygen at the nursing home facility.  Patient was administered Kayexalate " at the outside facility prior to transfer.    Addendum: He had a few sips of water, and subsequently had an episode of vomiting thereafter.    Review of Systems     Otherwise complete ROS reviewed and negative except as mentioned in the HPI.    Past Medical History: Insulin-dependent diabetes, paranoid schizophrenia, reported history of congestive heart failure, chronic back and neck pain, hypertension, gastroesophageal reflux disease, hyperlipidemia, tobacco dependence    Social History:  Patient states that he smokes about a pack of cigarettes per day.  He does not drink any alcohol.  Denies any illicit drug use.    Family History: He reports end-stage renal disease (1 of his parents was on dialysis) and heart disease run in the family    Allergies:  Allergies   Allergen Reactions   • Niacin Unknown - High Severity     Medications:  Prior to Admission medications    Not on File   1.  Risperidone 0.5 mg by mouth twice daily  2.  Ropinirole 2 mg by mouth at bedtime  3.  Simvastatin 20 mg by mouth at bedtime  4.  Flomax 0.4 mg by mouth every evening  5.  Multivitamin by mouth daily  6.  Trazodone 100 mg by mouth at bedtime  7.  Vitamin D3 5000 units 1 capsule daily  8.  Lactulose every 6 hours as needed for constipation  9.  Docusate and senna as needed for constipation  10.  Milk of magnesia as needed for constipation  11.  Ibuprofen 400 mg by mouth daily as needed for pain  12.  Meclizine 12.5 mg by mouth every 4 hours as needed for dizziness  13.  Albuterol inhaler 2 puffs 3 times daily as needed for COPD  14.  Aspirin 325 mg by mouth daily  15.  Vitamin B12 thousand micrograms by mouth daily  16.  BuSpar 5 mg by mouth 3 times a day  17.  Celebrex 200 mg by mouth daily  18.  Citalopram 20 mg by mouth daily  19.  Bentyl 20 mg by mouth 3 times a day for irritable bowel  20.  Folic acid 1 mg by mouth daily  21 fluticasone nasal spray daily  22.  Haldol 5 mg tablet patient takes 1.5 tablets orally twice daily  23.   "Humalog sliding scale insulin 3 times daily  24.  Norco 10/325 mg tablets p.o. 3 times daily  25.  Lisinopril 10 mg orally daily  26.   Lantus 10 units subcu nightly  27.  Metformin 1 g p.o. twice daily  Objective     Vital Signs: /70   Pulse 84   Temp 98.8 °F (37.1 °C) (Oral)   Resp 16   Ht 180.3 cm (71\")   Wt 97.3 kg (214 lb 6.4 oz)   SpO2 95%   BMI 29.90 kg/m²   Physical Exam   Constitutional: No distress.   Resting in bed; alert but also falls asleep easily   HENT:   Head: Normocephalic.   Mouth/Throat: No oropharyngeal exudate (MM dry).   Eyes: Pupils are equal, round, and reactive to light. No scleral icterus.   Neck: No tracheal deviation present.   Cardiovascular: Normal rate.   Pulmonary/Chest: Effort normal. No respiratory distress. He has no wheezes.   On 4LNC; dry cough during exam; faint rhonchi on the right   Abdominal: He exhibits no distension (slightly protuberant). There is no tenderness.   Musculoskeletal: He exhibits no edema.   Neurological: He is alert.   Skin: Skin is warm. Capillary refill takes less than 2 seconds. He is not diaphoretic.   Cigarette stain noted to fingertips   Psychiatric: He has a normal mood and affect.   Vitals reviewed.     Results Reviewed:  Lab Results (last 24 hours)     Procedure Component Value Units Date/Time    Procalcitonin [[id number]] Collected:  08/06/20 0458    Specimen:  Blood Updated:  08/06/20 0722    COVID-19, [facility] IN-HOUSE,NP Swab (NO TRANSPORT MEDIA) 2 HR TAT - Swab, Nasopharynx [[id number]]  (Normal) Collected:  08/06/20 0457    Specimen:  Swab from Nasopharynx Updated:  08/06/20 0555     COVID19 Not Detected    Narrative:       Fact sheet for providers: https://www.fda.gov/media/023263/download     Fact sheet for patients: https://www.fda.gov/media/476988/download    Basic Metabolic Panel [[id number]]  (Abnormal) Collected:  08/06/20 0458    Specimen:  Blood Updated:  08/06/20 0545     Glucose 114 mg/dL      BUN 40 mg/dL      Creatinine " 2.42 mg/dL      Sodium 115 mmol/L      Potassium 5.5 mmol/L      Chloride 82 mmol/L      CO2 17.0 mmol/L      Calcium 8.4 mg/dL      eGFR  African Amer 34 mL/min/1.73      eGFR Non African Amer 28 mL/min/1.73      BUN/Creatinine Ratio 16.5     Anion Gap 16.0 mmol/L     Narrative:       GFR Normal >60  Chronic Kidney Disease <60  Kidney Failure <15      BNP [[id number]]  (Abnormal) Collected:  20 0458    Specimen:  Blood Updated:  20     proBNP 6,805.0 pg/mL     Narrative:       Among patients with dyspnea, NT-proBNP is highly sensitive for the detection of acute congestive heart failure. In addition NT-proBNP of <300 pg/ml effectively rules out acute congestive heart failure with 99% negative predictive value.    Results may be falsely decreased if patient taking Biotin.          Imaging Results (Last 24 Hours)     ** No results found for the last 24 hours. **        I have personally reviewed and interpreted the radiology studies and ECG obtained at time of admission.     Labs from the outside facility reviewed: Hemoglobin 7.6, hematocrit 21.2, MCV 90.2, platelets 316, sodium 114, potassium 6.1, chloride 84, bicarbonate level 18.7, calcium 8.2, BUN 40, creatinine 2.6, glucose 118, ALT 36, albumin 2.9, alkaline phosphatase 55, AST 57, total bilirubin 0.2, troponin 0 0.16 (this was normal range at outside facility), AB.39/36/76    Chest x-ray at outside hospital revealed a stable appearance of the chest.  Hazy opacity in the right lung base likely airspace disease and possible effusion this is not significantly changed.  The left lung is clear.    CT scan of the head revealed no acute abnormalities    Assessment / Plan     Assessment:   Active Hospital Problems    Diagnosis   • Hyponatremia   • Hyperkalemia   • Nausea and vomiting   • Acute kidney injury (CMS/HCC)   • Hypoxia   • COPD (chronic obstructive pulmonary disease) (CMS/HCC)   • Tobacco dependence   • Anemia   • Paranoid schizophrenia  (CMS/[facility])   • Essential hypertension   • Hyperlipidemia     Plan:   1.  Start gentle IVFs with NS  2.  Check urine sodium, urine osmolality and serum osmolality  3.  BMP q6hrs for next 1 day  4.  Neuro checks  5.  CXR and KUB  6.  Nephrology consultation  7.  Renal US  8.  Echocardiogram  9.  Telemetry  10.  Supp 02  11.  Scheduled nebs  12.  Nicotine patch  13.  IV Zofran PRN  14.  Will try to limit sedating medication as much as possible  15.  Hold ACE I  16.  SSI coverage; hold long-acting insulin for now especially with his N/V; monitor BS trend  17.  Clear liquids for now (low potassium)  18.  IV Rocephin and Doxy for now  19.  FOBT  20.  SCDs; hold Lovenox  21.  Respiratory culture, respiratory PCR panel, Strep pneumo and urine Legionella Ag, check procalcitonin  22.  Pharmacy consult: review medication list to assess if any could contribute to hyponatremia  23.  Workup ongoing        Electronically signed by Magdi Banegas MD, 08/06/20, 07:33.

## 2020-08-06 NOTE — PROGRESS NOTES
"Reviewed list of outpatient medications (prior to admission medication list in H&P) that could contribute to hyponatremia.    Mohan Villatoro is a 60 y.o.male  [Ht: 180.3 cm (71\"); Wt: 97.3 kg (214 lb 6.4 oz)] admitted 8/6/2020  5:00 AM.    List of outpatient medications associated with an increased risk for hyponatremia include:    Celecoxib reported during post marketing surveillance.     Haloperidol Antipsychotic therapy (unspecified route): Hospitalization for hyponatremia, 1.1% with haloperidol vs 0.4% with matched controls (retrospective review of patients receiving any antipsychotic (n=[phone]) compared with matched controls (n=57,383)     Lisinopril reported with postmarketing use    Tramadol A 5-fold increased risk of hospitalization due to hyponatremia with traMADol has been reported in patients less than 65 years of age.         Jovanny North, PharmD  08/06/2007:55       "

## 2020-08-06 NOTE — PAYOR COMM NOTE
"Admit inpt 8-6-20  MEDICARE PRIMARY  UR  [id number]    Mohan Huff (60 y.o. Male)     Date of Birth Social Security Number Address Home Phone MRN    1960  [address] [place] [address] [phone] [id number]    Latter-day Marital Status          Yazdanism Single       Admission Date Admission Type Admitting Provider Attending Provider Department, Room/Bed    8/6/20 Urgent Magdi Banegas MD Thompson, Benjamin H, MD [facility] CARDIAC CARE, C006/1    Discharge Date Discharge Disposition Discharge Destination                       Attending Provider:  Magdi Banegas MD    Allergies:  Niacin    Isolation:  None   Infection:  None   Code Status:  CPR    Ht:  180.3 cm (71\")   Wt:  97.3 kg (214 lb 6.4 oz)    Admission Cmt:  None   Principal Problem:  None                Active Insurance as of 8/6/2020     Primary Coverage     Payor Plan Insurance Group Employer/Plan Group    MEDICARE MEDICARE A & B      Payor Plan Address Payor Plan Phone Number Payor Plan Fax Number Effective Dates    [address] [phone]  9/1/1985 - None Entered    [facility] [id number]       Subscriber Name Subscriber Birth Date Member ID       MOHAN HUFF 1960 [id number]           Secondary Coverage     Payor Plan Insurance Group Employer/Plan Group    [facility] MEDICAID [id number]     Payor Plan Address Payor Plan Phone Number Payor Plan Fax Number Effective Dates    [address] [address] [phone]  8/6/2020 - None Entered    [facility] [id number]       Subscriber Name Subscriber Birth Date Member ID       MOHAN HUFF 1960 [id number]                 Emergency Contacts      (Rel.) Home Phone Work Phone Mobile Phone    YAIMA BARCENAS (Sister) [phone] -- --               History & Physical      Magdi Banegas MD at 08/06/20 [address] [place] [address] Medicine Services  HISTORY AND PHYSICAL    Date of Admission: " "8/6/2020  Primary Care Physician: Provider, No Known    Subjective     Chief Complaint: \"Trouble going to the bathroom\"    History of Present Illness  Patient is a 60-year-old  male who reportedly has a history of congestive heart failure (type unknown), insulin-dependent diabetes, and paranoid schizophrenia the presented to our hospital as a transfer from [facility] secondary to hyponatremia and concern for congestive heart failure.  At the outside hospital patient's lab studies revealed a sodium level of 114.  Patient is a nursing home resident (states he has been there for 12 years) and when I inquired as to why he has stayed at the nursing home he reports \"it's a long story.\"  His main symptom was having trouble going to the bathroom.  He reports that he was not voiding like he normally would void.  He also reported having some issues with constipation, however he did receive Kayexalate at the outside facility, and he since has had a bowel movement since arrival to our hospital.  He does report having some shortness of breath over the course of the past few days.  He denies any fevers or chills.  He does report a cough productive of sputum, but states that he has not paid attention to the color of the sputum he is bringing up.  It sounds like he may also have been experiencing some recent orthopnea.  No worsening lower extremity edema.  He states that he has a history of sleep apnea, but does not use a CPAP device on an outpatient basis.  He reports having trouble with recent nausea but no vomiting.  States that his appetite has been poor and that his oral intake is also been poor.  He denies being started on any new medications recently.  Denies any chest pain or chest pressure.  He states that he is not on supplemental oxygen at the nursing home facility.  Patient was administered Kayexalate at the outside facility prior to transfer.    Addendum: He had a few sips of water, and subsequently had an " episode of vomiting thereafter.    Review of Systems     Otherwise complete ROS reviewed and negative except as mentioned in the HPI.    Past Medical History: Insulin-dependent diabetes, paranoid schizophrenia, reported history of congestive heart failure, chronic back and neck pain, hypertension, gastroesophageal reflux disease, hyperlipidemia, tobacco dependence    Social History:  Patient states that he smokes about a pack of cigarettes per day.  He does not drink any alcohol.  Denies any illicit drug use.    Family History: He reports end-stage renal disease (1 of his parents was on dialysis) and heart disease run in the family    Allergies:  Allergies   Allergen Reactions   • Niacin Unknown - High Severity     Medications:  Prior to Admission medications    Not on File   1.  Risperidone 0.5 mg by mouth twice daily  2.  Ropinirole 2 mg by mouth at bedtime  3.  Simvastatin 20 mg by mouth at bedtime  4.  Flomax 0.4 mg by mouth every evening  5.  Multivitamin by mouth daily  6.  Trazodone 100 mg by mouth at bedtime  7.  Vitamin D3 5000 units 1 capsule daily  8.  Lactulose every 6 hours as needed for constipation  9.  Docusate and senna as needed for constipation  10.  Milk of magnesia as needed for constipation  11.  Ibuprofen 400 mg by mouth daily as needed for pain  12.  Meclizine 12.5 mg by mouth every 4 hours as needed for dizziness  13.  Albuterol inhaler 2 puffs 3 times daily as needed for COPD  14.  Aspirin 325 mg by mouth daily  15.  Vitamin B12 thousand micrograms by mouth daily  16.  BuSpar 5 mg by mouth 3 times a day  17.  Celebrex 200 mg by mouth daily  18.  Citalopram 20 mg by mouth daily  19.  Bentyl 20 mg by mouth 3 times a day for irritable bowel  20.  Folic acid 1 mg by mouth daily  21 fluticasone nasal spray daily  22.  Haldol 5 mg tablet patient takes 1.5 tablets orally twice daily  23.  Humalog sliding scale insulin 3 times daily  24.  Norco 10/325 mg tablets p.o. 3 times daily  25.  Lisinopril  "10 mg orally daily  26.   Lantus 10 units subcu nightly  27.  Metformin 1 g p.o. twice daily  Objective     Vital Signs: /70   Pulse 84   Temp 98.8 °F (37.1 °C) (Oral)   Resp 16   Ht 180.3 cm (71\")   Wt 97.3 kg (214 lb 6.4 oz)   SpO2 95%   BMI 29.90 kg/m²    Physical Exam   Constitutional: No distress.   Resting in bed; alert but also falls asleep easily   HENT:   Head: Normocephalic.   Mouth/Throat: No oropharyngeal exudate (MM dry).   Eyes: Pupils are equal, round, and reactive to light. No scleral icterus.   Neck: No tracheal deviation present.   Cardiovascular: Normal rate.   Pulmonary/Chest: Effort normal. No respiratory distress. He has no wheezes.   On 4LNC; dry cough during exam; faint rhonchi on the right   Abdominal: He exhibits no distension (slightly protuberant). There is no tenderness.   Musculoskeletal: He exhibits no edema.   Neurological: He is alert.   Skin: Skin is warm. Capillary refill takes less than 2 seconds. He is not diaphoretic.   Cigarette stain noted to fingertips   Psychiatric: He has a normal mood and affect.   Vitals reviewed.     Results Reviewed:  Lab Results (last 24 hours)     Procedure Component Value Units Date/Time    Procalcitonin [[id number]] Collected:  08/06/20 0458    Specimen:  Blood Updated:  08/06/20 0722    COVID-19, ABBOTT IN-HOUSE,NP Swab (NO TRANSPORT MEDIA) 2 HR TAT - Swab, Nasopharynx [[id number]]  (Normal) Collected:  08/06/20 0457    Specimen:  Swab from Nasopharynx Updated:  08/06/20 0555     COVID19 Not Detected    Narrative:       Fact sheet for providers: https://www.fda.gov/media/097995/download     Fact sheet for patients: https://www.fda.gov/media/667535/download    Basic Metabolic Panel [[id number]]  (Abnormal) Collected:  08/06/20 0458    Specimen:  Blood Updated:  08/06/20 0545     Glucose 114 mg/dL      BUN 40 mg/dL      Creatinine 2.42 mg/dL      Sodium 115 mmol/L      Potassium 5.5 mmol/L      Chloride 82 mmol/L      CO2 17.0 mmol/L      " Calcium 8.4 mg/dL      eGFR  African Amer 34 mL/min/1.73      eGFR Non African Amer 28 mL/min/1.73      BUN/Creatinine Ratio 16.5     Anion Gap 16.0 mmol/L     Narrative:       GFR Normal >60  Chronic Kidney Disease <60  Kidney Failure <15      BNP [[id number]]  (Abnormal) Collected:  20 0458    Specimen:  Blood Updated:  20     proBNP 6,805.0 pg/mL     Narrative:       Among patients with dyspnea, NT-proBNP is highly sensitive for the detection of acute congestive heart failure. In addition NT-proBNP of <300 pg/ml effectively rules out acute congestive heart failure with 99% negative predictive value.    Results may be falsely decreased if patient taking Biotin.          Imaging Results (Last 24 Hours)     ** No results found for the last 24 hours. **        I have personally reviewed and interpreted the radiology studies and ECG obtained at time of admission.     Labs from the outside facility reviewed: Hemoglobin 7.6, hematocrit 21.2, MCV 90.2, platelets 316, sodium 114, potassium 6.1, chloride 84, bicarbonate level 18.7, calcium 8.2, BUN 40, creatinine 2.6, glucose 118, ALT 36, albumin 2.9, alkaline phosphatase 55, AST 57, total bilirubin 0.2, troponin 0 0.16 (this was normal range at outside facility), AB.39/36/76    Chest x-ray at outside hospital revealed a stable appearance of the chest.  Hazy opacity in the right lung base likely airspace disease and possible effusion this is not significantly changed.  The left lung is clear.    CT scan of the head revealed no acute abnormalities    Assessment / Plan     Assessment:   Active Hospital Problems    Diagnosis   • Hyponatremia   • Hyperkalemia   • Nausea and vomiting   • Acute kidney injury (CMS/HCC)   • Hypoxia   • COPD (chronic obstructive pulmonary disease) (CMS/HCC)   • Tobacco dependence   • Anemia   • Paranoid schizophrenia (CMS/HCC)   • Essential hypertension   • Hyperlipidemia     Plan:   1.  Start gentle IVFs with NS  2.  Check  urine sodium, urine osmolality and serum osmolality  3.  BMP q6hrs for next 1 day  4.  Neuro checks  5.  CXR and KUB  6.  Nephrology consultation  7.  Renal US  8.  Echocardiogram  9.  Telemetry  10.  Supp 02  11.  Scheduled nebs  12.  Nicotine patch  13.  IV Zofran PRN  14.  Will try to limit sedating medication as much as possible  15.  Hold ACE I  16.  SSI coverage; hold long-acting insulin for now especially with his N/V; monitor BS trend  17.  Clear liquids for now (low potassium)  18.  IV Rocephin and Doxy for now  19.  FOBT  20.  SCDs; hold Lovenox  21.  Respiratory culture, respiratory PCR panel, Strep pneumo and urine Legionella Ag, check procalcitonin  22.  Pharmacy consult: review medication list to assess if any could contribute to hyponatremia  23.  Workup ongoing        Electronically signed by Magdi Banegas MD, 08/06/20, 07:33.              Electronically signed by Magdi Banegas MD at 08/06/20 0743       Emergency Department Notes    No notes of this type exist for this encounter.         Vital Signs (last 3 days)     Date/Time   Temp   Temp src   Pulse   Resp   BP   SpO2    08/06/20 0635   98.8 (37.1)   Oral   --   --   --   --    08/06/20 0628   98 (36.7)   --   84   16   131/70   95    08/06/20 0506   --   --   --   15   126/68   --    08/06/20 0458   98 (36.7)   Oral   83   --   --   97              Intake & Output (last 3 days)     None           Facility-Administered Medications as of 8/6/2020   Medication Dose Route Frequency Provider Last Rate Last Dose   • acetaminophen (TYLENOL) tablet 650 mg  650 mg Oral Q4H PRN Magdi Banegas MD       • aspirin EC tablet 81 mg  81 mg Oral Daily Magdi Banegas MD       • cefTRIAXone (ROCEPHIN) 1 g/100 mL 0.9% NS (MBP)  1 g Intravenous Q24H Magdi Banegas MD       • dextrose (D50W) 25 g/ 50mL Intravenous Solution 25 g  25 g Intravenous Q15 Min PRN Magdi Banegas MD       • dextrose (GLUTOSE) oral gel 15 g  15 g  Oral Q15 Min PRN Magdi Banegas MD       • doxycycline (VIBRAMYCIN) 100 mg/100 mL 0.9% NS MBP  100 mg Intravenous Q12H Magdi Baengas MD       • famotidine (PEPCID) tablet 20 mg  20 mg Oral Daily Magdi Banegas MD       • glucagon (human recombinant) (GLUCAGEN DIAGNOSTIC) injection 1 mg  1 mg Subcutaneous Q15 Min PRN Magdi Banegas MD       • HYDROcodone-acetaminophen (NORCO) 5-325 MG per tablet 1 tablet  1 tablet Oral Q6H PRN Magdi Banegas MD       • insulin lispro (humaLOG) injection 0-9 Units  0-9 Units Subcutaneous TID AC Magdi Banegas MD       • ipratropium-albuterol (DUO-NEB) nebulizer solution 3 mL  3 mL Nebulization 4x Daily - RT Magdi Banegas MD       • nicotine (NICODERM CQ) 14 MG/24HR patch 1 patch  1 patch Transdermal Q24H Magdi Banegas MD       • ondansetron (ZOFRAN) injection 4 mg  4 mg Intravenous Q6H PRN Magdi aBnegas MD   4 mg at 08/06/20 0731   • risperiDONE (risperDAL) tablet 0.5 mg  0.5 mg Oral Q12H Magdi Banegas MD       • sodium chloride 0.9 % flush 10 mL  10 mL Intravenous Q12H Magdi Banegas MD       • sodium chloride 0.9 % flush 10 mL  10 mL Intravenous PRN Magdi Banegas MD       • sodium chloride 0.9 % infusion  75 mL/hr Intravenous Continuous Magdi Banegas MD 75 mL/hr at 08/06/20 0718 75 mL/hr at 08/06/20 0718   • tamsulosin (FLOMAX) 24 hr capsule 0.4 mg  0.4 mg Oral Daily Magdi Banegas MD           Imaging Results (Last 72 Hours)     ** No results found for the last 72 hours. **        Orders (last 72 hrs)      Start     Ordered    08/07/20 0600  Comprehensive Metabolic Panel  Morning Draw      08/06/20 0716    08/07/20 0600  Phosphorus  Morning Draw      08/06/20 0847    08/06/20 1200  Basic Metabolic Panel  Every 6 Hours,   Status:  Canceled      08/06/20 0716    08/06/20 1200  Basic Metabolic Panel  Every 4 Hours      08/06/20 0817    08/06/20 1100  POC Glucose 4x Daily AC & at  Bedtime  4 Times Daily Before Meals & at Bedtime      08/06/20 0711    08/06/20 0900  sodium chloride 0.9 % flush 10 mL  Every 12 Hours Scheduled      08/06/20 0710    08/06/20 0900  nicotine (NICODERM CQ) 14 MG/24HR patch 1 patch  Every 24 Hours Scheduled      08/06/20 0718    08/06/20 0900  famotidine (PEPCID) tablet 20 mg  Daily      08/06/20 0722    08/06/20 0900  aspirin EC tablet 81 mg  Daily      08/06/20 0726    08/06/20 0900  risperiDONE (risperDAL) tablet 0.5 mg  Every 12 Hours Scheduled      08/06/20 0737    08/06/20 0900  tamsulosin (FLOMAX) 24 hr capsule 0.4 mg  Daily      08/06/20 0737    08/06/20 0847  Protein, Urine, Random - Urine, Clean Catch  Once      08/06/20 0847    08/06/20 0847  Magnesium  Once      08/06/20 0847    08/06/20 0847  Uric Acid  Once      08/06/20 0847    08/06/20 0832  Need to obtain prior labs to help determine baseline renal function  Nursing Communication  Once     Comments:  Need to obtain prior labs to help determine baseline renal function    08/06/20 0845    08/06/20 0830  ipratropium-albuterol (DUO-NEB) nebulizer solution 3 mL  4 Times Daily - RT      08/06/20 0722    08/06/20 0817  Basic Metabolic Panel  Once      08/06/20 0817    08/06/20 0815  doxycycline (VIBRAMYCIN) 100 mg/100 mL 0.9% NS MBP  Every 12 Hours      08/06/20 0716    08/06/20 0815  cefTRIAXone (ROCEPHIN) 1 g/100 mL 0.9% NS (MBP)  Every 24 Hours      08/06/20 0716    08/06/20 0815  famotidine (PEPCID) tablet 20 mg  2 Times Daily Before Meals,   Status:  Discontinued      08/06/20 0721    08/06/20 0800  Vital Signs  Every 4 Hours      08/06/20 0710    08/06/20 0800  sodium chloride 0.9 % infusion  Continuous      08/06/20 0710    08/06/20 0800  insulin lispro (humaLOG) injection 0-9 Units  3 Times Daily Before Meals      08/06/20 0711    08/06/20 0800  Neuro Checks  Every 4 Hours      08/06/20 0716    08/06/20 0745  Lactic Acid, Plasma  Once      08/06/20 0744    08/06/20 0741  Diet Clear Liquid;  Consistent Carbohydrate, Low Potassium  Diet Effective Now      08/06/20 0740    08/06/20 0739  Osmolality, Urine - Urine, Clean Catch  Once      08/06/20 0738    08/06/20 0737  HYDROcodone-acetaminophen (NORCO) 5-325 MG per tablet 1 tablet  Every 6 Hours PRN      08/06/20 0737    08/06/20 0735  Occult Blood X 1, Stool - Stool,  Once      08/06/20 0734    08/06/20 0730  XR Abdomen KUB  1 Time Imaging      08/06/20 0729    08/06/20 0723  Pharmacy Consult  Continuous PRN,   Status:  Discontinued      08/06/20 0723    08/06/20 0716  Procalcitonin  STAT      08/06/20 0716    08/06/20 0714  XR Chest 1 View  1 Time Imaging      08/06/20 0716    08/06/20 0713  Sodium, Urine, Random - Urine, Clean Catch  Once      08/06/20 0716    08/06/20 0713  Creatinine, Urine, Random - Urine, Clean Catch  Once      08/06/20 0716    08/06/20 0713  US Renal Limited  1 Time Imaging      08/06/20 0716    08/06/20 0713  ECG 12 Lead  Once      08/06/20 0716    08/06/20 0713  Adult Transthoracic Echo Complete W/ Cont if Necessary Per Protocol  Once      08/06/20 0716    08/06/20 0713  Respiratory Culture - Sputum, Cough  Once      08/06/20 0716    08/06/20 0713  Respiratory Panel, PCR - Swab, Nasopharynx  Once      08/06/20 0716    08/06/20 0713  S. Pneumo Ag Urine or CSF - Urine, Urine, Clean Catch  Once      08/06/20 0716    08/06/20 0713  Legionella Antigen, Urine - Urine, Urine, Clean Catch  Once      08/06/20 0716    08/06/20 0712  Do not hold basal insulin when patient is NPO. Hold bolus dose if NPO  Continuous      08/06/20 0711    08/06/20 0712  Follow [facility] Hypoglycemia Standing Orders For Blood Glucose Less Than 70 mg/dL  Until Discontinued     Comments:  ALERT PATIENT - NOT NPO & CAN SAFELY SWALLOW  Administer 4 oz Fruit Juice OR 4 oz Regular Soda OR 8 oz Milk OR 15-30 grams (1 tube) of Glucose Gel.  Recheck Blood Glucose Approximately 15 Minutes After Ingestion, Repeat Treatment & Continue to Recheck Blood Sugar Approximately Every  15 Minutes Until Blood Glucose is 70 or Higher.  Once Blood Glucose is 70 or Higher & if It Will Be More Than 60 Minutes Until Next Meal, Provide Appropriate Snack (Including Carbohydrate Food) Based on Meal Plan Order. Give Meal Tray As Soon As Possible.    PATIENT HAS IV ACCESS - UNRESPONSIVE, NPO OR UNABLE TO SAFELY SWALLOW  Administer 25g (50ml) D50W IV Push.  Recheck Blood Glucose Approximately 15 Minutes After Administration, if Blood Glucose Remains Less Than 70, Repeat Treatment   Recheck Blood Glucose Approximately 15 Minutes After 2nd Administration, if Blood Glucose Remains Less Than 70 After 2nd Dose of D50W, Contact Provider for Further Treatment Orders & Consider Adding IVF With D5W for Maintenance    PATIENT WITHOUT IV ACCESS - UNRESPONSIVE, NPO OR UNABLE TO SAFELY SWALLOW  Administer 1mg Glucagon SQ & Establish IV Access.  Turn Patient on Side - Nausea / Vomiting May Occur.  Recheck Blood Glucose Approximately 15 Minutes After Administration.  If Blood Glucose Remains Less Than 70, Administer 25g D50W IV Push (50ml).  Recheck Blood Glucose Approximately 15 Minutes After Administration of D50W, if Blood Glucose Remains Less Than 70, Contact Provider for Further Treatment Orders & Consider Adding IVF With D5 for Maintenance    Document Event & Patient Response to Interventions in EMR, Document Medications on MAR  Notify Provider if Hypoglycemia Treatment Needed    08/06/20 0711    08/06/20 0712  CBC & Differential  STAT      08/06/20 0716    08/06/20 0712  Osmolality, Serum  STAT      08/06/20 0716    08/06/20 0712  Urinalysis With Microscopic If Indicated (No Culture) - Urine, Clean Catch  Once      08/06/20 0716    08/06/20 0712  CBC Auto Differential  PROCEDURE ONCE      08/06/20 0716    08/06/20 0711  dextrose (GLUTOSE) oral gel 15 g  Every 15 Minutes PRN      08/06/20 0711    08/06/20 0711  dextrose (D50W) 25 g/ 50mL Intravenous Solution 25 g  Every 15 Minutes PRN      08/06/20 0711    08/06/20  0711  glucagon (human recombinant) (GLUCAGEN DIAGNOSTIC) injection 1 mg  Every 15 Minutes PRN      08/06/20 0711    08/06/20 0711  Diet Regular; Consistent Carbohydrate  Diet Effective Now,   Status:  Canceled      08/06/20 0710    08/06/20 0711  Inpatient Nephrology Consult  Once     Specialty:  Nephrology  Provider:  Lev Shepard MD    08/06/20 0710    08/06/20 0710  ondansetron (ZOFRAN) injection 4 mg  Every 6 Hours PRN      08/06/20 0710    08/06/20 0710  acetaminophen (TYLENOL) tablet 650 mg  Every 4 Hours PRN      08/06/20 0710    08/06/20 0710  Code Status and Medical Interventions:  Continuous      08/06/20 0710    08/06/20 0710  Intake & Output  Every Shift      08/06/20 0710    08/06/20 0710  Weigh Patient  Once      08/06/20 0710    08/06/20 0710  Oxygen Therapy- Nasal Cannula; Titrate for SPO2: 90% - 95%  Continuous      08/06/20 0710    08/06/20 0710  Insert Peripheral IV  Once      08/06/20 0710    08/06/20 0710  Saline Lock & Maintain IV Access  Continuous      08/06/20 0710    08/06/20 0710  Place Sequential Compression Device  Once      08/06/20 0710    08/06/20 0710  Maintain Sequential Compression Device  Continuous      08/06/20 0710    08/06/20 0710  Cardiac Monitoring  Continuous      08/06/20 0710    08/06/20 0710  Pulse Oximetry, Continuous  Continuous      08/06/20 0710    08/06/20 0709  sodium chloride 0.9 % flush 10 mL  As Needed      08/06/20 0710    08/06/20 0609  Inpatient Admission  Once      08/06/20 0608    08/06/20 0503  Basic Metabolic Panel  STAT      08/06/20 0502    08/06/20 0456  BNP  STAT      08/06/20 0455    08/06/20 0456  COVID-19, ABBOTT IN-HOUSE,NP Swab (NO TRANSPORT MEDIA) 2 HR TAT - Swab, Nasopharynx  Once      08/06/20 0455                Physician Progress Notes (last 72 hours) (Notes from 08/03/20 0854 through 08/06/20 0854)    No notes of this type exist for this encounter.            Consult Notes (last 7 days) (Notes from 07/30/20 through 08/06/20)       Mian Koch, APRN at 08/06/20 0824      Consult Orders    1. Inpatient Nephrology Consult [[id number]] ordered by Magdi Banegas MD at 08/06/20 0710                Nephrology ([facility]) Consult Note      Patient:  Mohan Villatoro  YOB: 1960  Date of Service: 8/6/2020  MRN: [id number]   Acct: [id number]   Primary Care Physician: Provider, No Known  Advance Directive:   Code Status and Medical Interventions:   Ordered at: 08/06/20 0711     Code Status:    CPR     Medical Interventions (Level of Support Prior to Arrest):    Full     Admit Date: 8/6/2020       Hospital Day: 0  Referring Provider: Fabricio Foy,*      Patient personally seen and examined.  Complete chart including Consults, Notes, Operative Reports, Labs, Cardiology, and Radiology studies reviewed as able.        Subjective:  Mohan Villatoro is a 60 y.o. male  whom we were consulted for hyponatremia, hyperkalemia, elevated creatinine. Unknown baseline renal function.  History of type 2 diabetes, paranoid schizophrenia, congestive heart failure. Patient transferred from [facility] for sodium level 114. He is a nursing home resident.  Reportedly has had nausea and poor oral intake recently. He also has noticed decreased urine output last couple of days. Daily NSAID use. Patient was given Kayexalate before he left [facility] for hyperkalemia.  Currently is awake and alert. Denies pain or dyspnea at rest. Asking for water. No urine output since arrival to CCU just a few hours ago.    Allergies:  Niacin    Home Meds:  No medications prior to admission.       Medicines:  Current Facility-Administered Medications   Medication Dose Route Frequency Provider Last Rate Last Dose   • acetaminophen (TYLENOL) tablet 650 mg  650 mg Oral Q4H PRN Magdi Banegas MD       • aspirin EC tablet 81 mg  81 mg Oral Daily Magdi Banegas MD       • cefTRIAXone (ROCEPHIN) 1 g/100 mL 0.9% NS  (MBP)  1 g Intravenous Q24H Magdi Banegas MD       • dextrose (D50W) 25 g/ 50mL Intravenous Solution 25 g  25 g Intravenous Q15 Min PRN Magdi Banegas MD       • dextrose (GLUTOSE) oral gel 15 g  15 g Oral Q15 Min PRN Magdi Banegas MD       • doxycycline (VIBRAMYCIN) 100 mg/100 mL 0.9% NS MBP  100 mg Intravenous Q12H Magdi Banegas MD       • famotidine (PEPCID) tablet 20 mg  20 mg Oral Daily Magdi Banegas MD       • glucagon (human recombinant) (GLUCAGEN DIAGNOSTIC) injection 1 mg  1 mg Subcutaneous Q15 Min PRN Magdi Banegas MD       • HYDROcodone-acetaminophen (NORCO) 5-325 MG per tablet 1 tablet  1 tablet Oral Q6H PRN Magdi Banegas MD       • insulin lispro (humaLOG) injection 0-9 Units  0-9 Units Subcutaneous TID AC Magdi Banegas MD       • ipratropium-albuterol (DUO-NEB) nebulizer solution 3 mL  3 mL Nebulization 4x Daily - RT Magdi Banegas MD       • nicotine (NICODERM CQ) 14 MG/24HR patch 1 patch  1 patch Transdermal Q24H Magdi Banegas MD       • ondansetron (ZOFRAN) injection 4 mg  4 mg Intravenous Q6H PRN Magdi Banegas MD   4 mg at 08/06/20 0731   • Pharmacy Consult   Does not apply Continuous PRN Magdi Banegas MD       • risperiDONE (risperDAL) tablet 0.5 mg  0.5 mg Oral Q12H Magdi Banegas MD       • sodium chloride 0.9 % flush 10 mL  10 mL Intravenous Q12H Magdi Banegas MD       • sodium chloride 0.9 % flush 10 mL  10 mL Intravenous PRN Magdi Banegas MD       • sodium chloride 0.9 % infusion  75 mL/hr Intravenous Continuous Magdi Banegas MD 75 mL/hr at 08/06/20 0718 75 mL/hr at 08/06/20 0718   • tamsulosin (FLOMAX) 24 hr capsule 0.4 mg  0.4 mg Oral Daily Magdi Banegas MD           Past Medical History:  No past medical history on file.    Past Surgical History:  No past surgical history on file.    Family History  No family history on file.    Social History  Social History  "    Socioeconomic History   • Marital status: Single     Spouse name: Not on file   • Number of children: Not on file   • Years of education: Not on file   • Highest education level: Not on file         Review of Systems:  History obtained from chart review and the patient  General ROS: No fever or chills  Respiratory ROS: No cough, shortness of breath, wheezing  Cardiovascular ROS: No chest pain or palpitations  Gastrointestinal ROS: No abdominal pain or melena  Genito-Urinary ROS: No dysuria or hematuria  Neurological ROS: no headache or dizziness  14 point ROS reviewed with the patient and negative except as noted above and in the HPI unless unable to obtain.    Objective:  Patient Vitals for the past 24 hrs:   BP Temp Temp src Pulse Resp SpO2 Height Weight   08/06/20 0635 -- 98.8 °F (37.1 °C) Oral -- -- -- 180.3 cm (71\") 97.3 kg (214 lb 6.4 oz)   08/06/20 0628 131/70 98 °F (36.7 °C) -- 84 16 95 % -- --   08/06/20 0506 126/68 -- -- -- 15 -- -- --   08/06/20 0458 -- 98 °F (36.7 °C) Oral 83 -- 97 % 180.3 cm (71\") 88.5 kg (195 lb)     No intake or output data in the 24 hours ending 08/06/20 0824  General: awake/alert    Neck: supple, no JVD  Chest:  clear to auscultation bilaterally without respiratory distress  CVS: regular rate and rhythm  Abdominal: soft, nontender, positive bowel sounds  Extremities: no cyanosis or edema  Skin: warm and dry without rash   Neuro: no focal motor deficits      Labs:  Results from last 7 days   Lab Units 08/06/20  0745   WBC 10*3/mm3 13.59*   HEMOGLOBIN g/dL 7.8*   HEMATOCRIT % 21.4*   PLATELETS 10*3/mm3 313         Results from last 7 days   Lab Units 08/06/20  0458   SODIUM mmol/L 115*   POTASSIUM mmol/L 5.5*   CHLORIDE mmol/L 82*   CO2 mmol/L 17.0*   BUN mg/dL 40*   CREATININE mg/dL 2.42*   CALCIUM mg/dL 8.4*   GLUCOSE mg/dL 114*       Radiology:   Imaging Results (Last 72 Hours)     ** No results found for the last 72 hours. **          Culture:  No results found for: BLOODCX, " URINECX, WOUNDCX, MRSACX, RESPCX, STOOLCX      Assessment   1.  Hyponatremia  2.  Hyperkalemia  3.  ? Acute kidney injury  4.  COPD  5.  Type 2 diabetes  6.  Paranoid schizophrenia   7.  Anemia   8.  Metabolic acidosis    Plan:  1.  STAT BMP now. Then every four hours initially. Will need to follow closely to prevent overcorrection of sodium level. Maximum of 10 meq in any 24 hour period recommended.  2.  Hold chronic meds associated with hyponatremia (Haldol and Citalopram)  3.  Agree with gentle IV fluids. Patient appears volume depleted on exam.  4.  Need to obtain prior labs as his baseline renal function is currently unknown  5.  Further assessment and plan pending Dr Shepard's evaluation of patient        Thank you for the consult, we appreciate the opportunity to provide care to your patients.  Feel free to contact me if I can be of any further assistance.      MIKEY Le  8/6/2020  08:24    Electronically signed by Mian Koch APRN at 08/06/20 5880

## 2020-08-06 NOTE — CONSULTS
Nephrology ([facility]) Consult Note      Patient:  Mohan Villatoro  YOB: 1960  Date of Service: 8/6/2020  MRN: [id number]   Acct: [id number]   Primary Care Physician: Provider, No Known  Advance Directive:   Code Status and Medical Interventions:   Ordered at: 08/06/20 0711     Code Status:    CPR     Medical Interventions (Level of Support Prior to Arrest):    Full     Admit Date: 8/6/2020       Hospital Day: 0  Referring Provider: Fabricio Foy,*      Patient personally seen and examined.  Complete chart including Consults, Notes, Operative Reports, Labs, Cardiology, and Radiology studies reviewed as able.        Subjective:  Mohan Villatoro is a 60 y.o. male  whom we were consulted for hyponatremia, hyperkalemia, elevated creatinine. Unknown baseline renal function.  History of type 2 diabetes, paranoid schizophrenia, congestive heart failure. Patient transferred from [facility] for sodium level 114. He is a nursing home resident.  Reportedly has had nausea and poor oral intake recently. He also has noticed decreased urine output last couple of days. Daily NSAID use. Patient was given Kayexalate before he left [facility] for hyperkalemia.  Currently is awake and alert. Denies pain or dyspnea at rest. Asking for water. No urine output since arrival to CCU just a few hours ago.    Allergies:  Niacin    Home Meds:  No medications prior to admission.       Medicines:  Current Facility-Administered Medications   Medication Dose Route Frequency Provider Last Rate Last Dose   • acetaminophen (TYLENOL) tablet 650 mg  650 mg Oral Q4H PRN Magdi Banegas MD       • aspirin EC tablet 81 mg  81 mg Oral Daily Magdi Banegas MD       • cefTRIAXone (ROCEPHIN) 1 g/100 mL 0.9% NS (MBP)  1 g Intravenous Q24H Magdi Banegas MD       • dextrose (D50W) 25 g/ 50mL Intravenous Solution 25 g  25 g Intravenous Q15 Min PRN Magdi Banegas MD       •  dextrose (GLUTOSE) oral gel 15 g  15 g Oral Q15 Min PRN Magdi Banegas MD       • doxycycline (VIBRAMYCIN) 100 mg/100 mL 0.9% NS MBP  100 mg Intravenous Q12H Magdi Banegas MD       • famotidine (PEPCID) tablet 20 mg  20 mg Oral Daily Magdi Banegas MD       • glucagon (human recombinant) (GLUCAGEN DIAGNOSTIC) injection 1 mg  1 mg Subcutaneous Q15 Min PRN Magdi Banegas MD       • HYDROcodone-acetaminophen (NORCO) 5-325 MG per tablet 1 tablet  1 tablet Oral Q6H PRN Magdi Banegas MD       • insulin lispro (humaLOG) injection 0-9 Units  0-9 Units Subcutaneous TID AC Magdi Banegas MD       • ipratropium-albuterol (DUO-NEB) nebulizer solution 3 mL  3 mL Nebulization 4x Daily - RT Magdi Banegas MD       • nicotine (NICODERM CQ) 14 MG/24HR patch 1 patch  1 patch Transdermal Q24H Magdi Banegas MD       • ondansetron (ZOFRAN) injection 4 mg  4 mg Intravenous Q6H PRN Magdi Banegas MD   4 mg at 08/06/20 0731   • Pharmacy Consult   Does not apply Continuous PRN Magdi Banegas MD       • risperiDONE (risperDAL) tablet 0.5 mg  0.5 mg Oral Q12H Magdi Banegas MD       • sodium chloride 0.9 % flush 10 mL  10 mL Intravenous Q12H Magdi Banegas MD       • sodium chloride 0.9 % flush 10 mL  10 mL Intravenous PRN Magdi Banegas MD       • sodium chloride 0.9 % infusion  75 mL/hr Intravenous Continuous Magdi Banegas MD 75 mL/hr at 08/06/20 0718 75 mL/hr at 08/06/20 0718   • tamsulosin (FLOMAX) 24 hr capsule 0.4 mg  0.4 mg Oral Daily Magdi Banegas MD           Past Medical History:  No past medical history on file.    Past Surgical History:  No past surgical history on file.    Family History  No family history on file.    Social History  Social History     Socioeconomic History   • Marital status: Single     Spouse name: Not on file   • Number of children: Not on file   • Years of education: Not on file   • Highest education  "level: Not on file         Review of Systems:  History obtained from chart review and the patient  General ROS: No fever or chills  Respiratory ROS: No cough, shortness of breath, wheezing  Cardiovascular ROS: No chest pain or palpitations  Gastrointestinal ROS: No abdominal pain or melena  Genito-Urinary ROS: No dysuria or hematuria  Neurological ROS: no headache or dizziness  14 point ROS reviewed with the patient and negative except as noted above and in the HPI unless unable to obtain.    Objective:  Patient Vitals for the past 24 hrs:   BP Temp Temp src Pulse Resp SpO2 Height Weight   08/06/20 0635 -- 98.8 °F (37.1 °C) Oral -- -- -- 180.3 cm (71\") 97.3 kg (214 lb 6.4 oz)   08/06/20 0628 131/70 98 °F (36.7 °C) -- 84 16 95 % -- --   08/06/20 0506 126/68 -- -- -- 15 -- -- --   08/06/20 0458 -- 98 °F (36.7 °C) Oral 83 -- 97 % 180.3 cm (71\") 88.5 kg (195 lb)     No intake or output data in the 24 hours ending 08/06/20 0824  General: awake/alert    Neck: supple, no JVD  Chest:  clear to auscultation bilaterally without respiratory distress  CVS: regular rate and rhythm  Abdominal: soft, nontender, positive bowel sounds  Extremities: no cyanosis or edema  Skin: warm and dry without rash   Neuro: no focal motor deficits      Labs:  Results from last 7 days   Lab Units 08/06/20  0745   WBC 10*3/mm3 13.59*   HEMOGLOBIN g/dL 7.8*   HEMATOCRIT % 21.4*   PLATELETS 10*3/mm3 313         Results from last 7 days   Lab Units 08/06/20  0458   SODIUM mmol/L 115*   POTASSIUM mmol/L 5.5*   CHLORIDE mmol/L 82*   CO2 mmol/L 17.0*   BUN mg/dL 40*   CREATININE mg/dL 2.42*   CALCIUM mg/dL 8.4*   GLUCOSE mg/dL 114*       Radiology:   Imaging Results (Last 72 Hours)     ** No results found for the last 72 hours. **          Culture:  No results found for: BLOODCX, URINECX, WOUNDCX, MRSACX, RESPCX, STOOLCX      Assessment   1.  Hyponatremia  2.  Hyperkalemia  3.  ? Acute kidney injury  4.  COPD  5.  Type 2 diabetes  6.  Paranoid " schizophrenia   7.  Anemia   8.  Metabolic acidosis    Plan:  1.  STAT BMP now. Then every four hours initially. Will need to follow closely to prevent overcorrection of sodium level. Maximum of 10 meq in any 24 hour period recommended.  2.  Hold chronic meds associated with hyponatremia (Haldol and Citalopram)  3.  Agree with gentle IV fluids. Patient appears volume depleted on exam.  4.  Need to obtain prior labs as his baseline renal function is currently unknown  5.  Further assessment and plan pending Dr Shepard's evaluation of patient        Thank you for the consult, we appreciate the opportunity to provide care to your patients.  Feel free to contact me if I can be of any further assistance.      Mian Koch, MIKEY  8/6/2020  08:24

## 2020-08-07 NOTE — CONSULTS
Inpatient Cardiology Consult  Consult performed by: Davis Alford MD  Consult ordered by: Tripp Bruner MD  Reason for consult: Cardiac arrest      Chief Complaint: Unobtainable as patient is intubated.    HPI: This is a 60-year-old male with reported heart failure, insulin-dependent diabetes, paranoid schizophrenia who was transferred here secondary to hyponatremia and concern for heart failure.  Unfortunate, the patient is currently intubated and sedated and provides no history, therefore this history of the present illness is obtained from review of medical records.  Apparently, the patient had reported shortness of breath over a few days, denying fevers, chills but did report a cough productive of sputum.  No reported lower extremity edema.  Also, the patient had been experiencing some nausea.  It is reported that the patient had been given Kayexalate at the outside facility and that his sodium had been approximately 114 at the outside facility.  Upon arrival here, the patient sodium was 115 with a potassium of 5.5, shortly later repeated at 5.7 with a creatinine of roughly 2.4.  Overnight, the patient started to have respiratory difficulties and the nighttime nurse noted that she was talking with respiratory therapy about his respiratory status and that she is pretty certain that he was hypoxic at this time and then developed bradycardia and subsequently a PEA arrest.  He required about 5 minutes of compressions and 2 pushes of drugs at which point spontaneous circulation was returned.  The patient has remained hemodynamically stable since that time.  He was placed on the mechanical ventilator overnight and remains intubated and sedated at this time.    Family who is in the room denies any prior cardiac history, even heart failure although she says that she is not certain.  She does note that he is a smoker and may have lung issues but is not necessarily treated for this.    All elements the  "past medical history, past surgical history, home medications, allergies, social history, family history and review of systems are unobtainable from the patient as he is currently intubated and sedated.    Exam:  /55 (BP Location: Right arm, Patient Position: Lying)   Pulse 68   Temp 97.6 °F (36.4 °C) (Axillary)   Resp 18   Ht 180.3 cm (71\")   Wt 96.9 kg (213 lb 11.2 oz)   SpO2 95%   BMI 29.81 kg/m²   Temp:  [97.6 °F (36.4 °C)-98.7 °F (37.1 °C)] 97.6 °F (36.4 °C)  Heart Rate:  [] 68  Resp:  [8-27] 18  BP: ()/() 104/55  FiO2 (%):  [30 %-60 %] 30 %    Physical Exam   Constitutional: Vital signs are normal. He appears well-developed and well-nourished.  Non-toxic appearance. He is intubated.   HENT:   Head: Normocephalic and atraumatic.   Right Ear: External ear normal.   Left Ear: External ear normal.   Nose: Nose normal.   Mouth/Throat: Mucous membranes are not pale, not dry and not cyanotic.   ET tube in place   Eyes: Pupils are equal, round, and reactive to light. Lids are normal.   Neck: Neck supple. No hepatojugular reflux and no JVD present. Carotid bruit is not present. No tracheal deviation and no edema present. No thyroid mass and no thyromegaly present.   Cardiovascular: Normal rate, regular rhythm, S1 normal, S2 normal, normal heart sounds, intact distal pulses and normal pulses.  No extrasystoles are present. PMI is not displaced. Exam reveals no gallop and no friction rub.   No murmur heard.  Pulses:       Radial pulses are 2+ on the right side, and 2+ on the left side.        Femoral pulses are 2+ on the right side, and 2+ on the left side.       Dorsalis pedis pulses are 2+ on the right side, and 2+ on the left side.        Posterior tibial pulses are 2+ on the right side, and 2+ on the left side.   Pulmonary/Chest: He is intubated. No respiratory distress. He has wheezes. He has no rales. He exhibits no tenderness.   Abdominal: Soft. Normal appearance and bowel sounds are " normal. He exhibits distension. He exhibits no abdominal bruit and no pulsatile midline mass. There is no hepatosplenomegaly.   Musculoskeletal: He exhibits no edema, tenderness or deformity.   Unable to examine, intubated, although no obvious abnormalities noted   Lymphadenopathy:     He has no cervical adenopathy.   Neurological:   Unable to perform neurologic exam, intubated   Skin: Skin is warm, dry and intact. No rash noted. He is not diaphoretic. No cyanosis or erythema. Nails show no clubbing.   Psychiatric:   Unobtainable, intubated   Vitals reviewed.    Diagnostic Data:    Lab Results   Component Value Date    WBC 17.42 (H) 08/07/2020    HGB 7.9 (L) 08/07/2020    HCT 22.9 (L) 08/07/2020    MCV 89.8 08/07/2020     08/07/2020     Lab Results   Component Value Date    GLUCOSE 125 (H) 08/07/2020    CALCIUM 7.7 (L) 08/07/2020     (C) 08/07/2020    K 5.4 (H) 08/07/2020    CO2 19.0 (L) 08/07/2020    CL 85 (L) 08/07/2020    BUN 49 (H) 08/07/2020    CREATININE 2.59 (H) 08/07/2020    EGFRIFAFRI 34 (L) 08/06/2020    EGFRIFNONA 26 (L) 08/07/2020    BCR 18.9 08/07/2020    ANIONGAP 15.0 08/07/2020     BNP on arrival approximately 6800    , ALT 50, alkaline phosphatase 61, total bilirubin 0.2, total protein 6.6, albumin 3.8    ECG on 8/6/2020 at 7:55 PM: Normal sinus rhythm, nonspecific ST changes    Echocardiogram on 8/6/2020: Normal left ventricular ejection fraction, normal right ventricular size and function and no hemodynamically significant valvular abnormalities.    ASSESSMENT/PLAN:    1.  PEA arrest -likely mediated by hypoxemia, subsequent bradycardia and then PEA arrest  2.  Hyponatremia  3.  Hyperkalemia  4.  Metabolic acidosis  5.  Anemia  6.  Leukocytosis  7.  Acute hypoxemic respiratory failure  8.  Nausea and vomiting  9.  Acute renal failure  10.  Tobacco abuse  11.  Paranoid schizophrenia  12.  Reported hypertension  13.  Reported hyperlipidemia    -We are asked to see this patient who  had a PEA arrest event early this morning.  It sounds like this event took place in the setting of respiratory difficulties, hypoxemia and subsequently the patient had PEA arrest.  Since that time, he has remained with stable rhythm.  -Also, patient has multiple metabolic derangements including hyponatremia, hyperkalemia, acute renal failure, acidosis, etc., all potentially contributing to his event overnight.  These are being managed further by the primary service and other consulting services.  -The patient did have an echocardiogram yesterday showing normal systolic function as well as normal right ventricular size and function and no significant valvular abnormalities.  No need to repeat the study after the patient's event last night.  -At this time, from a cardiac standpoint, no further invasive work-up or additional work-up would be needed.  Obviously, this patient will be continued on telemetry monitoring which will be recommended.  -Recommend to correct any electrolyte and metabolic abnormalities to avoid the potential for cardiac dysrhythmias.  -I did assure the family that the patient was receiving excellent care from the primary service as well as other consulting services.  -Thank you for this consultation.  We will be available as needed at this time.

## 2020-08-07 NOTE — PROGRESS NOTES
Nephrology ([facility]) Progress Note      Patient:  Mohan Villatoro  YOB: 1960  Date of Service: 8/7/2020  MRN: [id number]   Acct: [id number]   Primary Care Physician: Provider, No Known  Advance Directive:   Code Status and Medical Interventions:   Ordered at: 08/06/20 0711     Code Status:    CPR     Medical Interventions (Level of Support Prior to Arrest):    Full     Admit Date: 8/6/2020       Hospital Day: 1  Referring Provider: Fabricio Foy,*      Patient personally seen and examined.  Complete chart including Consults, Notes, Operative Reports, Labs, Cardiology, and Radiology studies reviewed as able.    Chief complaint: Abnormal labs.    Subjective:  60 years old gentleman with a known history of chronic kidney disease.  Hospital from [facility] at a nursing home when he has incidental finding of serum sodium of 114 mmol.  Patient has history of chronic kidney disease, hyperkalemia, paranoid schizophrenia, type 2 diabetes.  Patient had poor p.o. intake with nausea and vomiting.  Hospital course remarkable for IV fluid administration.  His serum sodium has improved but still very low.  Last night he has PEA, requiring endotracheal intubation.  He is currently intubated and sedated.  Repeat chest x-ray consistent with pulmonary edema.    Home Meds:  Medications Prior to Admission   Medication Sig Dispense Refill Last Dose   • albuterol sulfate  (90 Base) MCG/ACT inhaler Inhale 2 puffs 3 (Three) Times a Day As Needed (copd).   8/5/2020 at Unknown time   • aspirin 325 MG tablet Take 325 mg by mouth Daily.   8/5/2020 at Unknown time   • busPIRone (BUSPAR) 5 MG tablet Take 5 mg by mouth 3 (Three) Times a Day.   8/5/2020 at Unknown time   • calcium polycarbophil (FIBERCON) 625 MG tablet Take 625 mg by mouth 2 (Two) Times a Day.   8/5/2020 at Unknown time   • celecoxib (CeleBREX) 200 MG capsule Take 200 mg by mouth Daily.   8/5/2020 at Unknown  time   • Cholecalciferol (VITAMIN D3) 125 MCG (5000 UT) capsule capsule Take 5,000 Units by mouth Daily.   8/5/2020 at Unknown time   • citalopram (CeleXA) 20 MG tablet Take 20 mg by mouth Daily.   8/5/2020 at Unknown time   • dicyclomine (BENTYL) 20 MG tablet Take 20 mg by mouth 3 (Three) Times a Day.   8/5/2020 at Unknown time   • diphenhydrAMINE (BENADRYL) 50 MG capsule Take 50 mg by mouth At Night As Needed for Sleep.   Past Week at Unknown time   • finasteride (PROSCAR) 5 MG tablet Take 5 mg by mouth Daily.   8/5/2020 at Unknown time   • fluticasone (FLONASE) 50 MCG/ACT nasal spray 1 spray into the nostril(s) as directed by provider Daily.   8/5/2020 at Unknown time   • folic acid (FOLVITE) 1 MG tablet Take 1 mg by mouth Daily.   8/5/2020 at Unknown time   • furosemide (LASIX) 20 MG tablet Take 20 mg by mouth Daily.   8/5/2020 at Unknown time   • haloperidol (HALDOL) 5 MG tablet Take 7.5 mg by mouth every night at bedtime.   8/5/2020 at Unknown time   • HYDROcodone-acetaminophen (NORCO)  MG per tablet Take 1 tablet by mouth Every 8 (Eight) Hours As Needed for Moderate Pain .   8/5/2020 at Unknown time   • hydrOXYzine pamoate (VISTARIL) 50 MG capsule Take 50 mg by mouth every night at bedtime.   8/5/2020 at Unknown time   • ibuprofen (ADVIL,MOTRIN) 400 MG tablet Take 400 mg by mouth Daily As Needed for Mild Pain .   8/5/2020 at Unknown time   • insulin glargine (LANTUS) 100 UNIT/ML injection Inject 14 Units under the skin into the appropriate area as directed Every Night.   8/5/2020 at Unknown time   • lisinopril (PRINIVIL,ZESTRIL) 20 MG tablet Take 20 mg by mouth Daily.   8/5/2020 at Unknown time   • loperamide (IMODIUM) 2 MG capsule Take 2 mg by mouth 4 (Four) Times a Day As Needed for Diarrhea.   Past Month at Unknown time   • magnesium hydroxide (MILK OF MAGNESIA) 400 MG/5ML suspension Take 30 mL by mouth Daily As Needed (constipation).   Past Month at Unknown time   • meclizine (ANTIVERT) 12.5 MG  tablet Take 12.5 mg by mouth Every 4 (Four) Hours As Needed for Dizziness.   Past Week at Unknown time   • metoclopramide (REGLAN) 5 MG tablet Take 5 mg by mouth 4 (Four) Times a Day Before Meals & at Bedtime.   8/5/2020 at Unknown time   • Multiple Vitamins-Minerals (THERA-M PO) Take 1 tablet by mouth Daily.   8/5/2020 at Unknown time   • Omega-3 Fatty Acids (OMEGA-3 FISH OIL) 1000 MG capsule Take 2 g by mouth 2 (Two) Times a Day.   8/5/2020 at Unknown time   • omeprazole (priLOSEC) 20 MG capsule Take 20 mg by mouth Daily.   8/5/2020 at Unknown time   • risperiDONE (risperDAL M-TABS) 0.5 MG disintegrating tablet Place 0.5 mg on the tongue 2 (Two) Times a Day.   8/5/2020 at Unknown time   • rOPINIRole (REQUIP) 2 MG tablet Take 2 mg by mouth Every Night.   8/5/2020 at Unknown time   • sennosides-docusate (senna-docusate sodium) 8.6-50 MG per tablet Take 1 tablet by mouth At Night As Needed for Constipation.   Past Week at Unknown time   • simvastatin (ZOCOR) 20 MG tablet Take 20 mg by mouth Every Night.   8/5/2020 at Unknown time   • tamsulosin (FLOMAX) 0.4 MG capsule 24 hr capsule Take 1 capsule by mouth every night at bedtime.   8/5/2020 at Unknown time   • traZODone (DESYREL) 100 MG tablet Take 100 mg by mouth Every Night.   8/5/2020 at Unknown time   • vitamin B-12 (CYANOCOBALAMIN) 1000 MCG tablet Take 1,000 mcg by mouth Daily.   8/5/2020 at Unknown time       Medicines:  Current Facility-Administered Medications   Medication Dose Route Frequency Provider Last Rate Last Dose   • acetaminophen (TYLENOL) tablet 650 mg  650 mg Oral Q4H PRN Magdi Banegas MD       • albuterol (PROVENTIL) nebulizer solution 0.083% 2.5 mg/3mL  2.5 mg Nebulization Q6H PRN Fabricio Foy MD       • aspirin EC tablet 81 mg  81 mg Oral Daily Magdi Banegas MD   81 mg at 08/06/20 1036   • budesonide (PULMICORT) nebulizer solution 0.5 mg  0.5 mg Nebulization BID - RT Adonis Franz MD   0.25 mg at 08/07/20 1042   •  dextrose (D50W) 25 g/ 50mL Intravenous Solution 25 g  25 g Intravenous Q15 Min PRN Magdi Banegas MD       • dextrose (GLUTOSE) oral gel 15 g  15 g Oral Q15 Min PRN Magdi Banegas MD       • famotidine (PEPCID) tablet 20 mg  20 mg Oral Daily Magdi Banegas MD   20 mg at 08/06/20 1037   • glucagon (human recombinant) (GLUCAGEN DIAGNOSTIC) injection 1 mg  1 mg Subcutaneous Q15 Min PRN Magdi Banegas MD       • hydrALAZINE (APRESOLINE) injection 10 mg  10 mg Intravenous Q6H PRN Fabricio Foy MD       • HYDROcodone-acetaminophen (NORCO) 5-325 MG per tablet 1 tablet  1 tablet Oral Q6H PRN Magdi Banegas MD   1 tablet at 08/06/20 1035   • insulin lispro (humaLOG) injection 0-9 Units  0-9 Units Subcutaneous TID AC Magdi Banegas MD       • ipratropium-albuterol (DUO-NEB) nebulizer solution 3 mL  3 mL Nebulization 4x Daily - RT Magdi Banegas MD   3 mL at 08/07/20 1042   • LORazepam (ATIVAN) tablet 1 mg  1 mg Oral Q6H PRN Fabricio oFy MD   1 mg at 08/07/20 0121   • methylPREDNISolone sodium succinate (SOLU-Medrol) injection 40 mg  40 mg Intravenous Q6H Adonis Franz MD       • nicotine (NICODERM CQ) 14 MG/24HR patch 1 patch  1 patch Transdermal Q24H Magdi Banegas MD   1 patch at 08/06/20 1035   • ondansetron (ZOFRAN) injection 4 mg  4 mg Intravenous Q6H PRN Magdi Banegas MD   4 mg at 08/06/20 0731   • piperacillin-tazobactam (ZOSYN) 3.375 g in iso-osmotic dextrose 50 ml (premix)  3.375 g Intravenous Q8H Fabricio Foy MD       • propofol (DIPRIVAN) infusion 10 mg/mL 100 mL  5-50 mcg/kg/min Intravenous Titrated Fabricio Foy MD 17.51 mL/hr at 08/07/20 0934 30 mcg/kg/min at 08/07/20 0934   • Sodium Chloride 0.45% 975 mL with Sodium bicarbonate 8.4% 75 mEq infusion total 1000 mL   Intravenous Continuous Lev Shepard  mL/hr at 08/07/20 0227     • sodium chloride 0.9 % flush 10 mL  10 mL Intravenous Q12H  Magdi Banegas MD   10 mL at 08/06/20 2141   • sodium chloride 0.9 % flush 10 mL  10 mL Intravenous PRN Magdi Banegas MD       • tamsulosin (FLOMAX) 24 hr capsule 0.4 mg  0.4 mg Oral Daily Magdi Banegas MD   0.4 mg at 08/06/20 1037       Past Medical History:  Past Medical History:   Diagnosis Date   • Diabetes (CMS/HCC)    • Schizo affective schizophrenia (CMS/HCC)        Past Surgical History:  History reviewed. No pertinent surgical history.    Family History  History reviewed. No pertinent family history.    Social History  Social History     Socioeconomic History   • Marital status: Single     Spouse name: Not on file   • Number of children: Not on file   • Years of education: Not on file   • Highest education level: Not on file         Review of Systems:  Review of system is unobtainable as he is intubated and sedated.  Objective:  Patient Vitals for the past 24 hrs:   BP Temp Temp src Pulse Resp SpO2 Weight   08/07/20 1101 128/58 -- -- 65 -- 94 % --   08/07/20 1048 -- -- -- -- 18 -- --   08/07/20 1044 -- -- -- -- 23 -- --   08/07/20 1000 139/70 -- -- 72 -- 96 % --   08/07/20 0900 119/65 -- -- 66 -- 93 % --   08/07/20 0712 -- -- -- -- 18 -- --   08/07/20 0704 -- -- -- 68 18 95 % --   08/07/20 0700 104/55 -- -- 70 -- 95 % --   08/07/20 0630 115/58 -- -- 79 -- 98 % --   08/07/20 0600 (!) 83/44 -- -- 76 -- 92 % --   08/07/20 0530 (!) 136/108 -- -- 85 -- 100 % --   08/07/20 0526 -- -- -- -- -- -- 96.9 kg (213 lb 11.2 oz)   08/07/20 0500 157/73 -- -- 91 25 98 % --   08/07/20 0448 -- -- -- -- -- 98 % --   08/07/20 0436 (!) 218/56 -- -- 111 18 100 % --   08/07/20 0400 -- -- -- 91 26 (!) 89 % --   08/07/20 0334 -- 97.6 °F (36.4 °C) Axillary 92 -- (!) 86 % --   08/07/20 0330 137/56 -- -- 91 25 (!) 84 % --   08/07/20 0300 158/74 -- -- 95 25 (!) 89 % --   08/07/20 0230 154/71 -- -- 91 24 90 % --   08/07/20 0200 (!) 190/161 -- -- 90 25 (!) 88 % --   08/07/20 0135 -- -- -- 95 25 92 % --   08/07/20  0120 -- -- -- 94 25 (!) 87 % --   08/07/20 0105 156/79 -- -- 98 24 90 % --   08/07/20 0000 146/89 98.3 °F (36.8 °C) Axillary 87 24 92 % --   08/06/20 2345 151/53 -- -- 89 -- (!) 85 % --   08/06/20 2315 -- -- -- 90 -- 92 % --   08/06/20 2255 -- -- -- 89 -- 91 % --   08/06/20 2200 136/85 -- -- 91 23 93 % --   08/06/20 2130 116/93 -- -- 88 -- -- --   08/06/20 2100 162/78 -- -- 88 22 94 % --   08/06/20 2030 159/55 -- -- 89 -- 93 % --   08/06/20 2000 175/65 -- -- 88 23 93 % --   08/06/20 1950 -- -- -- 94 -- (!) 89 % --   08/06/20 1930 160/62 -- -- 90 -- 93 % --   08/06/20 1928 -- 98.2 °F (36.8 °C) Axillary -- -- -- --   08/06/20 1916 -- -- -- -- 24 -- --   08/06/20 1915 149/60 -- -- 92 -- 91 % --   08/06/20 1907 -- -- -- 87 22 94 % --   08/06/20 1900 165/65 -- -- 89 24 91 % --   08/06/20 1845 (!) 162/127 -- -- 86 16 93 % --   08/06/20 1815 140/82 -- -- 89 16 93 % --   08/06/20 1628 -- -- -- 80 8 99 % --   08/06/20 1623 -- -- -- 80 16 93 % --   08/06/20 1600 144/84 98.3 °F (36.8 °C) Oral 82 -- 94 % --   08/06/20 1530 114/72 -- -- 79 26 96 % --   08/06/20 1445 (!) 139/115 -- -- 78 26 94 % --   08/06/20 1430 129/59 -- -- 76 26 97 % --   08/06/20 1200 -- 98.7 °F (37.1 °C) Oral -- -- -- --       Intake/Output Summary (Last 24 hours) at 8/7/2020 1138  Last data filed at 8/7/2020 0517  Gross per 24 hour   Intake 4694.1 ml   Output 1000 ml   Net 3694.1 ml     General: Intubated/sedated.  HEENT: Normocephalic atraumatic/orotracheal intubation.  Neck: Supple with no JVD or carotid bruits.  Chest:  clear to auscultation bilaterally without respiratory distress  CVS: regular rate and rhythm  Abdominal: soft, nontender, positive bowel sounds  Extremities: no cyanosis or edema  Skin: warm and dry without rash      Labs:  Results from last 7 days   Lab Units 08/07/20  0333 08/06/20  0745   WBC 10*3/mm3 17.42* 13.59*   HEMOGLOBIN g/dL 7.9* 7.8*   HEMATOCRIT % 22.9* 21.4*   PLATELETS 10*3/mm3 346 313         Results from last 7 days    Lab Units 08/07/20  0744 08/07/20  0333 08/06/20  2348   SODIUM mmol/L 119* 117* 116*   POTASSIUM mmol/L 5.4* 4.9 5.3*   CHLORIDE mmol/L 85* 84* 84*   CO2 mmol/L 19.0* 17.0* 20.0*   BUN mg/dL 49* 44* 43*   CREATININE mg/dL 2.59* 2.48* 2.33*   CALCIUM mg/dL 7.7* 8.2* 8.2*   BILIRUBIN mg/dL  --  0.2  --    ALK PHOS U/L  --  61  --    ALT (SGPT) U/L  --  50*  --    AST (SGOT) U/L  --  137*  --    GLUCOSE mg/dL 125* 116* 98       Radiology:   Imaging Results (Last 72 Hours)     Procedure Component Value Units Date/Time    XR Chest 1 View [[id number]] Collected:  08/07/20 0729     Updated:  08/07/20 0733    Narrative:       EXAMINATION: XR CHEST 1 VW-     8/7/2020 4:45 AM CDT     HISTORY: code blue/intubated     1 view chest x-ray compared with yesterday.     Magnified heart size.  Endotracheal tube in good position with the tip 8 cm above the digna.     Right greater than left bilateral central infiltrate compatible with  edema.     No pneumothorax.     Summary:  1. Bilateral infiltrate compatible with edema. Dominant right perihilar  pattern.  2. No pneumothorax.  3. Endotracheal tube in good position.  This report was finalized on 08/07/2020 07:30 by Dr. Jorge Alberto Montesinos MD.    US Renal Bilateral [[id number]] Collected:  08/06/20 1653     Updated:  08/06/20 1659    Narrative:       EXAMINATION: US RENAL LIMITED-     8/6/2020 4:02 PM CDT     HISTORY: Acute renal insufficiency.     Gray scale and color flow ultrasound evaluation of the kidneys.     Cortical thickness and cortical echogenicity is appropriate.  No hydronephrosis.     Right kidney = 107 x 56 x 62 mm.  Left kidney = 113 x 51 x 66 mm.     Incidental note is made of right pleural fluid.     A 15 mm hypoechoic focus at the inferior pole of the kidney has the  appearance of a cyst though is poorly visualized due to overlying bowel  gas.  Consider CT follow-up.     Summary:  1. No sign of obstruction.  2. Symmetric kidneys with suspected 15 mm lower pole cyst on  the right  side. Detail is limited by overlying bowel gas.  3. Right pleural effusion.     This report was finalized on 08/06/2020 16:55 by Dr. Jorge Alberto Montesinos MD.    XR Abdomen KUB [[id number]] Collected:  08/06/20 1157     Updated:  08/06/20 1203    Narrative:       XR ABDOMEN KUB-     Indication: Abdominal pain     Comparison: None     Findings:     Multiple loops of gas-filled dilated small bowel are present with loops  measuring up to 3.4 cm diameter. There is also mild gaseous distention  of the RIGHT colon. No visible pneumatosis. No suspicious calcifications  or evidence of mass effect. Multilevel lumbar spine degenerative change.  No acute osseous finding.       Impression:       Impression:     Multiple gas-filled dilated small bowel loops. Mild gaseous distention  of the RIGHT colon. Imaging appearance favors ileus although recommend  follow-up to exclude developing obstruction.  This report was finalized on 08/06/2020 11:59 by Dr. Jovanny Cervantes MD.    XR Chest 1 View [[id number]] Collected:  08/06/20 1136     Updated:  08/06/20 1140    Narrative:       EXAMINATION: XR CHEST 1 VW-     8/6/2020 11:13 AM CDT     HISTORY: shortness of breath     1 view chest x-ray with no comparison.     Normal heart size given the portable projection.     There is right greater than left bibasilar infiltrate or edema.     The upper lobes are clear and there is no pneumothorax.     Summary:  1. Right greater than left bibasilar infiltrate for which pneumonia is  favored over pulmonary edema.  This report was finalized on 08/06/2020 11:37 by Dr. Jorge Alberto Montesinos MD.          Culture:  Respiratory Culture   Date Value Ref Range Status   08/06/2020   Preliminary    Scant growth (1+) Normal Respiratory Sophia: NO S.aureus/MRSA or Pseudomonas aeruginosa         Assessment   1.  HYPONATREMIA/mixed picture.  2.  Recurrent hyperkalemia.  3.  Chronic kidney disease baseline.  4.  Acute kidney injury/ATN.  5.  Respiratory  failure/intubated.  6.  Metabolic acidemia.  7.  Paranoid schizophrenia.  8.  Type 2 diabetes.    Plan:  1.  IV Bumex.  2.  Maintain a slow hydration.  3.  Ultrasound was reviewed.  4.  Continue to monitor electrolytes.      Lev Shepard MD  8/7/2020  11:38

## 2020-08-07 NOTE — PROGRESS NOTES
[facility] Medicine Services  INPATIENT PROGRESS NOTE    Patient Name: Mohan Villatoro  Date of Admission: 8/6/2020  Today's Date: 08/07/20  Length of Stay: 1  Primary Care Physician: Provider, No Known    Subjective   Chief Complaint: Intubated  HPI   Currently intubated and sedated  Suffered a cardiac arrest last night.  Had PEA.          Review of Systems   Unable to perform ROS: Intubated        All pertinent negatives and positives are as above. All other systems have been reviewed and are negative unless otherwise stated.     Objective    Temp:  [97.6 °F (36.4 °C)-98.7 °F (37.1 °C)] 97.6 °F (36.4 °C)  Heart Rate:  [] 68  Resp:  [8-27] 18  BP: ()/() 104/55  FiO2 (%):  [30 %-60 %] 30 %  Physical Exam   Constitutional: He appears well-developed and well-nourished. He is sedated and intubated.   HENT:   Head: Normocephalic and atraumatic.   Right Ear: External ear normal.   Left Ear: External ear normal.   Nose: Nose normal.   Mouth/Throat: Oropharynx is clear and moist.   Eyes: Pupils are equal, round, and reactive to light. Conjunctivae and EOM are normal. Right eye exhibits no discharge. Left eye exhibits no discharge. No scleral icterus.   Neck: Normal range of motion. Neck supple. No tracheal deviation present. No thyromegaly present.   Cardiovascular: Normal rate, regular rhythm, normal heart sounds and intact distal pulses. Exam reveals no gallop and no friction rub.   No murmur heard.  Pulmonary/Chest: Effort normal. No stridor. He is intubated. No respiratory distress. He has decreased breath sounds. He has no wheezes. He has no rales. He exhibits no tenderness.   Abdominal: Soft. Bowel sounds are normal. He exhibits no distension and no mass. There is no tenderness. There is no rebound and no guarding. No hernia.   Musculoskeletal: Normal range of motion. He exhibits no edema or deformity.   Lymphadenopathy:     He has no cervical adenopathy.      Neurological: He has normal reflexes. He displays normal reflexes. No cranial nerve deficit. He exhibits normal muscle tone. Coordination normal.   Skin: Skin is warm and dry. No rash noted. No erythema. No pallor.   Vitals reviewed.          Results Review:  I have reviewed the labs, radiology results, and diagnostic studies.    Laboratory Data:   Results from last 7 days   Lab Units 08/07/20  0333 08/06/20  0745   WBC 10*3/mm3 17.42* 13.59*   HEMOGLOBIN g/dL 7.9* 7.8*   HEMATOCRIT % 22.9* 21.4*   PLATELETS 10*3/mm3 346 313        Results from last 7 days   Lab Units 08/07/20  0744 08/07/20  0333 08/06/20  2348   SODIUM mmol/L 119* 117* 116*   POTASSIUM mmol/L 5.4* 4.9 5.3*   CHLORIDE mmol/L 85* 84* 84*   CO2 mmol/L 19.0* 17.0* 20.0*   BUN mg/dL 49* 44* 43*   CREATININE mg/dL 2.59* 2.48* 2.33*   CALCIUM mg/dL 7.7* 8.2* 8.2*   BILIRUBIN mg/dL  --  0.2  --    ALK PHOS U/L  --  61  --    ALT (SGPT) U/L  --  50*  --    AST (SGOT) U/L  --  137*  --    GLUCOSE mg/dL 125* 116* 98       Culture Data:   No results found for: BLOODCX, URINECX, WOUNDCX, MRSACX, RESPCX, STOOLCX    Radiology Data:   Imaging Results (Last 24 Hours)     Procedure Component Value Units Date/Time    XR Chest 1 View [[id number]] Collected:  08/07/20 0729     Updated:  08/07/20 0733    Narrative:       EXAMINATION: XR CHEST 1 VW-     8/7/2020 4:45 AM CDT     HISTORY: code blue/intubated     1 view chest x-ray compared with yesterday.     Magnified heart size.  Endotracheal tube in good position with the tip 8 cm above the digna.     Right greater than left bilateral central infiltrate compatible with  edema.     No pneumothorax.     Summary:  1. Bilateral infiltrate compatible with edema. Dominant right perihilar  pattern.  2. No pneumothorax.  3. Endotracheal tube in good position.  This report was finalized on 08/07/2020 07:30 by Dr. Jorge Alberto Montesinos MD.     Renal Bilateral [[id number]] Collected:  08/06/20 1653     Updated:  08/06/20 1652     Narrative:       EXAMINATION: US RENAL LIMITED-     8/6/2020 4:02 PM CDT     HISTORY: Acute renal insufficiency.     Gray scale and color flow ultrasound evaluation of the kidneys.     Cortical thickness and cortical echogenicity is appropriate.  No hydronephrosis.     Right kidney = 107 x 56 x 62 mm.  Left kidney = 113 x 51 x 66 mm.     Incidental note is made of right pleural fluid.     A 15 mm hypoechoic focus at the inferior pole of the kidney has the  appearance of a cyst though is poorly visualized due to overlying bowel  gas.  Consider CT follow-up.     Summary:  1. No sign of obstruction.  2. Symmetric kidneys with suspected 15 mm lower pole cyst on the right  side. Detail is limited by overlying bowel gas.  3. Right pleural effusion.     This report was finalized on 08/06/2020 16:55 by Dr. Jorge Alberto Montesinos MD.    XR Abdomen KUB [[id number]] Collected:  08/06/20 1157     Updated:  08/06/20 1203    Narrative:       XR ABDOMEN KUB-     Indication: Abdominal pain     Comparison: None     Findings:     Multiple loops of gas-filled dilated small bowel are present with loops  measuring up to 3.4 cm diameter. There is also mild gaseous distention  of the RIGHT colon. No visible pneumatosis. No suspicious calcifications  or evidence of mass effect. Multilevel lumbar spine degenerative change.  No acute osseous finding.       Impression:       Impression:     Multiple gas-filled dilated small bowel loops. Mild gaseous distention  of the RIGHT colon. Imaging appearance favors ileus although recommend  follow-up to exclude developing obstruction.  This report was finalized on 08/06/2020 11:59 by Dr. Jovanny Cervantes MD.    XR Chest 1 View [[id number]] Collected:  08/06/20 1136     Updated:  08/06/20 1140    Narrative:       EXAMINATION: XR CHEST 1 VW-     8/6/2020 11:13 AM CDT     HISTORY: shortness of breath     1 view chest x-ray with no comparison.     Normal heart size given the portable projection.     There is right  greater than left bibasilar infiltrate or edema.     The upper lobes are clear and there is no pneumothorax.     Summary:  1. Right greater than left bibasilar infiltrate for which pneumonia is  favored over pulmonary edema.  This report was finalized on 08/06/2020 11:37 by Dr. Jorge Alberto Montesinos MD.          I have reviewed the patient's current medications.     Assessment/Plan     Active Hospital Problems    Diagnosis   • Hyponatremia   • Hyperkalemia   • Nausea and vomiting   • Acute kidney injury (CMS/HCC)   • Hypoxia   • COPD (chronic obstructive pulmonary disease) (CMS/HCC)   • Tobacco dependence   • Anemia   • Paranoid schizophrenia (CMS/HCC)   • Essential hypertension   • Hyperlipidemia     Labs reviewed:  Sodium improved up to 119, Cr worse 2.48 to 2.59    Imaging reviewed:  CXR, reported noted    CXR independently interpreted by me:  Pulmonary edema, ETT in good position          1.  Acute Hypoxic Respiratory failure  -Intubated  -pulm consulted  -repeat CXR in AM  -Repeat ABG in AM    2.  Cardiac Arrest  -Telemetry  -Cardiology consulted  -possibly due     3.  Hyperkalemia  -normalized  -monitor    4.  Hyponatremia  -Nephrology consulted  -Hold Psych meds    5.  COPD  -Nebs PRN  -IV steroids    6.  Anemia  -Trend H&H  -transfuse as indicated    7.  HTN  -monitor    8.  HLD  -monitor    9.  Paranoid Schizophrenia   -hold psych meds given hyponatremia    10.  Flomax  -BPH    11.  Acute renal failure  -nephrology consulted              Discharge Planning: continue in CCU    Electronically signed by Tripp Bruner MD, 08/07/20, 09:52.

## 2020-08-07 NOTE — PLAN OF CARE
Pt remains in ccu.  Sedated on ventilator at  this time.  Monitoring and correcting electrolytes per nephrology and admitting physician.  Pulm consult for vent management.  Best cath started per nephrology, bumex iv started.

## 2020-08-07 NOTE — PLAN OF CARE
Pt admitted @ 0630 from [facility].  Pt ARF, hyponatremia, hyperkalemia.  Kayexalate given.  Renal consult.  Pt alert and oriented x 4.  Ivf w bicarb given.  Pt remains in ccu.

## 2020-08-07 NOTE — PROGRESS NOTES
Continued Stay Note   [place]     Patient Name: Mohan Villatoro  MRN: [id number]  Today's Date: 8/7/2020    Admit Date: 8/6/2020    Discharge Plan     Row Name 08/07/20 1142       Plan    Plan  unclear    Plan Comments  Patient is now on vent.  Needs unclear at this time, will follow.        Discharge Codes    No documentation.             DAFNE Rizo

## 2020-08-07 NOTE — NURSING NOTE
This nurse and RT in room assessing pt's resp staus when charge nurse noticed pt HR had slowed to 40's.  Pulse was check and found to be absent.  Refer to code charting.

## 2020-08-07 NOTE — CONSULTS
PULMONARY AND CRITICAL CARE CONSULT - [facility]    Mohan Villatoro   MR# [id number]  [facility] [id number]  8/7/2020   13:12    Referring Provider: Tripp Bruner MD    Chief Complaint: Mechanically ventilated    HPI: We are consulted by Tripp Bruner MD to see this 60 y.o. male born on 1960.     He has a history of congestive heart failure (type unknown), insulin-dependent diabetes, and paranoid schizophrenia the presented to [facility] as a transfer from [facility] secondary to hyponatremia and concern for congestive heart failure.  At the outside hospital patient's lab studies revealed a sodium level of 114.  Patient is a nursing home resident for 12 years.    He reported having dysuria and low urine output. His initial lab work showed hyperkalemia. He also reported having some issues with constipation, and he had a bowel movement since arrival to our hospital.  He also had some shortness of breath over the course of the past few days.  He denies any fevers or chills.  He does report a cough productive of sputum, but states that he has not paid attention to the color of the sputum he is bringing up.  It sounds like he may also have been experiencing some recent orthopnea.  No worsening lower extremity edema.  He states that he has a history of sleep apnea, but does not use a CPAP device on an outpatient basis.  He reports\ed having trouble with recent nausea but no vomiting.His appetite has been poor and that his oral intake is also been poor.  He denies being started on any new medications recently.  Denies any chest pain or chest pressure.  He states that he is not on supplemental oxygen at the nursing home facility.  Patient was administered Kayexalate at the outside facility prior to transfer.    Upon arrival here, the patient sodium was 115 with a potassium of 5.5, shortly later repeated at 5.7 with a creatinine of roughly 2.4.  Overnight, the patient started to  have respiratory difficulties and the nighttime nurse noted that she was talking with respiratory therapy about his respiratory status and that she is pretty certain that he was hypoxic at this time and then developed bradycardia and subsequently a PEA arrest.  He required about 5 minutes of compressions and 2 pushes of drugs at which point spontaneous circulation was returned.  The patient has remained hemodynamically stable since that time.  He was placed on the mechanical ventilator overnight and remains intubated and sedated at this time.    And is already getting followed up by nephrology and cardiology and pulmonary critical care consult requested for ventilator management and further care.  The patient's sister was present during my visit today and she reported although he was a smoker he did not have any work-up done for COPD and he was not on any oxygen and to her knowledge she was not on any regular inhalers or nebulizers.  No further history could be obtained on the patient.  To the sisters he had never been on mechanical ventilation before.    Past Medical History   has a past medical history of Diabetes (CMS/[facility]) and Schizo affective schizophrenia (CMS/[facility]).   has no past surgical history on file.  Allergies   Allergen Reactions   • Niacin Unknown - High Severity     Medications    aspirin 81 mg Oral Daily   budesonide 0.5 mg Nebulization BID - RT   bumetanide 1 mg Intravenous Q12H   famotidine 20 mg Oral Daily   insulin lispro 0-9 Units Subcutaneous TID AC   ipratropium-albuterol 3 mL Nebulization 4x Daily - RT   methylPREDNISolone sodium succinate 40 mg Intravenous Q6H   nicotine 1 patch Transdermal Q24H   piperacillin-tazobactam 3.375 g Intravenous Q8H   sodium chloride 10 mL Intravenous Q12H   tamsulosin 0.4 mg Oral Daily       propofol 5-50 mcg/kg/min Last Rate: 30 mcg/kg/min (08/07/20 1257)   custom IV infusion builder  Last Rate: 125 mL/hr at 08/07/20 9984     Social History   Patient is a  nursing home resident. Patient states that he smokes about a pack of cigarettes per day.  He does not drink any alcohol.  Denies any illicit drug use.    Family History  He reports end-stage renal disease (1 of his parents was on dialysis) and heart disease run in the family     Review of Systems:  Cannot obtain due to mechanical ventilation.  The patient notably is critically ill and connected to a ventilator.  As such patient cannot communicate and provide any history whatsoever, including any history of present illness or interval history since arrival or review of systems. The interested reviewer may note this fact, as an attempt has been made at collecting and documenting these portions of the patient history, but this information is unobtainable despite attempted review and therefore cannot be documented at this time.   Physical Exam:  Temp:  [97.6 °F (36.4 °C)-98.3 °F (36.8 °C)] 97.6 °F (36.4 °C)  Heart Rate:  [] 64  Resp:  [8-26] 20  BP: ()/() 119/61  FiO2 (%):  [30 %-60 %] 30 %    Intake/Output Summary (Last 24 hours) at 8/7/2020 1312  Last data filed at 8/7/2020 0517  Gross per 24 hour   Intake 4694.1 ml   Output 1000 ml   Net 3694.1 ml         08/06/20  0635 08/07/20  0526   Weight: 97.3 kg (214 lb 6.4 oz) 96.9 kg (213 lb 11.2 oz)     SpO2 Percentage    08/07/20 1000 08/07/20 1101 08/07/20 1200   SpO2: 96% 94% 98%     Physical Exam   Constitutional: He appears well-developed and well-nourished.   Sedated middle-aged  male orally intubated on ventilator.   HENT:   Head: Normocephalic and atraumatic.   Eyes: Pupils are equal, round, and reactive to light. Conjunctivae and EOM are normal.   Neck: Normal range of motion. Neck supple. No JVD present. No tracheal deviation present. No thyromegaly present.   Cardiovascular: Normal rate, regular rhythm and normal heart sounds. Exam reveals no friction rub.   No murmur heard.  Pulmonary/Chest: No stridor. No respiratory distress. He has  wheezes. He has no rales. He exhibits no tenderness.   Abdominal: Bowel sounds are normal. He exhibits no distension and no mass. There is no tenderness. There is no guarding.   Genitourinary:   Genitourinary Comments: Not examined   Musculoskeletal: Normal range of motion. He exhibits edema. He exhibits no tenderness or deformity.   Lymphadenopathy:     He has no cervical adenopathy.   Neurological: He displays normal reflexes. No cranial nerve deficit. He exhibits normal muscle tone. Coordination normal.   Patient intubated on ventilator and is sedated.  Not be assessed.   Skin: Skin is warm and dry.   Psychiatric:   Could not be assessed     Ventilator Settings:     Vt (Set, L): 0.5 L  Resp Rate (Set): 18  Pressure Support (cm H2O): 0 cm H20  FiO2 (%): 30 %  PEEP/CPAP (cm H2O): 5 cm H20  Minute Ventilation (L/min) (Obs): 9.75 L/min  Resp Rate (Observed) Vent: 20  I:E Ratio (Set): 1:1.50  I:E Ratio (Obs): 1:2.7  PIP Observed (cm H2O): 28 cm H2O  Plateau Pressure (cm H2O): 19 cm H2O     He is orally intubated with a size 7.5 endotracheal tube with 25 cm lips     Results from last 7 days   Lab Units 08/07/20  0333 08/06/20  0745   WBC 10*3/mm3 17.42* 13.59*   HEMOGLOBIN g/dL 7.9* 7.8*   PLATELETS 10*3/mm3 346 313     Results from last 7 days   Lab Units 08/07/20  0744 08/07/20  0333 08/06/20  2348  08/06/20  0745   SODIUM mmol/L 119* 117* 116*   < > 115*   POTASSIUM mmol/L 5.4* 4.9 5.3*   < > 5.7*   CO2 mmol/L 19.0* 17.0* 20.0*   < > 18.0*   BUN mg/dL 49* 44* 43*   < > 42*   CREATININE mg/dL 2.59* 2.48* 2.33*   < > 2.41*   MAGNESIUM mg/dL  --   --   --   --  1.7   PHOSPHORUS mg/dL  --  4.7*  --   --   --    GLUCOSE mg/dL 125* 116* 98   < > 122*    < > = values in this interval not displayed.     Results from last 7 days   Lab Units 08/07/20  0534 08/07/20  0435   PH, ARTERIAL pH units 7.260* 7.069*   PCO2, ARTERIAL mm Hg 36.8 50.4*   PO2 ART mm Hg 150.0* 272.0*   FIO2 % 60 100     Respiratory Culture   Date Value  Ref Range Status   08/06/2020   Preliminary    Scant growth (1+) Normal Respiratory Sophia: NO S.aureus/MRSA or Pseudomonas aeruginosa     Lab Results   Component Value Date    PROBNP 6,805.0 (H) 08/06/2020     Recent radiology:   Imaging Results (Last 72 Hours)     Procedure Component Value Units Date/Time    XR Chest 1 View [[id number]] Collected:  08/07/20 0729     Updated:  08/07/20 0733    Narrative:       EXAMINATION: XR CHEST 1 VW-     8/7/2020 4:45 AM CDT     HISTORY: code blue/intubated     1 view chest x-ray compared with yesterday.     Magnified heart size.  Endotracheal tube in good position with the tip 8 cm above the digna.     Right greater than left bilateral central infiltrate compatible with  edema.     No pneumothorax.     Summary:  1. Bilateral infiltrate compatible with edema. Dominant right perihilar  pattern.  2. No pneumothorax.  3. Endotracheal tube in good position.  This report was finalized on 08/07/2020 07:30 by Dr. Jorge Alberto Montesinos MD.    US Renal Bilateral [[id number]] Collected:  08/06/20 1653     Updated:  08/06/20 1659    Narrative:       EXAMINATION: US RENAL LIMITED-     8/6/2020 4:02 PM CDT     HISTORY: Acute renal insufficiency.     Gray scale and color flow ultrasound evaluation of the kidneys.     Cortical thickness and cortical echogenicity is appropriate.  No hydronephrosis.     Right kidney = 107 x 56 x 62 mm.  Left kidney = 113 x 51 x 66 mm.     Incidental note is made of right pleural fluid.     A 15 mm hypoechoic focus at the inferior pole of the kidney has the  appearance of a cyst though is poorly visualized due to overlying bowel  gas.  Consider CT follow-up.     Summary:  1. No sign of obstruction.  2. Symmetric kidneys with suspected 15 mm lower pole cyst on the right  side. Detail is limited by overlying bowel gas.  3. Right pleural effusion.     This report was finalized on 08/06/2020 16:55 by Dr. Jorge Alberto Montesinos MD.    XR Abdomen KUB [[id number]] Collected:  08/06/20  1157     Updated:  08/06/20 1203    Narrative:       XR ABDOMEN KUB-     Indication: Abdominal pain     Comparison: None     Findings:     Multiple loops of gas-filled dilated small bowel are present with loops  measuring up to 3.4 cm diameter. There is also mild gaseous distention  of the RIGHT colon. No visible pneumatosis. No suspicious calcifications  or evidence of mass effect. Multilevel lumbar spine degenerative change.  No acute osseous finding.       Impression:       Impression:     Multiple gas-filled dilated small bowel loops. Mild gaseous distention  of the RIGHT colon. Imaging appearance favors ileus although recommend  follow-up to exclude developing obstruction.  This report was finalized on 08/06/2020 11:59 by Dr. Jovanny Cervantes MD.    XR Chest 1 View [[id number]] Collected:  08/06/20 1136     Updated:  08/06/20 1140    Narrative:       EXAMINATION: XR CHEST 1 VW-     8/6/2020 11:13 AM CDT     HISTORY: shortness of breath     1 view chest x-ray with no comparison.     Normal heart size given the portable projection.     There is right greater than left bibasilar infiltrate or edema.     The upper lobes are clear and there is no pneumothorax.     Summary:  1. Right greater than left bibasilar infiltrate for which pneumonia is  favored over pulmonary edema.  This report was finalized on 08/06/2020 11:37 by Dr. Jorge Alberto Montesinos MD.        My radiograph interpretation/independent review of imaging: Agree with the radiology finding.  Other test results (not lab or imaging): Results for orders placed during the hospital encounter of 08/06/20   Adult Transthoracic Echo Complete W/ Cont if Necessary Per Protocol    Narrative · Left ventricular systolic function is normal. Estimated EF appears to be   in the range of 56 - 60%.  · Normal right ventricular cavity size and systolic function noted.  · No hemodynamically significant valvular abnormalities identified on this   study.      Reviewed  Independent review  of ekg: Agree with current findings    Problem List as identified by Epic (may contain historical, inactive problems)  Patient Active Problem List   Diagnosis   • Hyponatremia   • Hyperkalemia   • Nausea and vomiting   • Acute kidney injury (CMS/HCC)   • Hypoxia   • COPD (chronic obstructive pulmonary disease) (CMS/HCC)   • Tobacco dependence   • Anemia   • Paranoid schizophrenia (CMS/[facility])   • Essential hypertension   • Hyperlipidemia     Pulmonary Assessment:  SEVERE ACUTE RESPIRATORY FAILURE REQUIRING MECHANICAL VENTILATION  This is a threat to life or pulmonary function, high risk, due to underlying chronic obstructive pulmonary disease and pulseless electrical activity and cardiac arrest.    New problem (to me), with additional workup planned: Chronic obstructive pulmonary disease  New problem (to me), no additional workup planned: Sudden cardiac arrest and pulseless atrial activity  Other problems either stable, failing to improve or worsenin. Acute hypoxic respiratory failure following cardiac arrest and pulseless electrical activity  2. Oral intubation currently on mechanical ventilation  3. Chronic obstructive pulmonary disease  4. Tobacco abuse  5. Acute kidney injury  6. Chronic kidney disease stage III  7. Hypernatremia and hyperkalemia  8. Anxiety depression  9. Nursing home resident  10. Paranoid schizophrenia    Recommend/plan:   Ventilator order set, including intravenous narcotics, benzodiazepines (that is controlled drugs) for sedation and ventilator tolerance  Chest X-ray in the morning tomorrow  Arterial blood gas analysis in the morning tomorrow  Additional plan:  · Continue full ventilator support.  He is not ready for weaning yet.   · Spontaneous breathing trial.  Continue sedation with propofol and may need fentanyl for pain control.  · He is already on DuoNeb and he was started on Pulmicort for persistent wheezing.  · His COVID screen has been negative.  Nephrology following for renal  failure and managing hyperkalemia and hyponatremia  · Avoid nephrotoxic medications.  Patient is not on any medications which may lead to hyperkalemia.  · Continue to monitor fluid balance and electrolytes and renal function closely.  Cardiology is following.  · Echocardiogram did not show any acute cardiac issues and pulseless electrical activity is most likely secondary to hyperkalemia and other ongoing metabolic issues.  · Nicotine patch added for history of tobacco abuse.  Continue DVT and ulcer prophylaxis and pain and anxiety control.  He was also started on Solu-Medrol for COPD exacerbation and wheezing.  · Pulmonary and critical care team will continue following him and make further recommendations.  · CODE STATUS: Full.  Care plan discussed with RN and RT time spent in seeing this patient is pulmonary consult intensive care unit for cardiac care and it was 45 minutes.    Thank you for this consult.  We will follow along.    Adonis Franz MD  Pulmonologist/Intensivist  8/7/2020 13:31

## 2020-08-08 NOTE — PROGRESS NOTES
Nephrology ([facility]) Progress Note      Patient:  Mohan Villatoro  YOB: 1960  Date of Service: 8/8/2020  MRN: [id number]   Acct: [id number]   Primary Care Physician: Provider, No Known  Advance Directive:   Code Status and Medical Interventions:   Ordered at: 08/06/20 0711     Code Status:    CPR     Medical Interventions (Level of Support Prior to Arrest):    Full     Admit Date: 8/6/2020       Hospital Day: 2  Referring Provider: Fabricio Foy,*      Patient personally seen and examined.  Complete chart including Consults, Notes, Operative Reports, Labs, Cardiology, and Radiology studies reviewed as able.    Chief complaint: Abnormal labs.    Subjective:  60 years old gentleman with a known history of chronic kidney disease.  Hospital from [facility] at a nursing home when he has incidental finding of serum sodium of 114 mmol.  Patient has history of chronic kidney disease, hyperkalemia, paranoid schizophrenia, type 2 diabetes.  Patient had poor p.o. intake with nausea and vomiting.  Hospital course remarkable for IV fluid administration.  His serum sodium has improved but still very low.  Last night he has PEA, requiring endotracheal intubation.  He is currently intubated and sedated.  Repeat chest x-ray consistent with pulmonary edema.    He has responded to intravenous diuretics and has improvement of serum sodium as well.    Home Meds:  Medications Prior to Admission   Medication Sig Dispense Refill Last Dose   • albuterol sulfate  (90 Base) MCG/ACT inhaler Inhale 2 puffs 3 (Three) Times a Day As Needed (copd).   8/5/2020 at Unknown time   • aspirin 325 MG tablet Take 325 mg by mouth Daily.   8/5/2020 at Unknown time   • busPIRone (BUSPAR) 5 MG tablet Take 5 mg by mouth 3 (Three) Times a Day.   8/5/2020 at Unknown time   • calcium polycarbophil (FIBERCON) 625 MG tablet Take 625 mg by mouth 2 (Two) Times a Day.   8/5/2020 at Unknown time    • celecoxib (CeleBREX) 200 MG capsule Take 200 mg by mouth Daily.   8/5/2020 at Unknown time   • Cholecalciferol (VITAMIN D3) 125 MCG (5000 UT) capsule capsule Take 5,000 Units by mouth Daily.   8/5/2020 at Unknown time   • citalopram (CeleXA) 20 MG tablet Take 20 mg by mouth Daily.   8/5/2020 at Unknown time   • dicyclomine (BENTYL) 20 MG tablet Take 20 mg by mouth 3 (Three) Times a Day.   8/5/2020 at Unknown time   • diphenhydrAMINE (BENADRYL) 50 MG capsule Take 50 mg by mouth At Night As Needed for Sleep.   Past Week at Unknown time   • finasteride (PROSCAR) 5 MG tablet Take 5 mg by mouth Daily.   8/5/2020 at Unknown time   • fluticasone (FLONASE) 50 MCG/ACT nasal spray 1 spray into the nostril(s) as directed by provider Daily.   8/5/2020 at Unknown time   • folic acid (FOLVITE) 1 MG tablet Take 1 mg by mouth Daily.   8/5/2020 at Unknown time   • furosemide (LASIX) 20 MG tablet Take 20 mg by mouth Daily.   8/5/2020 at Unknown time   • haloperidol (HALDOL) 5 MG tablet Take 7.5 mg by mouth every night at bedtime.   8/5/2020 at Unknown time   • HYDROcodone-acetaminophen (NORCO)  MG per tablet Take 1 tablet by mouth Every 8 (Eight) Hours As Needed for Moderate Pain .   8/5/2020 at Unknown time   • hydrOXYzine pamoate (VISTARIL) 50 MG capsule Take 50 mg by mouth every night at bedtime.   8/5/2020 at Unknown time   • ibuprofen (ADVIL,MOTRIN) 400 MG tablet Take 400 mg by mouth Daily As Needed for Mild Pain .   8/5/2020 at Unknown time   • insulin glargine (LANTUS) 100 UNIT/ML injection Inject 14 Units under the skin into the appropriate area as directed Every Night.   8/5/2020 at Unknown time   • lisinopril (PRINIVIL,ZESTRIL) 20 MG tablet Take 20 mg by mouth Daily.   8/5/2020 at Unknown time   • loperamide (IMODIUM) 2 MG capsule Take 2 mg by mouth 4 (Four) Times a Day As Needed for Diarrhea.   Past Month at Unknown time   • magnesium hydroxide (MILK OF MAGNESIA) 400 MG/5ML suspension Take 30 mL by mouth Daily As  Needed (constipation).   Past Month at Unknown time   • meclizine (ANTIVERT) 12.5 MG tablet Take 12.5 mg by mouth Every 4 (Four) Hours As Needed for Dizziness.   Past Week at Unknown time   • metoclopramide (REGLAN) 5 MG tablet Take 5 mg by mouth 4 (Four) Times a Day Before Meals & at Bedtime.   8/5/2020 at Unknown time   • Multiple Vitamins-Minerals (THERA-M PO) Take 1 tablet by mouth Daily.   8/5/2020 at Unknown time   • Omega-3 Fatty Acids (OMEGA-3 FISH OIL) 1000 MG capsule Take 2 g by mouth 2 (Two) Times a Day.   8/5/2020 at Unknown time   • omeprazole (priLOSEC) 20 MG capsule Take 20 mg by mouth Daily.   8/5/2020 at Unknown time   • risperiDONE (risperDAL M-TABS) 0.5 MG disintegrating tablet Place 0.5 mg on the tongue 2 (Two) Times a Day.   8/5/2020 at Unknown time   • rOPINIRole (REQUIP) 2 MG tablet Take 2 mg by mouth Every Night.   8/5/2020 at Unknown time   • sennosides-docusate (senna-docusate sodium) 8.6-50 MG per tablet Take 1 tablet by mouth At Night As Needed for Constipation.   Past Week at Unknown time   • simvastatin (ZOCOR) 20 MG tablet Take 20 mg by mouth Every Night.   8/5/2020 at Unknown time   • tamsulosin (FLOMAX) 0.4 MG capsule 24 hr capsule Take 1 capsule by mouth every night at bedtime.   8/5/2020 at Unknown time   • traZODone (DESYREL) 100 MG tablet Take 100 mg by mouth Every Night.   8/5/2020 at Unknown time   • vitamin B-12 (CYANOCOBALAMIN) 1000 MCG tablet Take 1,000 mcg by mouth Daily.   8/5/2020 at Unknown time       Medicines:  Current Facility-Administered Medications   Medication Dose Route Frequency Provider Last Rate Last Dose   • acetaminophen (TYLENOL) tablet 650 mg  650 mg Oral Q4H PRN Magdi Banegas MD       • albuterol (PROVENTIL) nebulizer solution 0.083% 2.5 mg/3mL  2.5 mg Nebulization Q6H PRN Fabricio Foy MD       • budesonide (PULMICORT) nebulizer solution 0.5 mg  0.5 mg Nebulization BID - RT Adonis Franz MD   0.25 mg at 08/08/20 0655   • bumetanide  (BUMEX) injection 1 mg  1 mg Intravenous Q12H Lev Shepard MD   1 mg at 08/08/20 0145   • dextrose (D50W) 25 g/ 50mL Intravenous Solution 25 g  25 g Intravenous Q15 Min PRN Magdi Banegas MD       • dextrose (GLUTOSE) oral gel 15 g  15 g Oral Q15 Min PRN Magdi Banegas MD       • famotidine (PEPCID) injection 20 mg  20 mg Intravenous Daily Tripp Bruner MD       • glucagon (human recombinant) (GLUCAGEN DIAGNOSTIC) injection 1 mg  1 mg Subcutaneous Q15 Min PRN Madgi Banegas MD       • hydrALAZINE (APRESOLINE) injection 10 mg  10 mg Intravenous Q6H PRN Fabricio Foy MD       • insulin lispro (humaLOG) injection 0-9 Units  0-9 Units Subcutaneous Q6H Tripp Bruner MD       • ipratropium-albuterol (DUO-NEB) nebulizer solution 3 mL  3 mL Nebulization 4x Daily - RT Magdi Banegas MD   3 mL at 08/08/20 0655   • methylPREDNISolone sodium succinate (SOLU-Medrol) injection 40 mg  40 mg Intravenous Q6H Adonis Franz MD   40 mg at 08/08/20 0336   • Morphine sulfate (PF) injection 2 mg  2 mg Intravenous Q2H PRN Fabricio Foy MD   2 mg at 08/08/20 0542   • nicotine (NICODERM CQ) 14 MG/24HR patch 1 patch  1 patch Transdermal Q24H Magdi Banegas MD   1 patch at 08/07/20 1850   • ondansetron (ZOFRAN) injection 4 mg  4 mg Intravenous Q6H PRN Magdi Banegas MD   4 mg at 08/06/20 0731   • piperacillin-tazobactam (ZOSYN) 3.375 g in iso-osmotic dextrose 50 ml (premix)  3.375 g Intravenous Q8H Fabricio Foy MD   3.375 g at 08/08/20 0337   • propofol (DIPRIVAN) infusion 10 mg/mL 100 mL  5-50 mcg/kg/min Intravenous Titrated Fabricio Foy MD 29.2 mL/hr at 08/08/20 0643 50 mcg/kg/min at 08/08/20 0643   • Sodium Chloride 0.45% 975 mL with Sodium bicarbonate 8.4% 75 mEq infusion total 1000 mL   Intravenous Continuous Lev Shepard MD 70 mL/hr at 08/08/20 0327     • sodium chloride 0.9 % flush 10 mL  10 mL Intravenous Q12H Bassam  Magdi LAND MD   10 mL at 08/07/20 2001   • sodium chloride 0.9 % flush 10 mL  10 mL Intravenous PRN Magdi Banegas MD           Past Medical History:  Past Medical History:   Diagnosis Date   • Diabetes (CMS/HCC)    • Schizo affective schizophrenia (CMS/HCC)        Past Surgical History:  History reviewed. No pertinent surgical history.    Family History  History reviewed. No pertinent family history.    Social History  Social History     Socioeconomic History   • Marital status: Single     Spouse name: Not on file   • Number of children: Not on file   • Years of education: Not on file   • Highest education level: Not on file         Review of Systems:  Review of system is unobtainable as he is intubated and sedated.  Objective:  Patient Vitals for the past 24 hrs:   BP Temp Temp src Pulse Resp SpO2 Weight   08/08/20 0800 136/52 97 °F (36.1 °C) Axillary 82 25 98 % --   08/08/20 0702 -- -- -- -- 21 -- --   08/08/20 0700 126/56 -- -- 80 -- 97 % --   08/08/20 0655 -- -- -- 77 18 97 % --   08/08/20 0530 149/73 -- -- 95 -- 95 % --   08/08/20 0500 132/66 -- -- 86 -- 93 % --   08/08/20 0445 116/46 -- -- 79 -- 96 % --   08/08/20 0431 -- -- -- 87 27 99 % --   08/08/20 0430 114/99 -- -- 85 -- 95 % --   08/08/20 0415 110/50 -- -- 78 -- 94 % --   08/08/20 0400 113/48 -- -- 78 -- 95 % --   08/08/20 0345 117/49 -- -- 81 -- 96 % --   08/08/20 0330 132/53 -- -- 90 -- 96 % --   08/08/20 0324 -- 97.6 °F (36.4 °C) Rectal -- -- -- 93 kg (205 lb)   08/08/20 0315 132/65 -- -- 87 -- 100 % --   08/08/20 0300 99/48 -- -- 76 -- 99 % --   08/08/20 0245 105/50 -- -- 79 -- 98 % --   08/08/20 0230 126/54 -- -- 90 -- 99 % --   08/08/20 0215 117/51 -- -- 85 -- 93 % --   08/08/20 0200 113/86 -- -- 103 -- 91 % --   08/08/20 0145 116/49 -- -- 79 -- 95 % --   08/08/20 0130 114/48 -- -- 79 -- 95 % --   08/08/20 0115 111/47 -- -- 78 -- 94 % --   08/08/20 0100 113/46 -- -- 78 -- 94 % --   08/08/20 0045 114/67 -- -- 78 -- 96 % --   08/08/20 0000  116/48 -- -- 78 -- 97 % --   08/07/20 2350 -- 97.3 °F (36.3 °C) Axillary -- -- -- --   08/07/20 2345 122/49 -- -- 77 -- 97 % --   08/07/20 2330 118/49 -- -- 75 -- 99 % --   08/07/20 2315 120/46 -- -- 74 -- 98 % --   08/07/20 2310 -- -- -- 74 23 97 % --   08/07/20 2300 136/47 -- -- 73 -- 96 % --   08/07/20 2245 113/46 -- -- 71 -- 96 % --   08/07/20 2230 114/46 -- -- 70 -- 96 % --   08/07/20 2215 115/47 -- -- 70 -- -- --   08/07/20 2200 119/51 -- -- 68 -- 99 % --   08/07/20 2145 125/52 -- -- 66 -- 99 % --   08/07/20 2130 124/54 -- -- 65 -- 99 % --   08/07/20 2115 115/55 -- -- 63 -- 99 % --   08/07/20 2100 120/56 -- -- 63 -- 100 % --   08/07/20 2045 113/49 -- -- 62 -- 100 % --   08/07/20 2030 118/54 -- -- 64 -- 99 % --   08/07/20 2015 118/67 93.5 °F (34.2 °C) Rectal 66 -- 94 % --   08/07/20 2000 131/61 -- -- 68 -- 100 % --   08/07/20 1915 128/58 -- -- 72 -- 99 % --   08/07/20 1900 125/57 -- -- 63 -- 99 % --   08/07/20 1853 -- -- -- 64 18 96 % --   08/07/20 1840 -- -- -- 65 19 98 % --   08/07/20 1800 131/63 -- -- 70 -- 99 % --   08/07/20 1700 119/48 -- -- 81 -- 93 % --   08/07/20 1600 131/65 98.3 °F (36.8 °C) Axillary 75 -- (!) 82 % --   08/07/20 1527 -- -- -- -- 21 -- --   08/07/20 1522 -- -- -- 70 24 94 % --   08/07/20 1500 141/68 -- -- 65 -- 94 % --   08/07/20 1400 133/61 -- -- 65 -- 97 % --   08/07/20 1300 122/61 -- -- 66 -- 100 % --   08/07/20 1200 119/61 -- -- 64 20 98 % --   08/07/20 1101 128/58 -- -- 65 -- 94 % --   08/07/20 1048 -- -- -- -- 18 -- --   08/07/20 1044 -- -- -- -- 23 -- --   08/07/20 1000 139/70 -- -- 72 -- 96 % --       Intake/Output Summary (Last 24 hours) at 8/8/2020 0904  Last data filed at 8/8/2020 0800  Gross per 24 hour   Intake 3085.1 ml   Output 1450 ml   Net 1635.1 ml     General: Intubated/sedated.  HEENT: Normocephalic atraumatic/orotracheal intubation.  Neck: Supple with no JVD or carotid bruits.  Chest:  clear to auscultation bilaterally without respiratory distress  CVS: regular rate  and rhythm  Abdominal: soft, nontender, positive bowel sounds  Extremities: no cyanosis or edema  Skin: warm and dry without rash      Labs:  Results from last 7 days   Lab Units 08/08/20  0258 08/07/20  0333 08/06/20  0745   WBC 10*3/mm3 14.11* 17.42* 13.59*   HEMOGLOBIN g/dL 7.1* 7.9* 7.8*   HEMATOCRIT % 19.3* 22.9* 21.4*   PLATELETS 10*3/mm3 268 346 313         Results from last 7 days   Lab Units 08/08/20  0258 08/07/20  0744 08/07/20  0333   SODIUM mmol/L 122* 119* 117*   POTASSIUM mmol/L 4.8 5.4* 4.9   CHLORIDE mmol/L 85* 85* 84*   CO2 mmol/L 19.0* 19.0* 17.0*   BUN mg/dL 50* 49* 44*   CREATININE mg/dL 2.58* 2.59* 2.48*   CALCIUM mg/dL 7.8* 7.7* 8.2*   BILIRUBIN mg/dL 0.2  --  0.2   ALK PHOS U/L 49  --  61   ALT (SGPT) U/L 47*  --  50*   AST (SGOT) U/L 104*  --  137*   GLUCOSE mg/dL 117* 125* 116*       Radiology:   Imaging Results (Last 72 Hours)     Procedure Component Value Units Date/Time    XR Chest 1 View [[id number]] Collected:  08/07/20 0729     Updated:  08/07/20 0733    Narrative:       EXAMINATION: XR CHEST 1 VW-     8/7/2020 4:45 AM CDT     HISTORY: code blue/intubated     1 view chest x-ray compared with yesterday.     Magnified heart size.  Endotracheal tube in good position with the tip 8 cm above the digna.     Right greater than left bilateral central infiltrate compatible with  edema.     No pneumothorax.     Summary:  1. Bilateral infiltrate compatible with edema. Dominant right perihilar  pattern.  2. No pneumothorax.  3. Endotracheal tube in good position.  This report was finalized on 08/07/2020 07:30 by Dr. Jorge Alberto Montesinos MD.    US Renal Bilateral [[id number]] Collected:  08/06/20 1653     Updated:  08/06/20 1659    Narrative:       EXAMINATION: US RENAL LIMITED-     8/6/2020 4:02 PM CDT     HISTORY: Acute renal insufficiency.     Gray scale and color flow ultrasound evaluation of the kidneys.     Cortical thickness and cortical echogenicity is appropriate.  No hydronephrosis.     Right  kidney = 107 x 56 x 62 mm.  Left kidney = 113 x 51 x 66 mm.     Incidental note is made of right pleural fluid.     A 15 mm hypoechoic focus at the inferior pole of the kidney has the  appearance of a cyst though is poorly visualized due to overlying bowel  gas.  Consider CT follow-up.     Summary:  1. No sign of obstruction.  2. Symmetric kidneys with suspected 15 mm lower pole cyst on the right  side. Detail is limited by overlying bowel gas.  3. Right pleural effusion.     This report was finalized on 08/06/2020 16:55 by Dr. Jorge Alberto Montesinos MD.    XR Abdomen KUB [[id number]] Collected:  08/06/20 1157     Updated:  08/06/20 1203    Narrative:       XR ABDOMEN KUB-     Indication: Abdominal pain     Comparison: None     Findings:     Multiple loops of gas-filled dilated small bowel are present with loops  measuring up to 3.4 cm diameter. There is also mild gaseous distention  of the RIGHT colon. No visible pneumatosis. No suspicious calcifications  or evidence of mass effect. Multilevel lumbar spine degenerative change.  No acute osseous finding.       Impression:       Impression:     Multiple gas-filled dilated small bowel loops. Mild gaseous distention  of the RIGHT colon. Imaging appearance favors ileus although recommend  follow-up to exclude developing obstruction.  This report was finalized on 08/06/2020 11:59 by Dr. Jovanny Cervantes MD.    XR Chest 1 View [[id number]] Collected:  08/06/20 1136     Updated:  08/06/20 1140    Narrative:       EXAMINATION: XR CHEST 1 VW-     8/6/2020 11:13 AM CDT     HISTORY: shortness of breath     1 view chest x-ray with no comparison.     Normal heart size given the portable projection.     There is right greater than left bibasilar infiltrate or edema.     The upper lobes are clear and there is no pneumothorax.     Summary:  1. Right greater than left bibasilar infiltrate for which pneumonia is  favored over pulmonary edema.  This report was finalized on 08/06/2020 11:37 by   Jorge Alberto Montesinos MD.          Culture:  Respiratory Culture   Date Value Ref Range Status   08/06/2020   Preliminary    Scant growth (1+) Normal Respiratory Sophia: NO S.aureus/MRSA or Pseudomonas aeruginosa         Assessment   1.  HYPONATREMIA/mixed picture/improving..  2.  Recurrent hyperkalemia.  3.  Chronic kidney disease baseline.  4.  Acute kidney injury/ATN.  5.  Respiratory failure/intubated.  6.  Metabolic acidemia.  7.  Paranoid schizophrenia.  8.  Type 2 diabetes.  9.  Bibasilar infiltrates  10.  Severe anemia    Plan:  1.  IV Bumex.  2.  Transfuse as needed.  3.  Baseline iron studies.  4.  Continue to monitor electrolytes.      Lev Shepard MD  8/8/2020  09:04

## 2020-08-08 NOTE — PROGRESS NOTES
"Pharmacy Dosing Service  Automatic Renal Adjustment  Pepcid    Assessment/Action/Plan:  Based on prescribing information provided by the drug , Pepcid 20 mg IV every 12 hours, has been changed to Pepcid 20  mg PO every 24 hours. Pharmacy will continue to monitor daily and make further adjustment(s) accordingly.     Subjective:  Mohan Villatoro is a 60 y.o. male     Additional Factors Considered:  Patient disposition per documentation  Disease state or condition being treated    Objective:  Ht: 180.3 cm (71\"); Wt: 93 kg (205 lb)  Estimated Creatinine Clearance: 36.4 mL/min (A) (by C-G formula based on SCr of 2.58 mg/dL (H)).   Lab Results   Component Value Date    CREATININE 2.58 (H) 08/08/2020    CREATININE 2.59 (H) 08/07/2020    CREATININE 2.48 (H) 08/07/2020       CARLOS Mehta, PharmD  08/08/20 08:44    "

## 2020-08-08 NOTE — PLAN OF CARE
VSS. Follows commands with sedation on. Urine output adequate. Md aware of critical labs, will continue to monitor

## 2020-08-08 NOTE — PLAN OF CARE
Pt remains in restraints remains on ventilator - failed weaning trial today = went ST, respirations were 44 and oxygen levels dropped; still fighting vent at times had to increase max level on propofol to 75 rate currently 60; follows commands at times and not others; PERRLA.  Problem: Patient Care Overview  Goal: Plan of Care Review  Outcome: Ongoing (interventions implemented as appropriate)  Flowsheets (Taken 8/8/2020 1522)  Progress: no change  Plan of Care Reviewed With: other (see comments)     Problem: Fall Risk (Adult)  Goal: Identify Related Risk Factors and Signs and Symptoms  Outcome: Ongoing (interventions implemented as appropriate)     Problem: Fall Risk (Adult)  Goal: Identify Related Risk Factors and Signs and Symptoms  Outcome: Ongoing (interventions implemented as appropriate)     Problem: Fall Risk (Adult)  Goal: Absence of Fall  Outcome: Ongoing (interventions implemented as appropriate)  Flowsheets (Taken 8/8/2020 1529)  Absence of Fall: achieves outcome     Problem: Renal Failure/Kidney Injury, Acute (Adult)  Goal: Signs and Symptoms of Listed Potential Problems Will be Absent, Minimized or Managed (Renal Failure/Kidney Injury, Acute)  Outcome: Ongoing (interventions implemented as appropriate)  Flowsheets (Taken 8/8/2020 1529)  Problems Assessed (Acute Renal Failure/Kidney Injury): all  Problems Present (ARF/Kidney Injury): hypertension; situational response     Problem: Skin Injury Risk (Adult)  Goal: Skin Health and Integrity  Outcome: Ongoing (interventions implemented as appropriate)  Flowsheets (Taken 8/8/2020 1529)  Skin Health and Integrity: achieves outcome     Problem: Restraint, Nonbehavioral (Nonviolent)  Goal: Rationale and Justification  Outcome: Ongoing (interventions implemented as appropriate)  Flowsheets (Taken 8/8/2020 1529)  Rationale and Justification: prevent line/tube removal     Problem: Restraint, Nonbehavioral (Nonviolent)  Goal: Nonbehavioral (Nonviolent) Restraint:  Absence of Injury/Harm  Outcome: Ongoing (interventions implemented as appropriate)  Flowsheets (Taken 8/8/2020 1529)  Nonbehavioral (Nonviolent) Restraint: Absence of Injury/Harm: met     Problem: Restraint, Nonbehavioral (Nonviolent)  Goal: Nonbehavioral (Nonviolent) Restraint: Achievement of Discontinuation Criteria  Outcome: Ongoing (interventions implemented as appropriate)  Flowsheets (Taken 8/8/2020 1529)  Nonbehavioral (Nonviolent) Restraint: Achievement of Discontinuation Criteria: not met     Problem: Restraint, Nonbehavioral (Nonviolent)  Goal: Nonbehavioral (Nonviolent) Restraint: Preservation of Dignity and Wellbeing  Outcome: Ongoing (interventions implemented as appropriate)  Flowsheets (Taken 8/8/2020 1529)  Nonbehavioral (Nonviolent) Restraint: Preservation of Dignity and Wellbeing: met     Problem: Ventilation, Mechanical Invasive (Adult)  Goal: Signs and Symptoms of Listed Potential Problems Will be Absent, Minimized or Managed (Ventilation, Mechanical Invasive)  Outcome: Ongoing (interventions implemented as appropriate)  Flowsheets (Taken 8/8/2020 1529)  Problems Assessed (Mechanical Ventilation, Invasive): all  Problems Present (Mech Vent, Invasive): immobility; inability to wean; situational response

## 2020-08-09 NOTE — PROGRESS NOTES
Nephrology ([facility]) Progress Note      Patient:  Mohan Villatoro  YOB: 1960  Date of Service: 8/9/2020  MRN: [id number]   Acct: [id number]   Primary Care Physician: Provider, No Known  Advance Directive:   Code Status and Medical Interventions:   Ordered at: 08/06/20 0711     Code Status:    CPR     Medical Interventions (Level of Support Prior to Arrest):    Full     Admit Date: 8/6/2020       Hospital Day: 3  Referring Provider: Fabricio Foy,*      Patient personally seen and examined.  Complete chart including Consults, Notes, Operative Reports, Labs, Cardiology, and Radiology studies reviewed as able.    Chief complaint: Abnormal labs.    Subjective:  60 years old gentleman with a known history of chronic kidney disease.  Hospital from [facility] at a nursing home when he has incidental finding of serum sodium of 114 mmol.  Patient has history of chronic kidney disease, hyperkalemia, paranoid schizophrenia, type 2 diabetes.  Patient had poor p.o. intake with nausea and vomiting.  Hospital course remarkable for IV fluid administration.  His serum sodium has improved but still very low.  On August 7 he had PEA, requiring endotracheal intubation.  He is currently intubated and sedated.  Repeat chest x-ray consistent with pulmonary edema.    This morning he is still intubated/sedated and has good urine output.  His chest x-ray shows improvement of pulmonary edema but has bilateral basal infiltrate.    Home Meds:  Medications Prior to Admission   Medication Sig Dispense Refill Last Dose   • albuterol sulfate  (90 Base) MCG/ACT inhaler Inhale 2 puffs 3 (Three) Times a Day As Needed (copd).   8/5/2020 at Unknown time   • aspirin 325 MG tablet Take 325 mg by mouth Daily.   8/5/2020 at Unknown time   • busPIRone (BUSPAR) 5 MG tablet Take 5 mg by mouth 3 (Three) Times a Day.   8/5/2020 at Unknown time   • calcium polycarbophil (FIBERCON) 625 MG  tablet Take 625 mg by mouth 2 (Two) Times a Day.   8/5/2020 at Unknown time   • celecoxib (CeleBREX) 200 MG capsule Take 200 mg by mouth Daily.   8/5/2020 at Unknown time   • Cholecalciferol (VITAMIN D3) 125 MCG (5000 UT) capsule capsule Take 5,000 Units by mouth Daily.   8/5/2020 at Unknown time   • citalopram (CeleXA) 20 MG tablet Take 20 mg by mouth Daily.   8/5/2020 at Unknown time   • dicyclomine (BENTYL) 20 MG tablet Take 20 mg by mouth 3 (Three) Times a Day.   8/5/2020 at Unknown time   • diphenhydrAMINE (BENADRYL) 50 MG capsule Take 50 mg by mouth At Night As Needed for Sleep.   Past Week at Unknown time   • finasteride (PROSCAR) 5 MG tablet Take 5 mg by mouth Daily.   8/5/2020 at Unknown time   • fluticasone (FLONASE) 50 MCG/ACT nasal spray 1 spray into the nostril(s) as directed by provider Daily.   8/5/2020 at Unknown time   • folic acid (FOLVITE) 1 MG tablet Take 1 mg by mouth Daily.   8/5/2020 at Unknown time   • furosemide (LASIX) 20 MG tablet Take 20 mg by mouth Daily.   8/5/2020 at Unknown time   • haloperidol (HALDOL) 5 MG tablet Take 7.5 mg by mouth every night at bedtime.   8/5/2020 at Unknown time   • HYDROcodone-acetaminophen (NORCO)  MG per tablet Take 1 tablet by mouth Every 8 (Eight) Hours As Needed for Moderate Pain .   8/5/2020 at Unknown time   • hydrOXYzine pamoate (VISTARIL) 50 MG capsule Take 50 mg by mouth every night at bedtime.   8/5/2020 at Unknown time   • ibuprofen (ADVIL,MOTRIN) 400 MG tablet Take 400 mg by mouth Daily As Needed for Mild Pain .   8/5/2020 at Unknown time   • insulin glargine (LANTUS) 100 UNIT/ML injection Inject 14 Units under the skin into the appropriate area as directed Every Night.   8/5/2020 at Unknown time   • lisinopril (PRINIVIL,ZESTRIL) 20 MG tablet Take 20 mg by mouth Daily.   8/5/2020 at Unknown time   • loperamide (IMODIUM) 2 MG capsule Take 2 mg by mouth 4 (Four) Times a Day As Needed for Diarrhea.   Past Month at Unknown time   • magnesium  hydroxide (MILK OF MAGNESIA) 400 MG/5ML suspension Take 30 mL by mouth Daily As Needed (constipation).   Past Month at Unknown time   • meclizine (ANTIVERT) 12.5 MG tablet Take 12.5 mg by mouth Every 4 (Four) Hours As Needed for Dizziness.   Past Week at Unknown time   • metoclopramide (REGLAN) 5 MG tablet Take 5 mg by mouth 4 (Four) Times a Day Before Meals & at Bedtime.   8/5/2020 at Unknown time   • Multiple Vitamins-Minerals (THERA-M PO) Take 1 tablet by mouth Daily.   8/5/2020 at Unknown time   • Omega-3 Fatty Acids (OMEGA-3 FISH OIL) 1000 MG capsule Take 2 g by mouth 2 (Two) Times a Day.   8/5/2020 at Unknown time   • omeprazole (priLOSEC) 20 MG capsule Take 20 mg by mouth Daily.   8/5/2020 at Unknown time   • risperiDONE (risperDAL M-TABS) 0.5 MG disintegrating tablet Place 0.5 mg on the tongue 2 (Two) Times a Day.   8/5/2020 at Unknown time   • rOPINIRole (REQUIP) 2 MG tablet Take 2 mg by mouth Every Night.   8/5/2020 at Unknown time   • sennosides-docusate (senna-docusate sodium) 8.6-50 MG per tablet Take 1 tablet by mouth At Night As Needed for Constipation.   Past Week at Unknown time   • simvastatin (ZOCOR) 20 MG tablet Take 20 mg by mouth Every Night.   8/5/2020 at Unknown time   • tamsulosin (FLOMAX) 0.4 MG capsule 24 hr capsule Take 1 capsule by mouth every night at bedtime.   8/5/2020 at Unknown time   • traZODone (DESYREL) 100 MG tablet Take 100 mg by mouth Every Night.   8/5/2020 at Unknown time   • vitamin B-12 (CYANOCOBALAMIN) 1000 MCG tablet Take 1,000 mcg by mouth Daily.   8/5/2020 at Unknown time       Medicines:  Current Facility-Administered Medications   Medication Dose Route Frequency Provider Last Rate Last Dose   • acetaminophen (TYLENOL) tablet 650 mg  650 mg Oral Q4H PRN Magdi Banegas MD       • albuterol (PROVENTIL) nebulizer solution 0.083% 2.5 mg/3mL  2.5 mg Nebulization Q6H PRN Fabricio Foy MD       • budesonide (PULMICORT) nebulizer solution 0.5 mg  0.5 mg  Nebulization BID - RT Adonis Franz MD   0.25 mg at 08/09/20 0721   • bumetanide (BUMEX) injection 1 mg  1 mg Intravenous Q12H Lev Shepard MD   1 mg at 08/09/20 0054   • dextrose (D50W) 25 g/ 50mL Intravenous Solution 25 g  25 g Intravenous Q15 Min PRN Magdi Banegas MD       • dextrose (GLUTOSE) oral gel 15 g  15 g Oral Q15 Min PRN Magdi Banegas MD       • famotidine (PEPCID) injection 20 mg  20 mg Intravenous Daily Tripp Bruner MD   20 mg at 08/09/20 0913   • glucagon (human recombinant) (GLUCAGEN DIAGNOSTIC) injection 1 mg  1 mg Subcutaneous Q15 Min PRN Magdi Banegas MD       • hydrALAZINE (APRESOLINE) injection 10 mg  10 mg Intravenous Q6H PRN Fabricio Foy MD       • insulin lispro (humaLOG) injection 0-9 Units  0-9 Units Subcutaneous Q6H Tripp Bruner MD   2 Units at 08/09/20 0554   • ipratropium-albuterol (DUO-NEB) nebulizer solution 3 mL  3 mL Nebulization 4x Daily - RT Magdi Banegas MD   3 mL at 08/09/20 0721   • methylPREDNISolone sodium succinate (SOLU-Medrol) injection 40 mg  40 mg Intravenous Q6H Adonis Franz MD   40 mg at 08/09/20 0357   • Morphine sulfate (PF) injection 2 mg  2 mg Intravenous Q2H PRN Fabricio Foy MD   2 mg at 08/08/20 0542   • nicotine (NICODERM CQ) 14 MG/24HR patch 1 patch  1 patch Transdermal Q24H Magdi Banegas MD   1 patch at 08/09/20 0913   • ondansetron (ZOFRAN) injection 4 mg  4 mg Intravenous Q6H PRN Magdi Banegas MD   4 mg at 08/06/20 0731   • piperacillin-tazobactam (ZOSYN) 3.375 g in iso-osmotic dextrose 50 ml (premix)  3.375 g Intravenous Q8H Fabricio Foy MD   3.375 g at 08/09/20 0357   • propofol (DIPRIVAN) infusion 10 mg/mL 100 mL  5-75 mcg/kg/min Intravenous Titrated Tripp Bruner MD 39.1 mL/hr at 08/09/20 0739 70 mcg/kg/min at 08/09/20 0739   • sodium chloride 0.9 % flush 10 mL  10 mL Intravenous Q12H Magdi Banegas MD   10 mL at 08/09/20 0913    • sodium chloride 0.9 % flush 10 mL  10 mL Intravenous PRN Magdi Banegas MD           Past Medical History:  Past Medical History:   Diagnosis Date   • Diabetes (CMS/HCC)    • Schizo affective schizophrenia (CMS/HCC)        Past Surgical History:  History reviewed. No pertinent surgical history.    Family History  History reviewed. No pertinent family history.    Social History  Social History     Socioeconomic History   • Marital status: Single     Spouse name: Not on file   • Number of children: Not on file   • Years of education: Not on file   • Highest education level: Not on file         Review of Systems:  Review of system is unobtainable as he is intubated and sedated.  Objective:  Patient Vitals for the past 24 hrs:   BP Temp Temp src Pulse Resp SpO2 Weight   08/09/20 0900 139/82 -- -- 81 -- 97 % --   08/09/20 0800 148/63 98.4 °F (36.9 °C) Axillary 72 18 97 % --   08/09/20 0730 -- -- -- -- 18 -- --   08/09/20 0721 -- -- -- 71 18 98 % --   08/09/20 0700 152/64 -- -- 70 -- 99 % --   08/09/20 0630 154/68 -- -- 70 -- 98 % --   08/09/20 0600 152/65 -- -- 69 -- 100 % --   08/09/20 0530 146/67 -- -- 68 -- 99 % --   08/09/20 0500 141/62 -- -- 69 -- 100 % --   08/09/20 0430 137/59 -- -- 69 -- 100 % --   08/09/20 0400 138/58 97.6 °F (36.4 °C) Axillary 69 -- 100 % --   08/09/20 0330 135/60 -- -- 69 -- 99 % --   08/09/20 0300 132/61 -- -- 69 -- 99 % --   08/09/20 0230 135/58 -- -- 71 -- 99 % --   08/09/20 0210 -- -- -- -- -- -- 92.7 kg (204 lb 6.4 oz)   08/09/20 0200 132/60 -- -- 78 -- 98 % --   08/09/20 0130 134/56 -- -- 74 -- 97 % --   08/09/20 0100 128/52 -- -- 74 -- 97 % --   08/09/20 0030 125/50 -- -- 74 -- 96 % --   08/09/20 0000 133/51 -- -- 81 -- 95 % --   08/08/20 2340 -- 97.4 °F (36.3 °C) Axillary -- -- -- --   08/08/20 2330 132/54 -- -- 79 -- 96 % --   08/08/20 2300 123/54 -- -- 72 -- 95 % --   08/08/20 2230 123/52 -- -- 70 -- 95 % --   08/08/20 2200 128/56 -- -- 71 -- 95 % --   08/08/20 2130 122/57  -- -- 72 -- 94 % --   08/08/20 2100 128/56 -- -- 70 -- 94 % --   08/08/20 2030 130/59 -- -- 68 -- 94 % --   08/08/20 2000 133/62 -- -- 70 -- 94 % --   08/08/20 1950 -- 96 °F (35.6 °C) Axillary -- -- -- --   08/08/20 1934 -- -- -- 66 -- 98 % --   08/08/20 1927 -- -- -- 66 18 98 % --   08/08/20 1900 139/63 -- -- 69 -- 98 % --   08/08/20 1800 137/62 -- -- 68 -- 98 % --   08/08/20 1700 127/56 -- -- 68 -- 96 % --   08/08/20 1600 133/56 97.9 °F (36.6 °C) Axillary 75 23 93 % --   08/08/20 1500 144/67 -- -- 74 -- 97 % --   08/08/20 1440 -- -- -- -- 26 -- --   08/08/20 1433 -- -- -- 74 22 96 % --   08/08/20 1400 148/66 -- -- 72 -- 96 % --   08/08/20 1300 159/65 -- -- 81 -- 93 % --   08/08/20 1200 128/53 98 °F (36.7 °C) Axillary 74 21 94 % --   08/08/20 1100 135/58 -- -- 81 -- 92 % --   08/08/20 1037 -- -- -- -- 26 -- --   08/08/20 1030 -- -- -- 100 27 94 % --   08/08/20 1024 -- -- -- 112 -- (!) 86 % --   08/08/20 1023 -- -- -- 112 -- (!) 88 % --   08/08/20 1005 142/58 -- -- 78 -- 91 % --   08/08/20 1000 -- -- -- 82 -- 99 % --       Intake/Output Summary (Last 24 hours) at 8/9/2020 0918  Last data filed at 8/9/2020 0800  Gross per 24 hour   Intake 1001 ml   Output 1525 ml   Net -524 ml     General: Intubated/sedated.  HEENT: Normocephalic atraumatic/orotracheal intubation.  Neck: Supple with no JVD or carotid bruits.  Chest:  clear to auscultation bilaterally without respiratory distress  CVS: regular rate and rhythm  Abdominal: soft, nontender, positive bowel sounds  Extremities: no cyanosis or edema  Skin: warm and dry without rash      Labs:  Results from last 7 days   Lab Units 08/09/20  0243 08/08/20  0258 08/07/20  0333   WBC 10*3/mm3 12.57* 14.11* 17.42*   HEMOGLOBIN g/dL 7.2* 7.1* 7.9*   HEMATOCRIT % 19.7* 19.3* 22.9*   PLATELETS 10*3/mm3 308 268 346         Results from last 7 days   Lab Units 08/09/20  0243 08/08/20  0258 08/07/20  0744 08/07/20  0333   SODIUM mmol/L 123* 122* 119* 117*   POTASSIUM mmol/L 4.7 4.8  5.4* 4.9   CHLORIDE mmol/L 85* 85* 85* 84*   CO2 mmol/L 21.0* 19.0* 19.0* 17.0*   BUN mg/dL 56* 50* 49* 44*   CREATININE mg/dL 2.76* 2.58* 2.59* 2.48*   CALCIUM mg/dL 7.8* 7.8* 7.7* 8.2*   BILIRUBIN mg/dL 0.2 0.2  --  0.2   ALK PHOS U/L 43 49  --  61   ALT (SGPT) U/L 43* 47*  --  50*   AST (SGOT) U/L 64* 104*  --  137*   GLUCOSE mg/dL 136* 117* 125* 116*       Radiology:   Imaging Results (Last 72 Hours)     Procedure Component Value Units Date/Time    XR Chest 1 View [[id number]] Collected:  08/09/20 0750     Updated:  08/09/20 0754    Narrative:       EXAMINATION:  XR CHEST 1 VW-  8/9/2020 5:10 AM CDT     HISTORY: Intubated. Bilateral infiltrates.     COMPARISON: 8/7/2020.     FINDINGS:  Bilateral infiltrates are not significantly changed. The  infiltrates are greater on the right. There are small bilateral pleural  effusions. There is old granulomatous disease. The heart size remains  normal. Endotracheal tube tip is at the T3-4 level.       Impression:       1. Bilateral infiltrates may represent pneumonia or pulmonary edema.  2. Small bilateral pleural effusions.        This report was finalized on 08/09/2020 07:51 by Dr. Daryl Rios MD.    XR Chest 1 View [[id number]] Collected:  08/07/20 0729     Updated:  08/07/20 0733    Narrative:       EXAMINATION: XR CHEST 1 VW-     8/7/2020 4:45 AM CDT     HISTORY: code blue/intubated     1 view chest x-ray compared with yesterday.     Magnified heart size.  Endotracheal tube in good position with the tip 8 cm above the digna.     Right greater than left bilateral central infiltrate compatible with  edema.     No pneumothorax.     Summary:  1. Bilateral infiltrate compatible with edema. Dominant right perihilar  pattern.  2. No pneumothorax.  3. Endotracheal tube in good position.  This report was finalized on 08/07/2020 07:30 by Dr. Jorge Alberto Montesinos MD.    US Renal Bilateral [[id number]] Collected:  08/06/20 1653     Updated:  08/06/20 1659    Narrative:        EXAMINATION: US RENAL LIMITED-     8/6/2020 4:02 PM CDT     HISTORY: Acute renal insufficiency.     Gray scale and color flow ultrasound evaluation of the kidneys.     Cortical thickness and cortical echogenicity is appropriate.  No hydronephrosis.     Right kidney = 107 x 56 x 62 mm.  Left kidney = 113 x 51 x 66 mm.     Incidental note is made of right pleural fluid.     A 15 mm hypoechoic focus at the inferior pole of the kidney has the  appearance of a cyst though is poorly visualized due to overlying bowel  gas.  Consider CT follow-up.     Summary:  1. No sign of obstruction.  2. Symmetric kidneys with suspected 15 mm lower pole cyst on the right  side. Detail is limited by overlying bowel gas.  3. Right pleural effusion.     This report was finalized on 08/06/2020 16:55 by Dr. Jorge Alberto Montesinos MD.    XR Abdomen KUB [[id number]] Collected:  08/06/20 1157     Updated:  08/06/20 1203    Narrative:       XR ABDOMEN KUB-     Indication: Abdominal pain     Comparison: None     Findings:     Multiple loops of gas-filled dilated small bowel are present with loops  measuring up to 3.4 cm diameter. There is also mild gaseous distention  of the RIGHT colon. No visible pneumatosis. No suspicious calcifications  or evidence of mass effect. Multilevel lumbar spine degenerative change.  No acute osseous finding.       Impression:       Impression:     Multiple gas-filled dilated small bowel loops. Mild gaseous distention  of the RIGHT colon. Imaging appearance favors ileus although recommend  follow-up to exclude developing obstruction.  This report was finalized on 08/06/2020 11:59 by Dr. Jovanny Cervantes MD.    XR Chest 1 View [[id number]] Collected:  08/06/20 1136     Updated:  08/06/20 1140    Narrative:       EXAMINATION: XR CHEST 1 VW-     8/6/2020 11:13 AM CDT     HISTORY: shortness of breath     1 view chest x-ray with no comparison.     Normal heart size given the portable projection.     There is right greater than left  bibasilar infiltrate or edema.     The upper lobes are clear and there is no pneumothorax.     Summary:  1. Right greater than left bibasilar infiltrate for which pneumonia is  favored over pulmonary edema.  This report was finalized on 08/06/2020 11:37 by Dr. Jorge Alberto Montesinos MD.          Culture:  Respiratory Culture   Date Value Ref Range Status   08/06/2020   Preliminary    Scant growth (1+) Normal Respiratory Sophia: NO S.aureus/MRSA or Pseudomonas aeruginosa         Assessment   1.  HYPONATREMIA/mixed picture/improving..  2.  Recurrent hyperkalemia/improved..  3.  Chronic kidney disease baseline.  4.  Acute kidney injury/ATN.  5.  Respiratory failure/intubated.  6.  Metabolic acidemia.  7.  Paranoid schizophrenia.  8.  Type 2 diabetes.  9.  Bibasilar infiltrates  10.  Severe anemia    Plan:  1.  Change Bumex to once a day.  2.  Transfuse as needed.  3.  Intravenous Venofer.  4.  Continue to monitor electrolytes.      Lev Shepard MD  8/9/2020  09:18

## 2020-08-09 NOTE — PROGRESS NOTES
PULMONARY AND CRITICAL CARE PROGRESS NOTE - [facility]    Patient: Mohan Villatoro    1960    MR# [id number]    Acct# [id number]  08/09/20   10:26  Referring Provider: Tripp Bruner MD    Chief Complaint: Mechanically ventilated    Interval history: Patient was seen in the follow-up visit in pulmonary rounds and CCU today.  Chart reviewed and I talked with RN.  He did well and remained stable on the ventilator.  He was seen as a new pulmonary critical care consult and for ventilator management yesterday.     He presented with a history of congestive heart failure (type unknown), insulin-dependent diabetes, and paranoid schizophrenia as a transfer from [facility] secondary to hyponatremia and concern for congestive heart failure.  Patient is a nursing home resident for 12 years.  Significant hyponatremia and hyperkalemia at the time of presentation.     He reportedly had has a history of sleep apnea, but does not use a CPAP device on an outpatient basis.His appetite has been poor and that his oral intake is also been poor.  He denies being started on any new medications recently.  Denies any chest pain or chest pressure.  He states that he is not on supplemental oxygen at the nursing home facility.  Patient was administered Kayexalate at the outside facility prior to transfer.    He was admitted in the intensive care unit and placed on supplemental oxygen.  He was seen by nephrology and cardiology and he was getting Kayexalate for hyperkalemia and was actually improving but suddenly developed a pulseless seizure activity and cardiac arrest and needed intubation mechanical ventilation and is currently orally intubated and sedated propofol and is on ventilator.     I talked to patient's nurse and RT and also reviewed the chart.  He remained stable on the ventilator and is currently resting comfortably.  He has thick respiratory secretions and he gets anxious and agitated.  He is  probably in pain because he got some CPR.  He appears edematous and is currently followed by nephrology and is also getting Bumex 1 mg twice a day.  He is not on any allergy medications.  Also he is not on any routine pain medications.  He had no other acute events overnight and is afebrile.    Meds:    budesonide 0.5 mg Nebulization BID - RT   [START ON 8/10/2020] bumetanide 1 mg Intravenous Daily   famotidine 20 mg Intravenous Daily   insulin lispro 0-9 Units Subcutaneous Q6H   ipratropium-albuterol 3 mL Nebulization 4x Daily - RT   iron sucrose (VENOFER) IVPB 200 mg Intravenous Q24H   methylPREDNISolone sodium succinate 40 mg Intravenous Q6H   nicotine 1 patch Transdermal Q24H   piperacillin-tazobactam 3.375 g Intravenous Q8H   sodium chloride 10 mL Intravenous Q12H       propofol 5-75 mcg/kg/min Last Rate: 70 mcg/kg/min (08/09/20 1011)     Review of Systems:   Cannot obtain due to mechanical ventilation.  The patient notably is critically ill and connected to a ventilator.  As such patient cannot communicate and provide any history whatsoever, including any history of present illness or interval history since arrival or review of systems. The interested reviewer may note this fact, as an attempt has been made at collecting and documenting these portions of the patient history, but this information is unobtainable despite attempted review and therefore cannot be documented at this time.   Ventilator Settings:     Vt (Set, L): 0.6 L  Resp Rate (Set): 18  Pressure Support (cm H2O): 0 cm H20  FiO2 (%): 35 %  PEEP/CPAP (cm H2O): 5 cm H20  Minute Ventilation (L/min) (Obs): 10.3 L/min  Resp Rate (Observed) Vent: 19  I:E Ratio (Set): 1:1.80  I:E Ratio (Obs): 1:1.8  PIP Observed (cm H2O): 40 cm H2O     He is orally intubated with 7.5 difficulty with 22 cm at the lips.     Physical Exam:  Temp:  [96 °F (35.6 °C)-98.4 °F (36.9 °C)] 98.4 °F (36.9 °C)  Heart Rate:  [] 70  Resp:  [18-27] 26  BP: (122-159)/(50-91)  144/91  FiO2 (%):  [30 %-45 %] 35 %    Intake/Output Summary (Last 24 hours) at 8/9/2020 1026  Last data filed at 8/9/2020 0800  Gross per 24 hour   Intake 1001 ml   Output 1525 ml   Net -524 ml     SpO2 Percentage    08/09/20 0900 08/09/20 0957 08/09/20 1000   SpO2: 97% 98% 100%      Physical Exam     Constitutional: He appears well-developed and well-nourished.   Sedated middle-aged  male orally intubated on ventilator.   HENT:   Head: Normocephalic and atraumatic.   Eyes: Pupils are equal, round, and reactive to light. Conjunctivae and EOM are normal.   Neck: Normal range of motion. Neck supple. No JVD present. No tracheal deviation present. No thyromegaly present.   Cardiovascular: Normal rate, regular rhythm and normal heart sounds. Exam reveals no friction rub.   No murmur heard.  Pulmonary/Chest: No stridor. No respiratory distress. He has wheezes. He has no rales. He exhibits no tenderness.   Abdominal: Bowel sounds are normal. He exhibits no distension and no mass. There is no tenderness. There is no guarding.   Genitourinary:   Genitourinary Comments: Not examined   Musculoskeletal: Normal range of motion. He exhibits edema. He exhibits no tenderness or deformity.   Lymphadenopathy:     He has no cervical adenopathy.   Neurological: He displays normal reflexes. No cranial nerve deficit. He exhibits normal muscle tone. Coordination normal.   Patient intubated on ventilator and is sedated.  Not be assessed.   Skin: Skin is warm and dry.   Psychiatric:   Could not be assessed     Results from last 7 days   Lab Units 08/09/20  0243 08/08/20  0258 08/07/20  0333   WBC 10*3/mm3 12.57* 14.11* 17.42*   HEMOGLOBIN g/dL 7.2* 7.1* 7.9*   PLATELETS 10*3/mm3 308 268 346     Results from last 7 days   Lab Units 08/09/20  0243 08/08/20  0258 08/07/20  0744   SODIUM mmol/L 123* 122* 119*   POTASSIUM mmol/L 4.7 4.8 5.4*   BUN mg/dL 56* 50* 49*   CREATININE mg/dL 2.76* 2.58* 2.59*     Results from last 7 days   Lab  Units 08/09/20  0905 08/09/20  0134 08/08/20  0205   PH, ARTERIAL pH units 7.405 7.387 7.375   PCO2, ARTERIAL mm Hg 36.2 36.1 34.6*   PO2 ART mm Hg 94.4 52.2* 81.6*   FIO2 % 35 35 30     Respiratory Culture   Date Value Ref Range Status   08/06/2020   Final    Scant growth (1+) Normal Respiratory Sophia: NO S.aureus/MRSA or Pseudomonas aeruginosa     Recent films:  Xr Chest 1 View    Result Date: 8/9/2020  1. Bilateral infiltrates may represent pneumonia or pulmonary edema. 2. Small bilateral pleural effusions.   This report was finalized on 08/09/2020 07:51 by Dr. Daryl Rios MD.    Films reviewed personally by me.  My interpretation: Agree with radiology interpretation.    Pulmonary Assessment:  1. Acute hypoxic respiratory failure following cardiac arrest and pulseless electrical activity  2. Oral intubation currently on mechanical ventilation  3. Chronic obstructive pulmonary disease  4. Tobacco abuse  5. Acute kidney injury  6. Chronic kidney disease stage III  7. Hypernatremia and hyperkalemia  8. Anxiety depression  9. Nursing home resident  10. Paranoid schizophrenia    Recommend:   · Continue assist control ventilation, wean off sedation and start weaning trial.  He will have spontaneous breathing trial tomorrow morning and he is too restless and agitated and unable to doing the weaning trial today.  · Continue bronchodilator treatment and supportive respiratory care.  I started him on hypertonic saline nebulizer times a day.  Due to thick respiratory secretions it could be difficult for him to wean off from the ventilator.  · Renal function marginally worse and nephrology is following.  Monitor renal function electrolytes.  Nephrology is following.  As he appears edematous I would recommend to increase the Bumex to 2 mg twice a day if it is okay with nephrology.  · Cardiology following.  He may have some diastolic heart failure.  He also noted to have volume overload and appears edematous.  · Continue  DVT ulcer prophylaxis pain anxiety control.  He will be started on fentanyl drip in addition to the propofol drip.  He received CPR which may cause some pain issues so he will need some pain control in addition to the propofol drip.    · As he is not coming off the ventilator easily I would recommend to start him on nutritional support and he will need tube feeding to continue.  He may have a slow weaning from the ventilator and may need tracheostomy in the long run.  · Continue current treatment plan and supportive care DVT and ulcer prophylaxis.  · Labs as needed.  Pulmonary team will continue following him and make further recommendations.  Time spent in seeing this patient was 25 minutes.  · Daily labs and imaging studies as he remains on the ventilator.  · Continue current treatment plan supportive care and will continue following and make further recommendations.  · Time spent in seeing this patient in care unit 25 minutes.      Adonis Franz MD  Pulmonologist/Intensivist  8/9/2020 10:26    Electronically signed by Adonis Franz MD on 8/9/2020 at 10:26

## 2020-08-09 NOTE — PROGRESS NOTES
[facility] Medicine Services  INPATIENT PROGRESS NOTE    Patient Name: Mohan Villatoro  Date of Admission: 8/6/2020  Today's Date: 08/09/20  Length of Stay: 3  Primary Care Physician: Provider, No Known    Subjective   Chief Complaint: Intubated  HPI   Currently intubated and sedated  No events overnight          Review of Systems   Unable to perform ROS: Intubated        All pertinent negatives and positives are as above. All other systems have been reviewed and are negative unless otherwise stated.     Objective    Temp:  [96 °F (35.6 °C)-98.4 °F (36.9 °C)] 98.4 °F (36.9 °C)  Heart Rate:  [] 71  Resp:  [18-27] 26  BP: (122-159)/(50-82) 139/82  FiO2 (%):  [30 %-45 %] 35 %  Physical Exam   Constitutional: He appears well-developed and well-nourished. He is sedated and intubated.   HENT:   Head: Normocephalic and atraumatic.   Right Ear: External ear normal.   Left Ear: External ear normal.   Nose: Nose normal.   Mouth/Throat: Oropharynx is clear and moist.   Eyes: Pupils are equal, round, and reactive to light. Conjunctivae and EOM are normal. Right eye exhibits no discharge. Left eye exhibits no discharge. No scleral icterus.   Neck: Normal range of motion. Neck supple. No tracheal deviation present. No thyromegaly present.   Cardiovascular: Normal rate, regular rhythm, normal heart sounds and intact distal pulses. Exam reveals no gallop and no friction rub.   No murmur heard.  Pulmonary/Chest: Effort normal. No stridor. He is intubated. No respiratory distress. He has decreased breath sounds. He has no wheezes. He has no rales. He exhibits no tenderness.   Abdominal: Soft. Bowel sounds are normal. He exhibits no distension and no mass. There is no tenderness. There is no rebound and no guarding. No hernia.   Musculoskeletal: Normal range of motion. He exhibits no edema or deformity.   Lymphadenopathy:     He has no cervical adenopathy.   Neurological: He has normal  reflexes. He displays normal reflexes. No cranial nerve deficit. He exhibits normal muscle tone. Coordination normal.   Skin: Skin is warm and dry. No rash noted. No erythema. No pallor.   Vitals reviewed.          Results Review:  I have reviewed the labs, radiology results, and diagnostic studies.    Laboratory Data:   Results from last 7 days   Lab Units 08/09/20  0243 08/08/20 0258 08/07/20  0333   WBC 10*3/mm3 12.57* 14.11* 17.42*   HEMOGLOBIN g/dL 7.2* 7.1* 7.9*   HEMATOCRIT % 19.7* 19.3* 22.9*   PLATELETS 10*3/mm3 308 268 346        Results from last 7 days   Lab Units 08/09/20  0243 08/08/20 0258 08/07/20  0744 08/07/20  0333   SODIUM mmol/L 123* 122* 119* 117*   POTASSIUM mmol/L 4.7 4.8 5.4* 4.9   CHLORIDE mmol/L 85* 85* 85* 84*   CO2 mmol/L 21.0* 19.0* 19.0* 17.0*   BUN mg/dL 56* 50* 49* 44*   CREATININE mg/dL 2.76* 2.58* 2.59* 2.48*   CALCIUM mg/dL 7.8* 7.8* 7.7* 8.2*   BILIRUBIN mg/dL 0.2 0.2  --  0.2   ALK PHOS U/L 43 49  --  61   ALT (SGPT) U/L 43* 47*  --  50*   AST (SGOT) U/L 64* 104*  --  137*   GLUCOSE mg/dL 136* 117* 125* 116*       Culture Data:   No results found for: BLOODCX, URINECX, WOUNDCX, MRSACX, RESPCX, STOOLCX    Radiology Data:   Imaging Results (Last 24 Hours)     Procedure Component Value Units Date/Time    XR Chest 1 View [[id number]] Collected:  08/09/20 0750     Updated:  08/09/20 0754    Narrative:       EXAMINATION:  XR CHEST 1 VW-  8/9/2020 5:10 AM CDT     HISTORY: Intubated. Bilateral infiltrates.     COMPARISON: 8/7/2020.     FINDINGS:  Bilateral infiltrates are not significantly changed. The  infiltrates are greater on the right. There are small bilateral pleural  effusions. There is old granulomatous disease. The heart size remains  normal. Endotracheal tube tip is at the T3-4 level.       Impression:       1. Bilateral infiltrates may represent pneumonia or pulmonary edema.  2. Small bilateral pleural effusions.        This report was finalized on 08/09/2020 07:51 by   Daryl Rios MD.          I have reviewed the patient's current medications.     Assessment/Plan     Active Hospital Problems    Diagnosis   • Hyponatremia   • Hyperkalemia   • Nausea and vomiting   • Acute kidney injury (CMS/HCC)   • Hypoxia   • COPD (chronic obstructive pulmonary disease) (CMS/HCC)   • Tobacco dependence   • Anemia   • Paranoid schizophrenia (CMS/HCC)   • Essential hypertension   • Hyperlipidemia     Labs reviewed:  Sodium improved up to 123, Cr slightly worse 2.59 to 2.76    Imaging reviewed:  CXR, reported noted    CXR independently interpreted by me:  Pulmonary edema, ETT in good position          1.  Acute Hypoxic Respiratory failure  -Intubated  -pulm consulted, following  -repeat CXR in AM  -Repeat ABG in AM    2.  Cardiac Arrest  -Telemetry  -Cardiology consulted    3.  Hyperkalemia  -normalized  -monitor    4.  Hyponatremia  -Nephrology consulted, following  -Hold Psych meds    5.  COPD  -Nebs PRN  -IV steroids    6.  Anemia  -Trend H&H  -transfuse as indicated    7.  HTN  -monitor    8.  HLD  -monitor    9.  Paranoid Schizophrenia   -hold psych meds given hyponatremia    10.  Flomax  -BPH    11.  Acute renal failure  -nephrology consulted, following              Discharge Planning: continue in CCU    Electronically signed by Tripp Bruner MD, 08/09/20, 10:14.

## 2020-08-09 NOTE — PLAN OF CARE
VSS. Patient follows commands, however becomes very agitated with stimulation. Patient turned q2h. Patient critical lab values reported to Dr. Foy.

## 2020-08-10 NOTE — PROGRESS NOTES
PULMONARY AND CRITICAL CARE PROGRESS NOTE - [facility]    Patient: Mohan Villatoro    1960    MR# [id number]    Acct# [id number]  08/10/20   09:11  Referring Provider: Tripp Bruner MD    Chief Complaint: Mechanically ventilated    Interval history: He remains intubated and sedated on fentanyl and propofol.  Per nursing report, he is easily agitated when sedation is decreased.  ABGs are stable.  Chest x-ray is stable but the ET tube looks a little bit high and I will order it to be advanced slightly. He reportedly had has a history of sleep apnea, but does not use a CPAP device on an outpatient basis.  No family at bedside.    Meds:    budesonide 0.5 mg Nebulization BID - RT   bumetanide 1 mg Intravenous Daily   cetirizine 10 mg Oral Daily   famotidine 20 mg Intravenous Daily   fluticasone 2 spray Each Nare Daily   insulin lispro 0-9 Units Subcutaneous Q6H   ipratropium-albuterol 3 mL Nebulization 4x Daily - RT   iron sucrose (VENOFER) IVPB 200 mg Intravenous Q24H   methylPREDNISolone sodium succinate 40 mg Intravenous Q6H   nicotine 1 patch Transdermal Q24H   piperacillin-tazobactam 3.375 g Intravenous Q8H   sodium chloride 10 mL Intravenous Q12H   sodium chloride 4 mL Nebulization 4x Daily - RT       fentanyl 10 mcg/mL  mcg/hr Last Rate: 200 mcg/hr (08/10/20 0026)   propofol 5-75 mcg/kg/min Last Rate: 60 mcg/kg/min (08/10/20 0825)     Review of Systems:   Cannot obtain due to mechanical ventilation.  The patient notably is critically ill and connected to a ventilator.  As such patient cannot communicate and provide any history whatsoever, including any history of present illness or interval history since arrival or review of systems. The interested reviewer may note this fact, as an attempt has been made at collecting and documenting these portions of the patient history, but this information is unobtainable despite attempted review and therefore cannot be documented at  this time.   Ventilator Settings:     Vt (Set, L): 0.6 L  Resp Rate (Set): (S) 12  Pressure Support (cm H2O): 0 cm H20  FiO2 (%): 35 %  PEEP/CPAP (cm H2O): 5 cm H20  Minute Ventilation (L/min) (Obs): 10.3 L/min  Resp Rate (Observed) Vent: 15  I:E Ratio (Set): 1:1.80  I:E Ratio (Obs): 1:1.8  PIP Observed (cm H2O): 23 cm H2O     He is orally intubated with 7.5 difficulty with 22 cm at the lips.     Physical Exam:  Temp:  [93.8 °F (34.3 °C)-98.3 °F (36.8 °C)] 97 °F (36.1 °C)  Heart Rate:  [61-80] 80  Resp:  [12-26] 12  BP: (142-167)/(59-91) 150/76  FiO2 (%):  [35 %] 35 %    Intake/Output Summary (Last 24 hours) at 8/10/2020 0911  Last data filed at 8/10/2020 0842  Gross per 24 hour   Intake 1259.1 ml   Output 1950 ml   Net -690.9 ml     SpO2 Percentage    08/10/20 0707 08/10/20 0719 08/10/20 0800   SpO2: 100% 100% 96%      Physical Exam     Constitutional: He appears well-developed and well-nourished.   Sedated middle-aged  male orally intubated on ventilator.   HENT:   Head: Normocephalic and atraumatic.   Eyes: Pupils are equal, round, and reactive to light. Conjunctivae and EOM are normal.   Neck: Normal range of motion. Neck supple. No JVD present. No tracheal deviation present. No thyromegaly present.   Cardiovascular: Normal rate, regular rhythm and normal heart sounds. Exam reveals no friction rub.   No murmur heard.  Pulmonary/Chest: No stridor. No respiratory distress. He has wheezes. He has no rales. He exhibits no tenderness.   Abdominal: Bowel sounds are normal. He exhibits no distension and no mass. There is no tenderness. There is no guarding.   Genitourinary:   Genitourinary Comments: Not examined   Musculoskeletal: Normal range of motion. He exhibits edema. He exhibits no tenderness or deformity.   Lymphadenopathy:     He has no cervical adenopathy.   Neurological: He displays normal reflexes. No cranial nerve deficit. He exhibits normal muscle tone. Coordination normal.   Patient intubated on  ventilator and is sedated.  Not be assessed.   Skin: Skin is warm and dry.   Psychiatric:   Could not be assessed     Results from last 7 days   Lab Units 08/10/20  0257 08/09/20  0243 08/08/20  0258   WBC 10*3/mm3 9.86 12.57* 14.11*   HEMOGLOBIN g/dL 7.4* 7.2* 7.1*   PLATELETS 10*3/mm3 289 308 268     Results from last 7 days   Lab Units 08/10/20  0257 08/09/20  0243 08/08/20  0258   SODIUM mmol/L 125* 123* 122*   POTASSIUM mmol/L 4.5 4.7 4.8   BUN mg/dL 55* 56* 50*   CREATININE mg/dL 2.50* 2.76* 2.58*     Results from last 7 days   Lab Units 08/10/20  0200 08/09/20  0905 08/09/20  0134   PH, ARTERIAL pH units 7.431 7.405 7.387   PCO2, ARTERIAL mm Hg 36.4 36.2 36.1   PO2 ART mm Hg 76.0* 94.4 52.2*   FIO2 % 35 35 35     Respiratory Culture   Date Value Ref Range Status   08/06/2020   Final    Scant growth (1+) Normal Respiratory Sophia: NO S.aureus/MRSA or Pseudomonas aeruginosa     Recent films:  Xr Chest 1 View    Result Date: 8/10/2020  Impression:  No change in appearance of the chest. This report was finalized on 08/10/2020 06:57 by Dr. Jovanny Cervantes MD.    Xr Chest 1 View    Result Date: 8/9/2020  1. Bilateral infiltrates may represent pneumonia or pulmonary edema. 2. Small bilateral pleural effusions.   This report was finalized on 08/09/2020 07:51 by Dr. Daryl Rios MD.    Films reviewed personally by me.  My interpretation: Agree with radiology interpretation.    Pulmonary Assessment:  1. Acute hypoxic respiratory failure following cardiac arrest and pulseless electrical activity  2. Chronic obstructive pulmonary disease  3. Tobacco abuse  4. Acute kidney injury  5. Chronic kidney disease stage III  6. Hyponatremia  7. Anxiety depression  8. Nursing home resident  9. Paranoid schizophrenia  10. Anemia    Recommend:   · Advance ET tube 1 to 2 cm.  Vent rate decreased to 12.  Proceed with spontaneous breathing trial this morning if he is tolerable of sedation being turned down.    · Continue bronchodilator  treatment and supportive respiratory care with hypertonic saline nebulizer for times a day as well as DuoNebs and Pulmicort.  · Gentle diuresis with Bumex per renal.  · Cardiology following.  He may have some diastolic heart failure.  He also noted to have volume overload and appears edematous.  · Nicotine patch  · Continue DVT and stress ulcer ulcer prophylaxis.  · Daily ABG and chest x-ray while on ventilator.  · Antibiotics per others.    Electronically signed by MIKEY Mosquera on 8/10/2020 at 09:11    Physician substantive portion:  Pt was sedated at time of visit. Passively breathing and I reduced RR to 16.  Unable to determine if due to alkalosis or sedation, so I did not push rate below 16.  Breath sounds diminished and even.  no accessory muscle use. Abd soft.  Plan to: Lighten sedation and try to start spontaneous breathing trials.  Cxr shows basilar opacity on right. Continue to follow.    I have seen and examined patient personally, performing a face-to-face diagnostic evaluation with plan of care reviewed and developed with APRN and nursing staff. I have addended and/or modified the above history of present illness, physical examination, and assessment and plan to reflect my findings and impressions. Essential elements of the care plan were discussed with APRN above.  Agree with findings and assessment/plan as documented above.    Electronically signed by David Tafoya MD, on 8/10/2020, 21:01

## 2020-08-10 NOTE — PROGRESS NOTES
Continued Stay Note   [facility]     Patient Name: Mohan Villatoro  MRN: [id number]  Today's Date: 8/10/2020    Admit Date: 8/6/2020    Discharge Plan     Row Name 08/10/20 0847       Plan    Plan  LTAC?      Patient/Family in Agreement with Plan  yes    Plan Comments  Pt remains on vent, could we consult LTAC?  Pt came here from [facility] personal care home.  Will follow.         Discharge Codes    No documentation.             ESTRELLA Biggs

## 2020-08-10 NOTE — PLAN OF CARE
VSS. Propofol and fentanyl continue. PRN morphine once. Urine output adequate. Does not follow commands consistently, continues to move all extremities. Gets easily agitated when sedation decreased/paused or with stimulation. H/H improved today, 7.4/21, BUN/CR remains elevated. .

## 2020-08-10 NOTE — PROGRESS NOTES
[facility] Medicine Services  INPATIENT PROGRESS NOTE    Patient Name: Mohan Villatoro  Date of Admission: 8/6/2020  Today's Date: 08/10/20  Length of Stay: 4  Primary Care Physician: Provider, No Known    Subjective   Chief Complaint: Intubated  HPI   Currently intubated and sedated.  Afebrile  Renal function improving, sodium improving          Review of Systems   Unable to perform ROS: Intubated        All pertinent negatives and positives are as above. All other systems have been reviewed and are negative unless otherwise stated.     Objective    Temp:  [93.8 °F (34.3 °C)-98.3 °F (36.8 °C)] 97 °F (36.1 °C)  Heart Rate:  [61-80] 74  Resp:  [12-26] 16  BP: (138-167)/(59-91) 138/65  FiO2 (%):  [35 %] 35 %  Physical Exam   Constitutional: He appears well-developed and well-nourished. He is sedated and intubated.   HENT:   Head: Normocephalic and atraumatic.   Right Ear: External ear normal.   Left Ear: External ear normal.   Nose: Nose normal.   Mouth/Throat: Oropharynx is clear and moist.   Eyes: Pupils are equal, round, and reactive to light. Conjunctivae and EOM are normal. Right eye exhibits no discharge. Left eye exhibits no discharge. No scleral icterus.   Neck: Normal range of motion. Neck supple. No tracheal deviation present. No thyromegaly present.   Cardiovascular: Normal rate, regular rhythm, normal heart sounds and intact distal pulses. Exam reveals no gallop and no friction rub.   No murmur heard.  Pulmonary/Chest: Effort normal. No stridor. He is intubated. No respiratory distress. He has decreased breath sounds. He has no wheezes. He has no rales. He exhibits no tenderness.   Abdominal: Soft. Bowel sounds are normal. He exhibits no distension and no mass. There is no tenderness. There is no rebound and no guarding. No hernia.   Musculoskeletal: Normal range of motion. He exhibits no edema or deformity.   Lymphadenopathy:     He has no cervical adenopathy.      Neurological: He has normal reflexes. He displays normal reflexes. No cranial nerve deficit. He exhibits normal muscle tone. Coordination normal.   Skin: Skin is warm and dry. No rash noted. No erythema. No pallor.   Vitals reviewed.          Results Review:  I have reviewed the labs, radiology results, and diagnostic studies.    Laboratory Data:   Results from last 7 days   Lab Units 08/10/20  0257 08/09/20  0243 08/08/20  0258   WBC 10*3/mm3 9.86 12.57* 14.11*   HEMOGLOBIN g/dL 7.4* 7.2* 7.1*   HEMATOCRIT % 21.0* 19.7* 19.3*   PLATELETS 10*3/mm3 289 308 268        Results from last 7 days   Lab Units 08/10/20  0257 08/09/20  0243 08/08/20  0258   SODIUM mmol/L 125* 123* 122*   POTASSIUM mmol/L 4.5 4.7 4.8   CHLORIDE mmol/L 89* 85* 85*   CO2 mmol/L 23.0 21.0* 19.0*   BUN mg/dL 55* 56* 50*   CREATININE mg/dL 2.50* 2.76* 2.58*   CALCIUM mg/dL 8.0* 7.8* 7.8*   BILIRUBIN mg/dL 0.2 0.2 0.2   ALK PHOS U/L 42 43 49   ALT (SGPT) U/L 36 43* 47*   AST (SGOT) U/L 32 64* 104*   GLUCOSE mg/dL 138* 136* 117*       Culture Data:   No results found for: BLOODCX, URINECX, WOUNDCX, MRSACX, RESPCX, STOOLCX    Radiology Data:   Imaging Results (Last 24 Hours)     Procedure Component Value Units Date/Time    XR Chest 1 View [[id number]] Collected:  08/10/20 0656     Updated:  08/10/20 0700    Narrative:       XR CHEST 1 VW-     Indication: Ventilated patient     Comparison: 8/9/2020     Findings:     Endotracheal tube is 5 cm above the digna. Cardiac silhouette is  stable. Bilateral mid-lower lung pleural/parenchymal opacities are  unchanged. No visible pneumothorax. No acute osseous finding.       Impression:       Impression:     No change in appearance of the chest.  This report was finalized on 08/10/2020 06:57 by Dr. Jovanny Cervantes MD.          I have reviewed the patient's current medications.     Assessment/Plan     Active Hospital Problems    Diagnosis   • Hyponatremia   • Hyperkalemia   • Nausea and vomiting   • Acute kidney  injury (CMS/HCC)   • Hypoxia   • COPD (chronic obstructive pulmonary disease) (CMS/HCC)   • Tobacco dependence   • Anemia   • Paranoid schizophrenia (CMS/HCC)   • Essential hypertension   • Hyperlipidemia     Labs reviewed:  Sodium improved up to 125, Cr slightly better 2.5 from 2.76    Imaging reviewed:  CXR, reported noted    CXR independently interpreted by me:  Pulmonary edema, ETT in good position          1.  Acute Hypoxic Respiratory failure  -Intubated  -pulm consulted, following  -repeat CXR in AM  -Repeat ABG in AM    2.  Cardiac Arrest  -Telemetry  -Cardiology consulted    3.  Hyperkalemia  -normalized  -monitor    4.  Hyponatremia  -Nephrology consulted, following  -Hold Psych meds    5.  COPD  -Nebs PRN  -IV steroids    6.  Anemia  -Trend H&H  -transfuse as indicated    7.  HTN  -monitor    8.  HLD  -monitor    9.  Paranoid Schizophrenia   -hold psych meds given hyponatremia    10.  Flomax  -BPH    11.  Acute renal failure  -nephrology consulted, following    Consult nutrition for tube feeding recommendations          Discharge Planning: continue in CCU    Electronically signed by Tripp Bruner MD, 08/10/20, 09:30.

## 2020-08-10 NOTE — PROGRESS NOTES
Tried to get to spont breathing trail. Patient could not make it off the sedation very long approx 10 minutes. See SEBASTIAN Herring charting.

## 2020-08-10 NOTE — PLAN OF CARE
Problem: Patient Care Overview  Goal: Plan of Care Review  Outcome: Ongoing (interventions implemented as appropriate)  Flowsheets (Taken 8/10/2020 1146)  Progress: no change  Plan of Care Reviewed With: patient  Outcome Summary: Pt is NPO and on vent support. Received consult to start enteral nutrition via OG tube. Recommend to start Impact Peptide 1.5 at 15mL/hour x 8 hours then increase rate by 10mL every 4 hours as tolerated to a goal rate of 45mL/hour. Pt is also sedated with propofol which is providing 589kcal at current rate. TF with propofol kcal will meet 98% of est'd kcal needs and 99% of est'd protein needs. Will follow for TF tolerance and make changes as needed.

## 2020-08-10 NOTE — PROGRESS NOTES
Nephrology ([facility]) Progress Note      Patient:  Mohan Villatoro  YOB: 1960  Date of Service: 8/10/2020  MRN: [id number]   Acct: [id number]   Primary Care Physician: Provider, No Known  Advance Directive:   Code Status and Medical Interventions:   Ordered at: 08/06/20 0711     Code Status:    CPR     Medical Interventions (Level of Support Prior to Arrest):    Full     Admit Date: 8/6/2020       Hospital Day: 4  Referring Provider: Fabricio Foy,*      Patient personally seen and examined.  Complete chart including Consults, Notes, Operative Reports, Labs, Cardiology, and Radiology studies reviewed as able.        Subjective:  Mohan Villatoro is a 60 y.o. male  whom we were consulted for hyponatremia, hyperkalemia, acute kidney injury. Unknown baseline renal function.  History of type 2 diabetes, paranoid schizophrenia, congestive heart failure. Patient transferred from [facility] for sodium level 114. He is a nursing home resident.  Reportedly has had nausea and poor oral intake recently. He had also noticed decreased urine output. Daily NSAID use. On august 7 had PEA cardiac arrest and has remained intubated since then. Serum sodium improving slowly.    Today remains intubated and sedated. Pulmonary edema improving. Urine output nonoliguric    Allergies:  Niacin    Home Meds:  Medications Prior to Admission   Medication Sig Dispense Refill Last Dose   • albuterol sulfate  (90 Base) MCG/ACT inhaler Inhale 2 puffs 3 (Three) Times a Day As Needed (copd).   8/5/2020 at Unknown time   • aspirin 325 MG tablet Take 325 mg by mouth Daily.   8/5/2020 at Unknown time   • busPIRone (BUSPAR) 5 MG tablet Take 5 mg by mouth 3 (Three) Times a Day.   8/5/2020 at Unknown time   • calcium polycarbophil (FIBERCON) 625 MG tablet Take 625 mg by mouth 2 (Two) Times a Day.   8/5/2020 at Unknown time   • celecoxib (CeleBREX) 200 MG capsule Take 200 mg by mouth Daily.    8/5/2020 at Unknown time   • Cholecalciferol (VITAMIN D3) 125 MCG (5000 UT) capsule capsule Take 5,000 Units by mouth Daily.   8/5/2020 at Unknown time   • citalopram (CeleXA) 20 MG tablet Take 20 mg by mouth Daily.   8/5/2020 at Unknown time   • dicyclomine (BENTYL) 20 MG tablet Take 20 mg by mouth 3 (Three) Times a Day.   8/5/2020 at Unknown time   • diphenhydrAMINE (BENADRYL) 50 MG capsule Take 50 mg by mouth At Night As Needed for Sleep.   Past Week at Unknown time   • finasteride (PROSCAR) 5 MG tablet Take 5 mg by mouth Daily.   8/5/2020 at Unknown time   • fluticasone (FLONASE) 50 MCG/ACT nasal spray 1 spray into the nostril(s) as directed by provider Daily.   8/5/2020 at Unknown time   • folic acid (FOLVITE) 1 MG tablet Take 1 mg by mouth Daily.   8/5/2020 at Unknown time   • furosemide (LASIX) 20 MG tablet Take 20 mg by mouth Daily.   8/5/2020 at Unknown time   • haloperidol (HALDOL) 5 MG tablet Take 7.5 mg by mouth every night at bedtime.   8/5/2020 at Unknown time   • HYDROcodone-acetaminophen (NORCO)  MG per tablet Take 1 tablet by mouth Every 8 (Eight) Hours As Needed for Moderate Pain .   8/5/2020 at Unknown time   • hydrOXYzine pamoate (VISTARIL) 50 MG capsule Take 50 mg by mouth every night at bedtime.   8/5/2020 at Unknown time   • ibuprofen (ADVIL,MOTRIN) 400 MG tablet Take 400 mg by mouth Daily As Needed for Mild Pain .   8/5/2020 at Unknown time   • insulin glargine (LANTUS) 100 UNIT/ML injection Inject 14 Units under the skin into the appropriate area as directed Every Night.   8/5/2020 at Unknown time   • lisinopril (PRINIVIL,ZESTRIL) 20 MG tablet Take 20 mg by mouth Daily.   8/5/2020 at Unknown time   • loperamide (IMODIUM) 2 MG capsule Take 2 mg by mouth 4 (Four) Times a Day As Needed for Diarrhea.   Past Month at Unknown time   • magnesium hydroxide (MILK OF MAGNESIA) 400 MG/5ML suspension Take 30 mL by mouth Daily As Needed (constipation).   Past Month at Unknown time   • meclizine  (ANTIVERT) 12.5 MG tablet Take 12.5 mg by mouth Every 4 (Four) Hours As Needed for Dizziness.   Past Week at Unknown time   • metoclopramide (REGLAN) 5 MG tablet Take 5 mg by mouth 4 (Four) Times a Day Before Meals & at Bedtime.   8/5/2020 at Unknown time   • Multiple Vitamins-Minerals (THERA-M PO) Take 1 tablet by mouth Daily.   8/5/2020 at Unknown time   • Omega-3 Fatty Acids (OMEGA-3 FISH OIL) 1000 MG capsule Take 2 g by mouth 2 (Two) Times a Day.   8/5/2020 at Unknown time   • omeprazole (priLOSEC) 20 MG capsule Take 20 mg by mouth Daily.   8/5/2020 at Unknown time   • risperiDONE (risperDAL M-TABS) 0.5 MG disintegrating tablet Place 0.5 mg on the tongue 2 (Two) Times a Day.   8/5/2020 at Unknown time   • rOPINIRole (REQUIP) 2 MG tablet Take 2 mg by mouth Every Night.   8/5/2020 at Unknown time   • sennosides-docusate (senna-docusate sodium) 8.6-50 MG per tablet Take 1 tablet by mouth At Night As Needed for Constipation.   Past Week at Unknown time   • simvastatin (ZOCOR) 20 MG tablet Take 20 mg by mouth Every Night.   8/5/2020 at Unknown time   • tamsulosin (FLOMAX) 0.4 MG capsule 24 hr capsule Take 1 capsule by mouth every night at bedtime.   8/5/2020 at Unknown time   • traZODone (DESYREL) 100 MG tablet Take 100 mg by mouth Every Night.   8/5/2020 at Unknown time   • vitamin B-12 (CYANOCOBALAMIN) 1000 MCG tablet Take 1,000 mcg by mouth Daily.   8/5/2020 at Unknown time       Medicines:  Current Facility-Administered Medications   Medication Dose Route Frequency Provider Last Rate Last Dose   • acetaminophen (TYLENOL) tablet 650 mg  650 mg Oral Q4H PRN Magdi Banegas MD       • albuterol (PROVENTIL) nebulizer solution 0.083% 2.5 mg/3mL  2.5 mg Nebulization Q6H PRN Fabricio Foy MD       • budesonide (PULMICORT) nebulizer solution 0.5 mg  0.5 mg Nebulization BID - RT SensAdonis mckeon MD   0.5 mg at 08/10/20 0626   • bumetanide (BUMEX) injection 1 mg  1 mg Intravenous Daily Lev Shepard MD        • cetirizine (zyrTEC) tablet 10 mg  10 mg Oral Daily Adonis Franz MD       • dextrose (D50W) 25 g/ 50mL Intravenous Solution 25 g  25 g Intravenous Q15 Min PRN Magdi Banegas MD       • dextrose (GLUTOSE) oral gel 15 g  15 g Oral Q15 Min PRN Magdi Banegas MD       • famotidine (PEPCID) injection 20 mg  20 mg Intravenous Daily Tripp Bruner MD   20 mg at 08/09/20 0913   • fentaNYL citrate (PF) (SUBLIMAZE) 2,500 mcg in sodium chloride 0.9 % 250 mL (10 mcg/mL) infusion   mcg/hr Intravenous Titrated Adonis Franz MD 20 mL/hr at 08/10/20 0026 200 mcg/hr at 08/10/20 0026   • fluticasone (FLONASE) 50 MCG/ACT nasal spray 2 spray  2 spray Each Nare Daily Adonis Franz MD   2 spray at 08/09/20 1151   • glucagon (human recombinant) (GLUCAGEN DIAGNOSTIC) injection 1 mg  1 mg Subcutaneous Q15 Min PRN Magdi Banegas MD       • hydrALAZINE (APRESOLINE) injection 10 mg  10 mg Intravenous Q6H PRN Fabricio Foy MD       • insulin lispro (humaLOG) injection 0-9 Units  0-9 Units Subcutaneous Q6H Tripp Bruner MD   2 Units at 08/09/20 2357   • ipratropium-albuterol (DUO-NEB) nebulizer solution 3 mL  3 mL Nebulization 4x Daily - RT Magdi Banegas MD   3 mL at 08/10/20 0621   • iron sucrose (VENOFER) 200 mg in sodium chloride 0.9 % 100 mL IVPB  200 mg Intravenous Q24H Lev Shepard MD   200 mg at 08/09/20 1308   • methylPREDNISolone sodium succinate (SOLU-Medrol) injection 40 mg  40 mg Intravenous Q6H Adonis Franz MD   40 mg at 08/10/20 0335   • Morphine sulfate (PF) injection 2 mg  2 mg Intravenous Q2H PRN Fabricio Foy MD   2 mg at 08/09/20 1956   • nicotine (NICODERM CQ) 14 MG/24HR patch 1 patch  1 patch Transdermal Q24H Magdi Banegas MD   1 patch at 08/09/20 0913   • ondansetron (ZOFRAN) injection 4 mg  4 mg Intravenous Q6H PRN Magdi Banegas MD   4 mg at 08/06/20 0731   • piperacillin-tazobactam (ZOSYN) 3.375 g in  iso-osmotic dextrose 50 ml (premix)  3.375 g Intravenous Q8H Fabricio Foy MD   3.375 g at 08/10/20 0335   • propofol (DIPRIVAN) infusion 10 mg/mL 100 mL  5-75 mcg/kg/min Intravenous Titrated Tripp Bruner MD 27.9 mL/hr at 08/10/20 0812 50 mcg/kg/min at 08/10/20 0812   • sodium chloride 0.9 % flush 10 mL  10 mL Intravenous Q12H Magdi Banegas MD   10 mL at 08/09/20 2025   • sodium chloride 0.9 % flush 10 mL  10 mL Intravenous PRN Magdi Banegas MD       • sodium chloride 3 % nebulizer solution 4 mL  4 mL Nebulization 4x Daily - RT Adonis Franz MD   4 mL at 08/10/20 0707       Past Medical History:  Past Medical History:   Diagnosis Date   • Diabetes (CMS/HCC)    • Schizo affective schizophrenia (CMS/HCC)        Past Surgical History:  History reviewed. No pertinent surgical history.    Family History  History reviewed. No pertinent family history.    Social History  Social History     Socioeconomic History   • Marital status: Single     Spouse name: Not on file   • Number of children: Not on file   • Years of education: Not on file   • Highest education level: Not on file         Review of Systems:  Unable to obtain due to Intubated and sedated    Objective:  Patient Vitals for the past 24 hrs:   BP Temp Temp src Pulse Resp SpO2 Weight   08/10/20 0719 -- -- -- 71 12 100 % --   08/10/20 0707 -- -- -- 69 18 100 % --   08/10/20 0700 159/75 -- -- 70 -- 98 % --   08/10/20 0634 -- -- -- 73 18 100 % --   08/10/20 0630 164/78 -- -- 67 -- 100 % --   08/10/20 0626 -- -- -- 67 18 99 % --   08/10/20 0614 -- -- -- 66 18 98 % --   08/10/20 0600 167/84 -- -- 67 -- 98 % --   08/10/20 0530 159/75 -- -- 66 -- 99 % --   08/10/20 0500 154/72 -- -- 66 -- 99 % --   08/10/20 0430 149/70 -- -- 65 -- 99 % --   08/10/20 0400 142/70 -- -- 69 -- 98 % 84.6 kg (186 lb 9.6 oz)   08/10/20 0340 -- 98 °F (36.7 °C) Axillary -- -- -- --   08/10/20 0330 148/64 -- -- 66 -- 96 % --   08/10/20 0230 147/63 -- -- 67 --  95 % --   08/10/20 0200 147/59 -- -- 68 -- 94 % --   08/10/20 0130 147/60 -- -- 68 -- 94 % --   08/10/20 0100 148/59 -- -- 67 -- 95 % --   08/10/20 0030 143/59 -- -- 68 -- 94 % --   08/10/20 0005 146/59 97.6 °F (36.4 °C) Rectal 72 -- 94 % --   08/09/20 2355 -- -- -- 69 18 93 % --   08/09/20 2350 -- 97.8 °F (36.6 °C) Axillary -- -- -- --   08/09/20 2330 154/69 -- -- 68 -- 94 % --   08/09/20 2300 154/67 -- -- 67 -- 93 % --   08/09/20 2230 158/69 -- -- 66 -- 94 % --   08/09/20 2200 145/70 -- -- 68 -- 91 % --   08/09/20 2130 154/68 -- -- 64 -- 93 % --   08/09/20 2100 154/69 -- -- 63 -- 93 % --   08/09/20 2030 152/66 -- -- 61 -- 93 % --   08/09/20 2002 -- 93.8 °F (34.3 °C) Rectal -- -- -- --   08/09/20 2000 162/78 -- -- 61 18 94 % --   08/09/20 1930 163/76 -- -- 61 -- 95 % --   08/09/20 1900 166/80 -- -- 61 -- 94 % --   08/09/20 1800 161/77 -- -- 63 -- 95 % --   08/09/20 1700 158/75 -- -- 66 -- 96 % --   08/09/20 1600 154/74 98.3 °F (36.8 °C) Axillary 69 18 96 % --   08/09/20 1517 -- -- -- -- 18 -- --   08/09/20 1502 -- -- -- 69 18 95 % --   08/09/20 1500 152/70 -- -- 69 -- 95 % --   08/09/20 1400 144/71 -- -- 71 -- 94 % --   08/09/20 1300 166/81 -- -- 72 -- 93 % --   08/09/20 1200 160/78 98.1 °F (36.7 °C) Axillary 71 18 97 % --   08/09/20 1100 154/70 -- -- 73 -- 96 % --   08/09/20 1004 -- -- -- -- 26 -- --   08/09/20 1000 144/91 -- -- 70 -- 100 % --   08/09/20 0957 -- -- -- 71 18 98 % --   08/09/20 0900 139/82 -- -- 81 -- 97 % --       Intake/Output Summary (Last 24 hours) at 8/10/2020 0813  Last data filed at 8/10/2020 0400  Gross per 24 hour   Intake 1259.1 ml   Output 1600 ml   Net -340.9 ml     General: intubated and sedated   Neck: supple, no JVD  Chest:  clear to auscultation bilaterally without respiratory distress  CVS: regular rate and rhythm  Abdominal: soft, nontender, positive bowel sounds  Extremities: no cyanosis or edema  Skin: warm and dry without rash  Neuro: no focal motor deficits     Labs:  Results  from last 7 days   Lab Units 08/10/20  0257 08/09/20  0243 08/08/20 0258   WBC 10*3/mm3 9.86 12.57* 14.11*   HEMOGLOBIN g/dL 7.4* 7.2* 7.1*   HEMATOCRIT % 21.0* 19.7* 19.3*   PLATELETS 10*3/mm3 289 308 268         Results from last 7 days   Lab Units 08/10/20  0257 08/09/20  0243 08/08/20 0258   SODIUM mmol/L 125* 123* 122*   POTASSIUM mmol/L 4.5 4.7 4.8   CHLORIDE mmol/L 89* 85* 85*   CO2 mmol/L 23.0 21.0* 19.0*   BUN mg/dL 55* 56* 50*   CREATININE mg/dL 2.50* 2.76* 2.58*   CALCIUM mg/dL 8.0* 7.8* 7.8*   BILIRUBIN mg/dL 0.2 0.2 0.2   ALK PHOS U/L 42 43 49   ALT (SGPT) U/L 36 43* 47*   AST (SGOT) U/L 32 64* 104*   GLUCOSE mg/dL 138* 136* 117*       Radiology:   Imaging Results (Last 72 Hours)     Procedure Component Value Units Date/Time    XR Chest 1 View [[id number]] Collected:  08/10/20 0656     Updated:  08/10/20 0700    Narrative:       XR CHEST 1 VW-     Indication: Ventilated patient     Comparison: 8/9/2020     Findings:     Endotracheal tube is 5 cm above the digna. Cardiac silhouette is  stable. Bilateral mid-lower lung pleural/parenchymal opacities are  unchanged. No visible pneumothorax. No acute osseous finding.       Impression:       Impression:     No change in appearance of the chest.  This report was finalized on 08/10/2020 06:57 by Dr. Jovanny Cervantes MD.    XR Chest 1 View [[id number]] Collected:  08/09/20 0750     Updated:  08/09/20 0754    Narrative:       EXAMINATION:  XR CHEST 1 VW-  8/9/2020 5:10 AM CDT     HISTORY: Intubated. Bilateral infiltrates.     COMPARISON: 8/7/2020.     FINDINGS:  Bilateral infiltrates are not significantly changed. The  infiltrates are greater on the right. There are small bilateral pleural  effusions. There is old granulomatous disease. The heart size remains  normal. Endotracheal tube tip is at the T3-4 level.       Impression:       1. Bilateral infiltrates may represent pneumonia or pulmonary edema.  2. Small bilateral pleural effusions.        This report was  finalized on 08/09/2020 07:51 by Dr. Daryl Rios MD.          Culture:  Respiratory Culture   Date Value Ref Range Status   08/06/2020   Final    Scant growth (1+) Normal Respiratory Sophia: NO S.aureus/MRSA or Pseudomonas aeruginosa         Assessment   1.  Hyponatremia--slowly improving  2.  Acute kidney injury, ATN--improving  3.  Baseline chronic kidney disease, unknown stage  4.  Acute hypoxic respiratory failure--intubated  5.  Recurrent hyperkalemia--improved  6.  Metabolic acidosis--improved  7.  Type 2 diabetes  8.  Paranoid schizophrenia  9.  Anemia     Plan:  1.  Continue daily Bumex  2.  Monitor labs      Mian Koch, APRN  8/10/2020  08:13

## 2020-08-10 NOTE — PLAN OF CARE
Problem: Patient Care Overview  Goal: Plan of Care Review  Outcome: Ongoing (interventions implemented as appropriate)     VSS throughout this shift. Pt opens eyes at times and follows commands at times, although very restless/ agitated when awake. Sedation turned off this AM to attempt SBT and pt extremely agitated, sitting up in the bed, and attempting to pull at ETT. No SBT performed for this reason and sedation restarted. Pt requiring fentanyl gtt at 200mcg/hr and propofol gtt at 50mcg/kg/min for sedation at this time. Best remains in place with good urine output. OGT placed today, tube feedings started. Will monitor closely.

## 2020-08-11 NOTE — PROGRESS NOTES
[place] gurgling sounds coming from patient.  Patient had thumb wrapped on inline sx tubing,  had tube pulled out to 18 cm manish.   Advanced ET tube back to 26 cm manish and ordered repeat chest xray to verify placement and replaced tubing back out of reach.  Patient is very agitated off of sedation medication, unable to do trial.  Dr. Tafoya and Lottie Geronimo APRN aware of situation.

## 2020-08-11 NOTE — PROGRESS NOTES
[facility] Medicine Services  INPATIENT PROGRESS NOTE    Patient Name: Mohan Villatoro  Date of Admission: 8/6/2020  Today's Date: 08/11/20  Length of Stay: 5  Primary Care Physician: Provider, No Known    Subjective   Chief Complaint: Intubated  HPI   Currently intubated and sedated.  Afebrile  No agitation          Review of Systems   Unable to perform ROS: Intubated        All pertinent negatives and positives are as above. All other systems have been reviewed and are negative unless otherwise stated.     Objective    Temp:  [96 °F (35.6 °C)-97.5 °F (36.4 °C)] 96.8 °F (36 °C)  Heart Rate:  [65-88] 73  Resp:  [12-16] 16  BP: (139-163)/(58-82) 156/64  FiO2 (%):  [35 %] 35 %  Physical Exam   Constitutional: He appears well-developed and well-nourished. He is sedated and intubated.   HENT:   Head: Normocephalic and atraumatic.   Right Ear: External ear normal.   Left Ear: External ear normal.   Nose: Nose normal.   Mouth/Throat: Oropharynx is clear and moist.   Eyes: Pupils are equal, round, and reactive to light. Conjunctivae and EOM are normal. Right eye exhibits no discharge. Left eye exhibits no discharge. No scleral icterus.   Neck: Normal range of motion. Neck supple. No tracheal deviation present. No thyromegaly present.   Cardiovascular: Normal rate, regular rhythm, normal heart sounds and intact distal pulses. Exam reveals no gallop and no friction rub.   No murmur heard.  Pulmonary/Chest: Effort normal. No stridor. He is intubated. No respiratory distress. He has decreased breath sounds. He has no wheezes. He has no rales. He exhibits no tenderness.   Abdominal: Soft. Bowel sounds are normal. He exhibits no distension and no mass. There is no tenderness. There is no rebound and no guarding. No hernia.   Musculoskeletal: Normal range of motion. He exhibits no edema or deformity.   Lymphadenopathy:     He has no cervical adenopathy.   Neurological: He has normal  reflexes. He displays normal reflexes. No cranial nerve deficit. He exhibits normal muscle tone. Coordination normal.   Skin: Skin is warm and dry. No rash noted. No erythema. No pallor.   Vitals reviewed.          Results Review:  I have reviewed the labs, radiology results, and diagnostic studies.    Laboratory Data:   Results from last 7 days   Lab Units 08/10/20  0257 08/09/20  0243 08/08/20  0258   WBC 10*3/mm3 9.86 12.57* 14.11*   HEMOGLOBIN g/dL 7.4* 7.2* 7.1*   HEMATOCRIT % 21.0* 19.7* 19.3*   PLATELETS 10*3/mm3 289 308 268        Results from last 7 days   Lab Units 08/11/20  0925 08/10/20  0257 08/09/20  0243   SODIUM mmol/L 126* 125* 123*   POTASSIUM mmol/L 5.0 4.5 4.7   CHLORIDE mmol/L 90* 89* 85*   CO2 mmol/L 24.0 23.0 21.0*   BUN mg/dL 63* 55* 56*   CREATININE mg/dL 2.41* 2.50* 2.76*   CALCIUM mg/dL 8.4* 8.0* 7.8*   BILIRUBIN mg/dL 0.3 0.2 0.2   ALK PHOS U/L 40 42 43   ALT (SGPT) U/L 31 36 43*   AST (SGOT) U/L 22 32 64*   GLUCOSE mg/dL 157* 138* 136*       Culture Data:   No results found for: BLOODCX, URINECX, WOUNDCX, MRSACX, RESPCX, STOOLCX    Radiology Data:   Imaging Results (Last 24 Hours)     Procedure Component Value Units Date/Time    XR Chest 1 View [[id number]] Collected:  08/11/20 0713     Updated:  08/11/20 0718    Narrative:       XR CHEST 1 VW-     Indication: Ventilated patient     Comparison: Prior day     Findings:     Endotracheal tube is 3.5 cm above the digna.  Enteric tube terminates below the diaphragm.     Enlarged cardiac silhouette, likely stable allowing for differences in  lung volumes.  Veiling bibasilar opacities, similar to prior.  New linear RIGHT upper lung opacity.  No visible pneumothorax.       Impression:       Impression:     1.  Increased linear opacity in the RIGHT upper lobe which may represent  atelectasis.  2.  No change in veiling bibasilar opacities which may represent  layering effusions with overlying atelectasis.  This report was finalized on 08/11/2020  07:15 by Dr. Jovanny Cervantes MD.    XR Abdomen KUB [[id number]] Collected:  08/10/20 1027     Updated:  08/10/20 1031    Narrative:       XR ABDOMEN KUB- 8/10/2020 9:45 AM CDT     HISTORY: OGT placement       COMPARISON: None     FINDINGS:  Areas noted in the transverse colon. This is nonspecific.. Nasogastric  tube is present satisfactorily position..     No acute skeletal abnormality is identified.        Impression:       1. Satisfactory placement nasogastric tube.         This report was finalized on 08/10/2020 10:28 by Dr. Jeffrey Cullen MD.          I have reviewed the patient's current medications.     Assessment/Plan     Active Hospital Problems    Diagnosis   • Hyponatremia   • Hyperkalemia   • Nausea and vomiting   • Acute kidney injury (CMS/HCC)   • Hypoxia   • COPD (chronic obstructive pulmonary disease) (CMS/HCC)   • Tobacco dependence   • Anemia   • Paranoid schizophrenia (CMS/HCC)   • Essential hypertension   • Hyperlipidemia     Labs reviewed:  Sodium improved up to 127, Cr slightly better 2.5 to 2.41    Imaging reviewed:  CXR, reported noted    CXR independently interpreted by me:  Pulmonary edema, ETT in good position          1.  Acute Hypoxic Respiratory failure  -Intubated  -pulm consulted, following  -repeat CXR in AM  -Repeat ABG in AM    2.  Cardiac Arrest  -Telemetry  -Cardiology consulted    3.  Hyperkalemia  -normalized  -monitor    4.  Hyponatremia  -Nephrology consulted, following  -Hold Psych meds    5.  COPD  -Nebs PRN  -IV steroids    6.  Anemia  -Trend H&H  -transfuse as indicated    7.  HTN  -monitor    8.  HLD  -monitor    9.  Paranoid Schizophrenia   -hold psych meds given hyponatremia    10.  Flomax  -BPH    11.  Acute renal failure  -nephrology consulted, following    Consult nutrition for tube feeding recommendations          Discharge Planning: LTAC    Electronically signed by Tripp Bruner MD, 08/11/20, 10:00.

## 2020-08-11 NOTE — PROGRESS NOTES
PULMONARY AND CRITICAL CARE PROGRESS NOTE - [facility]    Patient: Mohan Villatoro    1960    MR# [id number]    Acct# [id number]  08/11/20   08:49  Referring Provider: Tripp Bruner MD    Chief Complaint: Mechanically ventilated    Interval history: He remains intubated and sedated on fentanyl and propofol.  The patient almost self extubated this AM.  CXR pending following re-advancement of tube.  Nursing reports that the patient has severe agitation with awakening trials.  ABGs with respiratory acidosis this morning.  Increased respiratory rate to 16, increased FiO2 to 40.  Chest x-ray shows increased linear opacity in the right upper lobe.  No other aggravating or alleviating factors.     Meds:    budesonide 0.5 mg Nebulization BID - RT   bumetanide 1 mg Intravenous Daily   cetirizine 10 mg Oral Daily   famotidine 20 mg Intravenous Daily   fluticasone 2 spray Each Nare Daily   insulin lispro 0-9 Units Subcutaneous Q6H   ipratropium-albuterol 3 mL Nebulization 4x Daily - RT   iron sucrose (VENOFER) IVPB 200 mg Intravenous Q24H   methylPREDNISolone sodium succinate 40 mg Intravenous Q6H   nicotine 1 patch Transdermal Q24H   piperacillin-tazobactam 3.375 g Intravenous Q8H   sodium chloride 10 mL Intravenous Q12H   sodium chloride 4 mL Nebulization 4x Daily - RT       fentanyl 10 mcg/mL  mcg/hr Last Rate: 200 mcg/hr (08/11/20 0748)   propofol 5-75 mcg/kg/min Last Rate: 55 mcg/kg/min (08/11/20 0748)     Review of Systems:   Cannot obtain due to mechanical ventilation.  The patient notably is critically ill and connected to a ventilator.  As such patient cannot communicate and provide any history whatsoever, including any history of present illness or interval history since arrival or review of systems. The interested reviewer may note this fact, as an attempt has been made at collecting and documenting these portions of the patient history, but this information is unobtainable  despite attempted review and therefore cannot be documented at this time.   Ventilator Settings:     Vt (Set, L): 0.6 L  Resp Rate (Set): 12  Pressure Support (cm H2O): 0 cm H20  FiO2 (%): 35 %  PEEP/CPAP (cm H2O): 5 cm H20  Minute Ventilation (L/min) (Obs): 9.91 L/min  Resp Rate (Observed) Vent: 16  I:E Ratio (Set): 1:2.10  I:E Ratio (Obs): 1:3.2  PIP Observed (cm H2O): 40 cm H2O      Physical Exam:  Temp:  [96 °F (35.6 °C)-97.5 °F (36.4 °C)] 96.8 °F (36 °C)  Heart Rate:  [] 76  Resp:  [12-16] 16  BP: (138-163)/(58-82) 145/58  FiO2 (%):  [35 %] 35 %    Intake/Output Summary (Last 24 hours) at 8/11/2020 0849  Last data filed at 8/11/2020 0748  Gross per 24 hour   Intake 3519.34 ml   Output 1600 ml   Net 1919.34 ml     SpO2 Percentage    08/11/20 0706 08/11/20 0721 08/11/20 0800   SpO2: 91% 97% 90%      Physical Exam     Constitutional: He appears well-developed and well-nourished.   Sedated middle-aged  male orally intubated on ventilator.   HENT:   Head: Normocephalic and atraumatic.   Eyes: Pupils are equal, round, and reactive to light. Conjunctivae and EOM are normal.   Neck: Normal range of motion. Neck supple. No JVD present. No tracheal deviation present. No thyromegaly present.   Cardiovascular: Normal rate, regular rhythm and normal heart sounds. Exam reveals no friction rub.   No murmur heard.  Pulmonary/Chest: No stridor. No respiratory distress. He has wheezes. He has no rales. He exhibits no tenderness.   Abdominal: Bowel sounds are normal. He exhibits no distension and no mass. There is no tenderness. There is no guarding.   Genitourinary:   Genitourinary Comments: Not examined   Musculoskeletal: Normal range of motion. He exhibits edema. He exhibits no tenderness or deformity.   Lymphadenopathy:     He has no cervical adenopathy.   Neurological: He displays normal reflexes. No cranial nerve deficit. He exhibits normal muscle tone. Coordination normal.   Patient intubated on ventilator  and is sedated.  Not be assessed.   Skin: Skin is warm and dry.   Psychiatric:   Could not be assessed     Results from last 7 days   Lab Units 08/10/20  0257 08/09/20  0243 08/08/20  0258   WBC 10*3/mm3 9.86 12.57* 14.11*   HEMOGLOBIN g/dL 7.4* 7.2* 7.1*   PLATELETS 10*3/mm3 289 308 268     Results from last 7 days   Lab Units 08/10/20  0257 08/09/20  0243 08/08/20  0258   SODIUM mmol/L 125* 123* 122*   POTASSIUM mmol/L 4.5 4.7 4.8   BUN mg/dL 55* 56* 50*   CREATININE mg/dL 2.50* 2.76* 2.58*     Results from last 7 days   Lab Units 08/11/20  0310 08/10/20  0200 08/09/20  0905   PH, ARTERIAL pH units 7.285* 7.431 7.405   PCO2, ARTERIAL mm Hg 54.3* 36.4 36.2   PO2 ART mm Hg 67.2* 76.0* 94.4   FIO2 % 35 35 35     Respiratory Culture   Date Value Ref Range Status   08/06/2020   Final    Scant growth (1+) Normal Respiratory Sophia: NO S.aureus/MRSA or Pseudomonas aeruginosa     Recent films:  Xr Chest 1 View    Result Date: 8/11/2020  Impression:  1.  Increased linear opacity in the RIGHT upper lobe which may represent atelectasis. 2.  No change in veiling bibasilar opacities which may represent layering effusions with overlying atelectasis. This report was finalized on 08/11/2020 07:15 by Dr. Jovanny Cervantes MD.    Xr Chest 1 View    Result Date: 8/10/2020  Impression:  No change in appearance of the chest. This report was finalized on 08/10/2020 06:57 by Dr. Jovanny Cervantes MD.    Xr Abdomen Kub    Result Date: 8/10/2020  1. Satisfactory placement nasogastric tube.   This report was finalized on 08/10/2020 10:28 by Dr. Jeffrey Cullen MD.    Films reviewed personally by me.  My interpretation: Agree with radiology interpretation.    Pulmonary Assessment:  1. Acute hypoxic respiratory failure following cardiac arrest and pulseless electrical activity  2. Chronic obstructive pulmonary disease  3. Tobacco abuse  4. Acute kidney injury  5. Chronic kidney disease stage III  6. Hyponatremia  7. Anxiety depression  8. Nursing  home resident  9. Paranoid schizophrenia  10. Anemia    Recommend:   · ETT D#5.  Increased respiratory rate to 16, increased FiO2 to 40.  · Continue bronchodilator treatment and supportive respiratory care with hypertonic saline nebulizer for times a day as well as DuoNebs and Pulmicort.  · Gentle diuresis with Bumex per renal.  · Cardiology following.  He may have some diastolic heart failure.   · Nicotine patch  · Continue DVT and stress ulcer ulcer prophylaxis.  · Daily ABG and chest x-ray while on ventilator.  · Antibiotics per others.  · Nursing reports that the patient is very agitated with sedation vacations.  Consider restarting psych meds for schizophrenia. - defer to attending   · awaiting f/u CXR after ETT advancement     Electronically signed by MIKEY Bauer on 8/11/2020 at 08:49    Physician substantive portion:  Chest x-ray shows some atelectasis in the right upper lobe.  Remains on the ventilator.  He is not awake.  We have adjusted his ventilator with increased rate given development of acidosis.  Continue treatment for heart failure and ischemic heart disease.  Breath sounds are diminished.  Wheezes present.  Continue bronchodilators.  Not ready for spontaneous breathing trials.  Continue nutrition and diuresis.  Receiving hypertonic saline for thick secretions.  We can continue that.    I have seen and examined patient personally, performing a face-to-face diagnostic evaluation with plan of care reviewed and developed with APRN and nursing staff. I have addended and/or modified the above history of present illness, physical examination, and assessment and plan to reflect my findings and impressions. Essential elements of the care plan were discussed with APRN above.  Agree with findings and assessment/plan as documented above.    Electronically signed by David Tafoya MD, on 8/11/2020, 12:49

## 2020-08-11 NOTE — PLAN OF CARE
Problem: Patient Care Overview  Goal: Plan of Care Review  Outcome: Ongoing (interventions implemented as appropriate)  Flowsheets  Taken 8/11/2020 0566  Progress: no change  Outcome Summary: Pt continues to be agitated & very restless when sedation paused. Will intermittently follow commands. VS WNL. UOP adequate. IV abx as ordered. Continue to monitor  Taken 8/11/2020 0400  Plan of Care Reviewed With: patient

## 2020-08-11 NOTE — PLAN OF CARE
Problem: Patient Care Overview  Goal: Plan of Care Review  Outcome: Ongoing (interventions implemented as appropriate)  Flowsheets (Taken 8/11/2020 3140)  Progress: no change  Plan of Care Reviewed With: other (see comments) (RN)  Outcome Summary: Arrived to complete PT eval.  Xray was obtained prior to therapy initiating care.  Pt became more agitated and started to desat.  Assisted Nsg staff to reposition pt, however RN asked PT to hold due to pt being agitated.  Will check back at a later time

## 2020-08-11 NOTE — PLAN OF CARE
Pt remains on ventilator - will still get very agitated and drop sats when stimulated despite being on 55 of propofol and 200 of fentanyl; tolerating tube feeds; at goal of 45/hr; no BM; remains in restraints - tries to pull at lines and tubes recklessly despite re-direction and other least restrictive measures; skin intact; good urine output in response to bumex.      Problem: Patient Care Overview  Goal: Plan of Care Review  Outcome: Ongoing (interventions implemented as appropriate)  Flowsheets  Taken 8/11/2020 1417 by Annel Reeves RN  Progress: no change  Taken 8/11/2020 0951 by Batsheva Taylor PT  Plan of Care Reviewed With: other (see comments) (RN)     Problem: Fall Risk (Adult)  Goal: Absence of Fall  Outcome: Ongoing (interventions implemented as appropriate)  Flowsheets (Taken 8/11/2020 1417)  Absence of Fall: achieves outcome     Problem: Renal Failure/Kidney Injury, Acute (Adult)  Goal: Signs and Symptoms of Listed Potential Problems Will be Absent, Minimized or Managed (Renal Failure/Kidney Injury, Acute)  Outcome: Ongoing (interventions implemented as appropriate)  Flowsheets (Taken 8/11/2020 1417)  Problems Assessed (Acute Renal Failure/Kidney Injury): all  Problems Present (ARF/Kidney Injury): electrolyte imbalance; fluid imbalance; hypertension; situational response     Problem: Skin Injury Risk (Adult)  Goal: Skin Health and Integrity  Outcome: Ongoing (interventions implemented as appropriate)  Flowsheets (Taken 8/11/2020 1417)  Skin Health and Integrity: achieves outcome     Problem: Restraint, Nonbehavioral (Nonviolent)  Goal: Rationale and Justification  Outcome: Ongoing (interventions implemented as appropriate)  Flowsheets (Taken 8/11/2020 1417)  Rationale and Justification: prevent line/tube removal     Problem: Restraint, Nonbehavioral (Nonviolent)  Goal: Nonbehavioral (Nonviolent) Restraint: Absence of Injury/Harm  Outcome: Ongoing (interventions implemented as  appropriate)  Flowsheets (Taken 8/11/2020 1417)  Nonbehavioral (Nonviolent) Restraint: Absence of Injury/Harm: met     Problem: Restraint, Nonbehavioral (Nonviolent)  Goal: Nonbehavioral (Nonviolent) Restraint: Achievement of Discontinuation Criteria  Outcome: Ongoing (interventions implemented as appropriate)  Flowsheets (Taken 8/11/2020 1417)  Nonbehavioral (Nonviolent) Restraint: Achievement of Discontinuation Criteria: not met     Problem: Restraint, Nonbehavioral (Nonviolent)  Goal: Nonbehavioral (Nonviolent) Restraint: Preservation of Dignity and Wellbeing  Outcome: Ongoing (interventions implemented as appropriate)  Flowsheets (Taken 8/11/2020 1417)  Nonbehavioral (Nonviolent) Restraint: Preservation of Dignity and Wellbeing: met     Problem: Ventilation, Mechanical Invasive (Adult)  Goal: Signs and Symptoms of Listed Potential Problems Will be Absent, Minimized or Managed (Ventilation, Mechanical Invasive)  Outcome: Ongoing (interventions implemented as appropriate)  Flowsheets  Taken 8/11/2020 0549 by Jigna Story RN  Problems Assessed (Mechanical Ventilation, Invasive): all  Taken 8/11/2020 1417 by Annel Reeves RN  Problems Present (Mech Vent, Invasive): immobility;inability to wean;situational response     Problem: Nutrition, Enteral (Adult)  Goal: Signs and Symptoms of Listed Potential Problems Will be Absent, Minimized or Managed (Nutrition, Enteral)  Outcome: Ongoing (interventions implemented as appropriate)  Flowsheets (Taken 8/11/2020 1417)  Problems Assessed (Enteral Nutrition): all  Problems Present (Enteral Nutrition): gastrointestinal complications

## 2020-08-11 NOTE — PROGRESS NOTES
Nephrology ([facility]) Progress Note      Patient:  Mohan Villatoro  YOB: 1960  Date of Service: 8/11/2020  MRN: [id number]   Acct: [id number]   Primary Care Physician: Provider, No Known  Advance Directive:   Code Status and Medical Interventions:   Ordered at: 08/06/20 0711     Code Status:    CPR     Medical Interventions (Level of Support Prior to Arrest):    Full     Admit Date: 8/6/2020       Hospital Day: 5  Referring Provider: Fabricio Foy,*      Patient personally seen and examined.  Complete chart including Consults, Notes, Operative Reports, Labs, Cardiology, and Radiology studies reviewed as able.        Subjective:  Mohan Villatoro is a 60 y.o. male  whom we were consulted for hyponatremia, hyperkalemia, acute kidney injury. Unknown baseline renal function.  History of type 2 diabetes, paranoid schizophrenia, congestive heart failure. Patient transferred from [facility] for sodium level 114. He is a nursing home resident.  Reportedly has had nausea and poor oral intake recently. He had also noticed decreased urine output. Daily NSAID use. On august 7 had PEA cardiac arrest and has remained intubated since then. Serum sodium improving slowly.    Today remains intubated and sedated.  Very agitated when sedation paused.  No new overnight issues. Urine output nonoliguric    Allergies:  Niacin    Home Meds:  Medications Prior to Admission   Medication Sig Dispense Refill Last Dose   • albuterol sulfate  (90 Base) MCG/ACT inhaler Inhale 2 puffs 3 (Three) Times a Day As Needed (copd).   8/5/2020 at Unknown time   • aspirin 325 MG tablet Take 325 mg by mouth Daily.   8/5/2020 at Unknown time   • busPIRone (BUSPAR) 5 MG tablet Take 5 mg by mouth 3 (Three) Times a Day.   8/5/2020 at Unknown time   • calcium polycarbophil (FIBERCON) 625 MG tablet Take 625 mg by mouth 2 (Two) Times a Day.   8/5/2020 at Unknown time   • celecoxib (CeleBREX) 200 MG  capsule Take 200 mg by mouth Daily.   8/5/2020 at Unknown time   • Cholecalciferol (VITAMIN D3) 125 MCG (5000 UT) capsule capsule Take 5,000 Units by mouth Daily.   8/5/2020 at Unknown time   • citalopram (CeleXA) 20 MG tablet Take 20 mg by mouth Daily.   8/5/2020 at Unknown time   • dicyclomine (BENTYL) 20 MG tablet Take 20 mg by mouth 3 (Three) Times a Day.   8/5/2020 at Unknown time   • diphenhydrAMINE (BENADRYL) 50 MG capsule Take 50 mg by mouth At Night As Needed for Sleep.   Past Week at Unknown time   • finasteride (PROSCAR) 5 MG tablet Take 5 mg by mouth Daily.   8/5/2020 at Unknown time   • fluticasone (FLONASE) 50 MCG/ACT nasal spray 1 spray into the nostril(s) as directed by provider Daily.   8/5/2020 at Unknown time   • folic acid (FOLVITE) 1 MG tablet Take 1 mg by mouth Daily.   8/5/2020 at Unknown time   • furosemide (LASIX) 20 MG tablet Take 20 mg by mouth Daily.   8/5/2020 at Unknown time   • haloperidol (HALDOL) 5 MG tablet Take 7.5 mg by mouth every night at bedtime.   8/5/2020 at Unknown time   • HYDROcodone-acetaminophen (NORCO)  MG per tablet Take 1 tablet by mouth Every 8 (Eight) Hours As Needed for Moderate Pain .   8/5/2020 at Unknown time   • hydrOXYzine pamoate (VISTARIL) 50 MG capsule Take 50 mg by mouth every night at bedtime.   8/5/2020 at Unknown time   • ibuprofen (ADVIL,MOTRIN) 400 MG tablet Take 400 mg by mouth Daily As Needed for Mild Pain .   8/5/2020 at Unknown time   • insulin glargine (LANTUS) 100 UNIT/ML injection Inject 14 Units under the skin into the appropriate area as directed Every Night.   8/5/2020 at Unknown time   • lisinopril (PRINIVIL,ZESTRIL) 20 MG tablet Take 20 mg by mouth Daily.   8/5/2020 at Unknown time   • loperamide (IMODIUM) 2 MG capsule Take 2 mg by mouth 4 (Four) Times a Day As Needed for Diarrhea.   Past Month at Unknown time   • magnesium hydroxide (MILK OF MAGNESIA) 400 MG/5ML suspension Take 30 mL by mouth Daily As Needed (constipation).   Past  Month at Unknown time   • meclizine (ANTIVERT) 12.5 MG tablet Take 12.5 mg by mouth Every 4 (Four) Hours As Needed for Dizziness.   Past Week at Unknown time   • metoclopramide (REGLAN) 5 MG tablet Take 5 mg by mouth 4 (Four) Times a Day Before Meals & at Bedtime.   8/5/2020 at Unknown time   • Multiple Vitamins-Minerals (THERA-M PO) Take 1 tablet by mouth Daily.   8/5/2020 at Unknown time   • Omega-3 Fatty Acids (OMEGA-3 FISH OIL) 1000 MG capsule Take 2 g by mouth 2 (Two) Times a Day.   8/5/2020 at Unknown time   • omeprazole (priLOSEC) 20 MG capsule Take 20 mg by mouth Daily.   8/5/2020 at Unknown time   • risperiDONE (risperDAL M-TABS) 0.5 MG disintegrating tablet Place 0.5 mg on the tongue 2 (Two) Times a Day.   8/5/2020 at Unknown time   • rOPINIRole (REQUIP) 2 MG tablet Take 2 mg by mouth Every Night.   8/5/2020 at Unknown time   • sennosides-docusate (senna-docusate sodium) 8.6-50 MG per tablet Take 1 tablet by mouth At Night As Needed for Constipation.   Past Week at Unknown time   • simvastatin (ZOCOR) 20 MG tablet Take 20 mg by mouth Every Night.   8/5/2020 at Unknown time   • tamsulosin (FLOMAX) 0.4 MG capsule 24 hr capsule Take 1 capsule by mouth every night at bedtime.   8/5/2020 at Unknown time   • traZODone (DESYREL) 100 MG tablet Take 100 mg by mouth Every Night.   8/5/2020 at Unknown time   • vitamin B-12 (CYANOCOBALAMIN) 1000 MCG tablet Take 1,000 mcg by mouth Daily.   8/5/2020 at Unknown time       Medicines:  Current Facility-Administered Medications   Medication Dose Route Frequency Provider Last Rate Last Dose   • acetaminophen (TYLENOL) tablet 650 mg  650 mg Oral Q4H PRN Magdi Banegas MD       • albuterol (PROVENTIL) nebulizer solution 0.083% 2.5 mg/3mL  2.5 mg Nebulization Q6H PRN Fabricio Foy MD       • budesonide (PULMICORT) nebulizer solution 0.5 mg  0.5 mg Nebulization BID - RT Adonis Franz MD   0.5 mg at 08/11/20 0707   • bumetanide (BUMEX) injection 1 mg  1 mg  Intravenous Daily Lev Shepard MD   1 mg at 08/11/20 0908   • cetirizine (zyrTEC) tablet 10 mg  10 mg Oral Daily Adonis Franz MD   10 mg at 08/11/20 0907   • dextrose (D50W) 25 g/ 50mL Intravenous Solution 25 g  25 g Intravenous Q15 Min PRN Magdi Banegas MD       • dextrose (GLUTOSE) oral gel 15 g  15 g Oral Q15 Min PRN Magdi Banegas MD       • famotidine (PEPCID) injection 20 mg  20 mg Intravenous Daily Tripp Bruner MD   20 mg at 08/11/20 0908   • fentaNYL citrate (PF) (SUBLIMAZE) 2,500 mcg in sodium chloride 0.9 % 250 mL (10 mcg/mL) infusion   mcg/hr Intravenous Titrated Adonis Franz MD 20 mL/hr at 08/11/20 0748 200 mcg/hr at 08/11/20 0748   • fluticasone (FLONASE) 50 MCG/ACT nasal spray 2 spray  2 spray Each Nare Daily Adonis Franz MD   2 spray at 08/11/20 0908   • glucagon (human recombinant) (GLUCAGEN DIAGNOSTIC) injection 1 mg  1 mg Subcutaneous Q15 Min PRN Magdi Banegas MD       • hydrALAZINE (APRESOLINE) injection 10 mg  10 mg Intravenous Q6H PRN Fabricio Foy MD       • insulin lispro (humaLOG) injection 0-9 Units  0-9 Units Subcutaneous Q6H Tripp Bruner MD   2 Units at 08/11/20 0618   • ipratropium-albuterol (DUO-NEB) nebulizer solution 3 mL  3 mL Nebulization 4x Daily - RT Magdi Banegas MD   3 mL at 08/11/20 0706   • iron sucrose (VENOFER) 200 mg in sodium chloride 0.9 % 100 mL IVPB  200 mg Intravenous Q24H Lev Shepard MD   200 mg at 08/10/20 1035   • methylPREDNISolone sodium succinate (SOLU-Medrol) injection 40 mg  40 mg Intravenous Q6H Adonis Franz MD   40 mg at 08/11/20 0915   • Morphine sulfate (PF) injection 2 mg  2 mg Intravenous Q2H PRN Fabricio Foy MD   2 mg at 08/09/20 1956   • nicotine (NICODERM CQ) 14 MG/24HR patch 1 patch  1 patch Transdermal Q24H Magdi Banegas MD   1 patch at 08/11/20 0907   • ondansetron (ZOFRAN) injection 4 mg  4 mg Intravenous Q6H PRN Magdi Banegas MD    4 mg at 08/06/20 0731   • piperacillin-tazobactam (ZOSYN) 3.375 g in iso-osmotic dextrose 50 ml (premix)  3.375 g Intravenous Q8H Fabricio Foy MD 0 mL/hr at 08/10/20 2346 3.375 g at 08/11/20 0406   • propofol (DIPRIVAN) infusion 10 mg/mL 100 mL  5-75 mcg/kg/min Intravenous Titrated Tripp Bruner MD 30.7 mL/hr at 08/11/20 0748 55 mcg/kg/min at 08/11/20 0748   • sodium chloride 0.9 % flush 10 mL  10 mL Intravenous Q12H Magdi Banegas MD   10 mL at 08/11/20 0912   • sodium chloride 0.9 % flush 10 mL  10 mL Intravenous PRN Magdi Banegas MD       • sodium chloride 3 % nebulizer solution 4 mL  4 mL Nebulization 4x Daily - RT Adonis Franz MD   4 mL at 08/11/20 0707       Past Medical History:  Past Medical History:   Diagnosis Date   • Diabetes (CMS/HCC)    • Schizo affective schizophrenia (CMS/HCC)        Past Surgical History:  History reviewed. No pertinent surgical history.    Family History  History reviewed. No pertinent family history.    Social History  Social History     Socioeconomic History   • Marital status: Single     Spouse name: Not on file   • Number of children: Not on file   • Years of education: Not on file   • Highest education level: Not on file         Review of Systems:  Unable to obtain due to Intubated and sedated    Objective:  Patient Vitals for the past 24 hrs:   BP Temp Temp src Pulse Resp SpO2 Weight   08/11/20 0900 156/64 -- -- 73 -- (!) 89 % --   08/11/20 0800 145/58 96.8 °F (36 °C) Axillary 76 16 90 % --   08/11/20 0721 -- -- -- 71 12 97 % --   08/11/20 0706 -- -- -- 81 12 91 % --   08/11/20 0700 163/80 -- -- 88 -- 92 % --   08/11/20 0600 147/70 -- -- 67 12 94 % --   08/11/20 0500 139/68 -- -- 69 12 94 % --   08/11/20 0400 141/78 96.9 °F (36.1 °C) Axillary 81 12 91 % 93.2 kg (205 lb 6 oz)   08/11/20 0345 -- -- -- 74 12 93 % --   08/11/20 0300 155/75 -- -- 70 12 92 % --   08/11/20 0200 146/71 -- -- 67 13 93 % --   08/11/20 0100 142/66 -- -- 65 13 93  % --   08/11/20 0000 156/78 96 °F (35.6 °C) Axillary 67 12 92 % --   08/10/20 2300 152/81 -- -- 67 12 93 % --   08/10/20 2235 -- -- -- 70 12 90 % --   08/10/20 2200 156/78 -- -- 66 12 93 % --   08/10/20 2101 -- -- -- 68 -- 100 % --   08/10/20 2100 160/82 -- -- 67 -- 99 % --   08/10/20 2054 -- -- -- 66 12 -- --   08/10/20 2030 -- -- -- 69 12 93 % --   08/10/20 2000 147/68 96.7 °F (35.9 °C) Axillary 71 13 92 % --   08/10/20 1900 142/69 -- -- 66 -- 93 % --   08/10/20 1800 157/77 -- -- 68 -- 94 % --   08/10/20 1730 149/73 -- -- 68 -- 94 % --   08/10/20 1700 144/70 -- -- 68 -- 94 % --   08/10/20 1630 147/70 -- -- 70 -- 93 % --   08/10/20 1600 150/71 96.8 °F (36 °C) Axillary 69 12 94 % --   08/10/20 1530 153/74 -- -- 70 -- 93 % --   08/10/20 1500 148/70 -- -- 70 -- 93 % --   08/10/20 1430 155/70 -- -- 71 -- 94 % --   08/10/20 1400 158/75 -- -- 72 -- 94 % --   08/10/20 1343 -- -- -- 69 12 99 % --   08/10/20 1335 -- -- -- 71 -- 94 % --   08/10/20 1330 -- -- -- 71 -- 97 % --   08/10/20 1328 -- -- -- 71 12 99 % --   08/10/20 1322 -- -- -- 73 13 94 % --   08/10/20 1300 149/66 -- -- 74 -- 94 % --   08/10/20 1230 150/64 -- -- 76 -- 93 % --   08/10/20 1200 149/73 97.5 °F (36.4 °C) Axillary 76 16 91 % --   08/10/20 1130 148/71 -- -- 76 -- 91 % --   08/10/20 1100 147/70 -- -- 75 -- 92 % --   08/10/20 1030 149/70 -- -- 78 -- 91 % --   08/10/20 1020 -- -- -- 78 12 98 % --   08/10/20 1013 -- -- -- 85 12 97 % --   08/10/20 1000 -- -- -- 115 -- 90 % --       Intake/Output Summary (Last 24 hours) at 8/11/2020 0944  Last data filed at 8/11/2020 0748  Gross per 24 hour   Intake 3232.34 ml   Output 1600 ml   Net 1632.34 ml     General: intubated and sedated   Neck: supple, no JVD  Chest:  clear to auscultation bilaterally without respiratory distress  CVS: regular rate and rhythm  Abdominal: soft, nontender, positive bowel sounds  Extremities: no cyanosis or edema  Skin: warm and dry without rash  Neuro: no focal motor deficits      Labs:  Results from last 7 days   Lab Units 08/10/20  0257 08/09/20  0243 08/08/20  0258   WBC 10*3/mm3 9.86 12.57* 14.11*   HEMOGLOBIN g/dL 7.4* 7.2* 7.1*   HEMATOCRIT % 21.0* 19.7* 19.3*   PLATELETS 10*3/mm3 289 308 268         Results from last 7 days   Lab Units 08/10/20  0257 08/09/20  0243 08/08/20  0258   SODIUM mmol/L 125* 123* 122*   POTASSIUM mmol/L 4.5 4.7 4.8   CHLORIDE mmol/L 89* 85* 85*   CO2 mmol/L 23.0 21.0* 19.0*   BUN mg/dL 55* 56* 50*   CREATININE mg/dL 2.50* 2.76* 2.58*   CALCIUM mg/dL 8.0* 7.8* 7.8*   BILIRUBIN mg/dL 0.2 0.2 0.2   ALK PHOS U/L 42 43 49   ALT (SGPT) U/L 36 43* 47*   AST (SGOT) U/L 32 64* 104*   GLUCOSE mg/dL 138* 136* 117*       Radiology:   Imaging Results (Last 72 Hours)     Procedure Component Value Units Date/Time    XR Chest 1 View [[id number]] Collected:  08/11/20 0713     Updated:  08/11/20 0718    Narrative:       XR CHEST 1 VW-     Indication: Ventilated patient     Comparison: Prior day     Findings:     Endotracheal tube is 3.5 cm above the digna.  Enteric tube terminates below the diaphragm.     Enlarged cardiac silhouette, likely stable allowing for differences in  lung volumes.  Veiling bibasilar opacities, similar to prior.  New linear RIGHT upper lung opacity.  No visible pneumothorax.       Impression:       Impression:     1.  Increased linear opacity in the RIGHT upper lobe which may represent  atelectasis.  2.  No change in veiling bibasilar opacities which may represent  layering effusions with overlying atelectasis.  This report was finalized on 08/11/2020 07:15 by Dr. Jovanny Cervantes MD.    XR Abdomen KUB [[id number]] Collected:  08/10/20 1027     Updated:  08/10/20 1031    Narrative:       XR ABDOMEN KUB- 8/10/2020 9:45 AM CDT     HISTORY: OGT placement       COMPARISON: None     FINDINGS:  Areas noted in the transverse colon. This is nonspecific.. Nasogastric  tube is present satisfactorily position..     No acute skeletal abnormality is identified.         Impression:       1. Satisfactory placement nasogastric tube.         This report was finalized on 08/10/2020 10:28 by Dr. Jeffrey Cullen MD.    XR Chest 1 View [[id number]] Collected:  08/10/20 0656     Updated:  08/10/20 0700    Narrative:       XR CHEST 1 VW-     Indication: Ventilated patient     Comparison: 8/9/2020     Findings:     Endotracheal tube is 5 cm above the digna. Cardiac silhouette is  stable. Bilateral mid-lower lung pleural/parenchymal opacities are  unchanged. No visible pneumothorax. No acute osseous finding.       Impression:       Impression:     No change in appearance of the chest.  This report was finalized on 08/10/2020 06:57 by Dr. Jovanny Cervantes MD.    XR Chest 1 View [[id number]] Collected:  08/09/20 0750     Updated:  08/09/20 0754    Narrative:       EXAMINATION:  XR CHEST 1 VW-  8/9/2020 5:10 AM CDT     HISTORY: Intubated. Bilateral infiltrates.     COMPARISON: 8/7/2020.     FINDINGS:  Bilateral infiltrates are not significantly changed. The  infiltrates are greater on the right. There are small bilateral pleural  effusions. There is old granulomatous disease. The heart size remains  normal. Endotracheal tube tip is at the T3-4 level.       Impression:       1. Bilateral infiltrates may represent pneumonia or pulmonary edema.  2. Small bilateral pleural effusions.        This report was finalized on 08/09/2020 07:51 by Dr. Daryl Rios MD.          Culture:  Respiratory Culture   Date Value Ref Range Status   08/06/2020   Final    Scant growth (1+) Normal Respiratory Sophia: NO S.aureus/MRSA or Pseudomonas aeruginosa         Assessment   1.  Hyponatremia--slowly improving  2.  Acute kidney injury, ATN--improving  3.  Baseline chronic kidney disease, unknown stage  4.  Acute hypoxic respiratory failure--intubated  5.  Recurrent hyperkalemia--improved  6.  Metabolic acidosis--improved  7.  Type 2 diabetes  8.  Paranoid schizophrenia  9.  Anemia     Plan:  1.  No AM labs--will  obtain CMP now  2.  Continue IV Bumex       Mian Koch, APRN  8/11/2020  09:44

## 2020-08-11 NOTE — PROGRESS NOTES
Continued Stay Note   [place]     Patient Name: Mohan Villatoro  MRN: [id number]  Today's Date: 8/11/2020    Admit Date: 8/6/2020    Discharge Plan     Row Name 08/11/20 1146       Plan    Plan  LTAC referral    Patient/Family in Agreement with Plan  yes    Plan Comments  LTAC referral received.  NIRMALA spoke with patient's sister, Jazmin Arzate, regarding referral.  Jazmin Arzate states she is in agreement with referral to LTAC/[facility].  Informed Chantel with [facility] admissions of referral.        Discharge Codes    No documentation.             MILKA RizoW

## 2020-08-12 NOTE — PAYOR COMM NOTE
"Mohan Huff (60 y.o. Male) [id number]   D/C notification    [facility]    Pt d/c today  akiko phone    Fax         Date of Birth Social Security Number Address Home Phone MRN    1960  [address] [place] [address] [phone] [id number]    Yazdanism Marital Status          Bahai Single       Admission Date Admission Type Admitting Provider Attending Provider Department, Room/Bed    8/6/20 Urgent Tripp Bruner MD  [facility] CARDIAC CARE, C006/1    Discharge Date Discharge Disposition Discharge Destination        8/12/2020 Skilled Nursing Facility (DC - External)              Attending Provider:  (none)   Allergies:  Niacin    Isolation:  None   Infection:  None   Code Status:  CPR    Ht:  180.3 cm (71\")   Wt:  94.1 kg (207 lb 8 oz)    Admission Cmt:  None   Principal Problem:  None                Active Insurance as of 8/6/2020     Primary Coverage     Payor Plan Insurance Group Employer/Plan Group    MEDICARE MEDICARE A & B      Payor Plan Address Payor Plan Phone Number Payor Plan Fax Number Effective Dates    [address] [phone]  9/1/1985 - None Entered    [facility] [id number]       Subscriber Name Subscriber Birth Date Member ID       MOHAN HUFF 1960 [id number]           Secondary Coverage     Payor Plan Insurance Group Employer/Plan Group    [facility] MEDICAID [id number]!     Payor Plan Address Payor Plan Phone Number Payor Plan Fax Number Effective Dates    [address] [address] [phone]  8/6/2020 - None Entered    [facility] [id number]       Subscriber Name Subscriber Birth Date Member ID       MOHAN HUFF 1960 [id number]                 Emergency Contacts      (Rel.) Home Phone Work Phone Mobile Phone    YAIMA BARCENAS (Sister) [phone] -- --    Sayra Banegas (Sister) -- -- [phone]               Discharge Summary      Tripp Bruner MD at 08/12/20 1019        "         [facility] Medicine Services  DISCHARGE SUMMARY       Date of Admission: 8/6/2020  Date of Discharge:  8/12/2020  Primary Care Physician: Provider, No Known    Presenting Problem/History of Present Illness:  Hyponatremia [E87.1]     Final Discharge Diagnoses:  Active Hospital Problems    Diagnosis   • Hyponatremia   • Hyperkalemia   • Nausea and vomiting   • Acute kidney injury (CMS/HCC)   • Hypoxia   • COPD (chronic obstructive pulmonary disease) (CMS/[facility])   • Tobacco dependence   • Anemia   • Paranoid schizophrenia (CMS/[facility])   • Essential hypertension   • Hyperlipidemia   1.  Acute Hypoxic Respiratory failure  -Intubated  -pulm consulted, following  -repeat CXR in AM  -Repeat ABG in AM     2.  Cardiac Arrest  -Telemetry  -Cardiology consulted     3.  Hyperkalemia  -normalized  -monitor     4.  Hyponatremia  -Nephrology consulted, following  -Hold Psych meds     5.  COPD  -Nebs PRN  -IV steroids     6.  Anemia  -Trend H&H  -transfuse as indicated     7.  HTN  -monitor     8.  HLD  -monitor     9.  Paranoid Schizophrenia   -hold psych meds given hyponatremia     10.  Flomax  -BPH     11.  Acute renal failure  -nephrology consulted, following    Consults:   1.  Pulmonology  2.  Nephrology  3.  Cardiology    Procedures Performed: N/A    Pertinent Test Results: N/A    Chief Complaint on Day of Discharge: Intubated    History of Present Illness on Day of Discharge:   Doing ok.  Intubated and sedated.  No events overnight    Hospital Course:  The patient is a 60 y.o. male who presented to [facility] with difficulty urinating.  He was found to be in renal failure and hyponatremia.  He developed respiratory failure and eventually cardiac arrest.  He was intubated and placed on the ventilator.  Pulmonology was consulted and managed the ventilator and his respiratory issues.  He has been treated with nebulizers and steroids.      Nephrology has been consulted and have  "managed his renal failure and hyponatremia.  He has been treated with gentle IVF and diuretics at various times.  His renal failure and hyponatremia have improved.      Cardiology was consulted after he arrested.  They did not feel he needed invasive cardiac workup.  They felt it was due to respiratory issues.    He has been slow to wean from the ventilator.  He has been accepted to LTAC for ongoing care and vent weaning.            Condition on Discharge:  Stable    Physical Exam on Discharge:  /77   Pulse 62   Temp 97 °F (36.1 °C) (Axillary)   Resp 16   Ht 180.3 cm (71\")   Wt 94.1 kg (207 lb 8 oz)   SpO2 94%   BMI 28.94 kg/m²    Physical Exam  1.  Acute Hypoxic Respiratory failure  -Intubated  -pulm consulted, following  -repeat CXR in AM  -Repeat ABG in AM     2.  Cardiac Arrest  -Telemetry  -Cardiology consulted     3.  Hyperkalemia  -normalized  -monitor     4.  Hyponatremia  -Nephrology consulted, following  -improving     5.  COPD  -Nebs PRN  -IV steroids     6.  Anemia  -Trend H&H  -transfuse as indicated     7.  HTN  -monitor     8.  HLD  -monitor     9.  Paranoid Schizophrenia   -restart psych meds  -Haldol  -Celexa     10.  Flomax  -BPH     11.  Acute renal failure  -nephrology consulted, following     12.  Mucus plugging suspected  -Start mucolytics  -pulm following       Discharge Disposition:  LTAC    Discharge Medications:     Discharge Medications      New Medications      Instructions Start Date   budesonide 0.25 MG/2ML nebulizer solution  Commonly known as:  PULMICORT   0.5 mg, Nebulization, 2 Times Daily - RT      famotidine 10 MG/ML solution injection  Commonly known as:  PEPCID   20 mg, Intravenous, Daily      fentaNYL citrate (PF) 2,500 mcg in sodium chloride 0.9 % 200 mL    mcg/hr ( mcg/hr), Intravenous, Continuous      guaiFENesin 100 MG/5ML solution oral solution  Commonly known as:  ROBITUSSIN   200 mg, Oral, Every 4 Hours      ipratropium-albuterol 0.5-2.5 mg/3 ml " nebulizer  Commonly known as:  DUO-NEB   3 mL, Nebulization, 4 Times Daily - RT      methylPREDNISolone sodium succinate 40 MG injection  Commonly known as:  SOLU-Medrol   40 mg, Intravenous, Every 12 Hours      propofol 10 mg/mL emulsion infusion  Commonly known as:  DIPRIVAN   5-75 mcg/kg/min (465-6,975 mcg/min), Intravenous, Titrated         Continue These Medications      Instructions Start Date   albuterol sulfate  (90 Base) MCG/ACT inhaler  Commonly known as:  PROVENTIL HFA;VENTOLIN HFA;PROAIR HFA   2 puffs, Inhalation, 3 Times Daily PRN      aspirin 325 MG tablet   325 mg, Oral, Daily      busPIRone 5 MG tablet  Commonly known as:  BUSPAR   5 mg, Oral, 3 Times Daily      calcium polycarbophil 625 MG tablet  Commonly known as:  FIBERCON   625 mg, Oral, 2 Times Daily      citalopram 20 MG tablet  Commonly known as:  CeleXA   20 mg, Oral, Daily      dicyclomine 20 MG tablet  Commonly known as:  BENTYL   20 mg, Oral, 3 Times Daily      diphenhydrAMINE 50 MG capsule  Commonly known as:  BENADRYL   50 mg, Oral, Nightly PRN      finasteride 5 MG tablet  Commonly known as:  PROSCAR   5 mg, Oral, Daily      fluticasone 50 MCG/ACT nasal spray  Commonly known as:  FLONASE   1 spray, Nasal, Daily      folic acid 1 MG tablet  Commonly known as:  FOLVITE   1 mg, Oral, Daily      furosemide 20 MG tablet  Commonly known as:  LASIX   20 mg, Oral, Daily      haloperidol 5 MG tablet  Commonly known as:  HALDOL   7.5 mg, Oral, Every Night at Bedtime      HYDROcodone-acetaminophen  MG per tablet  Commonly known as:  NORCO   1 tablet, Oral, Every 8 Hours PRN      hydrOXYzine pamoate 50 MG capsule  Commonly known as:  VISTARIL   50 mg, Oral, Every Night at Bedtime      insulin glargine 100 UNIT/ML injection  Commonly known as:  LANTUS   14 Units, Subcutaneous, Nightly      loperamide 2 MG capsule  Commonly known as:  IMODIUM   2 mg, Oral, 4 Times Daily PRN      metoclopramide 5 MG tablet  Commonly known as:  REGLAN   5  mg, Oral, 4 Times Daily Before Meals & Nightly      Omega-3 Fish Oil 1000 MG capsule   2 g, Oral, 2 Times Daily      omeprazole 20 MG capsule  Commonly known as:  priLOSEC   20 mg, Oral, Daily      risperiDONE 0.5 MG disintegrating tablet  Commonly known as:  risperDAL M-TABS   0.5 mg, Translingual, 2 Times Daily      rOPINIRole 2 MG tablet  Commonly known as:  REQUIP   2 mg, Oral, Nightly      sennosides-docusate 8.6-50 MG per tablet  Commonly known as:  PERICOLACE   1 tablet, Oral, Nightly PRN      simvastatin 20 MG tablet  Commonly known as:  ZOCOR   20 mg, Oral, Nightly      tamsulosin 0.4 MG capsule 24 hr capsule  Commonly known as:  FLOMAX   1 capsule, Oral, Every Night at Bedtime      THERA-M PO   1 tablet, Oral, Daily      traZODone 100 MG tablet  Commonly known as:  DESYREL   100 mg, Oral, Nightly      vitamin B-12 1000 MCG tablet  Commonly known as:  CYANOCOBALAMIN   1,000 mcg, Oral, Daily      vitamin D3 125 MCG (5000 UT) capsule capsule   5,000 Units, Oral, Daily         Stop These Medications    celecoxib 200 MG capsule  Commonly known as:  CeleBREX     ibuprofen 400 MG tablet  Commonly known as:  ADVIL,MOTRIN     lisinopril 20 MG tablet  Commonly known as:  PRINIVIL,ZESTRIL     magnesium hydroxide 400 MG/5ML suspension  Commonly known as:  MILK OF MAGNESIA     meclizine 12.5 MG tablet  Commonly known as:  ANTIVERT            Discharge Diet: Tube feeds    Activity at Discharge: Per LTAC    Discharge Care Plan/Instructions: Follow up at LTAC    Follow-up Appointments:   No future appointments.    Test Results Pending at Discharge: N/A    Electronically signed by Tripp Bruner MD, 08/12/20, 10:32.    Time: 40 minutes        Electronically signed by Tripp Bruner MD at 08/12/20 1032       Discharge Order (From admission, onward)     Start     Ordered    08/12/20 1005  Discharge patient  Once     Expected Discharge Date:  08/12/20    Discharge Disposition:  Skilled Nursing Facility ([place] -  External)    Physician of Record for Attribution - Please select from Treatment Team:  JOE ARRIETA [[id number]]    Review needed by CMO to determine Physician of Record:  No       Question Answer Comment   Physician of Record for Attribution - Please select from Treatment Team JOE ARRIETA    Review needed by CMO to determine Physician of Record No        08/12/20 1018

## 2020-08-12 NOTE — PROGRESS NOTES
Continued Stay Note  [facility]     Patient Name: Mohan Villatoro  MRN: [id number]  Today's Date: 8/12/2020    Admit Date: 8/6/2020    Discharge Plan     Row Name 08/12/20 1035       Plan    Plan  Continue [facility]    Patient/Family in Agreement with Plan  yes    Plan Comments  Patient will discharge to [facility].      Final Discharge Disposition Code  63 - LTCH    Final Note  [facility] [place]        Discharge Codes    No documentation.       Expected Discharge Date and Time     Expected Discharge Date Expected Discharge Time    Aug 12, 2020             DAFNE Rizo

## 2020-08-12 NOTE — PROGRESS NOTES
PULMONARY AND CRITICAL CARE PROGRESS NOTE - [facility]    Patient: Mohan Villatoro    1960    MR# [id number]    Acct# [id number]  08/12/20   09:19  Referring Provider: Tripp Bruner MD    Chief Complaint: Mechanically ventilated    Interval history: He remains intubated and sedated on fentanyl and propofol.  Nursing reports that he continues to be very easily agitated.  His normal psychiatric medications have not been resumed due to him being hyponatremic.  ABGs with some improvement this morning.  Chest x-ray is stable.  No other aggravating or alleviating factors.     Meds:    budesonide 0.5 mg Nebulization BID - RT   bumetanide 1 mg Intravenous Daily   cetirizine 10 mg Oral Daily   famotidine 20 mg Intravenous Daily   fluticasone 2 spray Each Nare Daily   insulin lispro 0-9 Units Subcutaneous Q6H   ipratropium-albuterol 3 mL Nebulization 4x Daily - RT   iron sucrose (VENOFER) IVPB 200 mg Intravenous Q24H   methylPREDNISolone sodium succinate 40 mg Intravenous Q6H   nicotine 1 patch Transdermal Q24H   sodium chloride 10 mL Intravenous Q12H   sodium chloride 4 mL Nebulization 4x Daily - RT       fentanyl 10 mcg/mL  mcg/hr Last Rate: 250 mcg/hr (08/12/20 0707)   propofol 5-75 mcg/kg/min Last Rate: 55 mcg/kg/min (08/12/20 0655)     Review of Systems:   Cannot obtain due to mechanical ventilation.  The patient notably is critically ill and connected to a ventilator.  As such patient cannot communicate and provide any history whatsoever, including any history of present illness or interval history since arrival or review of systems. The interested reviewer may note this fact, as an attempt has been made at collecting and documenting these portions of the patient history, but this information is unobtainable despite attempted review and therefore cannot be documented at this time.   Ventilator Settings:     Vt (Set, L): 0.6 L  Resp Rate (Set): 16  Pressure Support (cm H2O): 0 cm  H20  FiO2 (%): 40 %  PEEP/CPAP (cm H2O): 5 cm H20  Minute Ventilation (L/min) (Obs): 8.79 L/min  Resp Rate (Observed) Vent: 16  I:E Ratio (Set): 1:2.10  I:E Ratio (Obs): 1:2.1  PIP Observed (cm H2O): 22 cm H2O      Physical Exam:  Temp:  [96.9 °F (36.1 °C)-97.8 °F (36.6 °C)] 97 °F (36.1 °C)  Heart Rate:  [65-90] 69  Resp:  [15-18] 16  BP: (155-177)/(67-90) 165/71  FiO2 (%):  [40 %-45 %] 40 %    Intake/Output Summary (Last 24 hours) at 8/12/2020 0919  Last data filed at 8/12/2020 0800  Gross per 24 hour   Intake 3049.15 ml   Output 2350 ml   Net 699.15 ml     SpO2 Percentage    08/12/20 0740 08/12/20 0800 08/12/20 0815   SpO2: 94% 93% 93%      Physical Exam     Constitutional: He appears well-developed and well-nourished.   Sedated middle-aged  male orally intubated on ventilator.   HENT: ET tube in place.  Head: Normocephalic and atraumatic.   Eyes: Pupils are equal, round, and reactive to light. Conjunctivae and EOM are normal.   Neck: Normal range of motion. Neck supple. No JVD present. No tracheal deviation present. No thyromegaly present.   Cardiovascular: Normal rate, regular rhythm and normal heart sounds. Exam reveals no friction rub.   No murmur heard.  Pulmonary/Chest: No stridor. No respiratory distress. He has rhonchi. He has no rales. He exhibits no tenderness.   Abdominal: Bowel sounds are normal. He exhibits no distension and no mass. There is no tenderness. There is no guarding.   Musculoskeletal: Normal range of motion. He exhibits edema. He exhibits no tenderness or deformity.   Lymphadenopathy:     He has no cervical adenopathy.   Neurological: He displays normal reflexes. No cranial nerve deficit. He exhibits normal muscle tone. Coordination normal.   Patient intubated on ventilator and is sedated.    Skin: Skin is warm and dry.   Psychiatric:   Could not be assessed due to sedation     Results from last 7 days   Lab Units 08/10/20  0257 08/09/20  0243 08/08/20  0258   WBC 10*3/mm3 9.86  12.57* 14.11*   HEMOGLOBIN g/dL 7.4* 7.2* 7.1*   PLATELETS 10*3/mm3 289 308 268     Results from last 7 days   Lab Units 08/12/20  0224 08/11/20  0925 08/10/20  0257   SODIUM mmol/L 132* 126* 125*   POTASSIUM mmol/L 5.1 5.0 4.5   BUN mg/dL 66* 63* 55*   CREATININE mg/dL 2.26* 2.41* 2.50*     Results from last 7 days   Lab Units 08/12/20  0250 08/11/20  0310 08/10/20  0200   PH, ARTERIAL pH units 7.394 7.285* 7.431   PCO2, ARTERIAL mm Hg 44.0 54.3* 36.4   PO2 ART mm Hg 67.0* 67.2* 76.0*   FIO2 % 40 35 35     Respiratory Culture   Date Value Ref Range Status   08/06/2020   Final    Scant growth (1+) Normal Respiratory Sophia: NO S.aureus/MRSA or Pseudomonas aeruginosa     Recent films:  Xr Chest 1 View    Result Date: 8/12/2020  1. Opacification right lung unchanged from prior exam of August 11 2. Moderate cardiomegaly also unchanged.   This report was finalized on 08/12/2020 07:06 by Dr. Jeffrey Cullen MD.    Xr Chest 1 View    Result Date: 8/11/2020  1. Opacification of the right lung unchanged from prior exam of same date 2. Atelectasis left lower lobe. 3. Endotracheal tube is satisfactorily position.   This report was finalized on 08/11/2020 11:33 by Dr. Jeffrey Cullen MD.    Xr Chest 1 View    Result Date: 8/11/2020  Impression:  1.  Increased linear opacity in the RIGHT upper lobe which may represent atelectasis. 2.  No change in veiling bibasilar opacities which may represent layering effusions with overlying atelectasis. This report was finalized on 08/11/2020 07:15 by Dr. Jovanny Cervantes MD.    Xr Abdomen Kub    Result Date: 8/10/2020  1. Satisfactory placement nasogastric tube.   This report was finalized on 08/10/2020 10:28 by Dr. Jeffrey Cullen MD.    Films reviewed personally by me.  My interpretation: ET tube in place, some patchy infiltrates on the right appear stable from yesterday.    Pulmonary Assessment:  1. Acute hypoxic respiratory failure following cardiac arrest and pulseless electrical  activity  2. Chronic obstructive pulmonary disease  3. Tobacco abuse  4. Acute kidney injury  5. Chronic kidney disease stage III  6. Hyponatremia  7. Anxiety depression  8. Nursing home resident  9. Paranoid schizophrenia  10. Anemia    Recommend:   · ETT D#6.  Decreased respiratory rate to 14.  · He will likely not be able to come off of the ventilator until his home psychiatric medications are able to be restarted.  · Continue bronchodilator treatment and supportive respiratory care with hypertonic saline nebulizer for times a day as well as DuoNebs and Pulmicort.  · We will decrease IV steroid to 40 mg twice daily.  · Gentle diuresis with Bumex per renal.  · Cardiology following.  He may have some diastolic heart failure.   · Nicotine patch  · Continue DVT and stress ulcer ulcer prophylaxis.  · Daily ABG and chest x-ray while on ventilator.  · Antibiotics per others.  · Nutrition  · Possible LTAC placement    Electronically signed by MIKEY Mosquera on 8/12/2020 at 09:19    Physician substantive portion:  Patient is sedated mechanically ventilated.  He has had a lot of agitation whenever sedation is lightened.  Exam shows diminished breath sounds.  Good ventilator synchrony.  Some central rhonchi present.  Abdomen soft.  Recommend continuing to taper steroids, recommend initiating psych medicines.  Anticipate LTAC.    I have seen and examined patient personally, performing a face-to-face diagnostic evaluation with plan of care reviewed and developed with APRN and nursing staff. I have addended and/or modified the above history of present illness, physical examination, and assessment and plan to reflect my findings and impressions. Essential elements of the care plan were discussed with APRN above.  Agree with findings and assessment/plan as documented above.    Electronically signed by David Tafoya MD, on 8/12/2020, 18:34

## 2020-08-12 NOTE — PROGRESS NOTES
[facility] Medicine Services  INPATIENT PROGRESS NOTE    Patient Name: Mohan Villatoro  Date of Admission: 8/6/2020  Today's Date: 08/12/20  Length of Stay: 6  Primary Care Physician: Provider, No Known    Subjective   Chief Complaint: Intubated  HPI   Currently intubated and sedated.  Afebrile  No agitation          Review of Systems   Unable to perform ROS: Intubated        All pertinent negatives and positives are as above. All other systems have been reviewed and are negative unless otherwise stated.     Objective    Temp:  [96.9 °F (36.1 °C)-97.8 °F (36.6 °C)] 97 °F (36.1 °C)  Heart Rate:  [62-90] 63  Resp:  [15-18] 16  BP: (155-177)/(67-90) 169/81  FiO2 (%):  [40 %-45 %] 40 %  Physical Exam   Constitutional: He is oriented to person, place, and time. He appears well-developed and well-nourished. He is sedated and intubated.   HENT:   Head: Normocephalic and atraumatic.   Right Ear: External ear normal.   Left Ear: External ear normal.   Nose: Nose normal.   Mouth/Throat: Oropharynx is clear and moist.   Eyes: Pupils are equal, round, and reactive to light. Conjunctivae and EOM are normal. Right eye exhibits no discharge. Left eye exhibits no discharge. No scleral icterus.   Neck: Normal range of motion. Neck supple. No tracheal deviation present. No thyromegaly present.   Cardiovascular: Normal rate, regular rhythm, normal heart sounds and intact distal pulses. Exam reveals no gallop and no friction rub.   No murmur heard.  Pulmonary/Chest: Effort normal. No stridor. He is intubated. No respiratory distress. He has decreased breath sounds. He has no wheezes. He has no rales. He exhibits no tenderness.   Abdominal: Soft. Bowel sounds are normal. He exhibits no distension and no mass. There is no tenderness. There is no rebound and no guarding. No hernia.   Musculoskeletal: Normal range of motion. He exhibits no edema or deformity.   Lymphadenopathy:     He has no cervical  adenopathy.   Neurological: He is oriented to person, place, and time. He has normal reflexes. He displays normal reflexes. No cranial nerve deficit. He exhibits normal muscle tone. Coordination normal.   Skin: Skin is warm and dry. No rash noted. No erythema. No pallor.   Vitals reviewed.          Results Review:  I have reviewed the labs, radiology results, and diagnostic studies.    Laboratory Data:   Results from last 7 days   Lab Units 08/10/20  0257 08/09/20  0243 08/08/20  0258   WBC 10*3/mm3 9.86 12.57* 14.11*   HEMOGLOBIN g/dL 7.4* 7.2* 7.1*   HEMATOCRIT % 21.0* 19.7* 19.3*   PLATELETS 10*3/mm3 289 308 268        Results from last 7 days   Lab Units 08/12/20 0224 08/11/20  0925 08/10/20  0257 08/09/20  0243   SODIUM mmol/L 132* 126* 125* 123*   POTASSIUM mmol/L 5.1 5.0 4.5 4.7   CHLORIDE mmol/L 94* 90* 89* 85*   CO2 mmol/L 25.0 24.0 23.0 21.0*   BUN mg/dL 66* 63* 55* 56*   CREATININE mg/dL 2.26* 2.41* 2.50* 2.76*   CALCIUM mg/dL 8.6 8.4* 8.0* 7.8*   BILIRUBIN mg/dL  --  0.3 0.2 0.2   ALK PHOS U/L  --  40 42 43   ALT (SGPT) U/L  --  31 36 43*   AST (SGOT) U/L  --  22 32 64*   GLUCOSE mg/dL 161* 157* 138* 136*       Culture Data:   No results found for: BLOODCX, URINECX, WOUNDCX, MRSACX, RESPCX, STOOLCX    Radiology Data:   Imaging Results (Last 24 Hours)     Procedure Component Value Units Date/Time    XR Chest 1 View [[id number]] Collected:  08/12/20 0705     Updated:  08/12/20 0710    Narrative:       Frontal upright radiograph of the chest 8/12/2020 3:30 AM CDT     COMPARISON: August 11, 2020     HISTORY: Patient on ventilator.     FINDINGS:   Opacification the right lung is again noted. Consistent with an  inflammatory process. This is a groundglass type infiltrate. The left  lung is clear.. Cardiac silhouettes moderately enlarged. Endotracheal  tube and nasogastric tubes are satisfactorily position..      The osseous structures and surrounding soft tissues demonstrate no acute  abnormality.        Impression:       1. Opacification right lung unchanged from prior exam of August 11  2. Moderate cardiomegaly also unchanged.        This report was finalized on 08/12/2020 07:06 by Dr. Jeffrey Cullen MD.    XR Chest 1 View [[id number]] Collected:  08/11/20 1132     Updated:  08/11/20 1136    Narrative:       Frontal upright radiograph of the chest 8/11/2020 9:49 AM CDT     COMPARISON: August 11, 2020 3:28 AM     HISTORY: Tube placement.     FINDINGS:   Opacification the right lung is noted is no improvement compared to  prior exam of same date. Left lung is visualized and there is  silhouetting of the left diaphragm consistent with atelectasis left  lower lobe.. Cardiac silhouettes moderately enlarged. Nasogastric tube  and endotracheal tube are satisfactorily position..      The osseous structures and surrounding soft tissues demonstrate no acute  abnormality.       Impression:       1. Opacification of the right lung unchanged from prior exam of same  date  2. Atelectasis left lower lobe.  3. Endotracheal tube is satisfactorily position.        This report was finalized on 08/11/2020 11:33 by Dr. Jeffrey Cullen MD.          I have reviewed the patient's current medications.     Assessment/Plan     Active Hospital Problems    Diagnosis   • Hyponatremia   • Hyperkalemia   • Nausea and vomiting   • Acute kidney injury (CMS/HCC)   • Hypoxia   • COPD (chronic obstructive pulmonary disease) (CMS/HCC)   • Tobacco dependence   • Anemia   • Paranoid schizophrenia (CMS/HCC)   • Essential hypertension   • Hyperlipidemia     Labs reviewed:  Sodium improved up to 132, Cr slightly better 2.5 to 2.26    Imaging reviewed:  CXR, reported noted    CXR independently interpreted by me:  Pulmonary edema, ETT in good position          1.  Acute Hypoxic Respiratory failure  -Intubated  -pulm consulted, following  -repeat CXR in AM  -Repeat ABG in AM    2.  Cardiac Arrest  -Telemetry  -Cardiology consulted    3.   Hyperkalemia  -normalized  -monitor    4.  Hyponatremia  -Nephrology consulted, following  -improving    5.  COPD  -Nebs PRN  -IV steroids    6.  Anemia  -Trend H&H  -transfuse as indicated    7.  HTN  -monitor    8.  HLD  -monitor    9.  Paranoid Schizophrenia   -restart psych meds  -Haldol  -Celexa    10.  Flomax  -BPH    11.  Acute renal failure  -nephrology consulted, following    12.  Mucus plugging suspected  -Start mucolytics  -pulm following          Discharge Planning: LTAC    Electronically signed by Tripp Bruner MD, 08/12/20, 09:47.

## 2020-08-12 NOTE — PLAN OF CARE
Problem: Patient Care Overview  Goal: Plan of Care Review  Outcome: Ongoing (interventions implemented as appropriate)  Progress: no change  Taken 8/12/2020 0000 by Jigna Story, RN  Plan of Care Reviewed With: patient  Taken 8/12/2020 0540 by Jigna Story, RN  Outcome Summary: Very restless & agitated when sedation is paused O2 sats will drop into 80's. F/C dc'd yesterday I&O cath x 1 with 550 returned. Tolerating tube feeds at goal rate. Continue to monitor

## 2020-08-12 NOTE — PROGRESS NOTES
Nephrology ([facility]) Progress Note      Patient:  Mohan Villatoro  YOB: 1960  Date of Service: 8/12/2020  MRN: [id number]   Acct: [id number]   Primary Care Physician: Provider, No Known  Advance Directive:   Code Status and Medical Interventions:   Ordered at: 08/06/20 0711     Code Status:    CPR     Medical Interventions (Level of Support Prior to Arrest):    Full     Admit Date: 8/6/2020       Hospital Day: 6  Referring Provider: Fabricio Foy,*      Patient personally seen and examined.  Complete chart including Consults, Notes, Operative Reports, Labs, Cardiology, and Radiology studies reviewed as able.        Subjective:  Mohan Villatoro is a 60 y.o. male  whom we were consulted for hyponatremia, hyperkalemia, acute kidney injury. Unknown baseline renal function.  History of type 2 diabetes, paranoid schizophrenia, congestive heart failure. Patient transferred from [facility] for sodium level 114. He is a nursing home resident.  Reportedly has had nausea and poor oral intake recently. He had also noticed decreased urine output. Daily NSAID use. On august 7 had PEA cardiac arrest and has remained intubated since then. Serum sodium improving slowly.    Today remains intubated and sedated.  No new overnight issues. Urine output nonoliguric    Allergies:  Niacin    Home Meds:  Medications Prior to Admission   Medication Sig Dispense Refill Last Dose   • albuterol sulfate  (90 Base) MCG/ACT inhaler Inhale 2 puffs 3 (Three) Times a Day As Needed (copd).   8/5/2020 at Unknown time   • aspirin 325 MG tablet Take 325 mg by mouth Daily.   8/5/2020 at Unknown time   • busPIRone (BUSPAR) 5 MG tablet Take 5 mg by mouth 3 (Three) Times a Day.   8/5/2020 at Unknown time   • calcium polycarbophil (FIBERCON) 625 MG tablet Take 625 mg by mouth 2 (Two) Times a Day.   8/5/2020 at Unknown time   • celecoxib (CeleBREX) 200 MG capsule Take 200 mg by mouth Daily.    8/5/2020 at Unknown time   • Cholecalciferol (VITAMIN D3) 125 MCG (5000 UT) capsule capsule Take 5,000 Units by mouth Daily.   8/5/2020 at Unknown time   • citalopram (CeleXA) 20 MG tablet Take 20 mg by mouth Daily.   8/5/2020 at Unknown time   • dicyclomine (BENTYL) 20 MG tablet Take 20 mg by mouth 3 (Three) Times a Day.   8/5/2020 at Unknown time   • diphenhydrAMINE (BENADRYL) 50 MG capsule Take 50 mg by mouth At Night As Needed for Sleep.   Past Week at Unknown time   • finasteride (PROSCAR) 5 MG tablet Take 5 mg by mouth Daily.   8/5/2020 at Unknown time   • fluticasone (FLONASE) 50 MCG/ACT nasal spray 1 spray into the nostril(s) as directed by provider Daily.   8/5/2020 at Unknown time   • folic acid (FOLVITE) 1 MG tablet Take 1 mg by mouth Daily.   8/5/2020 at Unknown time   • furosemide (LASIX) 20 MG tablet Take 20 mg by mouth Daily.   8/5/2020 at Unknown time   • haloperidol (HALDOL) 5 MG tablet Take 7.5 mg by mouth every night at bedtime.   8/5/2020 at Unknown time   • HYDROcodone-acetaminophen (NORCO)  MG per tablet Take 1 tablet by mouth Every 8 (Eight) Hours As Needed for Moderate Pain .   8/5/2020 at Unknown time   • hydrOXYzine pamoate (VISTARIL) 50 MG capsule Take 50 mg by mouth every night at bedtime.   8/5/2020 at Unknown time   • ibuprofen (ADVIL,MOTRIN) 400 MG tablet Take 400 mg by mouth Daily As Needed for Mild Pain .   8/5/2020 at Unknown time   • insulin glargine (LANTUS) 100 UNIT/ML injection Inject 14 Units under the skin into the appropriate area as directed Every Night.   8/5/2020 at Unknown time   • lisinopril (PRINIVIL,ZESTRIL) 20 MG tablet Take 20 mg by mouth Daily.   8/5/2020 at Unknown time   • loperamide (IMODIUM) 2 MG capsule Take 2 mg by mouth 4 (Four) Times a Day As Needed for Diarrhea.   Past Month at Unknown time   • magnesium hydroxide (MILK OF MAGNESIA) 400 MG/5ML suspension Take 30 mL by mouth Daily As Needed (constipation).   Past Month at Unknown time   • meclizine  (ANTIVERT) 12.5 MG tablet Take 12.5 mg by mouth Every 4 (Four) Hours As Needed for Dizziness.   Past Week at Unknown time   • metoclopramide (REGLAN) 5 MG tablet Take 5 mg by mouth 4 (Four) Times a Day Before Meals & at Bedtime.   8/5/2020 at Unknown time   • Multiple Vitamins-Minerals (THERA-M PO) Take 1 tablet by mouth Daily.   8/5/2020 at Unknown time   • Omega-3 Fatty Acids (OMEGA-3 FISH OIL) 1000 MG capsule Take 2 g by mouth 2 (Two) Times a Day.   8/5/2020 at Unknown time   • omeprazole (priLOSEC) 20 MG capsule Take 20 mg by mouth Daily.   8/5/2020 at Unknown time   • risperiDONE (risperDAL M-TABS) 0.5 MG disintegrating tablet Place 0.5 mg on the tongue 2 (Two) Times a Day.   8/5/2020 at Unknown time   • rOPINIRole (REQUIP) 2 MG tablet Take 2 mg by mouth Every Night.   8/5/2020 at Unknown time   • sennosides-docusate (senna-docusate sodium) 8.6-50 MG per tablet Take 1 tablet by mouth At Night As Needed for Constipation.   Past Week at Unknown time   • simvastatin (ZOCOR) 20 MG tablet Take 20 mg by mouth Every Night.   8/5/2020 at Unknown time   • tamsulosin (FLOMAX) 0.4 MG capsule 24 hr capsule Take 1 capsule by mouth every night at bedtime.   8/5/2020 at Unknown time   • traZODone (DESYREL) 100 MG tablet Take 100 mg by mouth Every Night.   8/5/2020 at Unknown time   • vitamin B-12 (CYANOCOBALAMIN) 1000 MCG tablet Take 1,000 mcg by mouth Daily.   8/5/2020 at Unknown time       Medicines:  Current Facility-Administered Medications   Medication Dose Route Frequency Provider Last Rate Last Dose   • acetaminophen (TYLENOL) tablet 650 mg  650 mg Oral Q4H PRN Magdi Banegas MD       • albuterol (PROVENTIL) nebulizer solution 0.083% 2.5 mg/3mL  2.5 mg Nebulization Q6H PRN Fabricio Foy MD       • budesonide (PULMICORT) nebulizer solution 0.5 mg  0.5 mg Nebulization BID - RT SensAdonis mckeon MD   0.5 mg at 08/12/20 0645   • bumetanide (BUMEX) injection 1 mg  1 mg Intravenous Daily Lev Shepard MD    1 mg at 08/12/20 0803   • cetirizine (zyrTEC) tablet 10 mg  10 mg Oral Daily Adonis Franz MD   10 mg at 08/12/20 0803   • dextrose (D50W) 25 g/ 50mL Intravenous Solution 25 g  25 g Intravenous Q15 Min PRN Magdi Banegas MD       • dextrose (GLUTOSE) oral gel 15 g  15 g Oral Q15 Min PRN Magdi Banegas MD       • famotidine (PEPCID) injection 20 mg  20 mg Intravenous Daily Tripp Bruner MD   20 mg at 08/12/20 0802   • fentaNYL citrate (PF) (SUBLIMAZE) 2,500 mcg in sodium chloride 0.9 % 250 mL (10 mcg/mL) infusion   mcg/hr Intravenous Titrated Adonis Franz MD 25 mL/hr at 08/12/20 0707 250 mcg/hr at 08/12/20 0707   • fluticasone (FLONASE) 50 MCG/ACT nasal spray 2 spray  2 spray Each Nare Daily Adonis Franz MD   2 spray at 08/12/20 0803   • glucagon (human recombinant) (GLUCAGEN DIAGNOSTIC) injection 1 mg  1 mg Subcutaneous Q15 Min PRN Magdi Banegas MD       • hydrALAZINE (APRESOLINE) injection 10 mg  10 mg Intravenous Q6H PRN Fabricio Foy MD       • insulin lispro (humaLOG) injection 0-9 Units  0-9 Units Subcutaneous Q6H Tripp Bruner MD   4 Units at 08/12/20 0556   • ipratropium-albuterol (DUO-NEB) nebulizer solution 3 mL  3 mL Nebulization 4x Daily - RT Magdi Banegas MD   3 mL at 08/12/20 0640   • iron sucrose (VENOFER) 200 mg in sodium chloride 0.9 % 100 mL IVPB  200 mg Intravenous Q24H Lev Shepard MD   200 mg at 08/11/20 1225   • methylPREDNISolone sodium succinate (SOLU-Medrol) injection 40 mg  40 mg Intravenous Q6H Adonis Franz MD   40 mg at 08/12/20 0439   • Morphine sulfate (PF) injection 2 mg  2 mg Intravenous Q2H PRN Fabricio Foy MD   2 mg at 08/12/20 0741   • nicotine (NICODERM CQ) 14 MG/24HR patch 1 patch  1 patch Transdermal Q24H Magdi Banegas MD   1 patch at 08/12/20 0803   • ondansetron (ZOFRAN) injection 4 mg  4 mg Intravenous Q6H PRN Magdi Banegas MD   4 mg at 08/06/20 0731   • propofol  (DIPRIVAN) infusion 10 mg/mL 100 mL  5-75 mcg/kg/min Intravenous Titrated Tripp Bruner MD 30.7 mL/hr at 08/12/20 0655 55 mcg/kg/min at 08/12/20 0655   • sodium chloride 0.9 % flush 10 mL  10 mL Intravenous Q12H Magdi Banegas MD   10 mL at 08/12/20 0803   • sodium chloride 0.9 % flush 10 mL  10 mL Intravenous PRN Magdi Banegas MD       • sodium chloride 3 % nebulizer solution 4 mL  4 mL Nebulization 4x Daily - RT Adonis Franz MD   4 mL at 08/11/20 1905       Past Medical History:  Past Medical History:   Diagnosis Date   • Diabetes (CMS/HCC)    • Schizo affective schizophrenia (CMS/HCC)        Past Surgical History:  History reviewed. No pertinent surgical history.    Family History  History reviewed. No pertinent family history.    Social History  Social History     Socioeconomic History   • Marital status: Single     Spouse name: Not on file   • Number of children: Not on file   • Years of education: Not on file   • Highest education level: Not on file         Review of Systems:  Unable to obtain due to Intubated and sedated    Objective:  Patient Vitals for the past 24 hrs:   BP Temp Temp src Pulse Resp SpO2 Weight   08/12/20 0815 165/71 -- -- 69 -- 93 % --   08/12/20 0800 162/71 -- -- 70 -- 93 % --   08/12/20 0745 -- 97 °F (36.1 °C) Axillary -- -- -- --   08/12/20 0740 177/75 -- -- 90 -- 94 % --   08/12/20 0700 175/73 -- -- 69 -- 94 % --   08/12/20 0651 -- -- -- 66 16 -- --   08/12/20 0645 -- -- -- 65 16 -- --   08/12/20 0639 -- -- -- 66 16 93 % --   08/12/20 0600 169/71 -- -- 67 16 92 % --   08/12/20 0500 164/72 -- -- 67 17 94 % --   08/12/20 0400 169/74 97.4 °F (36.3 °C) Axillary 67 17 94 % 94.1 kg (207 lb 8 oz)   08/12/20 0300 169/73 -- -- 68 16 94 % --   08/12/20 0200 161/71 -- -- 67 16 93 % --   08/12/20 0100 166/67 -- -- 84 16 (!) 88 % --   08/12/20 0000 162/80 97.6 °F (36.4 °C) Axillary 75 16 (!) 89 % --   08/11/20 2300 170/81 -- -- 78 16 (!) 89 % --   08/11/20 2200 174/84 --  -- 75 16 91 % --   08/11/20 2100 172/87 -- -- 75 15 91 % --   08/11/20 2000 160/73 96.9 °F (36.1 °C) Axillary 76 17 91 % --   08/11/20 1911 -- -- -- -- 16 -- --   08/11/20 1905 -- -- -- 73 16 98 % --   08/11/20 1847 -- -- -- 72 16 (!) 89 % --   08/11/20 1800 160/90 -- -- 88 -- (!) 83 % --   08/11/20 1700 155/72 -- -- 81 -- 90 % --   08/11/20 1600 167/74 -- -- 74 -- 93 % --   08/11/20 1500 165/72 -- -- 74 -- 94 % --   08/11/20 1453 -- -- -- 72 18 99 % --   08/11/20 1444 -- -- -- 72 16 94 % --   08/11/20 1400 167/73 -- -- 75 -- 93 % --   08/11/20 1300 164/75 -- -- 77 -- 93 % --   08/11/20 1200 175/76 97.8 °F (36.6 °C) Axillary 79 16 93 % --   08/11/20 1100 169/78 -- -- 77 -- 93 % --   08/11/20 1052 -- -- -- 77 16 93 % --   08/11/20 1038 -- -- -- 76 16 90 % --   08/11/20 1000 -- -- -- 79 -- 90 % --       Intake/Output Summary (Last 24 hours) at 8/12/2020 0923  Last data filed at 8/12/2020 0800  Gross per 24 hour   Intake 3049.15 ml   Output 2350 ml   Net 699.15 ml     General: intubated and sedated   Neck: supple, no JVD  Chest:  clear to auscultation bilaterally without respiratory distress  CVS: regular rate and rhythm  Abdominal: soft, nontender, positive bowel sounds  Extremities: no cyanosis or edema  Skin: warm and dry without rash  Neuro: no focal motor deficits     Labs:  Results from last 7 days   Lab Units 08/10/20  0257 08/09/20  0243 08/08/20  0258   WBC 10*3/mm3 9.86 12.57* 14.11*   HEMOGLOBIN g/dL 7.4* 7.2* 7.1*   HEMATOCRIT % 21.0* 19.7* 19.3*   PLATELETS 10*3/mm3 289 308 268         Results from last 7 days   Lab Units 08/12/20  0224 08/11/20  0925 08/10/20  0257 08/09/20  0243   SODIUM mmol/L 132* 126* 125* 123*   POTASSIUM mmol/L 5.1 5.0 4.5 4.7   CHLORIDE mmol/L 94* 90* 89* 85*   CO2 mmol/L 25.0 24.0 23.0 21.0*   BUN mg/dL 66* 63* 55* 56*   CREATININE mg/dL 2.26* 2.41* 2.50* 2.76*   CALCIUM mg/dL 8.6 8.4* 8.0* 7.8*   BILIRUBIN mg/dL  --  0.3 0.2 0.2   ALK PHOS U/L  --  40 42 43   ALT (SGPT) U/L  --   31 36 43*   AST (SGOT) U/L  --  22 32 64*   GLUCOSE mg/dL 161* 157* 138* 136*       Radiology:   Imaging Results (Last 72 Hours)     Procedure Component Value Units Date/Time    XR Chest 1 View [[id number]] Collected:  08/12/20 0705     Updated:  08/12/20 0710    Narrative:       Frontal upright radiograph of the chest 8/12/2020 3:30 AM CDT     COMPARISON: August 11, 2020     HISTORY: Patient on ventilator.     FINDINGS:   Opacification the right lung is again noted. Consistent with an  inflammatory process. This is a groundglass type infiltrate. The left  lung is clear.. Cardiac silhouettes moderately enlarged. Endotracheal  tube and nasogastric tubes are satisfactorily position..      The osseous structures and surrounding soft tissues demonstrate no acute  abnormality.       Impression:       1. Opacification right lung unchanged from prior exam of August 11  2. Moderate cardiomegaly also unchanged.        This report was finalized on 08/12/2020 07:06 by Dr. Jeffrey Cullen MD.    XR Chest 1 View [[id number]] Collected:  08/11/20 1132     Updated:  08/11/20 1136    Narrative:       Frontal upright radiograph of the chest 8/11/2020 9:49 AM CDT     COMPARISON: August 11, 2020 3:28 AM     HISTORY: Tube placement.     FINDINGS:   Opacification the right lung is noted is no improvement compared to  prior exam of same date. Left lung is visualized and there is  silhouetting of the left diaphragm consistent with atelectasis left  lower lobe.. Cardiac silhouettes moderately enlarged. Nasogastric tube  and endotracheal tube are satisfactorily position..      The osseous structures and surrounding soft tissues demonstrate no acute  abnormality.       Impression:       1. Opacification of the right lung unchanged from prior exam of same  date  2. Atelectasis left lower lobe.  3. Endotracheal tube is satisfactorily position.        This report was finalized on 08/11/2020 11:33 by Dr. Jeffrey Cullen MD.    XR Chest 1 View  [[id number]] Collected:  08/11/20 0713     Updated:  08/11/20 0718    Narrative:       XR CHEST 1 VW-     Indication: Ventilated patient     Comparison: Prior day     Findings:     Endotracheal tube is 3.5 cm above the digna.  Enteric tube terminates below the diaphragm.     Enlarged cardiac silhouette, likely stable allowing for differences in  lung volumes.  Veiling bibasilar opacities, similar to prior.  New linear RIGHT upper lung opacity.  No visible pneumothorax.       Impression:       Impression:     1.  Increased linear opacity in the RIGHT upper lobe which may represent  atelectasis.  2.  No change in veiling bibasilar opacities which may represent  layering effusions with overlying atelectasis.  This report was finalized on 08/11/2020 07:15 by Dr. Jovanny Cervantes MD.    XR Abdomen KUB [[id number]] Collected:  08/10/20 1027     Updated:  08/10/20 1031    Narrative:       XR ABDOMEN KUB- 8/10/2020 9:45 AM CDT     HISTORY: OGT placement       COMPARISON: None     FINDINGS:  Areas noted in the transverse colon. This is nonspecific.. Nasogastric  tube is present satisfactorily position..     No acute skeletal abnormality is identified.        Impression:       1. Satisfactory placement nasogastric tube.         This report was finalized on 08/10/2020 10:28 by Dr. Jeffrey Cullen MD.    XR Chest 1 View [[id number]] Collected:  08/10/20 0656     Updated:  08/10/20 0700    Narrative:       XR CHEST 1 VW-     Indication: Ventilated patient     Comparison: 8/9/2020     Findings:     Endotracheal tube is 5 cm above the digna. Cardiac silhouette is  stable. Bilateral mid-lower lung pleural/parenchymal opacities are  unchanged. No visible pneumothorax. No acute osseous finding.       Impression:       Impression:     No change in appearance of the chest.  This report was finalized on 08/10/2020 06:57 by Dr. Jovanny Cervantes MD.          Culture:  Respiratory Culture   Date Value Ref Range Status   08/06/2020   Final     Scant growth (1+) Normal Respiratory Sophia: NO S.aureus/MRSA or Pseudomonas aeruginosa         Assessment   1.  Hyponatremia--improving  2.  Acute kidney injury, ATN--improving  3.  Baseline chronic kidney disease, unknown stage  4.  Acute hypoxic respiratory failure--intubated  5.  Recurrent hyperkalemia--improved  6.  Metabolic acidosis--improved  7.  Type 2 diabetes  8.  Paranoid schizophrenia  9.  Anemia     Plan:  1.  Continue IV Bumex  2.  Monitor labs  3.  Working on referral to [facility]      Mian Koch, APRN  8/12/2020  09:23

## 2020-08-12 NOTE — DISCHARGE SUMMARY
[facility] Medicine Services  DISCHARGE SUMMARY       Date of Admission: 8/6/2020  Date of Discharge:  8/12/2020  Primary Care Physician: Provider, No Known    Presenting Problem/History of Present Illness:  Hyponatremia [E87.1]     Final Discharge Diagnoses:  Active Hospital Problems    Diagnosis   • Hyponatremia   • Hyperkalemia   • Nausea and vomiting   • Acute kidney injury (CMS/HCC)   • Hypoxia   • COPD (chronic obstructive pulmonary disease) (CMS/[facility])   • Tobacco dependence   • Anemia   • Paranoid schizophrenia (CMS/[facility])   • Essential hypertension   • Hyperlipidemia   1.  Acute Hypoxic Respiratory failure  -Intubated  -pulm consulted, following  -repeat CXR in AM  -Repeat ABG in AM     2.  Cardiac Arrest  -Telemetry  -Cardiology consulted     3.  Hyperkalemia  -normalized  -monitor     4.  Hyponatremia  -Nephrology consulted, following  -Hold Psych meds     5.  COPD  -Nebs PRN  -IV steroids     6.  Anemia  -Trend H&H  -transfuse as indicated     7.  HTN  -monitor     8.  HLD  -monitor     9.  Paranoid Schizophrenia   -hold psych meds given hyponatremia     10.  Flomax  -BPH     11.  Acute renal failure  -nephrology consulted, following    Consults:   1.  Pulmonology  2.  Nephrology  3.  Cardiology    Procedures Performed: N/A    Pertinent Test Results: N/A    Chief Complaint on Day of Discharge: Intubated    History of Present Illness on Day of Discharge:   Doing ok.  Intubated and sedated.  No events overnight    Hospital Course:  The patient is a 60 y.o. male who presented to [facility] with difficulty urinating.  He was found to be in renal failure and hyponatremia.  He developed respiratory failure and eventually cardiac arrest.  He was intubated and placed on the ventilator.  Pulmonology was consulted and managed the ventilator and his respiratory issues.  He has been treated with nebulizers and steroids.      Nephrology has been consulted and have managed  "his renal failure and hyponatremia.  He has been treated with gentle IVF and diuretics at various times.  His renal failure and hyponatremia have improved.      Cardiology was consulted after he arrested.  They did not feel he needed invasive cardiac workup.  They felt it was due to respiratory issues.    He has been slow to wean from the ventilator.  He has been accepted to LTAC for ongoing care and vent weaning.            Condition on Discharge:  Stable    Physical Exam on Discharge:  /77   Pulse 62   Temp 97 °F (36.1 °C) (Axillary)   Resp 16   Ht 180.3 cm (71\")   Wt 94.1 kg (207 lb 8 oz)   SpO2 94%   BMI 28.94 kg/m²   Physical Exam  1.  Acute Hypoxic Respiratory failure  -Intubated  -pulm consulted, following  -repeat CXR in AM  -Repeat ABG in AM     2.  Cardiac Arrest  -Telemetry  -Cardiology consulted     3.  Hyperkalemia  -normalized  -monitor     4.  Hyponatremia  -Nephrology consulted, following  -improving     5.  COPD  -Nebs PRN  -IV steroids     6.  Anemia  -Trend H&H  -transfuse as indicated     7.  HTN  -monitor     8.  HLD  -monitor     9.  Paranoid Schizophrenia   -restart psych meds  -Haldol  -Celexa     10.  Flomax  -BPH     11.  Acute renal failure  -nephrology consulted, following     12.  Mucus plugging suspected  -Start mucolytics  -pulm following       Discharge Disposition:  LTAC    Discharge Medications:     Discharge Medications      New Medications      Instructions Start Date   budesonide 0.25 MG/2ML nebulizer solution  Commonly known as:  PULMICORT   0.5 mg, Nebulization, 2 Times Daily - RT      famotidine 10 MG/ML solution injection  Commonly known as:  PEPCID   20 mg, Intravenous, Daily      fentaNYL citrate (PF) 2,500 mcg in sodium chloride 0.9 % 200 mL    mcg/hr ( mcg/hr), Intravenous, Continuous      guaiFENesin 100 MG/5ML solution oral solution  Commonly known as:  ROBITUSSIN   200 mg, Oral, Every 4 Hours      ipratropium-albuterol 0.5-2.5 mg/3 ml " nebulizer  Commonly known as:  DUO-NEB   3 mL, Nebulization, 4 Times Daily - RT      methylPREDNISolone sodium succinate 40 MG injection  Commonly known as:  SOLU-Medrol   40 mg, Intravenous, Every 12 Hours      propofol 10 mg/mL emulsion infusion  Commonly known as:  DIPRIVAN   5-75 mcg/kg/min (465-6,975 mcg/min), Intravenous, Titrated         Continue These Medications      Instructions Start Date   albuterol sulfate  (90 Base) MCG/ACT inhaler  Commonly known as:  PROVENTIL HFA;VENTOLIN HFA;PROAIR HFA   2 puffs, Inhalation, 3 Times Daily PRN      aspirin 325 MG tablet   325 mg, Oral, Daily      busPIRone 5 MG tablet  Commonly known as:  BUSPAR   5 mg, Oral, 3 Times Daily      calcium polycarbophil 625 MG tablet  Commonly known as:  FIBERCON   625 mg, Oral, 2 Times Daily      citalopram 20 MG tablet  Commonly known as:  CeleXA   20 mg, Oral, Daily      dicyclomine 20 MG tablet  Commonly known as:  BENTYL   20 mg, Oral, 3 Times Daily      diphenhydrAMINE 50 MG capsule  Commonly known as:  BENADRYL   50 mg, Oral, Nightly PRN      finasteride 5 MG tablet  Commonly known as:  PROSCAR   5 mg, Oral, Daily      fluticasone 50 MCG/ACT nasal spray  Commonly known as:  FLONASE   1 spray, Nasal, Daily      folic acid 1 MG tablet  Commonly known as:  FOLVITE   1 mg, Oral, Daily      furosemide 20 MG tablet  Commonly known as:  LASIX   20 mg, Oral, Daily      haloperidol 5 MG tablet  Commonly known as:  HALDOL   7.5 mg, Oral, Every Night at Bedtime      HYDROcodone-acetaminophen  MG per tablet  Commonly known as:  NORCO   1 tablet, Oral, Every 8 Hours PRN      hydrOXYzine pamoate 50 MG capsule  Commonly known as:  VISTARIL   50 mg, Oral, Every Night at Bedtime      insulin glargine 100 UNIT/ML injection  Commonly known as:  LANTUS   14 Units, Subcutaneous, Nightly      loperamide 2 MG capsule  Commonly known as:  IMODIUM   2 mg, Oral, 4 Times Daily PRN      metoclopramide 5 MG tablet  Commonly known as:  REGLAN   5  mg, Oral, 4 Times Daily Before Meals & Nightly      Omega-3 Fish Oil 1000 MG capsule   2 g, Oral, 2 Times Daily      omeprazole 20 MG capsule  Commonly known as:  priLOSEC   20 mg, Oral, Daily      risperiDONE 0.5 MG disintegrating tablet  Commonly known as:  risperDAL M-TABS   0.5 mg, Translingual, 2 Times Daily      rOPINIRole 2 MG tablet  Commonly known as:  REQUIP   2 mg, Oral, Nightly      sennosides-docusate 8.6-50 MG per tablet  Commonly known as:  PERICOLACE   1 tablet, Oral, Nightly PRN      simvastatin 20 MG tablet  Commonly known as:  ZOCOR   20 mg, Oral, Nightly      tamsulosin 0.4 MG capsule 24 hr capsule  Commonly known as:  FLOMAX   1 capsule, Oral, Every Night at Bedtime      THERA-M PO   1 tablet, Oral, Daily      traZODone 100 MG tablet  Commonly known as:  DESYREL   100 mg, Oral, Nightly      vitamin B-12 1000 MCG tablet  Commonly known as:  CYANOCOBALAMIN   1,000 mcg, Oral, Daily      vitamin D3 125 MCG (5000 UT) capsule capsule   5,000 Units, Oral, Daily         Stop These Medications    celecoxib 200 MG capsule  Commonly known as:  CeleBREX     ibuprofen 400 MG tablet  Commonly known as:  ADVIL,MOTRIN     lisinopril 20 MG tablet  Commonly known as:  PRINIVIL,ZESTRIL     magnesium hydroxide 400 MG/5ML suspension  Commonly known as:  MILK OF MAGNESIA     meclizine 12.5 MG tablet  Commonly known as:  ANTIVERT            Discharge Diet: Tube feeds    Activity at Discharge: Per LTAC    Discharge Care Plan/Instructions: Follow up at LTAC    Follow-up Appointments:   No future appointments.    Test Results Pending at Discharge: N/A    Electronically signed by Tripp Bruner MD, 08/12/20, 10:32.    Time: 40 minutes

## 2020-08-18 PROBLEM — Z99.11 VENTILATOR DEPENDENCE (HCC): Status: ACTIVE | Noted: 2020-01-01

## 2020-08-18 PROBLEM — J96.22 ACUTE ON CHRONIC RESPIRATORY FAILURE WITH HYPOXIA AND HYPERCAPNIA (HCC): Status: ACTIVE | Noted: 2020-01-01

## 2020-08-18 PROBLEM — J96.21 ACUTE ON CHRONIC RESPIRATORY FAILURE WITH HYPOXIA AND HYPERCAPNIA (HCC): Status: ACTIVE | Noted: 2020-01-01

## 2020-08-24 PROBLEM — R06.89 RESPIRATORY INSUFFICIENCY: Status: ACTIVE | Noted: 2020-01-01

## 2020-08-25 NOTE — ANESTHESIA POSTPROCEDURE EVALUATION
"Patient: Mohan Villatoro    Procedure Summary     Date:  08/25/20 Room / Location:   [facility]  /  [facility]    Anesthesia Start:  0701 Anesthesia Stop:  0824    Procedures:       TRACHEOSTOMY (N/A Neck)      GASTROSTOMY FEEDING TUBE INSERTION (N/A Abdomen) Diagnosis:       Respiratory insufficiency      (Respiratory insufficiency [R06.89])    Surgeon:  Bal Manning MD; Kizzy Garcia MD Provider:  Tayo Anand CRNA    Anesthesia Type:  general ASA Status:  3          Anesthesia Type: general    Vitals  Vitals Value Taken Time   /86 8/25/2020  8:41 AM   Temp 97.9 °F (36.6 °C) 8/25/2020  8:22 AM   Pulse 69 8/25/2020  8:44 AM   Resp 18 8/25/2020  8:40 AM   SpO2 98 % 8/25/2020  8:44 AM   Vitals shown include unvalidated device data.        Post Anesthesia Care and Evaluation    Patient location during evaluation: PACU  Patient participation: complete - patient participated  Level of consciousness: awake and alert  Pain management: adequate  Airway patency: patent  Anesthetic complications: No anesthetic complications    Cardiovascular status: acceptable  Respiratory status: acceptable  Hydration status: acceptable    Comments: Blood pressure (!) 188/86, pulse 72, temperature 97.9 °F (36.6 °C), temperature source Temporal, resp. rate 18, height 180.3 cm (71\"), weight 85 kg (187 lb 4.8 oz), SpO2 97 %.    Pt discharged from PACU based on deepa score >8      "

## 2020-08-25 NOTE — ANESTHESIA PREPROCEDURE EVALUATION
Anesthesia Evaluation     Patient summary reviewed   no history of anesthetic complications:  NPO Solid Status: > 8 hours  NPO Liquid Status: > 8 hours           Airway   Comment: ETT in place  Dental      Pulmonary - normal exam    breath sounds clear to auscultation  (+) a smoker Abstained day of surgery, COPD,   (-) asthma, recent URI, sleep apnea  Cardiovascular - normal exam  Exercise tolerance: good (4-7 METS)    ECG reviewed  Rhythm: regular  Rate: normal    (+) hypertension, CHF (History reported from nurse) , hyperlipidemia,   (-) pacemaker, past MI, angina, cardiac stents, CABG    ROS comment: TTE 8/6/20 - ·Left ventricular systolic function is normal. Estimated EF appears to be in the range of 56 - 60%.  ·Normal right ventricular cavity size and systolic function noted.  ·No hemodynamically significant valvular abnormalities identified on this study.    Neuro/Psych  (+) psychiatric history Schizophrenia,     (-) seizures, TIA, CVA  GI/Hepatic/Renal/Endo    (+)   renal disease ARF, diabetes mellitus using insulin,   (-) GERD, liver disease, no thyroid disorder    Musculoskeletal     Abdominal    Substance History      OB/GYN          Other                        Anesthesia Plan    ASA 3     general   (Patient initially admitted one month ago with hyponatremia, respiratory distress, coded.  Has been intubated since that time.)  intravenous induction     Plan/risks discussed with: Consent signed by sister, no one present with patient other than LTC nursing staff.

## 2020-09-01 NOTE — PROGRESS NOTES
PULMONARY AND CRITICAL CARE PROGRESS NOTE - [facility]  Patient: Mohan Villatoro    1960   Acct# [id number]  09/01/20   08:39  Referring Provider: Eduardo Tolliver MD   Chief Complaint: Ventilator dependent respiratory failure   Interval history: Patient remains on mechanical ventilator.  No sedation.  The patient did well yesterday throughout the day and most of the night on SIMV without apneas.  The patient received sedative medication early this morning and the patient resumed having apneas requiring him to be placed back in assist control mode.  Currently the patient is resting in bed on assist control mode.  BNP 40,694 today.  He opens his eyes to voice.  No other aggravating or alleviating factors.   Review of Systems: Review of Systems   Unable to perform ROS: Patient nonverbal    Physical Exam:  Vital signs: T: 98.1, blood pressure 128/58, pulse 61, respiratory rate 12, end-tidal 46, saturation 96%   Physical Exam   Constitutional: He appears well-developed and well-nourished.   Middle-aged  male with tracheostomy tube on neck on ventilator, awake and follows commands.   HENT:   Head: Normocephalic and atraumatic.   Eyes: Pupils are equal, round, and reactive to light. Conjunctivae and EOM are normal. No scleral icterus.   Neck: Normal range of motion. Neck supple. No JVD present. No tracheal deviation present. No thyromegaly present.   Cardiovascular: Normal rate, regular rhythm, normal heart sounds and intact distal pulses. Exam reveals no friction rub.   No murmur heard.  Pulmonary/Chest: Effort normal. No respiratory distress. He has no wheezes. He has rales. He exhibits no tenderness.   Abdominal: Soft. Bowel sounds are normal. He exhibits no distension and no mass. There is no tenderness. There is no guarding.   PEG tube in place   Musculoskeletal: Normal range of motion. He exhibits edema. He exhibits no tenderness or deformity.   Lymphadenopathy:     He has  no cervical adenopathy.   Neurological: He is alert.   Nonverbal     Skin: Skin is warm and dry. No rash noted. No erythema. No pallor.   Psychiatric:   Could not be assessed.     Results from last 7 days   Lab Units 09/01/20  0506 08/31/20  0835 08/31/20  0519  08/30/20  0814   WBC 10*3/mm3 9.59  --  9.17  --  8.50   HEMOGLOBIN g/dL 9.6* 9.3* 8.7*   < > 8.7*   PLATELETS 10*3/mm3 342  --  287  --  262    < > = values in this interval not displayed.     Results from last 7 days   Lab Units 09/01/20  0506 08/31/20  0519 08/30/20  0814   SODIUM mmol/L 142 139 140   POTASSIUM mmol/L 3.9 3.7 3.4*   BUN mg/dL 47* 55* 59*   CREATININE mg/dL 1.88* 1.87* 1.98*     Results from last 7 days   Lab Units 08/31/20  0403 08/28/20  0437   PH, ARTERIAL pH units 7.492* 7.510*   PCO2, ARTERIAL mm Hg 41.9 41.8   PO2 ART mm Hg 70.8* 97.5   FIO2 % 30 40     Blood Culture   Date Value Ref Range Status   08/20/2020 No growth at 24 hours  Preliminary   08/20/2020 No growth at 24 hours  Preliminary     Respiratory Culture   Date Value Ref Range Status   08/20/2020   Final    Rare Normal Respiratory Sophia: NO S.aureus/MRSA or Pseudomonas aeruginosa   Recent films:  Xr Chest 1 View    Result Date: 9/1/2020  Impression: 1. Decreasing vascular congestion and pulmonary edema with improving aeration. 2. Tracheostomy.  This report was finalized on 09/01/2020 07:22 by Dr Sammy Strauss, .    Xr Chest 1 View    Result Date: 8/31/2020  Impression: 1. Bilateral, fairly diffuse hazy infiltrates reflecting pulmonary edema. This appears to be slightly improved. This report was finalized on 08/31/2020 07:16 by Dr Sammy Strauss, .    Films reviewed personally by me.  My interpretation: ETT intact, decreasing vascular congestion    Pulmonary Assessment:  1. Acute hypercapnic and hypoxic respiratory failure, on mechanical ventilation status post self extubation and reintubation and tracheostomy and PEG tube placement 8/25/20  2. Diffuse bilateral pulmonary  infiltrates: ARDS versus pulmonary edema- suspect at least an element of volume overload due to rapid change in bilateral infiltrates  3. Bilateral pneumonia  4. Bilateral pleural effusion, mild  5. JIM, slightly worse  6. Paranoid schizophrenia  7. Suspected GI bleeding-seems to be stable now  8. Opioid-induced constipation  9. S/p cardiac arrest on 8/7/2020 with 5 minutes ROSC, transferred to LTAC on 8/13/2020 for prolonged weaning  10. Hypernatremia  Recommend:   · Continue mechanical ventilation.  SIMV as tolerated   · Judicious use of sedative medications  · DC pulmicort  · Continue DuoNeb  · Arterial blood gases as needed   · Continue cefepime   · Nutrition  · DVT and ulcer prophylaxis  · Diuresis per attending/renal  · PT/OT/SLP  Electronically signed by MIKEY Bauer, on 9/1/2020, 08:39      Physician substantive portion:  Patient remains on the ventilator.  He has intermittent pauses.  He probably has some Cheyne-Villagran breathing.  Family is here we discussed that.  Breath sounds are decreased.  BNP elevated.  He continues on antibiotics and he is pretty sedated probably from all of his medications.  We have lowered his fentanyl yesterday.  Plan to continue as is.  He has been in multiple different modes.  Tolerating current set up.  We will need to continue to have ventilatory support as long as he is having episodes of apnea.    I have seen and examined patient personally, performing a face-to-face diagnostic evaluation with plan of care reviewed and developed with APRN and nursing staff. I have addended and/or modified the above history of present illness, physical examination, and assessment and plan to reflect my findings and impressions. Essential elements of the care plan were discussed with APRN above.  Agree with findings and assessment/plan as documented above.    Electronically signed by David Tafoya MD, on 9/1/2020, 18:52

## 2020-09-01 NOTE — PROGRESS NOTES
Unruly Tolliver M.D.  MIKEY Whitehead        Internal Medicine Progress Note    9/1/2020   09:35    Name:  Mohan Villatoro  MRN:    [id number]     Acct:     [id number]   Room:  [address] [place] [address] Day: 0     Admit Date: 8/12/2020  2:37 PM  PCP: Provider, No Known    Subjective:     C/C: need for continued vent weaning    Interval History: Status:  stayed the same/stable.  Resting in bed.  No family at bedside. Tolerating current vent settings (SIMV). Eyes open to verbal. Following some commands sporadically. Afebrile. Counts stable. Renal function stable.       Review of Systems   Unable to perform ROS: intubated         Medications:     Allergies:   Allergies   Allergen Reactions   • Niacin Unknown - High Severity       Current Meds:   Current Facility-Administered Medications:   •  acetaminophen (TYLENOL) 160 MG/5ML solution 650 mg, 650 mg, Per G Tube, Q4H PRN, Bal Manning MD  •  albuterol (PROVENTIL) nebulizer solution 0.083% 2.5 mg/3mL, 2.5 mg, Nebulization, Q6H PRN, Bal Manning MD  •  amLODIPine (NORVASC) tablet 10 mg, 10 mg, Per G Tube, Q24H, Tripp Felipe MD  •  atropine sulfate injection 0.5 mg, 0.5 mg, Intravenous, PRN, Bal Manning MD  •  bumetanide (BUMEX) injection 1 mg, 1 mg, Intravenous, Daily, Eduardo Tolliver MD  •  busPIRone (BUSPAR) tablet 10 mg, 10 mg, Per G Tube, Q8H, Bal Manning MD  •  cefepime (MAXIPIME) 1 g/100 mL 0.9% NS IVPB (mbp), 1 g, Intravenous, Q8H, Veronica Prado APRN  •  cetirizine (zyrTEC) tablet 10 mg, 10 mg, Per G Tube, Daily, Bal Manning MD  •  fentaNYL (DURAGESIC) 25 MCG/HR patch 1 patch, 1 patch, Transdermal, Q72H **AND** Check Fentanyl Patch Placement, 1 each, Does not apply, Q12H, David Tafoya MD  •  chlorhexidine (PERIDEX) 0.12 % solution 15 mL, 15 mL, Mouth/Throat, Q12H, Bal Manning MD  •  citalopram (CeleXA) tablet 20 mg, 20 mg, Per G Tube, Daily, Bal Manning MD  •   dextrose (D50W) 25 g/ 50mL Intravenous Solution 25 g, 25 g, Intravenous, Q15 Min PRN, Bal Manning MD  •  dextrose (GLUTOSE) oral gel 15 g, 15 g, Oral, Q15 Min PRN, Bal Manning MD  •  fluticasone (FLONASE) 50 MCG/ACT nasal spray 2 spray, 2 spray, Each Nare, Daily, Bal Manning MD  •  glucagon (human recombinant) (GLUCAGEN DIAGNOSTIC) injection 1 mg, 1 mg, Subcutaneous, Q15 Min PRN, Bal Manning MD  •  haloperidol (HALDOL) tablet 7.5 mg, 7.5 mg, Per G Tube, Nightly, Bal Manning MD  •  hydrALAZINE (APRESOLINE) injection 10 mg, 10 mg, Intravenous, Q6H PRN, Bal Manning MD  •  hydrALAZINE (APRESOLINE) tablet 75 mg, 75 mg, Per G Tube, Q8H, Eduardo Tolliver MD  •  insulin lispro (humaLOG) injection 0-9 Units, 0-9 Units, Subcutaneous, Q6H, Bal Manning MD  •  ipratropium-albuterol (DUO-NEB) nebulizer solution 3 mL, 3 mL, Nebulization, 4x Daily - RT, Bal Manning MD  •  lansoprazole (FIRST) oral suspension 30 mg, 30 mg, Nasogastric, QAM, Bal Manning MD  •  LORazepam (ATIVAN) injection 1 mg, 1 mg, Intravenous, Q1H PRN, Eduardo Tolliver MD  •  metoclopramide (REGLAN) injection 5 mg, 5 mg, Intravenous, Q8H, Eduardo Tolliver MD  •  Morphine sulfate (PF) injection 2 mg, 2 mg, Intravenous, Q2H PRN, Eduardo Tolliver MD  •  nicotine (NICODERM CQ) 7 MG/24HR patch 1 patch, 1 patch, Transdermal, Q24H, Eduardo Tolliver MD  •  ondansetron (ZOFRAN) tablet 4 mg, 4 mg, Oral, Q6H PRN **OR** ondansetron (ZOFRAN) injection 4 mg, 4 mg, Intravenous, Q6H PRN, Bal Manning MD  •  polyethylene glycol (MIRALAX) packet 17 g, 17 g, Nasogastric, BID PRN, Eduardo Tolliver MD  •  potassium chloride (KAYCIEL) 20 MEQ/15ML (10%) solution 20 mEq, 20 mEq, Per G Tube, Daily, Salima Oh APRN  •  risperiDONE (risperDAL) tablet 1 mg, 1 mg, Nasogastric, Nightly, Bal Manning MD  •  rivaroxaban (XARELTO) tablet 10 mg, 10 mg, Per  "G Tube, Daily With Dinner, Eduardo Tolliver MD  •  saccharomyces boulardii (FLORASTOR) capsule 250 mg, 250 mg, Oral, BID, Bal Manning MD  •  sennosides-docusate (PERICOLACE) 8.6-50 MG per tablet 1 tablet, 1 tablet, Oral, BID PRN, Eduardo Tolliver MD  •  sodium chloride 0.9 % flush 10 mL, 10 mL, Intravenous, Q12H, Bal Manning MD  •  sodium chloride 0.9 % flush 10 mL, 10 mL, Intravenous, PRN, Bal Manning MD  •  sodium chloride 3 % nebulizer solution 4 mL, 4 mL, Nebulization, 4x Daily - RT, Bal Manning MD    Data:     Code Status:    Code Status and Medical Interventions:   Ordered at: 08/25/20 0701     Level Of Support Discussed With:    Health Care Surrogate     Code Status:    CPR     Medical Interventions (Level of Support Prior to Arrest):    Full       History reviewed. No pertinent family history.    Social History     Socioeconomic History   • Marital status: Single     Spouse name: Not on file   • Number of children: Not on file   • Years of education: Not on file   • Highest education level: Not on file       Vitals:  BP (!) 188/86   Pulse 72   Temp 97.9 °F (36.6 °C) (Temporal)   Resp 18   Ht 180.3 cm (71\")   Wt 81.4 kg (179 lb 8 oz)   SpO2 97%   BMI 25.04 kg/m²   T 98.1 P 61 R 12 /58 Sp02 96% (vent)      I/O (24Hr):  No intake or output data in the 24 hours ending 09/01/20 0935    Labs and imaging:      Recent Results (from the past 12 hour(s))   POC Glucose Once    Collection Time: 08/31/20 11:09 PM   Result Value Ref Range    Glucose 200 (H) 70 - 130 mg/dL   Basic Metabolic Panel    Collection Time: 09/01/20  5:06 AM   Result Value Ref Range    Glucose 146 (H) 65 - 99 mg/dL    BUN 47 (H) 6 - 20 mg/dL    Creatinine 1.88 (H) 0.76 - 1.27 mg/dL    Sodium 142 136 - 145 mmol/L    Potassium 3.9 3.5 - 5.2 mmol/L    Chloride 100 98 - 107 mmol/L    CO2 31.0 (H) 22.0 - 29.0 mmol/L    Calcium 8.9 8.6 - 10.5 mg/dL    eGFR Non  Amer 37 (L) >60 " mL/min/1.73    BUN/Creatinine Ratio 25.0 7.0 - 25.0    Anion Gap 11.0 5.0 - 15.0 mmol/L   BNP    Collection Time: 09/01/20  5:06 AM   Result Value Ref Range    proBNP 40,694.0 (H) 0.0 - 900.0 pg/mL   CBC Auto Differential    Collection Time: 09/01/20  5:06 AM   Result Value Ref Range    WBC 9.59 3.40 - 10.80 10*3/mm3    RBC 3.05 (L) 4.14 - 5.80 10*6/mm3    Hemoglobin 9.6 (L) 13.0 - 17.7 g/dL    Hematocrit 28.7 (L) 37.5 - 51.0 %    MCV 94.1 79.0 - 97.0 fL    MCH 31.5 26.6 - 33.0 pg    MCHC 33.4 31.5 - 35.7 g/dL    RDW 14.8 12.3 - 15.4 %    RDW-SD 51.5 37.0 - 54.0 fl    MPV 11.6 6.0 - 12.0 fL    Platelets 342 140 - 450 10*3/mm3    Neutrophil % 62.3 42.7 - 76.0 %    Lymphocyte % 15.6 (L) 19.6 - 45.3 %    Monocyte % 12.3 (H) 5.0 - 12.0 %    Eosinophil % 8.7 (H) 0.3 - 6.2 %    Basophil % 0.7 0.0 - 1.5 %    Immature Grans % 0.4 0.0 - 0.5 %    Neutrophils, Absolute 5.97 1.70 - 7.00 10*3/mm3    Lymphocytes, Absolute 1.50 0.70 - 3.10 10*3/mm3    Monocytes, Absolute 1.18 (H) 0.10 - 0.90 10*3/mm3    Eosinophils, Absolute 0.83 (H) 0.00 - 0.40 10*3/mm3    Basophils, Absolute 0.07 0.00 - 0.20 10*3/mm3    Immature Grans, Absolute 0.04 0.00 - 0.05 10*3/mm3    nRBC 0.0 0.0 - 0.2 /100 WBC   POC Glucose Once    Collection Time: 09/01/20  5:18 AM   Result Value Ref Range    Glucose 96 70 - 130 mg/dL         Physical Examination:        Physical Exam   Constitutional: Vital signs are normal. He appears well-developed and well-nourished. He is intubated.   HENT:   Head: Normocephalic and atraumatic.   Nose: Nose normal.   Mouth/Throat: Oropharynx is clear and moist.   Eyes: Pupils are equal, round, and reactive to light. Conjunctivae, EOM and lids are normal.   Neck: Trachea normal and normal range of motion. Neck supple.   Trach to vent   Cardiovascular: Normal rate, regular rhythm and normal heart sounds.   Pulmonary/Chest: Effort normal and breath sounds normal. He is intubated.   Abdominal: Soft. Normal appearance and bowel sounds  are normal.   g tube  Midline dressing c/d/i   Musculoskeletal: Normal range of motion. He exhibits edema.   Generalized weakness   Neurological: He is alert. No cranial nerve deficit or sensory deficit.   Following some commands sporadically  Nodding/shaking head seemingly appropriately  Mouthing some words   Skin: Skin is warm, dry and intact. No petechiae and no rash noted. No cyanosis or erythema. Nails show no clubbing.   Dressing c/d/i   Psychiatric: He has a normal mood and affect. His behavior is normal.   Nursing note and vitals reviewed.        Assessment:          Respiratory insufficiency    Acute on chronic respiratory failure with hypoxia and hypercapnia (CMS/HCC)    Ventilator dependence (CMS/[facility])    Past Medical History:   Diagnosis Date   • Diabetes (CMS/[facility])    • Schizo affective schizophrenia (CMS/[facility])         Plan:        1. Acute hypoxic respiratory failure  2. Acute renal failure on CKD3  3. S/p Cardiac arrest  4. Schizophrenia  5. Multifactorial anemia  6. Hyponatremia  7. COPD  8. Leukocytosis  9. Dysphagia  10. Tobacco abuse  11. DM2 with hyperglycemia on long term insulin  12. Bradycardia  13. Hypokalemia    Continue current treatment. Monitor counts. Increase activity as tolerated.  Vent weaning under direction of pulmonology. Monitor electrolytes and renal function closely. Will continued to wean fentanyl patch.  Continue antibiotics. Resume anticoagulation. Wean reglan.  Wean nicotine patch. Labs Thursday.     Electronically signed by MIKEY Powell on 9/1/2020 at 09:35     I have discussed the care of Mohan Villatoro, including pertinent history and exam findings, with the nurse practitioner.    I have seen and examined the patient and the key elements of all parts of the encounter have been performed by me.  I agree with the assessment, plan and orders as documented by MIKEY Whitehead, after I modified the exam findings and the plan of treatments and the final  version is my approved version of the assessment.        Electronically signed by Eduardo Tolliver MD on 9/1/2020 at 19:26

## 2020-09-01 NOTE — PROGRESS NOTES
"Pharmacy Dosing Service  Automatic Renal Adjustment  Cefepime    Assessment/Action/Plan:  Based on prescribing information provided by the drug , Cefepime 1000 mg IV every 8 hours, has been changed to 2000 mg IV every 12 hours. Pharmacy will continue to monitor daily and make further adjustment(s) accordingly.     Subjective:  Mohan Villatoro is a 60 y.o. male     Additional Factors Considered:  Patient disposition per documentation  Disease state or condition being treated    Objective:  Ht: 180.3 cm (71\"); Wt: 81.4 kg (179 lb 8 oz)  Estimated Creatinine Clearance: 49.3 mL/min (A) (by C-G formula based on SCr of 1.88 mg/dL (H)).   Lab Results   Component Value Date    CREATININE 1.88 (H) 09/01/2020    CREATININE 1.87 (H) 08/31/2020    CREATININE 1.98 (H) 08/30/2020       CATE Mukherjee, formerly [facility]  09/01/20 13:53    "

## 2020-09-02 NOTE — PROGRESS NOTES
Unruly Tolliver M.D.  MIKEY Whitehead        Internal Medicine Progress Note    9/2/2020   09:44    Name:  Mohan Villatoro  MRN:    [id number]     Acct:     [id number]   Room:  [address] [place] [address] Day: 0     Admit Date: 8/12/2020  2:37 PM  PCP: Provider, No Known    Subjective:     C/C: need for continued vent weaning    Interval History: Status:  stayed the same/stable.  Resting in bed.  No family at bedside. Tolerating current vent settings (spontaneous). Eyes open to verbal. Following some commands sporadically. Afebrile. Counts stable. Renal function stable. CXR improved.       Review of Systems   Unable to perform ROS: intubated         Medications:     Allergies:   Allergies   Allergen Reactions   • Niacin Unknown - High Severity       Current Meds:   Current Facility-Administered Medications:   •  acetaminophen (TYLENOL) 160 MG/5ML solution 650 mg, 650 mg, Per G Tube, Q4H PRN, Bal Manning MD  •  albuterol (PROVENTIL) nebulizer solution 0.083% 2.5 mg/3mL, 2.5 mg, Nebulization, Q6H PRN, Bal Manning MD  •  amLODIPine (NORVASC) tablet 10 mg, 10 mg, Per G Tube, Q24H, Tripp Felipe MD  •  atropine sulfate injection 0.5 mg, 0.5 mg, Intravenous, PRN, Bal Manning MD  •  bumetanide (BUMEX) injection 1 mg, 1 mg, Intravenous, Daily, Eduardo Tolliver MD  •  busPIRone (BUSPAR) tablet 10 mg, 10 mg, Per G Tube, Q8H, Bal Manning MD  •  cefepime (MAXIPIME) 2 g/100 mL 0.9% NS (mbp), 2 g, Intravenous, Q12H, David Tafoya MD  •  cetirizine (zyrTEC) tablet 10 mg, 10 mg, Per G Tube, Daily, Bal Manning MD  •  fentaNYL (DURAGESIC) 25 MCG/HR patch 1 patch, 1 patch, Transdermal, Q72H **AND** Check Fentanyl Patch Placement, 1 each, Does not apply, Q12H, David Tafoya MD  •  chlorhexidine (PERIDEX) 0.12 % solution 15 mL, 15 mL, Mouth/Throat, Q12H, Bal Manning MD  •  citalopram (CeleXA) tablet 20 mg, 20 mg, Per G Tube, Daily, Nigel,  Bal Lopez MD  •  dextrose (D50W) 25 g/ 50mL Intravenous Solution 25 g, 25 g, Intravenous, Q15 Min PRN, Bal Manning MD  •  dextrose (GLUTOSE) oral gel 15 g, 15 g, Oral, Q15 Min PRN, Bal Manning MD  •  fluticasone (FLONASE) 50 MCG/ACT nasal spray 2 spray, 2 spray, Each Nare, Daily, Bal Manning MD  •  glucagon (human recombinant) (GLUCAGEN DIAGNOSTIC) injection 1 mg, 1 mg, Subcutaneous, Q15 Min PRN, Bal Manning MD  •  haloperidol (HALDOL) tablet 7.5 mg, 7.5 mg, Per G Tube, Nightly, Bal Manning MD  •  hydrALAZINE (APRESOLINE) injection 10 mg, 10 mg, Intravenous, Q6H PRN, Bal Manning MD  •  hydrALAZINE (APRESOLINE) tablet 75 mg, 75 mg, Per G Tube, Q8H, Eduardo Tolliver MD  •  insulin lispro (humaLOG) injection 0-9 Units, 0-9 Units, Subcutaneous, Q6H, Bal Manning MD  •  ipratropium-albuterol (DUO-NEB) nebulizer solution 3 mL, 3 mL, Nebulization, 4x Daily - RT, Bal Manning MD  •  lansoprazole (FIRST) oral suspension 30 mg, 30 mg, Nasogastric, QAM, Bal Manning MD  •  LORazepam (ATIVAN) injection 1 mg, 1 mg, Intravenous, Q1H PRN, Eduardo Tolliver MD  •  metoclopramide (REGLAN) injection 5 mg, 5 mg, Intravenous, Q8H, Eduardo Tolliver MD  •  Morphine sulfate (PF) injection 2 mg, 2 mg, Intravenous, Q2H PRN, Eduardo Tolliver MD  •  nicotine (NICODERM CQ) 7 MG/24HR patch 1 patch, 1 patch, Transdermal, Q24H, Eduardo Tolliver MD  •  ondansetron (ZOFRAN) tablet 4 mg, 4 mg, Oral, Q6H PRN **OR** ondansetron (ZOFRAN) injection 4 mg, 4 mg, Intravenous, Q6H PRN, Bal Manning MD  •  polyethylene glycol (MIRALAX) packet 17 g, 17 g, Nasogastric, BID PRN, Eduardo Tolliver MD  •  potassium chloride (KAYCIEL) 20 MEQ/15ML (10%) solution 20 mEq, 20 mEq, Per G Tube, Daily, Salima Oh APRN  •  risperiDONE (risperDAL) tablet 1 mg, 1 mg, Nasogastric, Nightly, Bal Manning MD  •  rivaroxaban (XARELTO)  "tablet 10 mg, 10 mg, Per G Tube, Daily With Dinner, Eduardo Tolliver MD  •  saccharomyces boulardii (FLORASTOR) capsule 250 mg, 250 mg, Oral, BID, Bal Manning MD  •  sennosides-docusate (PERICOLACE) 8.6-50 MG per tablet 1 tablet, 1 tablet, Oral, BID PRN, Eduardo Tolliver MD  •  sodium chloride 0.9 % flush 10 mL, 10 mL, Intravenous, Q12H, Bal Manning MD  •  sodium chloride 0.9 % flush 10 mL, 10 mL, Intravenous, PRN, Bal Manning MD  •  sodium chloride 3 % nebulizer solution 4 mL, 4 mL, Nebulization, 4x Daily - RT, Bal Manning MD    Data:     Code Status:    Code Status and Medical Interventions:   Ordered at: 08/25/20 0701     Level Of Support Discussed With:    Health Care Surrogate     Code Status:    CPR     Medical Interventions (Level of Support Prior to Arrest):    Full       History reviewed. No pertinent family history.    Social History     Socioeconomic History   • Marital status: Single     Spouse name: Not on file   • Number of children: Not on file   • Years of education: Not on file   • Highest education level: Not on file       Vitals:  BP (!) 188/86   Pulse 72   Temp 97.9 °F (36.6 °C) (Temporal)   Resp 18   Ht 180.3 cm (71\")   Wt 81.4 kg (179 lb 8 oz)   SpO2 97%   BMI 25.04 kg/m²   T 98.1 P 71 R 15 /56 Sp02 94% (vent)      I/O (24Hr):  No intake or output data in the 24 hours ending 09/02/20 0944    Labs and imaging:      Recent Results (from the past 12 hour(s))   POC Glucose Once    Collection Time: 09/01/20 11:04 PM   Result Value Ref Range    Glucose 156 (H) 70 - 130 mg/dL   POC Glucose Once    Collection Time: 09/02/20  5:54 AM   Result Value Ref Range    Glucose 152 (H) 70 - 130 mg/dL         Physical Examination:        Physical Exam   Constitutional: Vital signs are normal. He appears well-developed and well-nourished. He is intubated.   HENT:   Head: Normocephalic and atraumatic.   Nose: Nose normal.   Mouth/Throat: Oropharynx is " clear and moist.   Eyes: Pupils are equal, round, and reactive to light. Conjunctivae, EOM and lids are normal.   Neck: Trachea normal and normal range of motion. Neck supple.   Trach to vent   Cardiovascular: Normal rate, regular rhythm and normal heart sounds.   Pulmonary/Chest: Effort normal and breath sounds normal. He is intubated.   Abdominal: Soft. Normal appearance and bowel sounds are normal.   g tube  Midline dressing c/d/i   Musculoskeletal: Normal range of motion. He exhibits edema.   Generalized weakness   Neurological: He is alert. No cranial nerve deficit or sensory deficit.   Following some commands sporadically  Nodding/shaking head seemingly appropriately  Mouthing some words   Skin: Skin is warm, dry and intact. No petechiae and no rash noted. No cyanosis or erythema. Nails show no clubbing.   Dressing c/d/i   Psychiatric: He has a normal mood and affect. His behavior is normal.   Nursing note and vitals reviewed.        Assessment:          Respiratory insufficiency    Acute on chronic respiratory failure with hypoxia and hypercapnia (CMS/HCC)    Ventilator dependence (CMS/HCC)    Past Medical History:   Diagnosis Date   • Diabetes (CMS/HCC)    • Schizo affective schizophrenia (CMS/HCC)         Plan:        1. Acute hypoxic respiratory failure  2. Acute renal failure on CKD3  3. S/p Cardiac arrest  4. Schizophrenia  5. Multifactorial anemia  6. Hyponatremia  7. COPD  8. Leukocytosis  9. Dysphagia  10. Tobacco abuse  11. DM2 with hyperglycemia on long term insulin  12. Bradycardia  13. Hypokalemia    Continue current treatment. Monitor counts. Increase activity as tolerated.  Vent weaning under direction of pulmonology. Monitor electrolytes and renal function closely. Will continue to wean fentanyl patch.  Continue antibiotics. Continue  Anticoagulation and monitor counts closely. Wean reglan as able.  Wean nicotine patch. Labs in am.     Electronically signed by MIKEY Powell on  9/2/2020 at 09:44   I have discussed the care of Mohan Villatoro, including pertinent history and exam findings, with the nurse practitioner.    I have seen and examined the patient and the key elements of all parts of the encounter have been performed by me.  I agree with the assessment, plan and orders as documented by MIKEY Whitehead, after I modified the exam findings and the plan of treatments and the final version is my approved version of the assessment.        Electronically signed by Eduardo Tolliver MD on 9/2/2020 at 19:25

## 2020-09-02 NOTE — PROGRESS NOTES
[facility]  ENT PROGRESS NOTES  2020      Patient Identification:  Name: Mohan Villatoro  Age: 60 y.o.  Sex: male  :  1960  MRN: [id number]                     Date of Admission: 2020      CC:    Tracheostomy management  Subjective   Patient is resting comfortably in bed.  He is tolerating SIMV.  HISTORY   HPI, ROS, PMFSHx reviewed:   No changes       Objective     PE:        Body mass index is 25.04 kg/m².     General appearance: chronically ill appearing.   Ability to Communicate: Patient is alert but unable to communicate secondary to being ventilated and the presence of a tracheostomy   Facial exam: no craniofacial deformities   Ears - right ear normal, left ear normal.   Nasal exam - normal and patent, no erythema, discharge or polyps.   Oropharyngeal exam - mucous membranes moist, pharynx normal without lesions.   Neck exam - supple, no significant adenopathy, #8 cuffed Shiley tracheostomy tube in place-sutures are out, trach site looks good without bleeding or drainage..     CVS exam: normal rate and regular rhythm.   Chest: clear to auscultation, no wheezes, rales or rhonchi, symmetric air entry.   No lymphadenopathy in the anterior or posterior neck, supraclavicular areas.   Neurological exam reveals neck supple without rigidity.    DATA      MEDICATIONS   I have reviewed the current MAR.     LABS AND IMAGING      I have reviewed the labs and imaging data since the patient was last seen.       Assessment     ASSESSMENT       Respiratory insufficiency    Acute on chronic respiratory failure with hypoxia and hypercapnia (CMS/HCC)    Ventilator dependence (CMS/HCC)      Status post tracheostomy      Plan     PLAN     Continue local trach care  Will continue to follow peripherally for possible weaning when able          Bal Manning MD  2020  13:53

## 2020-09-02 NOTE — PROGRESS NOTES
Nephrology ([facility]) Progress Note      Patient:  Mohan Villatoro  YOB: 1960  Date of Service: 9/1/2020  MRN: [id number]   Acct: [id number]   Primary Care Physician: Provider, No Known  Advance Directive:   Code Status and Medical Interventions:   Ordered at: 08/25/20 0701     Level Of Support Discussed With:    Health Care Surrogate     Code Status:    CPR     Medical Interventions (Level of Support Prior to Arrest):    Full     Admit Date: 8/12/2020       Hospital Day: 0  Referring Provider: Eduardo Tolliver MD      Patient personally seen and examined.  Complete chart including Consults, Notes, Operative Reports, Labs, Cardiology, and Radiology studies reviewed as able.        Subjective:  Mohan Villatoro is a 60 y.o. male  whom we were consulted for hyponatremia, hyperkalemia, acute kidney injury. Unknown baseline renal function.  History of type 2 diabetes, paranoid schizophrenia, congestive heart failure. Patient transferred from [facility] for sodium level 114. He is a nursing home resident.  Reportedly has had nausea and poor oral intake recently. He had also noticed decreased urine output. Daily NSAID use. On August 7 had PEA cardiac arrest and had remained intubated since then. Serum sodium was improving slowly.  Moved to LTAC on August 12. He remains sedated on the ventilator and unable to participate in the history and physical examination.  On August 24, he underwent tracheotomy as well as PEG placement.     Today, he is on ventilator via trach.  Unable to fully participate in the  history and physical examination due to continued intubation.  No events per nursing.  Up in chair.    Temp- 98.1  Pulse- 61  Resp- 12  BP- 128/58  O2%- 96      Allergies:  Niacin    Home Meds:  Medications Prior to Admission   Medication Sig Dispense Refill Last Dose   • albuterol sulfate  (90 Base) MCG/ACT inhaler Inhale 2 puffs 3 (Three) Times a Day As Needed  (copd).   8/5/2020 at Unknown time   • aspirin 325 MG tablet Take 325 mg by mouth Daily.   8/5/2020 at Unknown time   • budesonide (PULMICORT) 0.25 MG/2ML nebulizer solution Take 4 mL by nebulization 2 (Two) Times a Day. 2 mL 0    • busPIRone (BUSPAR) 5 MG tablet Take 5 mg by mouth 3 (Three) Times a Day.   8/5/2020 at Unknown time   • calcium polycarbophil (FIBERCON) 625 MG tablet Take 625 mg by mouth 2 (Two) Times a Day.   8/5/2020 at Unknown time   • Cholecalciferol (VITAMIN D3) 125 MCG (5000 UT) capsule capsule Take 5,000 Units by mouth Daily.   8/5/2020 at Unknown time   • citalopram (CeleXA) 20 MG tablet Take 20 mg by mouth Daily.   8/5/2020 at Unknown time   • dicyclomine (BENTYL) 20 MG tablet Take 20 mg by mouth 3 (Three) Times a Day.   8/5/2020 at Unknown time   • diphenhydrAMINE (BENADRYL) 50 MG capsule Take 50 mg by mouth At Night As Needed for Sleep.   Past Week at Unknown time   • famotidine (PEPCID) 10 MG/ML solution injection Infuse 2 mL into a venous catheter Daily.      • fentaNYL citrate (PF) 2,500 mcg in sodium chloride 0.9 % 200 mL Infuse  mcg/hr into a venous catheter Continuous.      • finasteride (PROSCAR) 5 MG tablet Take 5 mg by mouth Daily.   8/5/2020 at Unknown time   • fluticasone (FLONASE) 50 MCG/ACT nasal spray 1 spray into the nostril(s) as directed by provider Daily.   8/5/2020 at Unknown time   • folic acid (FOLVITE) 1 MG tablet Take 1 mg by mouth Daily.   8/5/2020 at Unknown time   • furosemide (LASIX) 20 MG tablet Take 20 mg by mouth Daily.   8/5/2020 at Unknown time   • guaiFENesin (ROBITUSSIN) 100 MG/5ML solution oral solution Take 10 mL by mouth Every 4 (Four) Hours. 30 mL 0    • haloperidol (HALDOL) 5 MG tablet Take 7.5 mg by mouth every night at bedtime.   8/5/2020 at Unknown time   • HYDROcodone-acetaminophen (NORCO)  MG per tablet Take 1 tablet by mouth Every 8 (Eight) Hours As Needed for Moderate Pain .   8/5/2020 at Unknown time   • hydrOXYzine pamoate  (VISTARIL) 50 MG capsule Take 50 mg by mouth every night at bedtime.   8/5/2020 at Unknown time   • insulin glargine (LANTUS) 100 UNIT/ML injection Inject 14 Units under the skin into the appropriate area as directed Every Night.   8/5/2020 at Unknown time   • ipratropium-albuterol (DUO-NEB) 0.5-2.5 mg/3 ml nebulizer Take 3 mL by nebulization 4 (Four) Times a Day. 360 mL 0    • loperamide (IMODIUM) 2 MG capsule Take 2 mg by mouth 4 (Four) Times a Day As Needed for Diarrhea.   Past Month at Unknown time   • methylPREDNISolone sodium succinate (SOLU-Medrol) 40 MG injection Infuse 1 mL into a venous catheter Every 12 (Twelve) Hours. 1 each 0    • metoclopramide (REGLAN) 5 MG tablet Take 5 mg by mouth 4 (Four) Times a Day Before Meals & at Bedtime.   8/5/2020 at Unknown time   • Multiple Vitamins-Minerals (THERA-M PO) Take 1 tablet by mouth Daily.   8/5/2020 at Unknown time   • Omega-3 Fatty Acids (OMEGA-3 FISH OIL) 1000 MG capsule Take 2 g by mouth 2 (Two) Times a Day.   8/5/2020 at Unknown time   • omeprazole (priLOSEC) 20 MG capsule Take 20 mg by mouth Daily.   8/5/2020 at Unknown time   • propofol (DIPRIVAN) 10 mg/mL emulsion infusion Infuse 465-6,975 mcg/min into a venous catheter Dose Adjusted By Provider As Needed.      • risperiDONE (risperDAL M-TABS) 0.5 MG disintegrating tablet Place 0.5 mg on the tongue 2 (Two) Times a Day.   8/5/2020 at Unknown time   • rOPINIRole (REQUIP) 2 MG tablet Take 2 mg by mouth Every Night.   8/5/2020 at Unknown time   • sennosides-docusate (senna-docusate sodium) 8.6-50 MG per tablet Take 1 tablet by mouth At Night As Needed for Constipation.   Past Week at Unknown time   • simvastatin (ZOCOR) 20 MG tablet Take 20 mg by mouth Every Night.   8/5/2020 at Unknown time   • tamsulosin (FLOMAX) 0.4 MG capsule 24 hr capsule Take 1 capsule by mouth every night at bedtime.   8/5/2020 at Unknown time   • traZODone (DESYREL) 100 MG tablet Take 100 mg by mouth Every Night.   8/5/2020 at  Unknown time   • vitamin B-12 (CYANOCOBALAMIN) 1000 MCG tablet Take 1,000 mcg by mouth Daily.   8/5/2020 at Unknown time       Medicines:  Current Facility-Administered Medications   Medication Dose Route Frequency Provider Last Rate Last Dose   • acetaminophen (TYLENOL) 160 MG/5ML solution 650 mg  650 mg Per G Tube Q4H PRN Bal Manning MD       • albuterol (PROVENTIL) nebulizer solution 0.083% 2.5 mg/3mL  2.5 mg Nebulization Q6H PRN Bal Manning MD       • amLODIPine (NORVASC) tablet 10 mg  10 mg Per G Tube Q24H Tripp Felipe MD       • atropine sulfate injection 0.5 mg  0.5 mg Intravenous PRN Bal Manning MD       • bumetanide (BUMEX) injection 1 mg  1 mg Intravenous Daily Eduardo Tolliver MD       • busPIRone (BUSPAR) tablet 10 mg  10 mg Per G Tube Q8H Bal Manning MD       • cefepime (MAXIPIME) 2 g/100 mL 0.9% NS (mbp)  2 g Intravenous Q12H David Tafoya MD       • cetirizine (zyrTEC) tablet 10 mg  10 mg Per G Tube Daily Bal Manning MD       • fentaNYL (DURAGESIC) 25 MCG/HR patch 1 patch  1 patch Transdermal Q72H David Tafoya MD        And   • Check Fentanyl Patch Placement  1 each Does not apply Q12H David Tafoya MD       • chlorhexidine (PERIDEX) 0.12 % solution 15 mL  15 mL Mouth/Throat Q12H Bal Manning MD       • citalopram (CeleXA) tablet 20 mg  20 mg Per G Tube Daily Bal Manning MD       • dextrose (D50W) 25 g/ 50mL Intravenous Solution 25 g  25 g Intravenous Q15 Min PRN Bal Manning MD       • dextrose (GLUTOSE) oral gel 15 g  15 g Oral Q15 Min PRN Bal Manning MD       • fluticasone (FLONASE) 50 MCG/ACT nasal spray 2 spray  2 spray Each Nare Daily Bal Manning MD       • glucagon (human recombinant) (GLUCAGEN DIAGNOSTIC) injection 1 mg  1 mg Subcutaneous Q15 Min PRN Bal Manning MD       • haloperidol (HALDOL) tablet 7.5 mg  7.5 mg Per G Tube Nightly Bal Manning MD        • hydrALAZINE (APRESOLINE) injection 10 mg  10 mg Intravenous Q6H PRN Bal Manning MD       • hydrALAZINE (APRESOLINE) tablet 75 mg  75 mg Per G Tube Q8H Eduardo Tolliver MD       • insulin lispro (humaLOG) injection 0-9 Units  0-9 Units Subcutaneous Q6H Bal Manning MD       • ipratropium-albuterol (DUO-NEB) nebulizer solution 3 mL  3 mL Nebulization 4x Daily - RT Bal Manning MD       • lansoprazole (FIRST) oral suspension 30 mg  30 mg Nasogastric QAM Bal Manning MD       • LORazepam (ATIVAN) injection 1 mg  1 mg Intravenous Q1H PRN Eduardo Tolliver MD       • metoclopramide (REGLAN) injection 5 mg  5 mg Intravenous Q8H Eduardo Tolliver MD       • Morphine sulfate (PF) injection 2 mg  2 mg Intravenous Q2H PRN Eduardo Tolliver MD       • nicotine (NICODERM CQ) 7 MG/24HR patch 1 patch  1 patch Transdermal Q24H Eduardo Tolliver MD       • ondansetron (ZOFRAN) tablet 4 mg  4 mg Oral Q6H PRN Bal Manning MD        Or   • ondansetron (ZOFRAN) injection 4 mg  4 mg Intravenous Q6H PRN Bal Manning MD       • polyethylene glycol (MIRALAX) packet 17 g  17 g Nasogastric BID PRN Eduardo Tolliver MD       • potassium chloride (KAYCIEL) 20 MEQ/15ML (10%) solution 20 mEq  20 mEq Per G Tube Daily Salima Oh APRN       • risperiDONE (risperDAL) tablet 1 mg  1 mg Nasogastric Nightly Bal Manning MD       • rivaroxaban (XARELTO) tablet 10 mg  10 mg Per G Tube Daily With Dinner Eduardo Tolliver MD       • saccharomyces boulardii (FLORASTOR) capsule 250 mg  250 mg Oral BID Bal Manning MD       • sennosides-docusate (PERICOLACE) 8.6-50 MG per tablet 1 tablet  1 tablet Oral BID PRN Eduardo Tolliver MD       • sodium chloride 0.9 % flush 10 mL  10 mL Intravenous Q12H Bal Manning MD       • sodium chloride 0.9 % flush 10 mL  10 mL Intravenous PRN Bal Manning MD       • sodium chloride 3 %  nebulizer solution 4 mL  4 mL Nebulization 4x Daily - RT Bal Manning MD           Past Medical History:  Past Medical History:   Diagnosis Date   • Diabetes (CMS/[facility])    • Schizo affective schizophrenia (CMS/[facility])        Past Surgical History:  Past Surgical History:   Procedure Laterality Date   • GASTROSTOMY FEEDING TUBE INSERTION N/A 8/25/2020    Procedure: GASTROSTOMY FEEDING TUBE INSERTION;  Surgeon: Kizzy Garcia MD;  Location: [facility] OR;  Service: General;  Laterality: N/A;   • TRACHEOSTOMY N/A 8/25/2020    Procedure: TRACHEOSTOMY;  Surgeon: Bal Manning MD;  Location: [facility] OR;  Service: ENT;  Laterality: N/A;       Family History  History reviewed. No pertinent family history.    Social History  Social History     Socioeconomic History   • Marital status: Single     Spouse name: Not on file   • Number of children: Not on file   • Years of education: Not on file   • Highest education level: Not on file         Review of Systems:  History obtained from unobtainable from patient due to mental status      Objective:  No data found.  No intake or output data in the 24 hours ending 09/01/20 2152  General: vent via trach  Chest:  clear to auscultation bilaterally without respiratory distress  CVS: regular rate and rhythm  Abdominal: soft, nontender, positive bowel sounds  Extremities: ble edema  Skin: warm and dry without rash      Labs:  Results from last 7 days   Lab Units 09/01/20  0506 08/31/20  0835 08/31/20  0519  08/30/20  0814   WBC 10*3/mm3 9.59  --  9.17  --  8.50   HEMOGLOBIN g/dL 9.6* 9.3* 8.7*   < > 8.7*   HEMATOCRIT % 28.7* 27.8* 26.0*   < > 25.4*   PLATELETS 10*3/mm3 342  --  287  --  262    < > = values in this interval not displayed.         Results from last 7 days   Lab Units 09/01/20  0506 08/31/20  0519 08/30/20  0814   SODIUM mmol/L 142 139 140   POTASSIUM mmol/L 3.9 3.7 3.4*   CHLORIDE mmol/L 100 99 98   CO2 mmol/L 31.0* 31.0* 30.0*   BUN mg/dL 47* 55* 59*   CREATININE  mg/dL 1.88* 1.87* 1.98*   CALCIUM mg/dL 8.9 8.5* 8.3*   GLUCOSE mg/dL 146* 180* 189*       Radiology:   Imaging Results (Last 72 Hours)     Procedure Component Value Units Date/Time    XR Chest 1 View [[id number]] Collected:  09/01/20 0721     Updated:  09/01/20 0725    Narrative:       Frontal supine radiograph of the chest 9/1/2020 3:30 AM CDT     History: pt on vent; J96.21-Acute and chronic respiratory failure with  hypoxia; J96.22-Acute and chronic respiratory failure with hypercapnia;  Z99.11-Dependence on respirator (ventilator) status; R06.89-Other  abnormalities of breathing     Comparison: 8/31/2020      Findings:   Tracheostomy in place. Reidentified bilateral hazy lung infiltrates  which are improving. This is most notable in the perihilar regions,  which are better aerated on this exam. There are small bilateral  layering pleural effusions, more noticeable on the left. No  pneumothorax. Stable heart size. Decreased central pulmonary congestion.          Impression:       Impression:   1. Decreasing vascular congestion and pulmonary edema with improving  aeration.  2. Tracheostomy.     This report was finalized on 09/01/2020 07:22 by Dr Sammy Strauss, .    XR Chest 1 View [[id number]] Collected:  08/31/20 0715     Updated:  08/31/20 0719    Narrative:       Frontal supine radiograph of the chest 8/31/2020 3:40 AM CDT     History: on vent; J96.21-Acute and chronic respiratory failure with  hypoxia; J96.22-Acute and chronic respiratory failure with hypercapnia;  Z99.11-Dependence on respirator (ventilator) status; R06.89-Other  abnormalities of breathing     Comparison: 8/30/2020      Findings:      Tracheostomy in place. Reidentified bilateral diffuse hazy lung  infiltrates which may be slightly decreased. Cardiomegaly stable. No  pneumothorax. Likely small right-sided layering pleural effusion.     No acute osseous or soft tissue abnormality is noted.        Impression:       Impression:   1. Bilateral,  fairly diffuse hazy infiltrates reflecting pulmonary  edema. This appears to be slightly improved.   This report was finalized on 08/31/2020 07:16 by Dr Sammy Strauss, .    XR Chest 1 View [[id number]] Collected:  08/30/20 0725     Updated:  08/30/20 0728    Narrative:       Frontal supine radiograph of the chest 8/30/2020 3:40 AM CDT     History: pt on vent; J96.21-Acute and chronic respiratory failure with  hypoxia; J96.22-Acute and chronic respiratory failure with hypercapnia;  Z99.11-Dependence on respirator (ventilator) status; R06.89-Other  abnormalities of breathing     Comparison: 8/29/2020      Findings:   Tracheostomy again identified. Bilateral asymmetric hazy lung  infiltrates reflecting pulmonary edema, more noticeable on the right.  This is not significantly changed. There appear to be bilateral small  layering pleural effusions. No pneumothorax. Heart size is stable.       No acute osseous or soft tissue abnormality is noted.        Impression:       Impression:   1. Tracheostomy in place.  2. Underlying pulmonary edema which is not significantly changed.        This report was finalized on 08/30/2020 07:25 by Dr Sammy Strauss, .          Culture:  Blood Culture   Date Value Ref Range Status   08/27/2020 No growth at 4 days  Preliminary   08/27/2020 No growth at 4 days  Preliminary     Urine Culture   Date Value Ref Range Status   08/27/2020 No growth  Final     Respiratory Culture   Date Value Ref Range Status   08/27/2020 Light growth (2+) Delftia acidovorans (A)  Final   08/27/2020 Light growth (2+) Moraxella catarrhalis (A)  Final     Comment:     Beta lactamase positive confers resistance to ampcillin, amoxicillin, penicillin, ticarcillin, and piperacillin but NOT amoxicillin-clavulanate, ampicillin-sulbactam, or piperacillin-tazobactam.           Assessment   Acute kidney injury/ATN  Acute hypoxic respiratory failure which required mechanical ventilation  Hyperkalemia  Metabolic  acidosis  Paranoid schizophrenia  Anemia  Hypertension    Plan:  Continue current free water regimen, watch na as a bit up  Adjust blood pressure medications as needed  Monitor labs  Discussed with nursing      Tripp Felipe MD  9/1/2020  21:52

## 2020-09-02 NOTE — PROGRESS NOTES
"LTACH Fall Assessment Note    Mohanaddi Villatoro is a 60 y.o.male  [Ht: 180.3 cm (71\"); Wt: 81.4 kg (179 lb 8 oz)] admitted 8/12/2020  2:37 PM.    Current medications associated with an increased risk for fall include:  Amlodipine  Bumetanide  Buspirone  Cetirizine  Citalopram  Fentanyl patch  Haloperidol  Hydralazine  Humalog  Metoclopramide  Risperidone  Lorazepam  Morphine  Ondansetron    CATE Mukherjee [facility]  09/02/2014:27         "

## 2020-09-02 NOTE — PROGRESS NOTES
PULMONARY AND CRITICAL CARE PROGRESS NOTE - [facility]  Patient: Mohan Villatoro    1960   Acct# [id number]  09/02/20   07:59  Referring Provider: Eduardo Tolliver MD   Chief Complaint: Ventilator dependent respiratory failure     Interval history: Patient remains on mechanical ventilator. No sedation. He appears to be doing well on SIMV and making more efforts to breath on his own. MV 8L, spontaneous volumes over 500. Sat 94%, ET 48. Some ST depression on the monitor.  No family present. No other issues.     Review of Systems: Review of Systems   Unable to perform ROS: Patient nonverbal      Physical Exam:  Vital signs: T: 98.1, blood pressure 146/56, pulse 71, respiratory rate 15, end-tidal 48 saturation 94%   Physical Exam   Constitutional: He appears well-developed and well-nourished.   Middle-aged  male with tracheostomy tube on neck on ventilator, awake does not follow commands.   HENT:   Head: Normocephalic and atraumatic.   Eyes: Pupils are equal, round, and reactive to light. Conjunctivae and EOM are normal. No scleral icterus.   Neck: Normal range of motion. Neck supple. No JVD present. No tracheal deviation present. No thyromegaly present.   Cardiovascular: Normal rate, regular rhythm, normal heart sounds and intact distal pulses. Exam reveals no friction rub.   No murmur heard.  Pulmonary/Chest: Effort normal. No respiratory distress. He has no wheezes. He has rales. He exhibits no tenderness.   Abdominal: Soft. Bowel sounds are normal. He exhibits no distension and no mass. There is no tenderness. There is no guarding.   PEG tube in place   Musculoskeletal: Normal range of motion. He exhibits edema. He exhibits no tenderness or deformity.   Lymphadenopathy:     He has no cervical adenopathy.   Neurological: He is alert.   Nonverbal     Skin: Skin is warm and dry. No rash noted. No erythema. No pallor.   Psychiatric:   Could not be assessed.     Results from  last 7 days   Lab Units 09/01/20  0506 08/31/20  0835 08/31/20  0519  08/30/20  0814   WBC 10*3/mm3 9.59  --  9.17  --  8.50   HEMOGLOBIN g/dL 9.6* 9.3* 8.7*   < > 8.7*   PLATELETS 10*3/mm3 342  --  287  --  262    < > = values in this interval not displayed.     Results from last 7 days   Lab Units 09/01/20  0506 08/31/20  0519 08/30/20  0814   SODIUM mmol/L 142 139 140   POTASSIUM mmol/L 3.9 3.7 3.4*   BUN mg/dL 47* 55* 59*   CREATININE mg/dL 1.88* 1.87* 1.98*     Results from last 7 days   Lab Units 08/31/20  0403 08/28/20  0437   PH, ARTERIAL pH units 7.492* 7.510*   PCO2, ARTERIAL mm Hg 41.9 41.8   PO2 ART mm Hg 70.8* 97.5   FIO2 % 30 40     Blood Culture   Date Value Ref Range Status   08/20/2020 No growth at 24 hours  Preliminary   08/20/2020 No growth at 24 hours  Preliminary     Respiratory Culture   Date Value Ref Range Status   08/20/2020   Final    Rare Normal Respiratory Sophia: NO S.aureus/MRSA or Pseudomonas aeruginosa   Recent films:  Xr Chest 1 View    Result Date: 9/2/2020  1. Continued improvement. There is decrease in the pulmonary vascular congestion and improved aeration in the right and left lung..   This report was finalized on 09/02/2020 07:11 by Dr. Jeffrey Cullen MD.    Xr Chest 1 View    Result Date: 9/1/2020  Impression: 1. Decreasing vascular congestion and pulmonary edema with improving aeration. 2. Tracheostomy.  This report was finalized on 09/01/2020 07:22 by Dr Sammy Strauss, .    Films reviewed personally by me.  My interpretation: tracheostomy in good position,  decreasing vascular congestion and edema, 9-2-20    Pulmonary Assessment:    1. Acute hypercapnic and hypoxic respiratory failure, on mechanical ventilation status post self extubation and reintubation and tracheostomy and PEG tube placement 8/25/20  2. Diffuse bilateral pulmonary infiltrates: ARDS versus pulmonary edema- suspect at least an element of volume overload due to rapid change in bilateral  infiltrates  3. Bilateral pneumonia  4. Bilateral pleural effusion, mild  5. JIM, slightly worse  6. Paranoid schizophrenia  7. Suspected GI bleeding-seems to be stable now  8. Opioid-induced constipation  9. S/p cardiac arrest on 8/7/2020 with 5 minutes ROSC, transferred to LTAC on 8/13/2020 for prolonged weaning  10. Hypernatremia    Recommend:     · Continue mechanical ventilation.  SIMV as tolerated   · Will attempt intermittent PSV trial if he can tolerate them today during the day  · Judicious use of sedative medications  · Continue DuoNeb  · Arterial blood gases as needed   · Continue cefepime until 9-5  · Nutrition  · DVT and ulcer prophylaxis  · Diuresis per attending/renal  · PT/OT/SLP  Electronically signed by MIKEY Salmon, on 9/2/2020, 07:59      ATTESTATION OF CLINICAL NOTE:  I have reviewed the notes, assessments, and/or procedures performed by MIKEY Salmon, I concur with her/his documentation of Mohan Villatoro.    Patient is known to me from the prior visits.  He has long-term ventilator dependence and currently has a tracheostomy and PEG tube in place.  He did well on the pressure support trial for couple days but today is back on SIMV ventilation.  He is more alert and appears to be stable.    Physical examination patient is a middle-aged  male well-built well-nourished.  He opens eyes and unable to follow commands and interact when also.  Neck is supple tracheostomy tube in place.  Heart sounds normal rate and rhythm regular no murmur lungs clear to auscultation a few crackles noted at the base abdomen soft nontender was not present extremities no edema normal pulses normal color neurology grossly intact utilized weakness noted no major skin breakdown noted.    Continue current treatment plan he will need a long-term skilled nursing facility placement.  He is already on antibiotics.  Continue additional supportive also provided some pain and anxiety control.  Labs  and imaging studies will be repeated as needed.  We will continue following him and make further recommendations.    I have seen and examined patient personally, performing a face-to-face diagnostic evaluation with plan of care reviewed and developed with APRN and nursing staff. I have addended and/or modified the above history of present illness, physical examination, and assessment and plan to reflect my findings and impressions. Essential elements of the care plan were discussed with APRN above.  Agree with findings and assessment/plan as documented above.    Adonis Franz MD  Pulmonologist/Intensivist  9/2/2020 11:26

## 2020-09-03 NOTE — PROGRESS NOTES
Unruly Tolliver M.D.  [facility]        Internal Medicine Progress Note    9/3/2020   10:33    Name:  Mohan Villatoro  MRN:    [id number]     Acct:     [id number]   Room:  [address] [place] [address] Day: 0     Admit Date: 8/12/2020  2:37 PM  PCP: Provider, No Known    Subjective:     C/C: need for continued vent weaning    Interval History: Status:  stayed the same/stable. Resting in bed. No family at bedside. Tolerating current vent settings (spontaneous). Eyes open to verbal. Following some commands sporadically. Afebrile. Counts stable. Mittens in place to limit access to lines. Renal function stable. CXR improved.       Review of Systems   Unable to perform ROS: intubated         Medications:     Allergies:   Allergies   Allergen Reactions   • Niacin Unknown - High Severity       Current Meds:   Current Facility-Administered Medications:   •  acetaminophen (TYLENOL) 160 MG/5ML solution 650 mg, 650 mg, Per G Tube, Q4H PRN, Bal Manning MD  •  albuterol (PROVENTIL) nebulizer solution 0.083% 2.5 mg/3mL, 2.5 mg, Nebulization, Q6H PRN, Bal Manning MD  •  amLODIPine (NORVASC) tablet 10 mg, 10 mg, Per G Tube, Q24H, Tripp Felipe MD  •  atropine sulfate injection 0.5 mg, 0.5 mg, Intravenous, PRN, Bal Manning MD  •  bumetanide (BUMEX) injection 1 mg, 1 mg, Intravenous, Daily, Eduardo Tolliver MD  •  busPIRone (BUSPAR) tablet 10 mg, 10 mg, Per G Tube, Q8H, Bal Manning MD  •  cefepime (MAXIPIME) 2 g/100 mL 0.9% NS (mbp), 2 g, Intravenous, Q12H, David Tafoya MD  •  cetirizine (zyrTEC) tablet 10 mg, 10 mg, Per G Tube, Daily, Bal Manning MD  •  fentaNYL (DURAGESIC) 25 MCG/HR patch 1 patch, 1 patch, Transdermal, Q72H **AND** Check Fentanyl Patch Placement, 1 each, Does not apply, Q12H, David Tafoya MD  •  chlorhexidine (PERIDEX) 0.12 % solution 15 mL, 15 mL, Mouth/Throat, Q12H, Bal Manning MD  •  citalopram (CeleXA) tablet  20 mg, 20 mg, Per G Tube, Daily, Bal Manning MD  •  dextrose (D50W) 25 g/ 50mL Intravenous Solution 25 g, 25 g, Intravenous, Q15 Min PRN, Bal Manning MD  •  dextrose (GLUTOSE) oral gel 15 g, 15 g, Oral, Q15 Min PRN, Bal Manning MD  •  fluticasone (FLONASE) 50 MCG/ACT nasal spray 2 spray, 2 spray, Each Nare, Daily, Bal Manning MD  •  glucagon (human recombinant) (GLUCAGEN DIAGNOSTIC) injection 1 mg, 1 mg, Subcutaneous, Q15 Min PRN, Bal Manning MD  •  haloperidol (HALDOL) tablet 7.5 mg, 7.5 mg, Per G Tube, Nightly, Bal Manning MD  •  hydrALAZINE (APRESOLINE) injection 10 mg, 10 mg, Intravenous, Q6H PRN, Bal Manning MD  •  hydrALAZINE (APRESOLINE) tablet 75 mg, 75 mg, Per G Tube, Q8H, Eduardo Tolliver MD  •  insulin lispro (humaLOG) injection 0-9 Units, 0-9 Units, Subcutaneous, Q6H, Bal Manning MD  •  ipratropium-albuterol (DUO-NEB) nebulizer solution 3 mL, 3 mL, Nebulization, 4x Daily - RT, Bal Manning MD  •  lansoprazole (FIRST) oral suspension 30 mg, 30 mg, Nasogastric, QAM, Bal Manning MD  •  LORazepam (ATIVAN) injection 1 mg, 1 mg, Intravenous, Q1H PRN, Eduardo Tolliver MD  •  metoclopramide (REGLAN) injection 5 mg, 5 mg, Intravenous, Q8H, Eduardo Tolliver MD  •  Morphine sulfate (PF) injection 2 mg, 2 mg, Intravenous, Q2H PRN, Eduardo Tolliver MD  •  nicotine (NICODERM CQ) 7 MG/24HR patch 1 patch, 1 patch, Transdermal, Q24H, Eduardo Tolliver MD  •  ondansetron (ZOFRAN) tablet 4 mg, 4 mg, Oral, Q6H PRN **OR** ondansetron (ZOFRAN) injection 4 mg, 4 mg, Intravenous, Q6H PRN, Bal Manning MD  •  polyethylene glycol (MIRALAX) packet 17 g, 17 g, Nasogastric, BID PRN, Eduardo Tolliver MD  •  potassium chloride (KAYCIEL) 20 MEQ/15ML (10%) solution 20 mEq, 20 mEq, Per G Tube, Daily, Salima Oh, MIKEY  •  risperiDONE (risperDAL) tablet 1 mg, 1 mg, Nasogastric, Nightly, Bal Manning  "MD John  •  rivaroxaban (XARELTO) tablet 10 mg, 10 mg, Per G Tube, Daily With Dinner, Eduardo Tolliver MD  •  saccharomyces boulardii (FLORASTOR) capsule 250 mg, 250 mg, Oral, BID, Bal Manning MD  •  sennosides-docusate (PERICOLACE) 8.6-50 MG per tablet 1 tablet, 1 tablet, Oral, BID PRN, Eduardo Tolliver MD  •  sodium chloride 0.9 % flush 10 mL, 10 mL, Intravenous, Q12H, Bal Manning MD  •  sodium chloride 0.9 % flush 10 mL, 10 mL, Intravenous, PRN, Bal Manning MD  •  sodium chloride 3 % nebulizer solution 4 mL, 4 mL, Nebulization, 4x Daily - RT, Bal Manning MD    Data:     Code Status:    Code Status and Medical Interventions:   Ordered at: 08/25/20 0701     Level Of Support Discussed With:    Health Care Surrogate     Code Status:    CPR     Medical Interventions (Level of Support Prior to Arrest):    Full       History reviewed. No pertinent family history.    Social History     Socioeconomic History   • Marital status: Single     Spouse name: Not on file   • Number of children: Not on file   • Years of education: Not on file   • Highest education level: Not on file       Vitals:  BP (!) 188/86   Pulse 72   Temp 97.9 °F (36.6 °C) (Temporal)   Resp 18   Ht 180.3 cm (71\")   Wt 81.4 kg (179 lb 8 oz)   SpO2 97%   BMI 25.04 kg/m²   T 98.6 P 71 R 13 /62 Sp02 98% (vent)      I/O (24Hr):  No intake or output data in the 24 hours ending 09/03/20 1033    Labs and imaging:      Recent Results (from the past 12 hour(s))   POC Glucose Once    Collection Time: 09/02/20 11:35 PM   Result Value Ref Range    Glucose 156 (H) 70 - 130 mg/dL   POC Glucose Once    Collection Time: 09/03/20  5:19 AM   Result Value Ref Range    Glucose 134 (H) 70 - 130 mg/dL         Physical Examination:        Physical Exam   Constitutional: Vital signs are normal. He appears well-developed and well-nourished. He is intubated.   HENT:   Head: Normocephalic and atraumatic.   Nose: Nose " normal.   Mouth/Throat: Oropharynx is clear and moist.   Eyes: Pupils are equal, round, and reactive to light. Conjunctivae, EOM and lids are normal.   Neck: Trachea normal and normal range of motion. Neck supple.   Trach to vent   Cardiovascular: Normal rate, regular rhythm and normal heart sounds.   Pulmonary/Chest: Effort normal and breath sounds normal. He is intubated.   Abdominal: Soft. Normal appearance and bowel sounds are normal.   g tube  Midline dressing c/d/i   Musculoskeletal: Normal range of motion. He exhibits edema.   Generalized weakness   Neurological: He is alert. No cranial nerve deficit or sensory deficit.   Following some commands sporadically  Nodding/shaking head seemingly appropriately  Mouthing some words   Skin: Skin is warm, dry and intact. No petechiae and no rash noted. No cyanosis or erythema. Nails show no clubbing.   Dressing c/d/i   Psychiatric: He has a normal mood and affect. His behavior is normal.   Nursing note and vitals reviewed.        Assessment:          Respiratory insufficiency    Acute on chronic respiratory failure with hypoxia and hypercapnia (CMS/[facility])    Ventilator dependence (CMS/[facility])    Past Medical History:   Diagnosis Date   • Diabetes (CMS/HCC)    • Schizo affective schizophrenia (CMS/[facility])         Plan:        1. Acute hypoxic respiratory failure  2. Acute renal failure on CKD3  3. S/p Cardiac arrest  4. Schizophrenia  5. Multifactorial anemia  6. Hyponatremia  7. COPD  8. Leukocytosis  9. Dysphagia  10. Tobacco abuse  11. DM2 with hyperglycemia on long term insulin  12. Bradycardia  13. Hypokalemia    Continue current treatment. Monitor counts. Increase activity as tolerated.  Vent weaning under direction of pulmonology. Monitor electrolytes and renal function closely. Will continue to wean fentanyl patch.  Continue antibiotics. Continue  Anticoagulation and monitor counts closely. Wean reglan as able.  Wean nicotine patch. Labs Tuesday.     Electronically  signed by MIKEY Powell on 9/3/2020 at 10:33

## 2020-09-03 NOTE — PROGRESS NOTES
PULMONARY AND CRITICAL CARE PROGRESS NOTE - [facility]  Patient: Mohan Villatoro    1960   Acct# [id number]  09/03/20   08:18  Referring Provider: Eduardo Tolliver MD   Chief Complaint: Ventilator dependent respiratory failure     Interval history: The patient is awake and alert this morning.  He has managed to unclipped his Best catheter bag from his leg and he has a hold of the tubing.  Currently on PSV 10/5 35%.  O2 sat 97%.  No overnight events.    Review of Systems: Review of Systems   Unable to perform ROS: Patient nonverbal      Physical Exam:  Vital signs: T: 98.6, blood pressure 144/63, pulse 92, respiratory rate 15, end-tidal 44%, saturation 97%   Physical Exam   Constitutional: He appears well-developed and well-nourished.   Middle-aged  male with tracheostomy tube on neck on ventilator, awake does not follow commands.   HENT:   Head: Normocephalic and atraumatic.   Eyes: Pupils are equal, round, and reactive to light. Conjunctivae and EOM are normal. No scleral icterus.   Neck: Normal range of motion. Neck supple. No JVD present. No tracheal deviation present. No thyromegaly present.   Cardiovascular: Normal rate, regular rhythm, normal heart sounds and intact distal pulses. Exam reveals no friction rub.   No murmur heard.  Pulmonary/Chest: Effort normal. No respiratory distress. He has no wheezes. He has rales. He exhibits no tenderness.   Abdominal: Soft. Bowel sounds are normal. He exhibits no distension and no mass. There is no tenderness. There is no guarding.   PEG tube in place   Musculoskeletal: Normal range of motion. He exhibits edema. He exhibits no tenderness or deformity.   Lymphadenopathy:     He has no cervical adenopathy.   Neurological: He is alert.   Nonverbal     Skin: Skin is warm and dry. No rash noted. No erythema. No pallor.   Psychiatric:   Could not be assessed.     Results from last 7 days   Lab Units 09/01/20  0506 08/31/20  0884  08/31/20  0519  08/30/20  0814   WBC 10*3/mm3 9.59  --  9.17  --  8.50   HEMOGLOBIN g/dL 9.6* 9.3* 8.7*   < > 8.7*   PLATELETS 10*3/mm3 342  --  287  --  262    < > = values in this interval not displayed.     Results from last 7 days   Lab Units 09/01/20  0506 08/31/20  0519 08/30/20  0814   SODIUM mmol/L 142 139 140   POTASSIUM mmol/L 3.9 3.7 3.4*   BUN mg/dL 47* 55* 59*   CREATININE mg/dL 1.88* 1.87* 1.98*     Results from last 7 days   Lab Units 08/31/20  0403 08/28/20  0437   PH, ARTERIAL pH units 7.492* 7.510*   PCO2, ARTERIAL mm Hg 41.9 41.8   PO2 ART mm Hg 70.8* 97.5   FIO2 % 30 40     Blood Culture   Date Value Ref Range Status   08/20/2020 No growth at 24 hours  Preliminary   08/20/2020 No growth at 24 hours  Preliminary     Respiratory Culture   Date Value Ref Range Status   08/20/2020   Final    Rare Normal Respiratory Sophia: NO S.aureus/MRSA or Pseudomonas aeruginosa   Recent films:  Xr Chest 1 View    Result Date: 9/3/2020  Impression: 1. Decreasing pulmonary edema. No areas of new or worsening infiltrate. 2. Tracheostomy.  This report was finalized on 09/03/2020 07:01 by Dr Sammy Strauss, .    Xr Chest 1 View    Result Date: 9/2/2020  1. Continued improvement. There is decrease in the pulmonary vascular congestion and improved aeration in the right and left lung..   This report was finalized on 09/02/2020 07:11 by Dr. Jeffrey Cullen MD.    Films reviewed personally by me.  My interpretation: Tracheostomy intact.  Decreasing pulmonary edema    Pulmonary Assessment:    1. Acute hypercapnic and hypoxic respiratory failure, on mechanical ventilation status post self extubation and reintubation and tracheostomy and PEG tube placement 8/25/20  2. Diffuse bilateral pulmonary infiltrates: ARDS versus pulmonary edema- suspect at least an element of volume overload due to rapid change in bilateral infiltrates  3. Bilateral pneumonia  4. Bilateral pleural effusion, mild  5. JIM, slightly worse  6. Paranoid  schizophrenia  7. Suspected GI bleeding-seems to be stable now  8. Opioid-induced constipation  9. S/p cardiac arrest on 8/7/2020 with 5 minutes ROSC, transferred to LTAC on 8/13/2020 for prolonged weaning  10. Hypernatremia    Recommend:     · Continue mechanical ventilation.    · Continue PSV trials as tolerated.  Placed back in full support for signs of distress.  · Judicious use of sedative medications  · Continue DuoNeb  · Arterial blood gases as needed   · Continue cefepime until 9-5  · Nutrition  · DVT and ulcer prophylaxis  · Diuresis per attending/renal  · PT/OT/SLP  Electronically signed by MIKEY Bauer, on 9/3/2020, 08:18     ATTESTATION OF CLINICAL NOTE:  I have reviewed the notes, assessments, and/or procedures performed by MIKEY Bauer, I concur with her/his documentation of Mohan Villatoro.    Patient is more alert today and sitting up in the chair although he is still having some encephalopathy and he is tolerating the pressure support ventilation trial and may go on the tracheostomy mask.  He has no other acute events overnight.    On physical examination he is middle-aged  male sitting up in the chair he is on ventilator through tracheostomy head ENT atraumatic normocephalic pupils equal reactive light and accommodation, neck is supple no mass hemoglobin is essential lymphadenopathy, heart sounds normal rate and rhythm regular no murmur, lungs clear to auscultation decreased breath sounds at bases a few crackles no wheezes abdomen soft nontender bowel sounds present no organomegaly extremities no edema normal pulses normal color neurology grossly intact skin there is no breakdown patient still remains encephalopathic.    Continue with the treatment plan as above and will monitor the pain and anxiety control well continue bronchodilator and respiratory care and antibiotic coverage for few more days he will need nutritional support DVT ulcer prophylaxis and we  will do the diuresis and monitor renal function.  He will need skilled nursing facility placement.  We will continue following him and make further recommendations.    I have seen and examined patient personally, performing a face-to-face diagnostic evaluation with plan of care reviewed and developed with APRN and nursing staff. I have addended and/or modified the above history of present illness, physical examination, and assessment and plan to reflect my findings and impressions. Essential elements of the care plan were discussed with APRN above.  Agree with findings and assessment/plan as documented above.    Adonis Franz MD  Pulmonologist/Intensivist  9/3/2020 16:51

## 2020-09-03 NOTE — PROGRESS NOTES
Nephrology ([facility]) Progress Note      Patient:  Mohan Villatoro  YOB: 1960  Date of Service: 9/2/2020  MRN: [id number]   Acct: [id number]   Primary Care Physician: Provider, No Known  Advance Directive:   Code Status and Medical Interventions:   Ordered at: 08/25/20 0701     Level Of Support Discussed With:    Health Care Surrogate     Code Status:    CPR     Medical Interventions (Level of Support Prior to Arrest):    Full     Admit Date: 8/12/2020       Hospital Day: 0  Referring Provider: Eduardo Tolliver MD      Patient personally seen and examined.  Complete chart including Consults, Notes, Operative Reports, Labs, Cardiology, and Radiology studies reviewed as able.        Subjective:  Mohan Villatoro is a 60 y.o. male  whom we were consulted for hyponatremia, hyperkalemia, acute kidney injury. Unknown baseline renal function.  History of type 2 diabetes, paranoid schizophrenia, congestive heart failure. Patient transferred from [facility] for sodium level 114. He is a nursing home resident.  Reportedly has had nausea and poor oral intake recently. He had also noticed decreased urine output. Daily NSAID use. On August 7 had PEA cardiac arrest and had remained intubated since then. Serum sodium was improving slowly.  Moved to LTAC on August 12. He remains sedated on the ventilator and unable to participate in the history and physical examination.  On August 24, he underwent tracheotomy as well as PEG placement.     Today, he is on ventilator via trach.  Unable to fully participate in the  history and physical examination due to continued intubation.  No events per nursing.      Temp- 98.1  Pulse- 71  Resp- 15  BP- 146/56  O2%- 94      Allergies:  Niacin    Home Meds:  Medications Prior to Admission   Medication Sig Dispense Refill Last Dose   • albuterol sulfate  (90 Base) MCG/ACT inhaler Inhale 2 puffs 3 (Three) Times a Day As Needed (copd).    8/5/2020 at Unknown time   • aspirin 325 MG tablet Take 325 mg by mouth Daily.   8/5/2020 at Unknown time   • budesonide (PULMICORT) 0.25 MG/2ML nebulizer solution Take 4 mL by nebulization 2 (Two) Times a Day. 2 mL 0    • busPIRone (BUSPAR) 5 MG tablet Take 5 mg by mouth 3 (Three) Times a Day.   8/5/2020 at Unknown time   • calcium polycarbophil (FIBERCON) 625 MG tablet Take 625 mg by mouth 2 (Two) Times a Day.   8/5/2020 at Unknown time   • Cholecalciferol (VITAMIN D3) 125 MCG (5000 UT) capsule capsule Take 5,000 Units by mouth Daily.   8/5/2020 at Unknown time   • citalopram (CeleXA) 20 MG tablet Take 20 mg by mouth Daily.   8/5/2020 at Unknown time   • dicyclomine (BENTYL) 20 MG tablet Take 20 mg by mouth 3 (Three) Times a Day.   8/5/2020 at Unknown time   • diphenhydrAMINE (BENADRYL) 50 MG capsule Take 50 mg by mouth At Night As Needed for Sleep.   Past Week at Unknown time   • famotidine (PEPCID) 10 MG/ML solution injection Infuse 2 mL into a venous catheter Daily.      • fentaNYL citrate (PF) 2,500 mcg in sodium chloride 0.9 % 200 mL Infuse  mcg/hr into a venous catheter Continuous.      • finasteride (PROSCAR) 5 MG tablet Take 5 mg by mouth Daily.   8/5/2020 at Unknown time   • fluticasone (FLONASE) 50 MCG/ACT nasal spray 1 spray into the nostril(s) as directed by provider Daily.   8/5/2020 at Unknown time   • folic acid (FOLVITE) 1 MG tablet Take 1 mg by mouth Daily.   8/5/2020 at Unknown time   • furosemide (LASIX) 20 MG tablet Take 20 mg by mouth Daily.   8/5/2020 at Unknown time   • guaiFENesin (ROBITUSSIN) 100 MG/5ML solution oral solution Take 10 mL by mouth Every 4 (Four) Hours. 30 mL 0    • haloperidol (HALDOL) 5 MG tablet Take 7.5 mg by mouth every night at bedtime.   8/5/2020 at Unknown time   • HYDROcodone-acetaminophen (NORCO)  MG per tablet Take 1 tablet by mouth Every 8 (Eight) Hours As Needed for Moderate Pain .   8/5/2020 at Unknown time   • hydrOXYzine pamoate (VISTARIL) 50 MG  capsule Take 50 mg by mouth every night at bedtime.   8/5/2020 at Unknown time   • insulin glargine (LANTUS) 100 UNIT/ML injection Inject 14 Units under the skin into the appropriate area as directed Every Night.   8/5/2020 at Unknown time   • ipratropium-albuterol (DUO-NEB) 0.5-2.5 mg/3 ml nebulizer Take 3 mL by nebulization 4 (Four) Times a Day. 360 mL 0    • loperamide (IMODIUM) 2 MG capsule Take 2 mg by mouth 4 (Four) Times a Day As Needed for Diarrhea.   Past Month at Unknown time   • methylPREDNISolone sodium succinate (SOLU-Medrol) 40 MG injection Infuse 1 mL into a venous catheter Every 12 (Twelve) Hours. 1 each 0    • metoclopramide (REGLAN) 5 MG tablet Take 5 mg by mouth 4 (Four) Times a Day Before Meals & at Bedtime.   8/5/2020 at Unknown time   • Multiple Vitamins-Minerals (THERA-M PO) Take 1 tablet by mouth Daily.   8/5/2020 at Unknown time   • Omega-3 Fatty Acids (OMEGA-3 FISH OIL) 1000 MG capsule Take 2 g by mouth 2 (Two) Times a Day.   8/5/2020 at Unknown time   • omeprazole (priLOSEC) 20 MG capsule Take 20 mg by mouth Daily.   8/5/2020 at Unknown time   • propofol (DIPRIVAN) 10 mg/mL emulsion infusion Infuse 465-6,975 mcg/min into a venous catheter Dose Adjusted By Provider As Needed.      • risperiDONE (risperDAL M-TABS) 0.5 MG disintegrating tablet Place 0.5 mg on the tongue 2 (Two) Times a Day.   8/5/2020 at Unknown time   • rOPINIRole (REQUIP) 2 MG tablet Take 2 mg by mouth Every Night.   8/5/2020 at Unknown time   • sennosides-docusate (senna-docusate sodium) 8.6-50 MG per tablet Take 1 tablet by mouth At Night As Needed for Constipation.   Past Week at Unknown time   • simvastatin (ZOCOR) 20 MG tablet Take 20 mg by mouth Every Night.   8/5/2020 at Unknown time   • tamsulosin (FLOMAX) 0.4 MG capsule 24 hr capsule Take 1 capsule by mouth every night at bedtime.   8/5/2020 at Unknown time   • traZODone (DESYREL) 100 MG tablet Take 100 mg by mouth Every Night.   8/5/2020 at Unknown time   •  vitamin B-12 (CYANOCOBALAMIN) 1000 MCG tablet Take 1,000 mcg by mouth Daily.   8/5/2020 at Unknown time       Medicines:  Current Facility-Administered Medications   Medication Dose Route Frequency Provider Last Rate Last Dose   • acetaminophen (TYLENOL) 160 MG/5ML solution 650 mg  650 mg Per G Tube Q4H PRN Bal Manning MD       • albuterol (PROVENTIL) nebulizer solution 0.083% 2.5 mg/3mL  2.5 mg Nebulization Q6H PRN Bal Manning MD       • amLODIPine (NORVASC) tablet 10 mg  10 mg Per G Tube Q24H Tripp Felipe MD       • atropine sulfate injection 0.5 mg  0.5 mg Intravenous PRN Bal Manning MD       • bumetanide (BUMEX) injection 1 mg  1 mg Intravenous Daily Eduardo Tolliver MD       • busPIRone (BUSPAR) tablet 10 mg  10 mg Per G Tube Q8H Bal Manning MD       • cefepime (MAXIPIME) 2 g/100 mL 0.9% NS (mbp)  2 g Intravenous Q12H David Tafoya MD       • cetirizine (zyrTEC) tablet 10 mg  10 mg Per G Tube Daily Bal Manning MD       • fentaNYL (DURAGESIC) 25 MCG/HR patch 1 patch  1 patch Transdermal Q72H David Tafoya MD        And   • Check Fentanyl Patch Placement  1 each Does not apply Q12H David Tafoya MD       • chlorhexidine (PERIDEX) 0.12 % solution 15 mL  15 mL Mouth/Throat Q12H Bal Manning MD       • citalopram (CeleXA) tablet 20 mg  20 mg Per G Tube Daily Bal Manning MD       • dextrose (D50W) 25 g/ 50mL Intravenous Solution 25 g  25 g Intravenous Q15 Min PRN Bal Manning MD       • dextrose (GLUTOSE) oral gel 15 g  15 g Oral Q15 Min PRN Bal Manning MD       • fluticasone (FLONASE) 50 MCG/ACT nasal spray 2 spray  2 spray Each Nare Daily Bal Manning MD       • glucagon (human recombinant) (GLUCAGEN DIAGNOSTIC) injection 1 mg  1 mg Subcutaneous Q15 Min PRN Bal Manning MD       • haloperidol (HALDOL) tablet 7.5 mg  7.5 mg Per G Tube Nightly Bal Manning MD       •  hydrALAZINE (APRESOLINE) injection 10 mg  10 mg Intravenous Q6H PRN Bal Manning MD       • hydrALAZINE (APRESOLINE) tablet 75 mg  75 mg Per G Tube Q8H Eduardo Tolliver MD       • insulin lispro (humaLOG) injection 0-9 Units  0-9 Units Subcutaneous Q6H Bal Manning MD       • ipratropium-albuterol (DUO-NEB) nebulizer solution 3 mL  3 mL Nebulization 4x Daily - RT Bal Manning MD       • lansoprazole (FIRST) oral suspension 30 mg  30 mg Nasogastric QAM Bal Manning MD       • LORazepam (ATIVAN) injection 1 mg  1 mg Intravenous Q1H PRN Eduardo Tolliver MD       • metoclopramide (REGLAN) injection 5 mg  5 mg Intravenous Q8H Eduardo Tolliver MD       • Morphine sulfate (PF) injection 2 mg  2 mg Intravenous Q2H PRN Eduardo Tolliver MD       • nicotine (NICODERM CQ) 7 MG/24HR patch 1 patch  1 patch Transdermal Q24H Eduardo Tolliver MD       • ondansetron (ZOFRAN) tablet 4 mg  4 mg Oral Q6H PRN Bal Manning MD        Or   • ondansetron (ZOFRAN) injection 4 mg  4 mg Intravenous Q6H PRN Bal Manning MD       • polyethylene glycol (MIRALAX) packet 17 g  17 g Nasogastric BID PRN Eduardo Tolliver MD       • potassium chloride (KAYCIEL) 20 MEQ/15ML (10%) solution 20 mEq  20 mEq Per G Tube Daily Salima Oh APRN       • risperiDONE (risperDAL) tablet 1 mg  1 mg Nasogastric Nightly Bal Manning MD       • rivaroxaban (XARELTO) tablet 10 mg  10 mg Per G Tube Daily With Dinner Eduardo Tolliver MD       • saccharomyces boulardii (FLORASTOR) capsule 250 mg  250 mg Oral BID Bal Manning MD       • sennosides-docusate (PERICOLACE) 8.6-50 MG per tablet 1 tablet  1 tablet Oral BID PRN Eduardo Tolliver MD       • sodium chloride 0.9 % flush 10 mL  10 mL Intravenous Q12H Bal Manning MD       • sodium chloride 0.9 % flush 10 mL  10 mL Intravenous PRN Bal Manning MD       • sodium chloride 3 % nebulizer  solution 4 mL  4 mL Nebulization 4x Daily - RT Bal Manning MD           Past Medical History:  Past Medical History:   Diagnosis Date   • Diabetes (CMS/[facility])    • Schizo affective schizophrenia (CMS/HCC)        Past Surgical History:  Past Surgical History:   Procedure Laterality Date   • GASTROSTOMY FEEDING TUBE INSERTION N/A 8/25/2020    Procedure: GASTROSTOMY FEEDING TUBE INSERTION;  Surgeon: Kizzy Garcia MD;  Location:  [facility] OR;  Service: General;  Laterality: N/A;   • TRACHEOSTOMY N/A 8/25/2020    Procedure: TRACHEOSTOMY;  Surgeon: Bal Manning MD;  Location: [facility] OR;  Service: ENT;  Laterality: N/A;       Family History  History reviewed. No pertinent family history.    Social History  Social History     Socioeconomic History   • Marital status: Single     Spouse name: Not on file   • Number of children: Not on file   • Years of education: Not on file   • Highest education level: Not on file         Review of Systems:  History obtained from unobtainable from patient due to mental status      Objective:  No data found.  No intake or output data in the 24 hours ending 09/02/20 2110  General: vent via trach  Chest:  clear to auscultation bilaterally without respiratory distress  CVS: regular rate and rhythm  Abdominal: soft, nontender, positive bowel sounds  Extremities: ble edema  Skin: warm and dry without rash      Labs:  Results from last 7 days   Lab Units 09/01/20  0506 08/31/20  0835 08/31/20  0519  08/30/20  0814   WBC 10*3/mm3 9.59  --  9.17  --  8.50   HEMOGLOBIN g/dL 9.6* 9.3* 8.7*   < > 8.7*   HEMATOCRIT % 28.7* 27.8* 26.0*   < > 25.4*   PLATELETS 10*3/mm3 342  --  287  --  262    < > = values in this interval not displayed.         Results from last 7 days   Lab Units 09/01/20  0506 08/31/20  0519 08/30/20  0814   SODIUM mmol/L 142 139 140   POTASSIUM mmol/L 3.9 3.7 3.4*   CHLORIDE mmol/L 100 99 98   CO2 mmol/L 31.0* 31.0* 30.0*   BUN mg/dL 47* 55* 59*   CREATININE mg/dL 1.88*  1.87* 1.98*   CALCIUM mg/dL 8.9 8.5* 8.3*   GLUCOSE mg/dL 146* 180* 189*       Radiology:   Imaging Results (Last 72 Hours)     Procedure Component Value Units Date/Time    XR Chest 1 View [[id number]] Collected:  09/02/20 0710     Updated:  09/02/20 0714    Narrative:       Frontal upright radiograph of the chest 9/2/2020 3:35 AM CDT     COMPARISON: September 1, 2020.     FINDINGS:   Left lung is clear. Pulmonary vascular margins are slightly indistinct  in the right lung. This may be residual pulmonary vascular congestion  however the aeration in the right lung is improved from September 1..  Cardiac silhouettes normal. Tracheostomy tube is present..      The osseous structures and surrounding soft tissues demonstrate no acute  abnormality.       Impression:       1. Continued improvement. There is decrease in the pulmonary vascular  congestion and improved aeration in the right and left lung..        This report was finalized on 09/02/2020 07:11 by Dr. Jeffrey Cullen MD.    XR Chest 1 View [[id number]] Collected:  09/01/20 0721     Updated:  09/01/20 0725    Narrative:       Frontal supine radiograph of the chest 9/1/2020 3:30 AM CDT     History: pt on vent; J96.21-Acute and chronic respiratory failure with  hypoxia; J96.22-Acute and chronic respiratory failure with hypercapnia;  Z99.11-Dependence on respirator (ventilator) status; R06.89-Other  abnormalities of breathing     Comparison: 8/31/2020      Findings:   Tracheostomy in place. Reidentified bilateral hazy lung infiltrates  which are improving. This is most notable in the perihilar regions,  which are better aerated on this exam. There are small bilateral  layering pleural effusions, more noticeable on the left. No  pneumothorax. Stable heart size. Decreased central pulmonary congestion.          Impression:       Impression:   1. Decreasing vascular congestion and pulmonary edema with improving  aeration.  2. Tracheostomy.     This report was finalized on  09/01/2020 07:22 by Dr Sammy Strauss, .    XR Chest 1 View [[id number]] Collected:  08/31/20 0715     Updated:  08/31/20 0719    Narrative:       Frontal supine radiograph of the chest 8/31/2020 3:40 AM CDT     History: on vent; J96.21-Acute and chronic respiratory failure with  hypoxia; J96.22-Acute and chronic respiratory failure with hypercapnia;  Z99.11-Dependence on respirator (ventilator) status; R06.89-Other  abnormalities of breathing     Comparison: 8/30/2020      Findings:      Tracheostomy in place. Reidentified bilateral diffuse hazy lung  infiltrates which may be slightly decreased. Cardiomegaly stable. No  pneumothorax. Likely small right-sided layering pleural effusion.     No acute osseous or soft tissue abnormality is noted.        Impression:       Impression:   1. Bilateral, fairly diffuse hazy infiltrates reflecting pulmonary  edema. This appears to be slightly improved.   This report was finalized on 08/31/2020 07:16 by Dr Sammy Strauss, .          Culture:  Blood Culture   Date Value Ref Range Status   08/27/2020 No growth at 4 days  Preliminary   08/27/2020 No growth at 4 days  Preliminary     Urine Culture   Date Value Ref Range Status   08/27/2020 No growth  Final     Respiratory Culture   Date Value Ref Range Status   08/27/2020 Light growth (2+) Delftia acidovorans (A)  Final   08/27/2020 Light growth (2+) Moraxella catarrhalis (A)  Final     Comment:     Beta lactamase positive confers resistance to ampcillin, amoxicillin, penicillin, ticarcillin, and piperacillin but NOT amoxicillin-clavulanate, ampicillin-sulbactam, or piperacillin-tazobactam.           Assessment   Acute kidney injury/ATN  Acute hypoxic respiratory failure which required mechanical ventilation  Hyperkalemia  Metabolic acidosis  Paranoid schizophrenia  Anemia  Hypertension    Plan:  Continue current free water regimen  Adjust blood pressure medications as needed  Monitor labs  Discussed with nursing      Tripp Barger  MD [place]  9/2/2020  21:10

## 2020-09-04 NOTE — PROGRESS NOTES
Unruly Tolliver M.D.  [facility] Whitehead        Internal Medicine Progress Note    9/4/2020   13:09    Name:  Mohan Villatoro  MRN:    [id number]     Acct:     [id number]   Room:  [address] [place] [address] Day: 0     Admit Date: 8/12/2020  2:37 PM  PCP: Provider, No Known    Subjective:     C/C: need for continued vent weaning    Interval History: Status:  stayed the same/stable. Up to chair.  No family at bedside. Tolerating trach collar.  Eyes open to verbal. Following some commands sporadically. Afebrile. Counts stable.  Renal function stable.       Review of Systems   Unable to perform ROS: intubated         Medications:     Allergies:   Allergies   Allergen Reactions   • Niacin Unknown - High Severity       Current Meds:   Current Facility-Administered Medications:   •  acetaminophen (TYLENOL) 160 MG/5ML solution 650 mg, 650 mg, Per G Tube, Q4H PRN, Bal Manning MD  •  albuterol (PROVENTIL) nebulizer solution 0.083% 2.5 mg/3mL, 2.5 mg, Nebulization, Q6H PRN, Bal Manning MD  •  amLODIPine (NORVASC) tablet 10 mg, 10 mg, Per G Tube, Q24H, Tripp Felipe MD  •  atropine sulfate injection 0.5 mg, 0.5 mg, Intravenous, PRN, Bal Manning MD  •  bumetanide (BUMEX) injection 1 mg, 1 mg, Intravenous, Daily, Eduardo Tolliver MD  •  busPIRone (BUSPAR) tablet 10 mg, 10 mg, Per G Tube, Q8H, Bal Manning MD  •  cefepime (MAXIPIME) 2 g/100 mL 0.9% NS (mbp), 2 g, Intravenous, Q12H, David Tafoya MD  •  cetirizine (zyrTEC) tablet 10 mg, 10 mg, Per G Tube, Daily, Bal Manning MD  •  fentaNYL (DURAGESIC) 25 MCG/HR patch 1 patch, 1 patch, Transdermal, Q72H **AND** Check Fentanyl Patch Placement, 1 each, Does not apply, Q12H, David Tafoya MD  •  chlorhexidine (PERIDEX) 0.12 % solution 15 mL, 15 mL, Mouth/Throat, Q12H, Bal Manning MD  •  citalopram (CeleXA) tablet 20 mg, 20 mg, Per G Tube, Daily, Bal Manning MD  •  dextrose (D50W) 25  g/ 50mL Intravenous Solution 25 g, 25 g, Intravenous, Q15 Min PRN, Bal Manning MD  •  dextrose (GLUTOSE) oral gel 15 g, 15 g, Oral, Q15 Min PRN, Bal Manning MD  •  fluticasone (FLONASE) 50 MCG/ACT nasal spray 2 spray, 2 spray, Each Nare, Daily, Bal Manning MD  •  glucagon (human recombinant) (GLUCAGEN DIAGNOSTIC) injection 1 mg, 1 mg, Subcutaneous, Q15 Min PRN, Bal Manning MD  •  haloperidol (HALDOL) tablet 7.5 mg, 7.5 mg, Per G Tube, Nightly, Bal Manning MD  •  hydrALAZINE (APRESOLINE) injection 10 mg, 10 mg, Intravenous, Q6H PRN, Bal Manning MD  •  hydrALAZINE (APRESOLINE) tablet 75 mg, 75 mg, Per G Tube, Q8H, Eduardo Tolliver MD  •  insulin lispro (humaLOG) injection 0-9 Units, 0-9 Units, Subcutaneous, Q6H, Bal Manning MD  •  ipratropium-albuterol (DUO-NEB) nebulizer solution 3 mL, 3 mL, Nebulization, 4x Daily - RT, Bal Manning MD  •  lansoprazole (FIRST) oral suspension 30 mg, 30 mg, Nasogastric, QAM, Bal Manning MD  •  LORazepam (ATIVAN) injection 1 mg, 1 mg, Intravenous, Q1H PRN, Eduardo Tolliver MD  •  metoclopramide (REGLAN) injection 5 mg, 5 mg, Intravenous, Q8H, Eduardo Tolliver MD  •  Morphine sulfate (PF) injection 2 mg, 2 mg, Intravenous, Q2H PRN, Eduardo Tolliver MD  •  nicotine (NICODERM CQ) 7 MG/24HR patch 1 patch, 1 patch, Transdermal, Q24H, Eduardo Tolliver MD  •  ondansetron (ZOFRAN) tablet 4 mg, 4 mg, Oral, Q6H PRN **OR** ondansetron (ZOFRAN) injection 4 mg, 4 mg, Intravenous, Q6H PRN, Bal Manning MD  •  polyethylene glycol (MIRALAX) packet 17 g, 17 g, Nasogastric, BID PRN, Eduardo Tolliver MD  •  potassium chloride (KAYCIEL) 20 MEQ/15ML (10%) solution 20 mEq, 20 mEq, Per G Tube, Daily, Salima Oh APRN  •  risperiDONE (risperDAL) tablet 1 mg, 1 mg, Nasogastric, Nightly, Bal Manning MD  •  rivaroxaban (XARELTO) tablet 10 mg, 10 mg, Per G Tube, Daily With  "Dinner, Eduardo Tolliver MD  •  saccharomyces boulardii (FLORASTOR) capsule 250 mg, 250 mg, Oral, BID, Bal Manning MD  •  sennosides-docusate (PERICOLACE) 8.6-50 MG per tablet 1 tablet, 1 tablet, Oral, BID PRN, Eduardo Tolliver MD  •  sodium chloride 0.9 % flush 10 mL, 10 mL, Intravenous, Q12H, Bal Manning MD  •  sodium chloride 0.9 % flush 10 mL, 10 mL, Intravenous, PRN, Bal Manning MD    Data:     Code Status:    Code Status and Medical Interventions:   Ordered at: 08/25/20 0701     Level Of Support Discussed With:    Health Care Surrogate     Code Status:    CPR     Medical Interventions (Level of Support Prior to Arrest):    Full       History reviewed. No pertinent family history.    Social History     Socioeconomic History   • Marital status: Single     Spouse name: Not on file   • Number of children: Not on file   • Years of education: Not on file   • Highest education level: Not on file       Vitals:  BP (!) 188/86   Pulse 72   Temp 97.9 °F (36.6 °C) (Temporal)   Resp 18   Ht 180.3 cm (71\")   Wt 81.4 kg (179 lb 8 oz)   SpO2 97%   BMI 25.04 kg/m²   T 98.2 P 77 R 18 /72 Sp02 100% (trach collar)      I/O (24Hr):  No intake or output data in the 24 hours ending 09/04/20 1309    Labs and imaging:      Recent Results (from the past 12 hour(s))   POC Glucose Once    Collection Time: 09/04/20  5:25 AM   Result Value Ref Range    Glucose 152 (H) 70 - 130 mg/dL   POC Glucose Once    Collection Time: 09/04/20 11:43 AM   Result Value Ref Range    Glucose 155 (H) 70 - 130 mg/dL         Physical Examination:        Physical Exam   Constitutional: Vital signs are normal. He appears well-developed and well-nourished.   HENT:   Head: Normocephalic and atraumatic.   Nose: Nose normal.   Mouth/Throat: Oropharynx is clear and moist.   Eyes: Pupils are equal, round, and reactive to light. Conjunctivae, EOM and lids are normal.   Neck: Trachea normal and normal range of motion. " Neck supple.   Trach to collar   Cardiovascular: Normal rate, regular rhythm and normal heart sounds.   Pulmonary/Chest: Effort normal and breath sounds normal.   Abdominal: Soft. Normal appearance and bowel sounds are normal.   g tube  Midline dressing c/d/i   Musculoskeletal: Normal range of motion. He exhibits edema.   Generalized weakness   Neurological: He is alert. No cranial nerve deficit or sensory deficit.   Following some commands sporadically  Nodding/shaking head seemingly appropriately  Mouthing some words   Skin: Skin is warm, dry and intact. No petechiae and no rash noted. No cyanosis or erythema. Nails show no clubbing.   Dressing c/d/i   Psychiatric: He has a normal mood and affect. His behavior is normal.   Nursing note and vitals reviewed.        Assessment:          Respiratory insufficiency    Acute on chronic respiratory failure with hypoxia and hypercapnia (CMS/HCC)    Ventilator dependence (CMS/HCC)    Past Medical History:   Diagnosis Date   • Diabetes (CMS/HCC)    • Schizo affective schizophrenia (CMS/HCC)         Plan:        1. Acute hypoxic respiratory failure  2. Acute renal failure on CKD3  3. S/p Cardiac arrest  4. Schizophrenia  5. Multifactorial anemia  6. Hyponatremia  7. COPD  8. Leukocytosis  9. Dysphagia  10. Tobacco abuse  11. DM2 with hyperglycemia on long term insulin  12. Bradycardia  13. Hypokalemia    Continue current treatment. Monitor counts. Increase activity as tolerated.  Vent/oxygen weaning under direction of pulmonology. Monitor electrolytes and renal function closely. Continue antibiotics.  Wean reglan as able - change to per g tube. DC nicotine patch. Wean fentanyl patch.  Labs Tuesday.     Electronically signed by MIKEY Powell on 9/4/2020 at 13:09   I have discussed the care of Mohan Villatoro, including pertinent history and exam findings, with the nurse practitioner.    I have seen and examined the patient and the key elements of all parts of  the encounter have been performed by me.  I agree with the assessment, plan and orders as documented by MIKEY Whitehead, after I modified the exam findings and the plan of treatments and the final version is my approved version of the assessment.        Electronically signed by Eduardo Tolliver MD on 9/4/2020 at 18:35

## 2020-09-04 NOTE — PROGRESS NOTES
PULMONARY AND CRITICAL CARE PROGRESS NOTE - [facility]  Patient: Mohan Villatoro    1960   Acct# [id number]  09/04/20   09:12  Referring Provider: Eduardo Tolliver MD   Chief Complaint: Ventilator dependent respiratory failure     Interval history: The patient is awake and alert this morning.  He is currently on trach collar 30%.  RT states that he tolerated PSV all night.  O2 sat 97%, heart rate 78.  No other aggravating or alleviating factors.     Review of Systems: Review of Systems   Unable to perform ROS: Patient nonverbal      Physical Exam:  Vital signs: T: 98.2, blood pressure 147/62, pulse 78, respiratory rate 18, saturation 97%   Physical Exam   Constitutional: He appears well-developed and well-nourished.   Middle-aged  male with tracheostomy tube with tach collar   HENT:   Head: Normocephalic and atraumatic.   Eyes: Pupils are equal, round, and reactive to light. Conjunctivae and EOM are normal. No scleral icterus.   Neck: Normal range of motion. Neck supple. No JVD present. No tracheal deviation present. No thyromegaly present.   Cardiovascular: Normal rate, regular rhythm, normal heart sounds and intact distal pulses. Exam reveals no friction rub.   No murmur heard.  Pulmonary/Chest: Effort normal. No respiratory distress. He has no wheezes. He has rales. He exhibits no tenderness.   Abdominal: Soft. Bowel sounds are normal. He exhibits no distension and no mass. There is no tenderness. There is no guarding.   PEG tube in place   Musculoskeletal: Normal range of motion. He exhibits edema. He exhibits no tenderness or deformity.   Lymphadenopathy:     He has no cervical adenopathy.   Neurological: He is alert.   Nonverbal     Skin: Skin is warm and dry. No rash noted. No erythema. No pallor.   Psychiatric:   Could not be assessed.     Results from last 7 days   Lab Units 09/01/20  0506 08/31/20  0835 08/31/20  0519  08/30/20  0814   WBC 10*3/mm3 9.59  --  9.17   --  8.50   HEMOGLOBIN g/dL 9.6* 9.3* 8.7*   < > 8.7*   PLATELETS 10*3/mm3 342  --  287  --  262    < > = values in this interval not displayed.     Results from last 7 days   Lab Units 09/01/20  0506 08/31/20  0519 08/30/20  0814   SODIUM mmol/L 142 139 140   POTASSIUM mmol/L 3.9 3.7 3.4*   BUN mg/dL 47* 55* 59*   CREATININE mg/dL 1.88* 1.87* 1.98*     Results from last 7 days   Lab Units 08/31/20  0403   PH, ARTERIAL pH units 7.492*   PCO2, ARTERIAL mm Hg 41.9   PO2 ART mm Hg 70.8*   FIO2 % 30     Blood Culture   Date Value Ref Range Status   08/20/2020 No growth at 24 hours  Preliminary   08/20/2020 No growth at 24 hours  Preliminary     Respiratory Culture   Date Value Ref Range Status   08/20/2020   Final    Rare Normal Respiratory Sophia: NO S.aureus/MRSA or Pseudomonas aeruginosa   Recent films:  Xr Chest 1 View    Result Date: 9/4/2020  Impression: 1. No significant interval change. 2. Underlying pulmonary edema is mostly resolved. No new or worsening infiltrate. This report was finalized on 09/04/2020 07:17 by Dr Sammy Strauss, .    Xr Chest 1 View    Result Date: 9/3/2020  Impression: 1. Decreasing pulmonary edema. No areas of new or worsening infiltrate. 2. Tracheostomy.  This report was finalized on 09/03/2020 07:01 by Dr Sammy Strauss, .    Films reviewed personally by me.  My interpretation: Tracheostomy intact.  Pulmonary edema continues to improve.    Pulmonary Assessment:    1. Acute hypercapnic and hypoxic respiratory failure, on mechanical ventilation status post self extubation and reintubation and tracheostomy and PEG tube placement 8/25/20  2. Diffuse bilateral pulmonary infiltrates: ARDS versus pulmonary edema- suspect at least an element of volume overload due to rapid change in bilateral infiltrates  3. Bilateral pneumonia  4. Bilateral pleural effusion, mild  5. JIM, slightly worse  6. Paranoid schizophrenia  7. Suspected GI bleeding-seems to be stable now  8. Opioid-induced  constipation  9. S/p cardiac arrest on 8/7/2020 with 5 minutes ROSC, transferred to LTAC on 8/13/2020 for prolonged weaning  10. Hypernatremia    Recommend:   · Continue trach collar trial during the day as tolerated. Placeback in PSV at hour of sleep or signs of symptoms of distress  · Judicious use of sedative medications  · Continue DuoNeb  · Arterial blood gases as needed   · CXR M,W,F  · Continue cefepime until 9-5  · Nutrition  · DVT and ulcer prophylaxis  · Diuresis per attending/renal  · PT/OT/SLP  Electronically signed by MIKEY Bauer, on 9/4/2020, 09:12     ATTESTATION OF CLINICAL NOTE:  I have reviewed the notes, assessments, and/or procedures performed by MIKEY Bauer, I concur with her/his documentation of Mohan Villatoro.    And is doing much better and is sitting up in the chair and appears less encephalopathic he is on tracheostomy collar and tolerating it well.  On physical examination head ENT atraumatic normocephalic pupils are reactive and accommodation neck was supple tracheostomy tube in place.  Heart sounds normal rate rhythm regular no murmur, lungs clear to auscultation decreased breath sounds at bases a few crackles abdomen soft nontender bowel sounds present extremities no edema normal pulses normal color.  There is PEG tube in place.  Neurologic grossly intact and mental status is improving.    We will continue him on the current treatment plan supportive care.  Keep him on DVT ulcer prophylaxis and pain and anxiety control.  Labs and imaging studies as needed.  Continue additional support.  Plan for long-term placement.  Pulmonary team will continue following him and make further recommendations.    I have seen and examined patient personally, performing a face-to-face diagnostic evaluation with plan of care reviewed and developed with APRN and nursing staff. I have addended and/or modified the above history of present illness, physical examination, and  assessment and plan to reflect my findings and impressions. Essential elements of the care plan were discussed with APRN above.  Agree with findings and assessment/plan as documented above.    Adonis Franz MD  Pulmonologist/Intensivist  9/4/2020 16:23

## 2020-09-04 NOTE — PROGRESS NOTES
Nephrology ([facility]) Progress Note      Patient:  Mohan Villatoro  YOB: 1960  Date of Service: 9/3/2020  MRN: [id number]   Acct: [id number]   Primary Care Physician: Provider, No Known  Advance Directive:   Code Status and Medical Interventions:   Ordered at: 08/25/20 0701     Level Of Support Discussed With:    Health Care Surrogate     Code Status:    CPR     Medical Interventions (Level of Support Prior to Arrest):    Full     Admit Date: 8/12/2020       Hospital Day: 0  Referring Provider: Eduardo Tolliver MD      Patient personally seen and examined.  Complete chart including Consults, Notes, Operative Reports, Labs, Cardiology, and Radiology studies reviewed as able.        Subjective:  Mohan Villatoro is a 60 y.o. male  whom we were consulted for hyponatremia, hyperkalemia, acute kidney injury. Unknown baseline renal function.  History of type 2 diabetes, paranoid schizophrenia, congestive heart failure. Patient transferred from [facility] for sodium level 114. He is a nursing home resident.  Reportedly has had nausea and poor oral intake recently. He had also noticed decreased urine output. Daily NSAID use. On August 7 had PEA cardiac arrest and had remained intubated since then. Serum sodium was improving slowly.  Moved to LTAC on August 12. He remains sedated on the ventilator and unable to participate in the history and physical examination.  On August 24, he underwent tracheotomy as well as PEG placement.     Today, he is on ventilator via trach.  Unable to fully participate in the  history and physical examination due to continued intubation.  No events per nursing.      Temp- 98.6  Pulse- 71  Resp- 13  BP- 138/62  O2%- 98      Allergies:  Niacin    Home Meds:  Medications Prior to Admission   Medication Sig Dispense Refill Last Dose   • albuterol sulfate  (90 Base) MCG/ACT inhaler Inhale 2 puffs 3 (Three) Times a Day As Needed (copd).    8/5/2020 at Unknown time   • aspirin 325 MG tablet Take 325 mg by mouth Daily.   8/5/2020 at Unknown time   • budesonide (PULMICORT) 0.25 MG/2ML nebulizer solution Take 4 mL by nebulization 2 (Two) Times a Day. 2 mL 0    • busPIRone (BUSPAR) 5 MG tablet Take 5 mg by mouth 3 (Three) Times a Day.   8/5/2020 at Unknown time   • calcium polycarbophil (FIBERCON) 625 MG tablet Take 625 mg by mouth 2 (Two) Times a Day.   8/5/2020 at Unknown time   • Cholecalciferol (VITAMIN D3) 125 MCG (5000 UT) capsule capsule Take 5,000 Units by mouth Daily.   8/5/2020 at Unknown time   • citalopram (CeleXA) 20 MG tablet Take 20 mg by mouth Daily.   8/5/2020 at Unknown time   • dicyclomine (BENTYL) 20 MG tablet Take 20 mg by mouth 3 (Three) Times a Day.   8/5/2020 at Unknown time   • diphenhydrAMINE (BENADRYL) 50 MG capsule Take 50 mg by mouth At Night As Needed for Sleep.   Past Week at Unknown time   • famotidine (PEPCID) 10 MG/ML solution injection Infuse 2 mL into a venous catheter Daily.      • fentaNYL citrate (PF) 2,500 mcg in sodium chloride 0.9 % 200 mL Infuse  mcg/hr into a venous catheter Continuous.      • finasteride (PROSCAR) 5 MG tablet Take 5 mg by mouth Daily.   8/5/2020 at Unknown time   • fluticasone (FLONASE) 50 MCG/ACT nasal spray 1 spray into the nostril(s) as directed by provider Daily.   8/5/2020 at Unknown time   • folic acid (FOLVITE) 1 MG tablet Take 1 mg by mouth Daily.   8/5/2020 at Unknown time   • furosemide (LASIX) 20 MG tablet Take 20 mg by mouth Daily.   8/5/2020 at Unknown time   • guaiFENesin (ROBITUSSIN) 100 MG/5ML solution oral solution Take 10 mL by mouth Every 4 (Four) Hours. 30 mL 0    • haloperidol (HALDOL) 5 MG tablet Take 7.5 mg by mouth every night at bedtime.   8/5/2020 at Unknown time   • HYDROcodone-acetaminophen (NORCO)  MG per tablet Take 1 tablet by mouth Every 8 (Eight) Hours As Needed for Moderate Pain .   8/5/2020 at Unknown time   • hydrOXYzine pamoate (VISTARIL) 50 MG  capsule Take 50 mg by mouth every night at bedtime.   8/5/2020 at Unknown time   • insulin glargine (LANTUS) 100 UNIT/ML injection Inject 14 Units under the skin into the appropriate area as directed Every Night.   8/5/2020 at Unknown time   • ipratropium-albuterol (DUO-NEB) 0.5-2.5 mg/3 ml nebulizer Take 3 mL by nebulization 4 (Four) Times a Day. 360 mL 0    • loperamide (IMODIUM) 2 MG capsule Take 2 mg by mouth 4 (Four) Times a Day As Needed for Diarrhea.   Past Month at Unknown time   • methylPREDNISolone sodium succinate (SOLU-Medrol) 40 MG injection Infuse 1 mL into a venous catheter Every 12 (Twelve) Hours. 1 each 0    • metoclopramide (REGLAN) 5 MG tablet Take 5 mg by mouth 4 (Four) Times a Day Before Meals & at Bedtime.   8/5/2020 at Unknown time   • Multiple Vitamins-Minerals (THERA-M PO) Take 1 tablet by mouth Daily.   8/5/2020 at Unknown time   • Omega-3 Fatty Acids (OMEGA-3 FISH OIL) 1000 MG capsule Take 2 g by mouth 2 (Two) Times a Day.   8/5/2020 at Unknown time   • omeprazole (priLOSEC) 20 MG capsule Take 20 mg by mouth Daily.   8/5/2020 at Unknown time   • propofol (DIPRIVAN) 10 mg/mL emulsion infusion Infuse 465-6,975 mcg/min into a venous catheter Dose Adjusted By Provider As Needed.      • risperiDONE (risperDAL M-TABS) 0.5 MG disintegrating tablet Place 0.5 mg on the tongue 2 (Two) Times a Day.   8/5/2020 at Unknown time   • rOPINIRole (REQUIP) 2 MG tablet Take 2 mg by mouth Every Night.   8/5/2020 at Unknown time   • sennosides-docusate (senna-docusate sodium) 8.6-50 MG per tablet Take 1 tablet by mouth At Night As Needed for Constipation.   Past Week at Unknown time   • simvastatin (ZOCOR) 20 MG tablet Take 20 mg by mouth Every Night.   8/5/2020 at Unknown time   • tamsulosin (FLOMAX) 0.4 MG capsule 24 hr capsule Take 1 capsule by mouth every night at bedtime.   8/5/2020 at Unknown time   • traZODone (DESYREL) 100 MG tablet Take 100 mg by mouth Every Night.   8/5/2020 at Unknown time   •  vitamin B-12 (CYANOCOBALAMIN) 1000 MCG tablet Take 1,000 mcg by mouth Daily.   8/5/2020 at Unknown time       Medicines:  Current Facility-Administered Medications   Medication Dose Route Frequency Provider Last Rate Last Dose   • acetaminophen (TYLENOL) 160 MG/5ML solution 650 mg  650 mg Per G Tube Q4H PRN Bal Manning MD       • albuterol (PROVENTIL) nebulizer solution 0.083% 2.5 mg/3mL  2.5 mg Nebulization Q6H PRN Bal Manning MD       • amLODIPine (NORVASC) tablet 10 mg  10 mg Per G Tube Q24H Tripp Felipe MD       • atropine sulfate injection 0.5 mg  0.5 mg Intravenous PRN Bal Manning MD       • bumetanide (BUMEX) injection 1 mg  1 mg Intravenous Daily Eduardo Tolilver MD       • busPIRone (BUSPAR) tablet 10 mg  10 mg Per G Tube Q8H Bal Manning MD       • cefepime (MAXIPIME) 2 g/100 mL 0.9% NS (mbp)  2 g Intravenous Q12H David Tafoya MD       • cetirizine (zyrTEC) tablet 10 mg  10 mg Per G Tube Daily Bal Manning MD       • fentaNYL (DURAGESIC) 25 MCG/HR patch 1 patch  1 patch Transdermal Q72H David Tafoya MD        And   • Check Fentanyl Patch Placement  1 each Does not apply Q12H David Tafoya MD       • chlorhexidine (PERIDEX) 0.12 % solution 15 mL  15 mL Mouth/Throat Q12H Bal Manning MD       • citalopram (CeleXA) tablet 20 mg  20 mg Per G Tube Daily Bal Manning MD       • dextrose (D50W) 25 g/ 50mL Intravenous Solution 25 g  25 g Intravenous Q15 Min PRN Bal Manning MD       • dextrose (GLUTOSE) oral gel 15 g  15 g Oral Q15 Min PRN Bal Manning MD       • fluticasone (FLONASE) 50 MCG/ACT nasal spray 2 spray  2 spray Each Nare Daily Bal Manning MD       • glucagon (human recombinant) (GLUCAGEN DIAGNOSTIC) injection 1 mg  1 mg Subcutaneous Q15 Min PRN Bal Manning MD       • haloperidol (HALDOL) tablet 7.5 mg  7.5 mg Per G Tube Nightly Bal Manning MD       •  hydrALAZINE (APRESOLINE) injection 10 mg  10 mg Intravenous Q6H PRN Bal Manning MD       • hydrALAZINE (APRESOLINE) tablet 75 mg  75 mg Per G Tube Q8H Eduardo Tolliver MD       • insulin lispro (humaLOG) injection 0-9 Units  0-9 Units Subcutaneous Q6H Bal Manning MD       • ipratropium-albuterol (DUO-NEB) nebulizer solution 3 mL  3 mL Nebulization 4x Daily - RT Bal Manning MD       • lansoprazole (FIRST) oral suspension 30 mg  30 mg Nasogastric QAM Bal Manning MD       • LORazepam (ATIVAN) injection 1 mg  1 mg Intravenous Q1H PRN Eduardo Tolliver MD       • metoclopramide (REGLAN) injection 5 mg  5 mg Intravenous Q8H Eduardo Tolliver MD       • Morphine sulfate (PF) injection 2 mg  2 mg Intravenous Q2H PRN Eduardo Tolliver MD       • nicotine (NICODERM CQ) 7 MG/24HR patch 1 patch  1 patch Transdermal Q24H Eduardo Tolliver MD       • ondansetron (ZOFRAN) tablet 4 mg  4 mg Oral Q6H PRN Bal Manning MD        Or   • ondansetron (ZOFRAN) injection 4 mg  4 mg Intravenous Q6H PRN Bal Manning MD       • polyethylene glycol (MIRALAX) packet 17 g  17 g Nasogastric BID PRN Eduardo Tolliver MD       • potassium chloride (KAYCIEL) 20 MEQ/15ML (10%) solution 20 mEq  20 mEq Per G Tube Daily Salima Oh APRN       • risperiDONE (risperDAL) tablet 1 mg  1 mg Nasogastric Nightly Bal Manning MD       • rivaroxaban (XARELTO) tablet 10 mg  10 mg Per G Tube Daily With Dinner Eduardo Tolliver MD       • saccharomyces boulardii (FLORASTOR) capsule 250 mg  250 mg Oral BID Bal Manning MD       • sennosides-docusate (PERICOLACE) 8.6-50 MG per tablet 1 tablet  1 tablet Oral BID PRN Eduardo Tolliver MD       • sodium chloride 0.9 % flush 10 mL  10 mL Intravenous Q12H Bal Manning MD       • sodium chloride 0.9 % flush 10 mL  10 mL Intravenous PRN Bal Manning MD       • sodium chloride 3 % nebulizer  solution 4 mL  4 mL Nebulization 4x Daily - RT Bal Manning MD           Past Medical History:  Past Medical History:   Diagnosis Date   • Diabetes (CMS/[facility])    • Schizo affective schizophrenia (CMS/HCC)        Past Surgical History:  Past Surgical History:   Procedure Laterality Date   • GASTROSTOMY FEEDING TUBE INSERTION N/A 8/25/2020    Procedure: GASTROSTOMY FEEDING TUBE INSERTION;  Surgeon: Kizzy Garcia MD;  Location:  [facility] OR;  Service: General;  Laterality: N/A;   • TRACHEOSTOMY N/A 8/25/2020    Procedure: TRACHEOSTOMY;  Surgeon: Bal Manning MD;  Location: [facility] OR;  Service: ENT;  Laterality: N/A;       Family History  History reviewed. No pertinent family history.    Social History  Social History     Socioeconomic History   • Marital status: Single     Spouse name: Not on file   • Number of children: Not on file   • Years of education: Not on file   • Highest education level: Not on file         Review of Systems:  History obtained from unobtainable from patient due to mental status      Objective:  No data found.  No intake or output data in the 24 hours ending 09/03/20 2352  General: vent via trach  Chest:  clear to auscultation bilaterally without respiratory distress  CVS: regular rate and rhythm  Abdominal: soft, nontender, positive bowel sounds  Extremities: ble edema  Skin: warm and dry without rash      Labs:  Results from last 7 days   Lab Units 09/01/20  0506 08/31/20  0835 08/31/20  0519  08/30/20  0814   WBC 10*3/mm3 9.59  --  9.17  --  8.50   HEMOGLOBIN g/dL 9.6* 9.3* 8.7*   < > 8.7*   HEMATOCRIT % 28.7* 27.8* 26.0*   < > 25.4*   PLATELETS 10*3/mm3 342  --  287  --  262    < > = values in this interval not displayed.         Results from last 7 days   Lab Units 09/01/20  0506 08/31/20  0519 08/30/20  0814   SODIUM mmol/L 142 139 140   POTASSIUM mmol/L 3.9 3.7 3.4*   CHLORIDE mmol/L 100 99 98   CO2 mmol/L 31.0* 31.0* 30.0*   BUN mg/dL 47* 55* 59*   CREATININE mg/dL 1.88*  1.87* 1.98*   CALCIUM mg/dL 8.9 8.5* 8.3*   GLUCOSE mg/dL 146* 180* 189*       Radiology:   Imaging Results (Last 72 Hours)     Procedure Component Value Units Date/Time    XR Chest 1 View [[id number]] Collected:  09/03/20 0659     Updated:  09/03/20 0704    Narrative:       Frontal supine radiograph of the chest 9/3/2020 3:45 AM CDT     History: pt on vent; J96.21-Acute and chronic respiratory failure with  hypoxia; J96.22-Acute and chronic respiratory failure with hypercapnia;  Z99.11-Dependence on respirator (ventilator) status; R06.89-Other  abnormalities of breathing     Comparison: 9/2/2020      Findings:      Tracheostomy in place. Bilateral hazy infiltrates reflecting pulmonary  edema continue to improve, appearing quite faint on the left. No  consolidation. No pleural effusion or pneumothorax. Heart size is  stable. Pulmonary vasculature is nondilated. Granulomatous calcification  in the mediastinum. No acute bony abnormality.       Impression:       Impression:   1. Decreasing pulmonary edema. No areas of new or worsening infiltrate.  2. Tracheostomy.     This report was finalized on 09/03/2020 07:01 by Dr Sammy Strauss, .    XR Chest 1 View [[id number]] Collected:  09/02/20 0710     Updated:  09/02/20 0714    Narrative:       Frontal upright radiograph of the chest 9/2/2020 3:35 AM CDT     COMPARISON: September 1, 2020.     FINDINGS:   Left lung is clear. Pulmonary vascular margins are slightly indistinct  in the right lung. This may be residual pulmonary vascular congestion  however the aeration in the right lung is improved from September 1..  Cardiac silhouettes normal. Tracheostomy tube is present..      The osseous structures and surrounding soft tissues demonstrate no acute  abnormality.       Impression:       1. Continued improvement. There is decrease in the pulmonary vascular  congestion and improved aeration in the right and left lung..        This report was finalized on 09/02/2020 07:11 by   Jeffrey Cullen MD.    XR Chest 1 View [[id number]] Collected:  09/01/20 0721     Updated:  09/01/20 0725    Narrative:       Frontal supine radiograph of the chest 9/1/2020 3:30 AM CDT     History: pt on vent; J96.21-Acute and chronic respiratory failure with  hypoxia; J96.22-Acute and chronic respiratory failure with hypercapnia;  Z99.11-Dependence on respirator (ventilator) status; R06.89-Other  abnormalities of breathing     Comparison: 8/31/2020      Findings:   Tracheostomy in place. Reidentified bilateral hazy lung infiltrates  which are improving. This is most notable in the perihilar regions,  which are better aerated on this exam. There are small bilateral  layering pleural effusions, more noticeable on the left. No  pneumothorax. Stable heart size. Decreased central pulmonary congestion.          Impression:       Impression:   1. Decreasing vascular congestion and pulmonary edema with improving  aeration.  2. Tracheostomy.     This report was finalized on 09/01/2020 07:22 by Dr Sammy Strauss, .          Culture:  Blood Culture   Date Value Ref Range Status   08/27/2020 No growth at 4 days  Preliminary   08/27/2020 No growth at 4 days  Preliminary     Urine Culture   Date Value Ref Range Status   08/27/2020 No growth  Final     Respiratory Culture   Date Value Ref Range Status   08/27/2020 Light growth (2+) Delftia acidovorans (A)  Final   08/27/2020 Light growth (2+) Moraxella catarrhalis (A)  Final     Comment:     Beta lactamase positive confers resistance to ampcillin, amoxicillin, penicillin, ticarcillin, and piperacillin but NOT amoxicillin-clavulanate, ampicillin-sulbactam, or piperacillin-tazobactam.           Assessment   Acute kidney injury/ATN  Acute hypoxic respiratory failure which required mechanical ventilation  Hyperkalemia  Metabolic acidosis  Paranoid schizophrenia  Anemia  Hypertension    Plan:  Continue current free water regimen  Adjust blood pressure medications as needed  Monitor  labs  Discussed with nursing  Wean vent per pulmonary      Tripp Felipe MD  9/3/2020  23:52

## 2020-09-05 NOTE — PROGRESS NOTES
"  PROGRESS NOTE  Patient Name: Mohan Villatoro  Age/Sex: 60 y.o. male  : 1960  MRN: [id number]    Date of Admission: 2020  Date of Encounter Visit: 20   LOS: 0 days   Patient Care Team:  Provider, No Known as PCP - General    Chief Complaint: Afebrile, trach plugged, on room air    Hospital course: Respiratory failure, had a tracheostomy, has acute kidney injury, creatinine seems to be hovering now around 1.8, encephalopathy, has been on antibiotic with recent cultures showing some Moraxella and patient has been on antibiotic for several days and has been afebrile.    Interval History: He pulled his T-piece with the inner cannula but the trach remain in position, currently plugged and doing well on room air    REVIEW OF SYSTEMS:   CONSTITUTIONAL: no fever or chills  CARDIOVASCULAR: No chest pain, chest pressure or chest discomfort. No palpitations or edema.   RESPIRATORY: No shortness of breath, cough or sputum.   GASTROINTESTINAL: No anorexia, nausea, vomiting or diarrhea. No abdominal pain or blood.   HEMATOLOGIC: No bleeding or bruising.     Ventilator/Non-Invasive Ventilation Settings (From admission, onward)    None            Vital Signs        on       No intake or output data in the 24 hours ending 20 1354  Flowsheet Rows      First Filed Value   Admission Height  180.3 cm (71\") Documented at 2020 1500   Admission Weight  97.1 kg (214 lb 1.6 oz) Documented at 2020 0300        Body mass index is 25.04 kg/m².      20  0700 20  2300 20  1532   Weight: 85 kg (187 lb 4.8 oz) 81.4 kg (179 lb 8 oz) 81.4 kg (179 lb 7.3 oz)       Physical Exam:  GEN:  No acute distress, alert, cooperative, well developed, sitting in a chair, thin, with a trach plugged, on room air  EYES:   Sclerae clear. No icterus. PERRL. Normal EOM  ENT:   External ears/nose normal, no oral lesions, no thrush, mucous membranes moist  NECK:  Supple, midline trachea, no JVD, trach site looks " clean, no discharge, the trach is plugged, no oxygen supplementation  LUNGS: Normal chest on inspection, breath sounds are decent, I do not hear any wheezing, no use of any accessory muscles.   CV:  Regular rhythm and rate. Normal S1/S2. No murmurs, gallops, or rubs noted.  ABD:  Soft, nontender and nondistended. Normal bowel sounds. No guarding  EXT:  Moves all extremities well. No cyanosis. No redness. No edema.   Skin: Dry, intact, no bleeding    Results Review:      Results from last 7 days   Lab Units 09/01/20  0506 08/31/20  0519 08/30/20  0814   SODIUM mmol/L 142 139 140   POTASSIUM mmol/L 3.9 3.7 3.4*   CHLORIDE mmol/L 100 99 98   CO2 mmol/L 31.0* 31.0* 30.0*   BUN mg/dL 47* 55* 59*   CREATININE mg/dL 1.88* 1.87* 1.98*   CALCIUM mg/dL 8.9 8.5* 8.3*   ANION GAP mmol/L 11.0 9.0 12.0             Results from last 7 days   Lab Units 09/01/20  0506 08/31/20 0519   PROBNP pg/mL 40,694.0* 33,278.0*     Results from last 7 days   Lab Units 09/01/20  0506 08/31/20  0835 08/31/20 0519 08/30/20 1955 08/30/20 0814 08/29/20 2013   WBC 10*3/mm3 9.59  --  9.17  --  8.50  --    HEMOGLOBIN g/dL 9.6* 9.3* 8.7* 9.2* 8.7* 9.5*   HEMATOCRIT % 28.7* 27.8* 26.0* 27.5* 25.4* 27.6*   PLATELETS 10*3/mm3 342  --  287  --  262  --    MCV fL 94.1  --  92.9  --  91.0  --    NEUTROPHIL % % 62.3  --  62.9  --  61.6  --    LYMPHOCYTE % % 15.6*  --  16.7*  --  21.2  --    MONOCYTES % % 12.3*  --  12.3*  --  9.5  --    EOSINOPHIL % % 8.7*  --  7.1*  --  6.8*  --    BASOPHIL % % 0.7  --  0.8  --  0.7  --    IMM GRAN % % 0.4  --  0.2  --  0.2  --                    Invalid input(s): LDLCALC  Results from last 7 days   Lab Units 08/31/20  0403   PH, ARTERIAL pH units 7.492*   PCO2, ARTERIAL mm Hg 41.9   PO2 ART mm Hg 70.8*   HCO3 ART mmol/L 32.1*         Glucose   Date/Time Value Ref Range Status   09/05/2020 1128 156 (H) 70 - 130 mg/dL Final     Comment:     : [id number] Razo (Howard) LisaMeter ID: [id number]   09/05/2020 0452 137  (H) 70 - 130 mg/dL Final     Comment:     : TITO Aragon MelodyMeter ID: [id number]   09/04/2020 2341 140 (H) 70 - 130 mg/dL Final     Comment:     : TITO Aragon MelodyMeter ID: [id number]   09/04/2020 1747 121 70 - 130 mg/dL Final     Comment:     : EBLUME Blume EliseMeter ID: [id number]   09/04/2020 1143 155 (H) 70 - 130 mg/dL Final     Comment:     : EBLUME Blume EliseMeter ID: [id number]   09/04/2020 0525 152 (H) 70 - 130 mg/dL Final     Comment:     : HFLOOD Flood AnthonyMeter ID: [id number]   09/03/2020 2357 178 (H) 70 - 130 mg/dL Final     Comment:     : HFLOOD Flood AnthonyMeter ID: [id number]   09/03/2020 1733 175 (H) 70 - 130 mg/dL Final     Comment:     : EBLUME Blume EliseMeter ID: [id number]                               Imaging:   Imaging Results (All)        I reviewed the patient's new clinical results.  I personally viewed and interpreted the patient's imaging results: No films this morning for me to review most recent was from 9/4/2020, it showed no significant changes with proving interstitial edema      Medication Review:     amLODIPine 10 mg Per G Tube Q24H   bumetanide 1 mg Intravenous Daily   busPIRone 10 mg Per G Tube Q8H   cefepime 2 g Intravenous Q12H   cetirizine 10 mg Per G Tube Daily   fentaNYL 1 patch Transdermal Q72H   And      Check Fentanyl Patch Placement 1 each Does not apply Q12H   chlorhexidine 15 mL Mouth/Throat Q12H   citalopram 20 mg Per G Tube Daily   fluticasone 2 spray Each Nare Daily   haloperidol 7.5 mg Per G Tube Nightly   hydrALAZINE 75 mg Per G Tube Q8H   insulin lispro 0-9 Units Subcutaneous Q6H   ipratropium-albuterol 3 mL Nebulization 4x Daily - RT   lansoprazole 30 mg Nasogastric QAM   metoclopramide 5 mg Per G Tube Q8H   potassium chloride 20 mEq Per G Tube Daily   risperiDONE 1 mg Nasogastric Nightly   rivaroxaban 10 mg Per G Tube Daily With Dinner   saccharomyces boulardii 250 mg Oral BID    sodium chloride 10 mL Intravenous Q12H            ASSESSMENT:   1. Acute hypercapnic and hypoxic respiratory failure, on mechanical ventilation status post self extubation and reintubation and tracheostomy and PEG tube placement 8/25/20  2. Diffuse bilateral pulmonary infiltrates: ARDS versus pulmonary edema- suspect at least an element of volume overload due to rapid change in bilateral infiltrates  3. Bilateral pneumonia  4. Bilateral pleural effusion, mild  5. JIM, slightly worse  6. Paranoid schizophrenia  7. Suspected GI bleeding-seems to be stable now  8. Opioid-induced constipation  9. S/p cardiac arrest on 8/7/2020 with 5 minutes ROSC, transferred to LTAC on 8/13/2020 for prolonged weaning  10. Hypernatremia    PLAN:  Nephrology note reviewed.  Patient is on free water to help with hypernatremia, blood pressure medications were adjusted and monitoring labs, diuretics are ordered on a daily basis depending on the assessment.  Patient is on antibiotic with cefepime day #8 after having 3 days of Rocephin.  I do not see any super bugs on his prior cultures, no fever, no leukocytosis, chest x-ray showed improving interstitial edema with less focal process and we will discontinue the antibiotic and monitor.  Placement effort in progress  Patient is currently on room air with his trach plugged and does not seem to be in any distress  No evidence of any lower extremity edema at this point or jugular venous distention  Discussed with     Disposition:     Anita Candelario MD  09/05/20  13:54           Dictated utilizing Dragon dictation

## 2020-09-05 NOTE — PROGRESS NOTES
Nephrology ([facility]) Progress Note      Patient:  Mohan Villatoro  YOB: 1960  Date of Service: 9/4/2020  MRN: [id number]   Acct: [id number]   Primary Care Physician: Provider, No Known  Advance Directive:   Code Status and Medical Interventions:   Ordered at: 08/25/20 0701     Level Of Support Discussed With:    Health Care Surrogate     Code Status:    CPR     Medical Interventions (Level of Support Prior to Arrest):    Full     Admit Date: 8/12/2020       Hospital Day: 0  Referring Provider: Eduardo Tolliver MD      Patient personally seen and examined.  Complete chart including Consults, Notes, Operative Reports, Labs, Cardiology, and Radiology studies reviewed as able.        Subjective:  Mohan Villatoro is a 60 y.o. male  whom we were consulted for hyponatremia, hyperkalemia, acute kidney injury. Unknown baseline renal function.  History of type 2 diabetes, paranoid schizophrenia, congestive heart failure. Patient transferred from [facility] for sodium level 114. He is a nursing home resident.  Reportedly has had nausea and poor oral intake recently. He had also noticed decreased urine output. Daily NSAID use. On August 7 had PEA cardiac arrest and had remained intubated since then. Serum sodium was improving slowly.  Moved to LTAC on August 12. He remains sedated on the ventilator and unable to participate in the history and physical examination.  On August 24, he underwent tracheotomy as well as PEG placement.     Today, he is up in chair on trach collar.  Unable to fully participate in the  istory and physical examination due to mental status (nonverbal).  No events per nursing.      Temp- 98.2  Pulse- 77  Resp- 18  BP- 158/72  O2%- 100      Allergies:  Niacin    Home Meds:  Medications Prior to Admission   Medication Sig Dispense Refill Last Dose   • albuterol sulfate  (90 Base) MCG/ACT inhaler Inhale 2 puffs 3 (Three) Times a Day As Needed  (copd).   8/5/2020 at Unknown time   • aspirin 325 MG tablet Take 325 mg by mouth Daily.   8/5/2020 at Unknown time   • budesonide (PULMICORT) 0.25 MG/2ML nebulizer solution Take 4 mL by nebulization 2 (Two) Times a Day. 2 mL 0    • busPIRone (BUSPAR) 5 MG tablet Take 5 mg by mouth 3 (Three) Times a Day.   8/5/2020 at Unknown time   • calcium polycarbophil (FIBERCON) 625 MG tablet Take 625 mg by mouth 2 (Two) Times a Day.   8/5/2020 at Unknown time   • Cholecalciferol (VITAMIN D3) 125 MCG (5000 UT) capsule capsule Take 5,000 Units by mouth Daily.   8/5/2020 at Unknown time   • citalopram (CeleXA) 20 MG tablet Take 20 mg by mouth Daily.   8/5/2020 at Unknown time   • dicyclomine (BENTYL) 20 MG tablet Take 20 mg by mouth 3 (Three) Times a Day.   8/5/2020 at Unknown time   • diphenhydrAMINE (BENADRYL) 50 MG capsule Take 50 mg by mouth At Night As Needed for Sleep.   Past Week at Unknown time   • famotidine (PEPCID) 10 MG/ML solution injection Infuse 2 mL into a venous catheter Daily.      • fentaNYL citrate (PF) 2,500 mcg in sodium chloride 0.9 % 200 mL Infuse  mcg/hr into a venous catheter Continuous.      • finasteride (PROSCAR) 5 MG tablet Take 5 mg by mouth Daily.   8/5/2020 at Unknown time   • fluticasone (FLONASE) 50 MCG/ACT nasal spray 1 spray into the nostril(s) as directed by provider Daily.   8/5/2020 at Unknown time   • folic acid (FOLVITE) 1 MG tablet Take 1 mg by mouth Daily.   8/5/2020 at Unknown time   • furosemide (LASIX) 20 MG tablet Take 20 mg by mouth Daily.   8/5/2020 at Unknown time   • guaiFENesin (ROBITUSSIN) 100 MG/5ML solution oral solution Take 10 mL by mouth Every 4 (Four) Hours. 30 mL 0    • haloperidol (HALDOL) 5 MG tablet Take 7.5 mg by mouth every night at bedtime.   8/5/2020 at Unknown time   • HYDROcodone-acetaminophen (NORCO)  MG per tablet Take 1 tablet by mouth Every 8 (Eight) Hours As Needed for Moderate Pain .   8/5/2020 at Unknown time   • hydrOXYzine pamoate  (VISTARIL) 50 MG capsule Take 50 mg by mouth every night at bedtime.   8/5/2020 at Unknown time   • insulin glargine (LANTUS) 100 UNIT/ML injection Inject 14 Units under the skin into the appropriate area as directed Every Night.   8/5/2020 at Unknown time   • ipratropium-albuterol (DUO-NEB) 0.5-2.5 mg/3 ml nebulizer Take 3 mL by nebulization 4 (Four) Times a Day. 360 mL 0    • loperamide (IMODIUM) 2 MG capsule Take 2 mg by mouth 4 (Four) Times a Day As Needed for Diarrhea.   Past Month at Unknown time   • methylPREDNISolone sodium succinate (SOLU-Medrol) 40 MG injection Infuse 1 mL into a venous catheter Every 12 (Twelve) Hours. 1 each 0    • metoclopramide (REGLAN) 5 MG tablet Take 5 mg by mouth 4 (Four) Times a Day Before Meals & at Bedtime.   8/5/2020 at Unknown time   • Multiple Vitamins-Minerals (THERA-M PO) Take 1 tablet by mouth Daily.   8/5/2020 at Unknown time   • Omega-3 Fatty Acids (OMEGA-3 FISH OIL) 1000 MG capsule Take 2 g by mouth 2 (Two) Times a Day.   8/5/2020 at Unknown time   • omeprazole (priLOSEC) 20 MG capsule Take 20 mg by mouth Daily.   8/5/2020 at Unknown time   • propofol (DIPRIVAN) 10 mg/mL emulsion infusion Infuse 465-6,975 mcg/min into a venous catheter Dose Adjusted By Provider As Needed.      • risperiDONE (risperDAL M-TABS) 0.5 MG disintegrating tablet Place 0.5 mg on the tongue 2 (Two) Times a Day.   8/5/2020 at Unknown time   • rOPINIRole (REQUIP) 2 MG tablet Take 2 mg by mouth Every Night.   8/5/2020 at Unknown time   • sennosides-docusate (senna-docusate sodium) 8.6-50 MG per tablet Take 1 tablet by mouth At Night As Needed for Constipation.   Past Week at Unknown time   • simvastatin (ZOCOR) 20 MG tablet Take 20 mg by mouth Every Night.   8/5/2020 at Unknown time   • tamsulosin (FLOMAX) 0.4 MG capsule 24 hr capsule Take 1 capsule by mouth every night at bedtime.   8/5/2020 at Unknown time   • traZODone (DESYREL) 100 MG tablet Take 100 mg by mouth Every Night.   8/5/2020 at  Unknown time   • vitamin B-12 (CYANOCOBALAMIN) 1000 MCG tablet Take 1,000 mcg by mouth Daily.   8/5/2020 at Unknown time       Medicines:  Current Facility-Administered Medications   Medication Dose Route Frequency Provider Last Rate Last Dose   • acetaminophen (TYLENOL) 160 MG/5ML solution 650 mg  650 mg Per G Tube Q4H PRN Bal Manning MD       • albuterol (PROVENTIL) nebulizer solution 0.083% 2.5 mg/3mL  2.5 mg Nebulization Q6H PRN Bal Manning MD       • amLODIPine (NORVASC) tablet 10 mg  10 mg Per G Tube Q24H Tripp Felipe MD       • atropine sulfate injection 0.5 mg  0.5 mg Intravenous PRN Bal Manning MD       • bumetanide (BUMEX) injection 1 mg  1 mg Intravenous Daily Eduardo Tolliver MD       • busPIRone (BUSPAR) tablet 10 mg  10 mg Per G Tube Q8H Bal Manning MD       • cefepime (MAXIPIME) 2 g/100 mL 0.9% NS (mbp)  2 g Intravenous Q12H David Tafoya MD       • cetirizine (zyrTEC) tablet 10 mg  10 mg Per G Tube Daily Bal Manning MD       • fentaNYL (DURAGESIC) 12 MCG/HR 1 patch  1 patch Transdermal Q72H Eduardo Tolliver MD        And   • [START ON 9/5/2020] Check Fentanyl Patch Placement  1 each Does not apply Q12H Eduardo Tolliver MD       • chlorhexidine (PERIDEX) 0.12 % solution 15 mL  15 mL Mouth/Throat Q12H Bal Manning MD       • citalopram (CeleXA) tablet 20 mg  20 mg Per G Tube Daily Bal Manning MD       • dextrose (D50W) 25 g/ 50mL Intravenous Solution 25 g  25 g Intravenous Q15 Min PRN Bal Manning MD       • dextrose (GLUTOSE) oral gel 15 g  15 g Oral Q15 Min PRN Bal Manning MD       • fluticasone (FLONASE) 50 MCG/ACT nasal spray 2 spray  2 spray Each Nare Daily Bal Manning MD       • glucagon (human recombinant) (GLUCAGEN DIAGNOSTIC) injection 1 mg  1 mg Subcutaneous Q15 Min Bal Spain MD       • haloperidol (HALDOL) tablet 7.5 mg  7.5 mg Per G Tube Nightly  Bal Manning MD       • hydrALAZINE (APRESOLINE) injection 10 mg  10 mg Intravenous Q6H PRN Bal Manning MD       • hydrALAZINE (APRESOLINE) tablet 75 mg  75 mg Per G Tube Q8H Eduardo Tolliver MD       • insulin lispro (humaLOG) injection 0-9 Units  0-9 Units Subcutaneous Q6H Bal Manning MD       • ipratropium-albuterol (DUO-NEB) nebulizer solution 3 mL  3 mL Nebulization 4x Daily - RT Bal Manning MD       • lansoprazole (FIRST) oral suspension 30 mg  30 mg Nasogastric QAM Bal Manning MD       • LORazepam (ATIVAN) injection 1 mg  1 mg Intravenous Q1H PRN Eduardo Tolliver MD       • metoclopramide (REGLAN) tablet 5 mg  5 mg Per G Tube Q8H Eduardo Tolliver MD       • Morphine sulfate (PF) injection 2 mg  2 mg Intravenous Q2H PRN Eduardo Tolliver MD       • ondansetron (ZOFRAN) tablet 4 mg  4 mg Oral Q6H PRN Bal Manning MD        Or   • ondansetron (ZOFRAN) injection 4 mg  4 mg Intravenous Q6H PRN Bal Manning MD       • polyethylene glycol (MIRALAX) packet 17 g  17 g Nasogastric BID PRN Eduardo Tolliver MD       • potassium chloride (KAYCIEL) 20 MEQ/15ML (10%) solution 20 mEq  20 mEq Per G Tube Daily Salima Oh APRN       • risperiDONE (risperDAL) tablet 1 mg  1 mg Nasogastric Nightly Bal Manning MD       • rivaroxaban (XARELTO) tablet 10 mg  10 mg Per G Tube Daily With Dinner Eduardo Tolliver MD       • saccharomyces boulardii (FLORASTOR) capsule 250 mg  250 mg Oral BID Bal Manning MD       • sennosides-docusate (PERICOLACE) 8.6-50 MG per tablet 1 tablet  1 tablet Oral BID PRN Eduardo Tolliver MD       • sodium chloride 0.9 % flush 10 mL  10 mL Intravenous Q12H Bal Manning MD       • sodium chloride 0.9 % flush 10 mL  10 mL Intravenous PRN Bal Manning MD           Past Medical History:  Past Medical History:   Diagnosis Date   • Diabetes (CMS/HCC)    • Schizo affective  "schizophrenia (CMS/HCC)        Past Surgical History:  Past Surgical History:   Procedure Laterality Date   • GASTROSTOMY FEEDING TUBE INSERTION N/A 8/25/2020    Procedure: GASTROSTOMY FEEDING TUBE INSERTION;  Surgeon: Kizzy Garcia MD;  Location: [facility] OR;  Service: General;  Laterality: N/A;   • TRACHEOSTOMY N/A 8/25/2020    Procedure: TRACHEOSTOMY;  Surgeon: Bal Manning MD;  Location: [facility] OR;  Service: ENT;  Laterality: N/A;       Family History  History reviewed. No pertinent family history.    Social History  Social History     Socioeconomic History   • Marital status: Single     Spouse name: Not on file   • Number of children: Not on file   • Years of education: Not on file   • Highest education level: Not on file         Review of Systems:  History obtained from unobtainable from patient due to mental status      Objective:  Patient Vitals for the past 24 hrs:   Height Weight   09/04/20 1532 180.3 cm (70.98\") 81.4 kg (179 lb 7.3 oz)     No intake or output data in the 24 hours ending 09/04/20 2217  General: awake/nonverbal  Chest:  clear to auscultation bilaterally without respiratory distress  CVS: regular rate and rhythm  Abdominal: soft, nontender, positive bowel sounds  Extremities: ble edema  Skin: warm and dry without rash      Labs:  Results from last 7 days   Lab Units 09/01/20  0506 08/31/20  0835 08/31/20  0519  08/30/20  0814   WBC 10*3/mm3 9.59  --  9.17  --  8.50   HEMOGLOBIN g/dL 9.6* 9.3* 8.7*   < > 8.7*   HEMATOCRIT % 28.7* 27.8* 26.0*   < > 25.4*   PLATELETS 10*3/mm3 342  --  287  --  262    < > = values in this interval not displayed.         Results from last 7 days   Lab Units 09/01/20  0506 08/31/20  0519 08/30/20  0814   SODIUM mmol/L 142 139 140   POTASSIUM mmol/L 3.9 3.7 3.4*   CHLORIDE mmol/L 100 99 98   CO2 mmol/L 31.0* 31.0* 30.0*   BUN mg/dL 47* 55* 59*   CREATININE mg/dL 1.88* 1.87* 1.98*   CALCIUM mg/dL 8.9 8.5* 8.3*   GLUCOSE mg/dL 146* 180* 189*       Radiology: "   Imaging Results (Last 72 Hours)     Procedure Component Value Units Date/Time    XR Chest 1 View [[id number]] Collected:  09/04/20 0714     Updated:  09/04/20 0720    Narrative:       Frontal supine radiograph of the chest 9/4/2020 4:10 AM CDT     History: pt on vent; J96.21-Acute and chronic respiratory failure with  hypoxia; J96.22-Acute and chronic respiratory failure with hypercapnia;  Z99.11-Dependence on respirator (ventilator) status; R06.89-Other  abnormalities of breathing     Comparison: 9/3/2020      Findings:   Tracheostomy in place. Heart size is stable. The pulmonary vasculature  are nondilated. Residual hazy lung infiltrates are very similar and are  mostly resolved at this point. No pneumothorax or pleural effusion.          Impression:       Impression:   1. No significant interval change.  2. Underlying pulmonary edema is mostly resolved. No new or worsening  infiltrate.  This report was finalized on 09/04/2020 07:17 by Dr Sammy Strauss, .    XR Chest 1 View [[id number]] Collected:  09/03/20 0659     Updated:  09/03/20 0704    Narrative:       Frontal supine radiograph of the chest 9/3/2020 3:45 AM CDT     History: pt on vent; J96.21-Acute and chronic respiratory failure with  hypoxia; J96.22-Acute and chronic respiratory failure with hypercapnia;  Z99.11-Dependence on respirator (ventilator) status; R06.89-Other  abnormalities of breathing     Comparison: 9/2/2020      Findings:      Tracheostomy in place. Bilateral hazy infiltrates reflecting pulmonary  edema continue to improve, appearing quite faint on the left. No  consolidation. No pleural effusion or pneumothorax. Heart size is  stable. Pulmonary vasculature is nondilated. Granulomatous calcification  in the mediastinum. No acute bony abnormality.       Impression:       Impression:   1. Decreasing pulmonary edema. No areas of new or worsening infiltrate.  2. Tracheostomy.     This report was finalized on 09/03/2020 07:01 by Dr Christianson  Rica .    XR Chest 1 View [[id number]] Collected:  09/02/20 0710     Updated:  09/02/20 0714    Narrative:       Frontal upright radiograph of the chest 9/2/2020 3:35 AM CDT     COMPARISON: September 1, 2020.     FINDINGS:   Left lung is clear. Pulmonary vascular margins are slightly indistinct  in the right lung. This may be residual pulmonary vascular congestion  however the aeration in the right lung is improved from September 1..  Cardiac silhouettes normal. Tracheostomy tube is present..      The osseous structures and surrounding soft tissues demonstrate no acute  abnormality.       Impression:       1. Continued improvement. There is decrease in the pulmonary vascular  congestion and improved aeration in the right and left lung..        This report was finalized on 09/02/2020 07:11 by Dr. Jeffrey Cullen MD.          Culture:  Blood Culture   Date Value Ref Range Status   08/27/2020 No growth at 4 days  Preliminary   08/27/2020 No growth at 4 days  Preliminary     Urine Culture   Date Value Ref Range Status   08/27/2020 No growth  Final     Respiratory Culture   Date Value Ref Range Status   08/27/2020 Light growth (2+) Delftia acidovorans (A)  Final   08/27/2020 Light growth (2+) Moraxella catarrhalis (A)  Final     Comment:     Beta lactamase positive confers resistance to ampcillin, amoxicillin, penicillin, ticarcillin, and piperacillin but NOT amoxicillin-clavulanate, ampicillin-sulbactam, or piperacillin-tazobactam.           Assessment   Acute kidney injury/ATN  Acute hypoxic respiratory failure which required mechanical ventilation  Hyperkalemia  Metabolic acidosis  Paranoid schizophrenia  Anemia  Hypertension    Plan:  Continue current free water regimen  Adjust blood pressure medications as needed  Monitor labs  Discussed with nursing  Wean o2 per pulmonary      Tripp Felipe MD  9/4/2020  22:17

## 2020-09-05 NOTE — PROGRESS NOTES
Nephrology ([facility]) Progress Note      Patient:  Mohan Villatoro  YOB: 1960  Date of Service: 9/5/2020  MRN: [id number]   Acct: [id number]   Primary Care Physician: Provider, No Known  Advance Directive:   Code Status and Medical Interventions:   Ordered at: 08/25/20 0701     Level Of Support Discussed With:    Health Care Surrogate     Code Status:    CPR     Medical Interventions (Level of Support Prior to Arrest):    Full     Admit Date: 8/12/2020       Hospital Day: 0  Referring Provider: Eduardo Tolliver MD      Patient personally seen and examined.  Complete chart including Consults, Notes, Operative Reports, Labs, Cardiology, and Radiology studies reviewed as able.        Subjective:  Mohan Villatoro is a 60 y.o. male  whom we were consulted for hyponatremia, hyperkalemia, acute kidney injury. Unknown baseline renal function.  History of type 2 diabetes, paranoid schizophrenia, congestive heart failure. Patient transferred from [facility] for sodium level 114. He is a nursing home resident.  Reportedly has had nausea and poor oral intake recently. He had also noticed decreased urine output. Daily NSAID use. On August 7 had PEA cardiac arrest and had remained intubated since then. Serum sodium was improving slowly.  Moved to LTAC on August 12. He remains sedated on the ventilator and unable to participate in the history and physical examination.  On August 24, he underwent tracheotomy as well as PEG placement.     Today, he is up in chair with capped trach.  Unable to fully participate in the history and physical examination due to mental status (nonverbal).  No events per nursing.      Temp- 98.1  Pulse- 82  Resp- 20  BP- 158/61  O2%- 99      Allergies:  Niacin    Home Meds:  Medications Prior to Admission   Medication Sig Dispense Refill Last Dose   • albuterol sulfate  (90 Base) MCG/ACT inhaler Inhale 2 puffs 3 (Three) Times a Day As Needed  (copd).   8/5/2020 at Unknown time   • aspirin 325 MG tablet Take 325 mg by mouth Daily.   8/5/2020 at Unknown time   • budesonide (PULMICORT) 0.25 MG/2ML nebulizer solution Take 4 mL by nebulization 2 (Two) Times a Day. 2 mL 0    • busPIRone (BUSPAR) 5 MG tablet Take 5 mg by mouth 3 (Three) Times a Day.   8/5/2020 at Unknown time   • calcium polycarbophil (FIBERCON) 625 MG tablet Take 625 mg by mouth 2 (Two) Times a Day.   8/5/2020 at Unknown time   • Cholecalciferol (VITAMIN D3) 125 MCG (5000 UT) capsule capsule Take 5,000 Units by mouth Daily.   8/5/2020 at Unknown time   • citalopram (CeleXA) 20 MG tablet Take 20 mg by mouth Daily.   8/5/2020 at Unknown time   • dicyclomine (BENTYL) 20 MG tablet Take 20 mg by mouth 3 (Three) Times a Day.   8/5/2020 at Unknown time   • diphenhydrAMINE (BENADRYL) 50 MG capsule Take 50 mg by mouth At Night As Needed for Sleep.   Past Week at Unknown time   • famotidine (PEPCID) 10 MG/ML solution injection Infuse 2 mL into a venous catheter Daily.      • fentaNYL citrate (PF) 2,500 mcg in sodium chloride 0.9 % 200 mL Infuse  mcg/hr into a venous catheter Continuous.      • finasteride (PROSCAR) 5 MG tablet Take 5 mg by mouth Daily.   8/5/2020 at Unknown time   • fluticasone (FLONASE) 50 MCG/ACT nasal spray 1 spray into the nostril(s) as directed by provider Daily.   8/5/2020 at Unknown time   • folic acid (FOLVITE) 1 MG tablet Take 1 mg by mouth Daily.   8/5/2020 at Unknown time   • furosemide (LASIX) 20 MG tablet Take 20 mg by mouth Daily.   8/5/2020 at Unknown time   • guaiFENesin (ROBITUSSIN) 100 MG/5ML solution oral solution Take 10 mL by mouth Every 4 (Four) Hours. 30 mL 0    • haloperidol (HALDOL) 5 MG tablet Take 7.5 mg by mouth every night at bedtime.   8/5/2020 at Unknown time   • HYDROcodone-acetaminophen (NORCO)  MG per tablet Take 1 tablet by mouth Every 8 (Eight) Hours As Needed for Moderate Pain .   8/5/2020 at Unknown time   • hydrOXYzine pamoate  (VISTARIL) 50 MG capsule Take 50 mg by mouth every night at bedtime.   8/5/2020 at Unknown time   • insulin glargine (LANTUS) 100 UNIT/ML injection Inject 14 Units under the skin into the appropriate area as directed Every Night.   8/5/2020 at Unknown time   • ipratropium-albuterol (DUO-NEB) 0.5-2.5 mg/3 ml nebulizer Take 3 mL by nebulization 4 (Four) Times a Day. 360 mL 0    • loperamide (IMODIUM) 2 MG capsule Take 2 mg by mouth 4 (Four) Times a Day As Needed for Diarrhea.   Past Month at Unknown time   • methylPREDNISolone sodium succinate (SOLU-Medrol) 40 MG injection Infuse 1 mL into a venous catheter Every 12 (Twelve) Hours. 1 each 0    • metoclopramide (REGLAN) 5 MG tablet Take 5 mg by mouth 4 (Four) Times a Day Before Meals & at Bedtime.   8/5/2020 at Unknown time   • Multiple Vitamins-Minerals (THERA-M PO) Take 1 tablet by mouth Daily.   8/5/2020 at Unknown time   • Omega-3 Fatty Acids (OMEGA-3 FISH OIL) 1000 MG capsule Take 2 g by mouth 2 (Two) Times a Day.   8/5/2020 at Unknown time   • omeprazole (priLOSEC) 20 MG capsule Take 20 mg by mouth Daily.   8/5/2020 at Unknown time   • propofol (DIPRIVAN) 10 mg/mL emulsion infusion Infuse 465-6,975 mcg/min into a venous catheter Dose Adjusted By Provider As Needed.      • risperiDONE (risperDAL M-TABS) 0.5 MG disintegrating tablet Place 0.5 mg on the tongue 2 (Two) Times a Day.   8/5/2020 at Unknown time   • rOPINIRole (REQUIP) 2 MG tablet Take 2 mg by mouth Every Night.   8/5/2020 at Unknown time   • sennosides-docusate (senna-docusate sodium) 8.6-50 MG per tablet Take 1 tablet by mouth At Night As Needed for Constipation.   Past Week at Unknown time   • simvastatin (ZOCOR) 20 MG tablet Take 20 mg by mouth Every Night.   8/5/2020 at Unknown time   • tamsulosin (FLOMAX) 0.4 MG capsule 24 hr capsule Take 1 capsule by mouth every night at bedtime.   8/5/2020 at Unknown time   • traZODone (DESYREL) 100 MG tablet Take 100 mg by mouth Every Night.   8/5/2020 at  Unknown time   • vitamin B-12 (CYANOCOBALAMIN) 1000 MCG tablet Take 1,000 mcg by mouth Daily.   8/5/2020 at Unknown time       Medicines:  Current Facility-Administered Medications   Medication Dose Route Frequency Provider Last Rate Last Dose   • acetaminophen (TYLENOL) 160 MG/5ML solution 650 mg  650 mg Per G Tube Q4H PRN Bal Manning MD       • albuterol (PROVENTIL) nebulizer solution 0.083% 2.5 mg/3mL  2.5 mg Nebulization Q6H PRN Bal Manning MD       • amLODIPine (NORVASC) tablet 10 mg  10 mg Per G Tube Q24H Tripp Felipe MD       • atropine sulfate injection 0.5 mg  0.5 mg Intravenous PRN Bal Manning MD       • bumetanide (BUMEX) injection 1 mg  1 mg Intravenous Daily Eduardo Tolliver MD       • busPIRone (BUSPAR) tablet 10 mg  10 mg Per G Tube Q8H Bal Manning MD       • cetirizine (zyrTEC) tablet 10 mg  10 mg Per G Tube Daily Bal Manning MD       • fentaNYL (DURAGESIC) 12 MCG/HR 1 patch  1 patch Transdermal Q72H Eduardo Tolliver MD        And   • Check Fentanyl Patch Placement  1 each Does not apply Q12H Eduardo Tolliver MD       • chlorhexidine (PERIDEX) 0.12 % solution 15 mL  15 mL Mouth/Throat Q12H Bal Manning MD       • citalopram (CeleXA) tablet 20 mg  20 mg Per G Tube Daily Bal Manning MD       • dextrose (D50W) 25 g/ 50mL Intravenous Solution 25 g  25 g Intravenous Q15 Min PRN Bal Manning MD       • dextrose (GLUTOSE) oral gel 15 g  15 g Oral Q15 Min PRN Bal Manning MD       • fluticasone (FLONASE) 50 MCG/ACT nasal spray 2 spray  2 spray Each Nare Daily Bal Manning MD       • glucagon (human recombinant) (GLUCAGEN DIAGNOSTIC) injection 1 mg  1 mg Subcutaneous Q15 Min PRN Bal Manning MD       • haloperidol (HALDOL) tablet 7.5 mg  7.5 mg Per G Tube Nightly Bal Manning MD       • hydrALAZINE (APRESOLINE) injection 10 mg  10 mg Intravenous Q6H PRN Bal Manning MD        • hydrALAZINE (APRESOLINE) tablet 75 mg  75 mg Per G Tube Q8H Eduardo Tolliver MD       • insulin lispro (humaLOG) injection 0-9 Units  0-9 Units Subcutaneous Q6H Bal Manning MD       • ipratropium-albuterol (DUO-NEB) nebulizer solution 3 mL  3 mL Nebulization 4x Daily - RT Bal Manning MD       • lansoprazole (FIRST) oral suspension 30 mg  30 mg Nasogastric QAM Bal Manning MD       • LORazepam (ATIVAN) injection 1 mg  1 mg Intravenous Q1H PRN Eduardo Tolliver MD       • metoclopramide (REGLAN) tablet 5 mg  5 mg Per G Tube Q8H Eduardo Tolliver MD       • Morphine sulfate (PF) injection 2 mg  2 mg Intravenous Q2H PRN Eduardo Tolliver MD       • ondansetron (ZOFRAN) tablet 4 mg  4 mg Oral Q6H PRN Bal Manning MD        Or   • ondansetron (ZOFRAN) injection 4 mg  4 mg Intravenous Q6H PRN Bal Manning MD       • polyethylene glycol (MIRALAX) packet 17 g  17 g Nasogastric BID PRN Eduardo Tolliver MD       • potassium chloride (KAYCIEL) 20 MEQ/15ML (10%) solution 20 mEq  20 mEq Per G Tube Daily Salima Oh APRN       • risperiDONE (risperDAL) tablet 1 mg  1 mg Nasogastric Nightly Bal Manning MD       • rivaroxaban (XARELTO) tablet 10 mg  10 mg Per G Tube Daily With Dinner Eduardo Tolliver MD       • saccharomyces boulardii (FLORASTOR) capsule 250 mg  250 mg Oral BID Bal Manning MD       • sennosides-docusate (PERICOLACE) 8.6-50 MG per tablet 1 tablet  1 tablet Oral BID PRN Eduardo Tolliver MD       • sodium chloride 0.9 % flush 10 mL  10 mL Intravenous Q12H Bal Manning MD       • sodium chloride 0.9 % flush 10 mL  10 mL Intravenous PRN Bal Manning MD           Past Medical History:  Past Medical History:   Diagnosis Date   • Diabetes (CMS/HCC)    • Schizo affective schizophrenia (CMS/HCC)        Past Surgical History:  Past Surgical History:   Procedure Laterality Date   • GASTROSTOMY  FEEDING TUBE INSERTION N/A 8/25/2020    Procedure: GASTROSTOMY FEEDING TUBE INSERTION;  Surgeon: Kizzy Garcia MD;  Location: [facility] OR;  Service: General;  Laterality: N/A;   • TRACHEOSTOMY N/A 8/25/2020    Procedure: TRACHEOSTOMY;  Surgeon: Bal Manning MD;  Location: [facility] OR;  Service: ENT;  Laterality: N/A;       Family History  History reviewed. No pertinent family history.    Social History  Social History     Socioeconomic History   • Marital status: Single     Spouse name: Not on file   • Number of children: Not on file   • Years of education: Not on file   • Highest education level: Not on file         Review of Systems:  History obtained from unobtainable from patient due to mental status      Objective:  No data found.  No intake or output data in the 24 hours ending 09/05/20 1714  General: awake/nonverbal  Chest:  clear to auscultation bilaterally without respiratory distress  CVS: regular rate and rhythm  Abdominal: soft, nontender, positive bowel sounds  Extremities: ble edema  Skin: warm and dry without rash      Labs:  Results from last 7 days   Lab Units 09/01/20  0506 08/31/20  0835 08/31/20 0519 08/30/20  0814   WBC 10*3/mm3 9.59  --  9.17  --  8.50   HEMOGLOBIN g/dL 9.6* 9.3* 8.7*   < > 8.7*   HEMATOCRIT % 28.7* 27.8* 26.0*   < > 25.4*   PLATELETS 10*3/mm3 342  --  287  --  262    < > = values in this interval not displayed.         Results from last 7 days   Lab Units 09/01/20  0506 08/31/20 0519 08/30/20  0814   SODIUM mmol/L 142 139 140   POTASSIUM mmol/L 3.9 3.7 3.4*   CHLORIDE mmol/L 100 99 98   CO2 mmol/L 31.0* 31.0* 30.0*   BUN mg/dL 47* 55* 59*   CREATININE mg/dL 1.88* 1.87* 1.98*   CALCIUM mg/dL 8.9 8.5* 8.3*   GLUCOSE mg/dL 146* 180* 189*       Radiology:   Imaging Results (Last 72 Hours)     Procedure Component Value Units Date/Time    XR Chest 1 View [[id number]] Collected:  09/04/20 0714     Updated:  09/04/20 0720    Narrative:       Frontal supine radiograph of the  chest 9/4/2020 4:10 AM CDT     History: pt on vent; J96.21-Acute and chronic respiratory failure with  hypoxia; J96.22-Acute and chronic respiratory failure with hypercapnia;  Z99.11-Dependence on respirator (ventilator) status; R06.89-Other  abnormalities of breathing     Comparison: 9/3/2020      Findings:   Tracheostomy in place. Heart size is stable. The pulmonary vasculature  are nondilated. Residual hazy lung infiltrates are very similar and are  mostly resolved at this point. No pneumothorax or pleural effusion.          Impression:       Impression:   1. No significant interval change.  2. Underlying pulmonary edema is mostly resolved. No new or worsening  infiltrate.  This report was finalized on 09/04/2020 07:17 by Dr Sammy Strauss, .    XR Chest 1 View [[id number]] Collected:  09/03/20 0659     Updated:  09/03/20 0704    Narrative:       Frontal supine radiograph of the chest 9/3/2020 3:45 AM CDT     History: pt on vent; J96.21-Acute and chronic respiratory failure with  hypoxia; J96.22-Acute and chronic respiratory failure with hypercapnia;  Z99.11-Dependence on respirator (ventilator) status; R06.89-Other  abnormalities of breathing     Comparison: 9/2/2020      Findings:      Tracheostomy in place. Bilateral hazy infiltrates reflecting pulmonary  edema continue to improve, appearing quite faint on the left. No  consolidation. No pleural effusion or pneumothorax. Heart size is  stable. Pulmonary vasculature is nondilated. Granulomatous calcification  in the mediastinum. No acute bony abnormality.       Impression:       Impression:   1. Decreasing pulmonary edema. No areas of new or worsening infiltrate.  2. Tracheostomy.     This report was finalized on 09/03/2020 07:01 by Dr Sammy Strauss, .          Culture:  Blood Culture   Date Value Ref Range Status   08/27/2020 No growth at 4 days  Preliminary   08/27/2020 No growth at 4 days  Preliminary     Urine Culture   Date Value Ref Range Status    08/27/2020 No growth  Final     Respiratory Culture   Date Value Ref Range Status   08/27/2020 Light growth (2+) Delftia acidovorans (A)  Final   08/27/2020 Light growth (2+) Moraxella catarrhalis (A)  Final     Comment:     Beta lactamase positive confers resistance to ampcillin, amoxicillin, penicillin, ticarcillin, and piperacillin but NOT amoxicillin-clavulanate, ampicillin-sulbactam, or piperacillin-tazobactam.           Assessment   Acute kidney injury/ATN  Acute hypoxic respiratory failure which required mechanical ventilation  Hyperkalemia  Metabolic acidosis  Paranoid schizophrenia  Anemia  Hypertension    Plan:  Continue current free water regimen  Adjust blood pressure medications as needed  Monitor labs  Discussed with nursing  Wean o2 per pulmonary      Tripp Felipe MD  9/5/2020  17:14

## 2020-09-05 NOTE — PROGRESS NOTES
Unruly Tolliver M.D.  [facility]        Internal Medicine Progress Note    9/5/2020   17:14    Name:  Mohan Villatoro  MRN:    [id number]     Acct:     [id number]   Room:  [address] [place] [address] Day: 0     Admit Date: 8/12/2020  2:37 PM  PCP: Provider, No Known    Subjective:     C/C: need for continued vent weaning    Interval History: Status:  stayed the same/stable.  Sleeping in bed, arouses easily.  No family at bedside.  Trach capped, tolerating without difficulty.  Afebrile.  Followed a few commands.  No new concerns identified or voiced by nursing.      Review of Systems   Unable to perform ROS: intubated         Medications:     Allergies:   Allergies   Allergen Reactions   • Niacin Unknown - High Severity       Current Meds:   Current Facility-Administered Medications:   •  acetaminophen (TYLENOL) 160 MG/5ML solution 650 mg, 650 mg, Per G Tube, Q4H PRN, Bal Manning MD  •  albuterol (PROVENTIL) nebulizer solution 0.083% 2.5 mg/3mL, 2.5 mg, Nebulization, Q6H PRN, Bal Manning MD  •  amLODIPine (NORVASC) tablet 10 mg, 10 mg, Per G Tube, Q24H, Tripp Felipe MD  •  atropine sulfate injection 0.5 mg, 0.5 mg, Intravenous, PRN, Bal Manning MD  •  bumetanide (BUMEX) injection 1 mg, 1 mg, Intravenous, Daily, Eduardo Tolliver MD  •  busPIRone (BUSPAR) tablet 10 mg, 10 mg, Per G Tube, Q8H, Bal Manning MD  •  cetirizine (zyrTEC) tablet 10 mg, 10 mg, Per G Tube, Daily, Bal Manning MD  •  fentaNYL (DURAGESIC) 12 MCG/HR 1 patch, 1 patch, Transdermal, Q72H **AND** Check Fentanyl Patch Placement, 1 each, Does not apply, Q12H, Eduardo Tolliver MD  •  chlorhexidine (PERIDEX) 0.12 % solution 15 mL, 15 mL, Mouth/Throat, Q12H, Bal Manning MD  •  citalopram (CeleXA) tablet 20 mg, 20 mg, Per G Tube, Daily, Bal Manning MD  •  dextrose (D50W) 25 g/ 50mL Intravenous Solution 25 g, 25 g, Intravenous, Q15 Min PRN, Bal Manning  MD John  •  dextrose (GLUTOSE) oral gel 15 g, 15 g, Oral, Q15 Min PRN, Bal Manning MD  •  fluticasone (FLONASE) 50 MCG/ACT nasal spray 2 spray, 2 spray, Each Nare, Daily, Bal Manning MD  •  glucagon (human recombinant) (GLUCAGEN DIAGNOSTIC) injection 1 mg, 1 mg, Subcutaneous, Q15 Min PRN, Bal Manning MD  •  haloperidol (HALDOL) tablet 7.5 mg, 7.5 mg, Per G Tube, Nightly, Bal Manning MD  •  hydrALAZINE (APRESOLINE) injection 10 mg, 10 mg, Intravenous, Q6H PRN, Bal Manning MD  •  hydrALAZINE (APRESOLINE) tablet 75 mg, 75 mg, Per G Tube, Q8H, Eduardo Tolliver MD  •  insulin lispro (humaLOG) injection 0-9 Units, 0-9 Units, Subcutaneous, Q6H, Bal Manning MD  •  ipratropium-albuterol (DUO-NEB) nebulizer solution 3 mL, 3 mL, Nebulization, 4x Daily - RT, Bal Manning MD  •  lansoprazole (FIRST) oral suspension 30 mg, 30 mg, Nasogastric, QAM, Bal Manning MD  •  LORazepam (ATIVAN) injection 1 mg, 1 mg, Intravenous, Q1H PRN, Eduardo Tolliver MD  •  metoclopramide (REGLAN) tablet 5 mg, 5 mg, Per G Tube, Q8H, Eduardo Tolliver MD  •  Morphine sulfate (PF) injection 2 mg, 2 mg, Intravenous, Q2H PRN, Eduardo Tolliver MD  •  ondansetron (ZOFRAN) tablet 4 mg, 4 mg, Oral, Q6H PRN **OR** ondansetron (ZOFRAN) injection 4 mg, 4 mg, Intravenous, Q6H PRN, Bal Manning MD  •  polyethylene glycol (MIRALAX) packet 17 g, 17 g, Nasogastric, BID PRN, Eduardo Tolliver MD  •  potassium chloride (KAYCIEL) 20 MEQ/15ML (10%) solution 20 mEq, 20 mEq, Per G Tube, Daily, Salima Oh APRN  •  risperiDONE (risperDAL) tablet 1 mg, 1 mg, Nasogastric, Nightly, Bal Manning MD  •  rivaroxaban (XARELTO) tablet 10 mg, 10 mg, Per G Tube, Daily With Dinner, Eduardo Tolliver MD  •  saccharomyces boulardii (FLORASTOR) capsule 250 mg, 250 mg, Oral, BID, Bal Manning MD  •  sennosides-docusate (PERICOLACE) 8.6-50 MG per  "tablet 1 tablet, 1 tablet, Oral, BID PRN, Eduardo Tolliver MD  •  sodium chloride 0.9 % flush 10 mL, 10 mL, Intravenous, Q12H, Bal Manning MD  •  sodium chloride 0.9 % flush 10 mL, 10 mL, Intravenous, PRN, Bal Manning MD    Data:     Code Status:    Code Status and Medical Interventions:   Ordered at: 08/25/20 0701     Level Of Support Discussed With:    Health Care Surrogate     Code Status:    CPR     Medical Interventions (Level of Support Prior to Arrest):    Full       History reviewed. No pertinent family history.    Social History     Socioeconomic History   • Marital status: Single     Spouse name: Not on file   • Number of children: Not on file   • Years of education: Not on file   • Highest education level: Not on file       Vitals:  BP (!) 188/86   Pulse 72   Temp 97.9 °F (36.6 °C) (Temporal)   Resp 18   Ht 180.3 cm (70.98\")   Wt 81.4 kg (179 lb 7.3 oz)   SpO2 97%   BMI 25.04 kg/m²   T 98.1 P 82 R 20 /61 Sp02 99% (RA)      I/O (24Hr):  No intake or output data in the 24 hours ending 09/05/20 1714    Labs and imaging:      Recent Results (from the past 12 hour(s))   POC Glucose Once    Collection Time: 09/05/20 11:28 AM   Result Value Ref Range    Glucose 156 (H) 70 - 130 mg/dL         Physical Examination:        Physical Exam   Constitutional: Vital signs are normal. He appears well-developed and well-nourished.   HENT:   Head: Normocephalic and atraumatic.   Nose: Nose normal.   Mouth/Throat: Oropharynx is clear and moist.   Eyes: Pupils are equal, round, and reactive to light. Conjunctivae, EOM and lids are normal.   Neck: Trachea normal and normal range of motion. Neck supple.   Trach capped   Cardiovascular: Normal rate, regular rhythm and normal heart sounds.   Pulmonary/Chest: Effort normal and breath sounds normal.   Abdominal: Soft. Normal appearance and bowel sounds are normal.   g tube  Midline dressing c/d/i   Musculoskeletal: Normal range of motion. He " exhibits edema.   Generalized weakness   Neurological: He is alert. No cranial nerve deficit or sensory deficit.   Following a few commands   Nodding/shaking head seemingly appropriately  Mouthing some words   Skin: Skin is warm, dry and intact. No petechiae and no rash noted. No cyanosis or erythema. Nails show no clubbing.   Dressing c/d/i   Psychiatric: He has a normal mood and affect. His behavior is normal.   Nursing note and vitals reviewed.        Assessment:          Respiratory insufficiency    Acute on chronic respiratory failure with hypoxia and hypercapnia (CMS/[facility])    Ventilator dependence (CMS/[facility])    Past Medical History:   Diagnosis Date   • Diabetes (CMS/[facility])    • Schizo affective schizophrenia (CMS/[facility])         Plan:        1. Acute hypoxic respiratory failure  2. Acute renal failure on CKD3  3. S/p Cardiac arrest  4. Schizophrenia  5. Multifactorial anemia  6. Hyponatremia  7. COPD  8. Leukocytosis  9. Dysphagia  10. Tobacco abuse  11. DM2 with hyperglycemia on long term insulin  12. Bradycardia  13. Hypokalemia    Continue current treatment. Monitor counts. Increase activity as tolerated.  Vent/oxygen weaning under direction of pulmonology. Monitor electrolytes and renal function closely. Continue antibiotics.  Wean reglan as able - change to per g tube. Wean fentanyl patch.  Labs Tuesday.     Electronically signed by MIKEY Reed on 9/5/2020 at 17:14   I have discussed the care of Mohan Villatoro, including pertinent history and exam findings, with the nurse practitioner.    I have seen and examined the patient and the key elements of all parts of the encounter have been performed by me.  I agree with the assessment, plan and orders as documented by MIKEY Garcias, after I modified the exam findings and the plan of treatments and the final version is my approved version of the assessment.        Electronically signed by Eduardo Tolliver MD on 9/5/2020 at 19:24

## 2020-09-06 NOTE — PROGRESS NOTES
"  PROGRESS NOTE  Patient Name: Mohan Villatoro  Age/Sex: 60 y.o. male  : 1960  MRN: [id number]    Date of Admission: 2020  Date of Encounter Visit: 20   LOS: 0 days   Patient Care Team:  Provider, No Known as PCP - General    Chief Complaint: Afebrile, trach removed    Hospital course: Respiratory failure, had a tracheostomy, has acute kidney injury, creatinine seems to be hovering now around 1.8, encephalopathy, had a full course of antibiotics that were discontinued on 2020, with recent cultures showing some Moraxella and patient has beenafebrile for several days.    Interval History: He pulled his trach out completely, and he is doing well, on room air with nebulizer treatment    REVIEW OF SYSTEMS:   CONSTITUTIONAL: no fever or chills  CARDIOVASCULAR: No chest pain, chest pressure or chest discomfort. No palpitations or edema.   RESPIRATORY: No shortness of breath, cough or sputum.   GASTROINTESTINAL: No anorexia, nausea, vomiting or diarrhea. No abdominal pain or blood.   HEMATOLOGIC: No bleeding or bruising.     Ventilator/Non-Invasive Ventilation Settings (From admission, onward)    None            Vital Signs        on       No intake or output data in the 24 hours ending 20 1054  Flowsheet Rows      First Filed Value   Admission Height  180.3 cm (71\") Documented at 2020 1500   Admission Weight  97.1 kg (214 lb 1.6 oz) Documented at 2020 0300        Body mass index is 25.04 kg/m².      20  0700 20  2300 20  1532   Weight: 85 kg (187 lb 4.8 oz) 81.4 kg (179 lb 8 oz) 81.4 kg (179 lb 7.3 oz)       Physical Exam:  GEN:  No acute distress, alert, cooperative, well developed, sitting in a chair, thin, previous trach site is covered with surgical dressing.  EYES:   Sclerae clear. No icterus. PERRL. Normal EOM  ENT:   External ears/nose normal, no oral lesions, no thrush, mucous membranes moist  NECK:  Supple, midline trachea, trach site is covered but was " evaluated earlier by ENT  LUNGS: Normal chest on inspection, mild expiratory wheezes, with minimal rhonchi, no crackles.   CV:  Regular rhythm and rate. Normal S1/S2. No murmurs, gallops, or rubs noted.  ABD:  Soft, nontender and nondistended. Normal bowel sounds. No guarding  EXT:  Moves all extremities well. No cyanosis. No redness. No edema.   Skin: Dry, intact, no bleeding    Results Review:      Results from last 7 days   Lab Units 09/01/20  0506 08/31/20  0519   SODIUM mmol/L 142 139   POTASSIUM mmol/L 3.9 3.7   CHLORIDE mmol/L 100 99   CO2 mmol/L 31.0* 31.0*   BUN mg/dL 47* 55*   CREATININE mg/dL 1.88* 1.87*   CALCIUM mg/dL 8.9 8.5*   ANION GAP mmol/L 11.0 9.0             Results from last 7 days   Lab Units 09/01/20  0506 08/31/20  0519   PROBNP pg/mL 40,694.0* 33,278.0*     Results from last 7 days   Lab Units 09/01/20  0506 08/31/20  0835 08/31/20  0519 08/30/20  1955   WBC 10*3/mm3 9.59  --  9.17  --    HEMOGLOBIN g/dL 9.6* 9.3* 8.7* 9.2*   HEMATOCRIT % 28.7* 27.8* 26.0* 27.5*   PLATELETS 10*3/mm3 342  --  287  --    MCV fL 94.1  --  92.9  --    NEUTROPHIL % % 62.3  --  62.9  --    LYMPHOCYTE % % 15.6*  --  16.7*  --    MONOCYTES % % 12.3*  --  12.3*  --    EOSINOPHIL % % 8.7*  --  7.1*  --    BASOPHIL % % 0.7  --  0.8  --    IMM GRAN % % 0.4  --  0.2  --                    Invalid input(s): LDLCALC  Results from last 7 days   Lab Units 08/31/20  0403   PH, ARTERIAL pH units 7.492*   PCO2, ARTERIAL mm Hg 41.9   PO2 ART mm Hg 70.8*   HCO3 ART mmol/L 32.1*         Glucose   Date/Time Value Ref Range Status   09/06/2020 0521 153 (H) 70 - 130 mg/dL Final     Comment:     : [email or web address] Kernell MelodyMeter ID: [id number]   09/06/2020 0009 151 (H) 70 - 130 mg/dL Final     Comment:     : [email or web address] Kernell MelodyMeter ID: [id number]   09/05/2020 1747 88 70 - 130 mg/dL Final     Comment:     : DIVYA Barragan ID: [id number]   09/05/2020 1128 156 (H) 70 - 130 mg/dL Final     Comment:      : [id number] Dung Garcia) LisaMeter ID: [id number]   09/05/2020 0452 137 (H) 70 - 130 mg/dL Final     Comment:     : TITO Aragon MelodyMeter ID: [id number]   09/04/2020 2341 140 (H) 70 - 130 mg/dL Final     Comment:     : TITO Aragon MelodyMeter ID: [id number]   09/04/2020 1747 121 70 - 130 mg/dL Final     Comment:     : EBLUME Blume EliseMeter ID: [id number]   09/04/2020 1143 155 (H) 70 - 130 mg/dL Final     Comment:     : EBLUME Blume EliseMeter ID: [id number]                               Imaging:   Imaging Results (All)        I reviewed the patient's new clinical results.  I personally viewed and interpreted the patient's imaging results: No films this morning for me to review most recent was from 9/4/2020, it showed no significant changes with proving interstitial edema      Medication Review:     amLODIPine 10 mg Per G Tube Q24H   bumetanide 1 mg Intravenous Daily   busPIRone 10 mg Per G Tube Q8H   cetirizine 10 mg Per G Tube Daily   fentaNYL 1 patch Transdermal Q72H   And      Check Fentanyl Patch Placement 1 each Does not apply Q12H   chlorhexidine 15 mL Mouth/Throat Q12H   citalopram 20 mg Per G Tube Daily   fluticasone 2 spray Each Nare Daily   haloperidol 7.5 mg Per G Tube Nightly   hydrALAZINE 75 mg Per G Tube Q8H   insulin lispro 0-9 Units Subcutaneous Q6H   ipratropium-albuterol 3 mL Nebulization 4x Daily - RT   lansoprazole 30 mg Nasogastric QAM   metoclopramide 5 mg Per G Tube Q8H   potassium chloride 20 mEq Per G Tube Daily   risperiDONE 1 mg Nasogastric Nightly   rivaroxaban 10 mg Per G Tube Daily With Dinner   saccharomyces boulardii 250 mg Oral BID   sodium chloride 10 mL Intravenous Q12H            ASSESSMENT:   1. Acute hypercapnic and hypoxic respiratory failure, on mechanical ventilation status post self extubation and reintubation and tracheostomy and PEG tube placement 8/25/20  2. Diffuse bilateral pulmonary infiltrates: ARDS versus  pulmonary edema- suspect at least an element of volume overload due to rapid change in bilateral infiltrates  3. Bilateral pneumonia  4. Bilateral pleural effusion, mild  5. JIM, slightly worse  6. Paranoid schizophrenia  7. Suspected GI bleeding-seems to be stable now  8. Opioid-induced constipation  9. S/p cardiac arrest on 8/7/2020 with 5 minutes ROSC, transferred to LTAC on 8/13/2020 for prolonged weaning  10. Hypernatremia    PLAN:   patient is doing well, off antibiotic, off oxygen,  Trach was pulled out and is followed by ENT  Since patient is off the ventilator, off the antibiotic, off the oxygen, and no longer trached, we will follow PRN    Disposition:     Anita Candelario MD  09/06/20  10:54           Dictated utilizing Dragon dictation

## 2020-09-06 NOTE — PROGRESS NOTES
Nephrology ([facility]) Progress Note      Patient:  Mohan Villatoro  YOB: 1960  Date of Service: 9/6/2020  MRN: [id number]   Acct: [id number]   Primary Care Physician: Provider, No Known  Advance Directive:   Code Status and Medical Interventions:   Ordered at: 08/25/20 0701     Level Of Support Discussed With:    Health Care Surrogate     Code Status:    CPR     Medical Interventions (Level of Support Prior to Arrest):    Full     Admit Date: 8/12/2020       Hospital Day: 0  Referring Provider: Eduardo Tolliver MD      Patient personally seen and examined.  Complete chart including Consults, Notes, Operative Reports, Labs, Cardiology, and Radiology studies reviewed as able.        Subjective:  Mohan Villatoro is a 60 y.o. male  whom we were consulted for hyponatremia, hyperkalemia, acute kidney injury. Unknown baseline renal function.  History of type 2 diabetes, paranoid schizophrenia, congestive heart failure. Patient transferred from [facility] for sodium level 114. He is a nursing home resident.  Reportedly has had nausea and poor oral intake recently. He had also noticed decreased urine output. Daily NSAID use. On August 7 had PEA cardiac arrest and had remained intubated since then. Serum sodium was improving slowly.  Moved to LTAC on August 12. He remains sedated on the ventilator and unable to participate in the history and physical examination.  On August 24, he underwent tracheotomy as well as PEG placement.     Today, he is up in chair.  Pulled out trach again.  More responsive to questions.  Denied chest pain, shortness of air, nausea or vomiting.    Temp- 98.1  Pulse- 82  Resp- 20  BP- 158/61  O2%- 99      Allergies:  Niacin    Home Meds:  Medications Prior to Admission   Medication Sig Dispense Refill Last Dose   • albuterol sulfate  (90 Base) MCG/ACT inhaler Inhale 2 puffs 3 (Three) Times a Day As Needed (copd).   8/5/2020 at Unknown time    • aspirin 325 MG tablet Take 325 mg by mouth Daily.   8/5/2020 at Unknown time   • budesonide (PULMICORT) 0.25 MG/2ML nebulizer solution Take 4 mL by nebulization 2 (Two) Times a Day. 2 mL 0    • busPIRone (BUSPAR) 5 MG tablet Take 5 mg by mouth 3 (Three) Times a Day.   8/5/2020 at Unknown time   • calcium polycarbophil (FIBERCON) 625 MG tablet Take 625 mg by mouth 2 (Two) Times a Day.   8/5/2020 at Unknown time   • Cholecalciferol (VITAMIN D3) 125 MCG (5000 UT) capsule capsule Take 5,000 Units by mouth Daily.   8/5/2020 at Unknown time   • citalopram (CeleXA) 20 MG tablet Take 20 mg by mouth Daily.   8/5/2020 at Unknown time   • dicyclomine (BENTYL) 20 MG tablet Take 20 mg by mouth 3 (Three) Times a Day.   8/5/2020 at Unknown time   • diphenhydrAMINE (BENADRYL) 50 MG capsule Take 50 mg by mouth At Night As Needed for Sleep.   Past Week at Unknown time   • famotidine (PEPCID) 10 MG/ML solution injection Infuse 2 mL into a venous catheter Daily.      • fentaNYL citrate (PF) 2,500 mcg in sodium chloride 0.9 % 200 mL Infuse  mcg/hr into a venous catheter Continuous.      • finasteride (PROSCAR) 5 MG tablet Take 5 mg by mouth Daily.   8/5/2020 at Unknown time   • fluticasone (FLONASE) 50 MCG/ACT nasal spray 1 spray into the nostril(s) as directed by provider Daily.   8/5/2020 at Unknown time   • folic acid (FOLVITE) 1 MG tablet Take 1 mg by mouth Daily.   8/5/2020 at Unknown time   • furosemide (LASIX) 20 MG tablet Take 20 mg by mouth Daily.   8/5/2020 at Unknown time   • guaiFENesin (ROBITUSSIN) 100 MG/5ML solution oral solution Take 10 mL by mouth Every 4 (Four) Hours. 30 mL 0    • haloperidol (HALDOL) 5 MG tablet Take 7.5 mg by mouth every night at bedtime.   8/5/2020 at Unknown time   • HYDROcodone-acetaminophen (NORCO)  MG per tablet Take 1 tablet by mouth Every 8 (Eight) Hours As Needed for Moderate Pain .   8/5/2020 at Unknown time   • hydrOXYzine pamoate (VISTARIL) 50 MG capsule Take 50 mg by mouth  every night at bedtime.   8/5/2020 at Unknown time   • insulin glargine (LANTUS) 100 UNIT/ML injection Inject 14 Units under the skin into the appropriate area as directed Every Night.   8/5/2020 at Unknown time   • ipratropium-albuterol (DUO-NEB) 0.5-2.5 mg/3 ml nebulizer Take 3 mL by nebulization 4 (Four) Times a Day. 360 mL 0    • loperamide (IMODIUM) 2 MG capsule Take 2 mg by mouth 4 (Four) Times a Day As Needed for Diarrhea.   Past Month at Unknown time   • methylPREDNISolone sodium succinate (SOLU-Medrol) 40 MG injection Infuse 1 mL into a venous catheter Every 12 (Twelve) Hours. 1 each 0    • metoclopramide (REGLAN) 5 MG tablet Take 5 mg by mouth 4 (Four) Times a Day Before Meals & at Bedtime.   8/5/2020 at Unknown time   • Multiple Vitamins-Minerals (THERA-M PO) Take 1 tablet by mouth Daily.   8/5/2020 at Unknown time   • Omega-3 Fatty Acids (OMEGA-3 FISH OIL) 1000 MG capsule Take 2 g by mouth 2 (Two) Times a Day.   8/5/2020 at Unknown time   • omeprazole (priLOSEC) 20 MG capsule Take 20 mg by mouth Daily.   8/5/2020 at Unknown time   • propofol (DIPRIVAN) 10 mg/mL emulsion infusion Infuse 465-6,975 mcg/min into a venous catheter Dose Adjusted By Provider As Needed.      • risperiDONE (risperDAL M-TABS) 0.5 MG disintegrating tablet Place 0.5 mg on the tongue 2 (Two) Times a Day.   8/5/2020 at Unknown time   • rOPINIRole (REQUIP) 2 MG tablet Take 2 mg by mouth Every Night.   8/5/2020 at Unknown time   • sennosides-docusate (senna-docusate sodium) 8.6-50 MG per tablet Take 1 tablet by mouth At Night As Needed for Constipation.   Past Week at Unknown time   • simvastatin (ZOCOR) 20 MG tablet Take 20 mg by mouth Every Night.   8/5/2020 at Unknown time   • tamsulosin (FLOMAX) 0.4 MG capsule 24 hr capsule Take 1 capsule by mouth every night at bedtime.   8/5/2020 at Unknown time   • traZODone (DESYREL) 100 MG tablet Take 100 mg by mouth Every Night.   8/5/2020 at Unknown time   • vitamin B-12 (CYANOCOBALAMIN) 1000  MCG tablet Take 1,000 mcg by mouth Daily.   8/5/2020 at Unknown time       Medicines:  Current Facility-Administered Medications   Medication Dose Route Frequency Provider Last Rate Last Dose   • acetaminophen (TYLENOL) 160 MG/5ML solution 650 mg  650 mg Per G Tube Q4H PRN Bal Manning MD       • albuterol (PROVENTIL) nebulizer solution 0.083% 2.5 mg/3mL  2.5 mg Nebulization Q6H PRN Bal Manning MD       • amLODIPine (NORVASC) tablet 10 mg  10 mg Per G Tube Q24H Tripp Felipe MD       • atropine sulfate injection 0.5 mg  0.5 mg Intravenous PRN Bal Manning MD       • bumetanide (BUMEX) injection 1 mg  1 mg Intravenous Daily Eduardo Tolliver MD       • busPIRone (BUSPAR) tablet 10 mg  10 mg Per G Tube Q8H Bal Manning MD       • cetirizine (zyrTEC) tablet 10 mg  10 mg Per G Tube Daily Bal Manning MD       • fentaNYL (DURAGESIC) 12 MCG/HR 1 patch  1 patch Transdermal Q72H Eduardo Tolliver MD        And   • Check Fentanyl Patch Placement  1 each Does not apply Q12H Eduardo Tolliver MD       • chlorhexidine (PERIDEX) 0.12 % solution 15 mL  15 mL Mouth/Throat Q12H Bal Manning MD       • citalopram (CeleXA) tablet 20 mg  20 mg Per G Tube Daily Bal Manning MD       • dextrose (D50W) 25 g/ 50mL Intravenous Solution 25 g  25 g Intravenous Q15 Min PRN Bal Manning MD       • dextrose (GLUTOSE) oral gel 15 g  15 g Oral Q15 Min PRN Bal Manning MD       • fluticasone (FLONASE) 50 MCG/ACT nasal spray 2 spray  2 spray Each Nare Daily Bal Manning MD       • glucagon (human recombinant) (GLUCAGEN DIAGNOSTIC) injection 1 mg  1 mg Subcutaneous Q15 Min PRN Bal Manning MD       • haloperidol (HALDOL) tablet 7.5 mg  7.5 mg Per G Tube Nightly Bal Manning MD       • hydrALAZINE (APRESOLINE) injection 10 mg  10 mg Intravenous Q6H PRN Bal Manning MD       • hydrALAZINE (APRESOLINE) tablet 75 mg  75 mg  Per G Tube Q8H Eduardo Tolliver MD       • insulin lispro (humaLOG) injection 0-9 Units  0-9 Units Subcutaneous Q6H Bal Manning MD       • ipratropium-albuterol (DUO-NEB) nebulizer solution 3 mL  3 mL Nebulization 4x Daily - RT Bal Manning MD       • lansoprazole (FIRST) oral suspension 30 mg  30 mg Nasogastric QAM Bal Manning MD       • LORazepam (ATIVAN) injection 1 mg  1 mg Intravenous Q1H PRN Eduardo Tolliver MD       • metoclopramide (REGLAN) tablet 5 mg  5 mg Per G Tube Q8H Eduardo Tolliver MD       • Morphine sulfate (PF) injection 2 mg  2 mg Intravenous Q2H PRN Eduardo Tolliver MD       • ondansetron (ZOFRAN) tablet 4 mg  4 mg Oral Q6H PRN Bal Manning MD        Or   • ondansetron (ZOFRAN) injection 4 mg  4 mg Intravenous Q6H PRN Bal Manning MD       • polyethylene glycol (MIRALAX) packet 17 g  17 g Nasogastric BID PRN Eduardo Tolliver MD       • potassium chloride (KAYCIEL) 20 MEQ/15ML (10%) solution 20 mEq  20 mEq Per G Tube Daily Salima Oh APRN       • risperiDONE (risperDAL) tablet 1 mg  1 mg Nasogastric Nightly Bal Manning MD       • rivaroxaban (XARELTO) tablet 10 mg  10 mg Per G Tube Daily With Dinner Eduardo Tolliver MD       • saccharomyces boulardii (FLORASTOR) capsule 250 mg  250 mg Oral BID Bal Manning MD       • sennosides-docusate (PERICOLACE) 8.6-50 MG per tablet 1 tablet  1 tablet Oral BID PRN Eduardo Tolliver MD       • sodium chloride 0.9 % flush 10 mL  10 mL Intravenous Q12H Bal Manning MD       • sodium chloride 0.9 % flush 10 mL  10 mL Intravenous PRN Bal Manning MD           Past Medical History:  Past Medical History:   Diagnosis Date   • Diabetes (CMS/HCC)    • Schizo affective schizophrenia (CMS/HCC)        Past Surgical History:  Past Surgical History:   Procedure Laterality Date   • GASTROSTOMY FEEDING TUBE INSERTION N/A 8/25/2020    Procedure:  GASTROSTOMY FEEDING TUBE INSERTION;  Surgeon: Kizzy Garcia MD;  Location: [facility] OR;  Service: General;  Laterality: N/A;   • TRACHEOSTOMY N/A 8/25/2020    Procedure: TRACHEOSTOMY;  Surgeon: Bal Manning MD;  Location: [facility] OR;  Service: ENT;  Laterality: N/A;       Family History  History reviewed. No pertinent family history.    Social History  Social History     Socioeconomic History   • Marital status: Single     Spouse name: Not on file   • Number of children: Not on file   • Years of education: Not on file   • Highest education level: Not on file         Review of Systems:  History obtained from chart review and the patient  General ROS: No fever or chills  Respiratory ROS: No cough, shortness of breath, wheezing  Cardiovascular ROS: no chest pain or dyspnea on exertion  Gastrointestinal ROS: No abdominal pain or melena  Genito-Urinary ROS: No dysuria or hematuria  14 point ROS reviewed with the patient and negative except as noted above and in the HPI unless unable to obtain.        Objective:  No data found.  No intake or output data in the 24 hours ending 09/06/20 1527  General: awake/alert  Chest:  clear to auscultation bilaterally without respiratory distress  CVS: regular rate and rhythm  Abdominal: soft, nontender, positive bowel sounds  Extremities: tr ble edema  Skin: warm and dry without rash      Labs:  Results from last 7 days   Lab Units 09/01/20  0506 08/31/20  0835 08/31/20  0519   WBC 10*3/mm3 9.59  --  9.17   HEMOGLOBIN g/dL 9.6* 9.3* 8.7*   HEMATOCRIT % 28.7* 27.8* 26.0*   PLATELETS 10*3/mm3 342  --  287         Results from last 7 days   Lab Units 09/01/20  0506 08/31/20  0519   SODIUM mmol/L 142 139   POTASSIUM mmol/L 3.9 3.7   CHLORIDE mmol/L 100 99   CO2 mmol/L 31.0* 31.0*   BUN mg/dL 47* 55*   CREATININE mg/dL 1.88* 1.87*   CALCIUM mg/dL 8.9 8.5*   GLUCOSE mg/dL 146* 180*       Radiology:   Imaging Results (Last 72 Hours)     Procedure Component Value Units Date/Time    XR  Chest 1 View [[id number]] Collected:  09/04/20 0714     Updated:  09/04/20 0720    Narrative:       Frontal supine radiograph of the chest 9/4/2020 4:10 AM CDT     History: pt on vent; J96.21-Acute and chronic respiratory failure with  hypoxia; J96.22-Acute and chronic respiratory failure with hypercapnia;  Z99.11-Dependence on respirator (ventilator) status; R06.89-Other  abnormalities of breathing     Comparison: 9/3/2020      Findings:   Tracheostomy in place. Heart size is stable. The pulmonary vasculature  are nondilated. Residual hazy lung infiltrates are very similar and are  mostly resolved at this point. No pneumothorax or pleural effusion.          Impression:       Impression:   1. No significant interval change.  2. Underlying pulmonary edema is mostly resolved. No new or worsening  infiltrate.  This report was finalized on 09/04/2020 07:17 by Dr Sammy Strauss, .          Culture:  Blood Culture   Date Value Ref Range Status   08/27/2020 No growth at 4 days  Preliminary   08/27/2020 No growth at 4 days  Preliminary     Urine Culture   Date Value Ref Range Status   08/27/2020 No growth  Final     Respiratory Culture   Date Value Ref Range Status   08/27/2020 Light growth (2+) Delftia acidovorans (A)  Final   08/27/2020 Light growth (2+) Moraxella catarrhalis (A)  Final     Comment:     Beta lactamase positive confers resistance to ampcillin, amoxicillin, penicillin, ticarcillin, and piperacillin but NOT amoxicillin-clavulanate, ampicillin-sulbactam, or piperacillin-tazobactam.           Assessment   Acute kidney injury/ATN  Acute hypoxic respiratory failure which required mechanical ventilation  Hyperkalemia  Metabolic acidosis  Paranoid schizophrenia  Anemia  Hypertension    Plan:  Continue current free water regimen  Adjust blood pressure medications as needed  Monitor labs  Discussed with nursing  Wean o2 per pulmonary      Tripp Felipe MD  9/6/2020  15:27

## 2020-09-06 NOTE — PROGRESS NOTES
Unruly Tolliver M.D.  MIKEY Whitehead        Internal Medicine Progress Note    9/6/2020   15:07    Name:  Mohan Villatoro  MRN:    [id number]     Acct:     [id number]   Room:  [address] [place] [address] Day: 0     Admit Date: 8/12/2020  2:37 PM  PCP: Provider, No Known    Subjective:     C/C: need for continued vent weaning    Interval History: Status:  stayed the same/stable.  Up in chair.  Responds appropriately.  No family present.  Patient pulled his trach out last night, it was replaced and he pulled it out again this morning.  Dr. Reno evaluated patient this morning and recommended to leave tracheostomy out, tolerating room air without difficulty.  Afebrile.  Glucose overall controlled .     Review of Systems   Constitution: Positive for malaise/fatigue. Negative for chills, decreased appetite and fever.   HENT: Negative for congestion, hoarse voice, nosebleeds and sore throat.    Eyes: Negative for blurred vision, discharge, pain and visual disturbance.   Cardiovascular: Negative for chest pain, dyspnea on exertion, irregular heartbeat, leg swelling, orthopnea and palpitations.   Respiratory: Negative for cough, shortness of breath, sputum production and wheezing.    Hematologic/Lymphatic: Negative for bleeding problem. Does not bruise/bleed easily.   Skin: Negative for dry skin, flushing, itching, poor wound healing, rash and suspicious lesions.   Musculoskeletal: Positive for muscle weakness. Negative for arthritis, back pain, falls, joint pain, joint swelling and myalgias.   Gastrointestinal: Negative for bloating, abdominal pain, change in bowel habit, diarrhea, flatus, heartburn, melena and nausea.   Genitourinary: Negative for frequency, hesitancy, incomplete emptying, pelvic pain and urgency.   Neurological: Negative for difficulty with concentration, disturbances in coordination, dizziness, headaches, numbness, paresthesias, tremors and weakness.   Psychiatric/Behavioral:  Negative for altered mental status, hallucinations and memory loss. The patient is not nervous/anxious.          Medications:     Allergies:   Allergies   Allergen Reactions   • Niacin Unknown - High Severity       Current Meds:   Current Facility-Administered Medications:   •  acetaminophen (TYLENOL) 160 MG/5ML solution 650 mg, 650 mg, Per G Tube, Q4H PRN, Bal Manning MD  •  albuterol (PROVENTIL) nebulizer solution 0.083% 2.5 mg/3mL, 2.5 mg, Nebulization, Q6H PRN, Bal Manning MD  •  amLODIPine (NORVASC) tablet 10 mg, 10 mg, Per G Tube, Q24H, Tripp Felipe MD  •  atropine sulfate injection 0.5 mg, 0.5 mg, Intravenous, PRN, Bal Manning MD  •  bumetanide (BUMEX) injection 1 mg, 1 mg, Intravenous, Daily, Eduardo Tolliver MD  •  busPIRone (BUSPAR) tablet 10 mg, 10 mg, Per G Tube, Q8H, Bal Manning MD  •  cetirizine (zyrTEC) tablet 10 mg, 10 mg, Per G Tube, Daily, Bal Manning MD  •  fentaNYL (DURAGESIC) 12 MCG/HR 1 patch, 1 patch, Transdermal, Q72H **AND** Check Fentanyl Patch Placement, 1 each, Does not apply, Q12H, Eduardo Tolliver MD  •  chlorhexidine (PERIDEX) 0.12 % solution 15 mL, 15 mL, Mouth/Throat, Q12H, Bal Manning MD  •  citalopram (CeleXA) tablet 20 mg, 20 mg, Per G Tube, Daily, Bal Manning MD  •  dextrose (D50W) 25 g/ 50mL Intravenous Solution 25 g, 25 g, Intravenous, Q15 Min PRN, Bal Manning MD  •  dextrose (GLUTOSE) oral gel 15 g, 15 g, Oral, Q15 Min PRN, Bal Manning MD  •  fluticasone (FLONASE) 50 MCG/ACT nasal spray 2 spray, 2 spray, Each Nare, Daily, Bal Manning MD  •  glucagon (human recombinant) (GLUCAGEN DIAGNOSTIC) injection 1 mg, 1 mg, Subcutaneous, Q15 Min PRN, Bal Manning MD  •  haloperidol (HALDOL) tablet 7.5 mg, 7.5 mg, Per G Tube, Nightly, Bal Manning MD  •  hydrALAZINE (APRESOLINE) injection 10 mg, 10 mg, Intravenous, Q6H PRN, Bal Manning MD  •   hydrALAZINE (APRESOLINE) tablet 75 mg, 75 mg, Per G Tube, Q8H, Eduardo Tolliver MD  •  insulin lispro (humaLOG) injection 0-9 Units, 0-9 Units, Subcutaneous, Q6H, Bal Manning MD  •  ipratropium-albuterol (DUO-NEB) nebulizer solution 3 mL, 3 mL, Nebulization, 4x Daily - RT, Bal Manning MD  •  lansoprazole (FIRST) oral suspension 30 mg, 30 mg, Nasogastric, QAM, Bal Manning MD  •  LORazepam (ATIVAN) injection 1 mg, 1 mg, Intravenous, Q1H PRN, Eduardo Tolliver MD  •  metoclopramide (REGLAN) tablet 5 mg, 5 mg, Per G Tube, Q8H, Eduardo Tolliver MD  •  Morphine sulfate (PF) injection 2 mg, 2 mg, Intravenous, Q2H PRN, Eduardo Tolliver MD  •  ondansetron (ZOFRAN) tablet 4 mg, 4 mg, Oral, Q6H PRN **OR** ondansetron (ZOFRAN) injection 4 mg, 4 mg, Intravenous, Q6H PRN, Bal Manning MD  •  polyethylene glycol (MIRALAX) packet 17 g, 17 g, Nasogastric, BID PRN, Eduardo Tolliver MD  •  potassium chloride (KAYCIEL) 20 MEQ/15ML (10%) solution 20 mEq, 20 mEq, Per G Tube, Daily, Salima Oh, MIKEY  •  risperiDONE (risperDAL) tablet 1 mg, 1 mg, Nasogastric, Nightly, Bal Manning MD  •  rivaroxaban (XARELTO) tablet 10 mg, 10 mg, Per G Tube, Daily With Dinner, Eduardo Tolliver MD  •  saccharomyces boulardii (FLORASTOR) capsule 250 mg, 250 mg, Oral, BID, Bal Manning MD  •  sennosides-docusate (PERICOLACE) 8.6-50 MG per tablet 1 tablet, 1 tablet, Oral, BID PRN, Eduardo Tolliver MD  •  sodium chloride 0.9 % flush 10 mL, 10 mL, Intravenous, Q12H, Bal Manning MD  •  sodium chloride 0.9 % flush 10 mL, 10 mL, Intravenous, PRN, Bal Manning MD    Data:     Code Status:    Code Status and Medical Interventions:   Ordered at: 08/25/20 0701     Level Of Support Discussed With:    Health Care Surrogate     Code Status:    CPR     Medical Interventions (Level of Support Prior to Arrest):    Full       History reviewed. No pertinent  "family history.    Social History     Socioeconomic History   • Marital status: Single     Spouse name: Not on file   • Number of children: Not on file   • Years of education: Not on file   • Highest education level: Not on file       Vitals:  BP (!) 188/86   Pulse 72   Temp 97.9 °F (36.6 °C) (Temporal)   Resp 18   Ht 180.3 cm (70.98\")   Wt 81.4 kg (179 lb 7.3 oz)   SpO2 97%   BMI 25.04 kg/m²   T 98.4 P 80 R 16 /61 Sp02 100% (RA)      I/O (24Hr):  No intake or output data in the 24 hours ending 09/06/20 1507    Labs and imaging:      Recent Results (from the past 12 hour(s))   POC Glucose Once    Collection Time: 09/06/20  5:21 AM   Result Value Ref Range    Glucose 153 (H) 70 - 130 mg/dL   POC Glucose Once    Collection Time: 09/06/20 11:50 AM   Result Value Ref Range    Glucose 136 (H) 70 - 130 mg/dL         Physical Examination:        Physical Exam   Constitutional: Vital signs are normal. He appears well-developed and well-nourished.   HENT:   Head: Normocephalic and atraumatic.   Nose: Nose normal.   Mouth/Throat: Oropharynx is clear and moist.   Eyes: Pupils are equal, round, and reactive to light. Conjunctivae, EOM and lids are normal.   Neck: Trachea normal and normal range of motion. Neck supple.   Dressing to neck clean dry and intact   Cardiovascular: Normal rate, regular rhythm and normal heart sounds.   Pulmonary/Chest: Effort normal and breath sounds normal.   Abdominal: Soft. Normal appearance and bowel sounds are normal.   g tube  Midline dressing c/d/i   Musculoskeletal: Normal range of motion. He exhibits edema.   Generalized weakness   Neurological: He is alert. No cranial nerve deficit or sensory deficit.        Skin: Skin is warm, dry and intact. No petechiae and no rash noted. No cyanosis or erythema. Nails show no clubbing.   Dressing c/d/i   Psychiatric: He has a normal mood and affect. His behavior is normal.   Nursing note and vitals reviewed.        Assessment:          " Respiratory insufficiency    Acute on chronic respiratory failure with hypoxia and hypercapnia (CMS/HCC)    Ventilator dependence (CMS/HCC)    Past Medical History:   Diagnosis Date   • Diabetes (CMS/HCC)    • Schizo affective schizophrenia (CMS/[facility])         Plan:        1. Acute hypoxic respiratory failure  2. Acute renal failure on CKD3  3. S/p Cardiac arrest  4. Schizophrenia  5. Multifactorial anemia  6. Hyponatremia  7. COPD  8. Leukocytosis  9. Dysphagia  10. Tobacco abuse  11. DM2 with hyperglycemia on long term insulin  12. Bradycardia  13. Hypokalemia    Continue current treatment. Monitor counts. Increase activity as tolerated.  Monitor oxygenation, tracheostomy removed.. Monitor electrolytes and renal function closely. Continue antibiotics.  Wean reglan as able - change to per g tube. Wean fentanyl patch.  Labs Tuesday.  Will request speech to evaluate swallow.    Electronically signed by MIKEY Reed on 9/6/2020 at 15:07

## 2020-09-06 NOTE — PROGRESS NOTES
Chandana Reno Jr, MD     OTOLARYNGOLOGY, HEAD AND NECK SURGERY PROGRESS NOTE   Referring Provider: Eduardo Tolliver MD  Reason for Consultation: Trach management  Location: 586/1  Accompanied by: RT  Chief complaint: Trach  Subjective    Interval History:   Since last examined, Mohan Villatoro has decannulated himself x 2. He has been off vent for 24 hrs. He will not leave trach alone. He is alert but not answering questions.  RT reports patient removed trach yesterday. They placed a #6 uncuffed Shiley.  Patient did well last night off vent. He removed trach again this AM. RT left out.  Patient seen. Chart reviewed.  Review of Systems:   No change from prior inquiry      Objective    Vital Signs       EXAMINATION:  CONSTITUTIONAL:    well developed  well nourished  Lying in bed, appears comfortable, NAD     BODY HABITUS:    Normal body habitus     COMMUNICATION:    Shakes head, prompted to talk     HEAD:     Normocephalic, without obvious abnormality, atraumatic     FACE:    structure normal, no tenderness present, no lesions/masses, no evidence of trauma     SALIVARY GLANDS:    parotid glands with no tenderness, no swelling, no masses, submandibular glands with normal size, nontender      EYE:      PERRLA  EOMI     HEARING:    Appears intact     EARS:     PINNA:     normal, non-tender, skin intact without lesions      NOSE EXTERNAL:    APPEARANCE: normal, straight, with good projection, no tenderness, no lesions, no tenderness, good nasal support, patent nares     NOSE INTERNAL:    Not examined     ORAL CAVITY:      LIPS:      structure normal, no tenderness on palpation, no lesions, no evidence of trauma, normal mobility and oral competence    TEETH:       dentition within normal limits for age    GUMS:      receding    ORAL MUCOSA:       oral mucosa normal, no mucosal lesions    FLOOR OF MOUTH:       Warthin's duct patent, mucosa normal  TONGUE:       lingual mucosa normal without lesions, normal  tongue mobility    PALATE:      not evaluated     OROPHARYNX:    not well evaluated     NECK:    normal appearance  Stoma present     NEURO/PSYCHIATRIC :    Appears alert but not talking much    TRACHEOSTOMY TUBE:   Stoma: healing, suture removed from cartilage     Secretions: mild     Finger occlusion: tolerates well     Voice: decreased intensity   Date placed: 8/25/2020  Date Decannulated: 9/6 2020, patient not leaving in place    Results Review:       I have reviewed the patients old records in the chart.    Medications, Allergies and Past History Reviewed        Assessment       Respiratory insufficiency    Acute on chronic respiratory failure with hypoxia and hypercapnia (CMS/HCC)    Ventilator dependence (CMS/HCC)         Plan      Patient stable without trach. I will leave out for now and follow. He seems to have weaned well.  Stoma care.  Discussed plan with RT.    Following patient as in-patient. Further recommendations will be made based on serial examinations.    Medications/Orders for this encounter: No new medications ordered.  Orders-  stoma care         Chandana Reno Jr, MD  09/06/20  09:19

## 2020-09-07 NOTE — PROGRESS NOTES
Nephrology ([facility]) Progress Note      Patient:  Mohan Villatoro  YOB: 1960  Date of Service: 9/7/2020  MRN: [id number]   Acct: [id number]   Primary Care Physician: Provider, No Known  Advance Directive:   Code Status and Medical Interventions:   Ordered at: 08/25/20 0701     Level Of Support Discussed With:    Health Care Surrogate     Code Status:    CPR     Medical Interventions (Level of Support Prior to Arrest):    Full     Admit Date: 8/12/2020       Hospital Day: 0  Referring Provider: Eduardo Tolliver MD    Patient personally seen and examined.  Complete chart including Consults, Notes, Operative Reports, Labs, Cardiology, and Radiology studies reviewed as able.      Subjective:  Mohan Villatoro is a 60 y.o. male  whom we were consulted for hyponatremia, hyperkalemia, acute kidney injury. Unknown baseline renal function.  History of type 2 diabetes, paranoid schizophrenia, congestive heart failure. Patient transferred from [facility] for sodium level 114. He is a nursing home resident.  Reportedly has had nausea and poor oral intake recently. He had also noticed decreased urine output. Daily NSAID use. On August 7 had PEA cardiac arrest and had remained intubated since then. Serum sodium was improving slowly.  Moved to LTAC on August 12. He remains sedated on the ventilator and unable to participate in the history and physical examination.  On August 24, he underwent tracheotomy as well as PEG placement.     Today, he is awake with no new complaints.    Temp- 98.2  Pulse- 84  Resp- 16  BP- 175/75  O2%- 100      Allergies:  Niacin    Home Meds:  Medications Prior to Admission   Medication Sig Dispense Refill Last Dose   • albuterol sulfate  (90 Base) MCG/ACT inhaler Inhale 2 puffs 3 (Three) Times a Day As Needed (copd).   8/5/2020 at Unknown time   • aspirin 325 MG tablet Take 325 mg by mouth Daily.   8/5/2020 at Unknown time   • budesonide  (PULMICORT) 0.25 MG/2ML nebulizer solution Take 4 mL by nebulization 2 (Two) Times a Day. 2 mL 0    • busPIRone (BUSPAR) 5 MG tablet Take 5 mg by mouth 3 (Three) Times a Day.   8/5/2020 at Unknown time   • calcium polycarbophil (FIBERCON) 625 MG tablet Take 625 mg by mouth 2 (Two) Times a Day.   8/5/2020 at Unknown time   • Cholecalciferol (VITAMIN D3) 125 MCG (5000 UT) capsule capsule Take 5,000 Units by mouth Daily.   8/5/2020 at Unknown time   • citalopram (CeleXA) 20 MG tablet Take 20 mg by mouth Daily.   8/5/2020 at Unknown time   • dicyclomine (BENTYL) 20 MG tablet Take 20 mg by mouth 3 (Three) Times a Day.   8/5/2020 at Unknown time   • diphenhydrAMINE (BENADRYL) 50 MG capsule Take 50 mg by mouth At Night As Needed for Sleep.   Past Week at Unknown time   • famotidine (PEPCID) 10 MG/ML solution injection Infuse 2 mL into a venous catheter Daily.      • fentaNYL citrate (PF) 2,500 mcg in sodium chloride 0.9 % 200 mL Infuse  mcg/hr into a venous catheter Continuous.      • finasteride (PROSCAR) 5 MG tablet Take 5 mg by mouth Daily.   8/5/2020 at Unknown time   • fluticasone (FLONASE) 50 MCG/ACT nasal spray 1 spray into the nostril(s) as directed by provider Daily.   8/5/2020 at Unknown time   • folic acid (FOLVITE) 1 MG tablet Take 1 mg by mouth Daily.   8/5/2020 at Unknown time   • furosemide (LASIX) 20 MG tablet Take 20 mg by mouth Daily.   8/5/2020 at Unknown time   • guaiFENesin (ROBITUSSIN) 100 MG/5ML solution oral solution Take 10 mL by mouth Every 4 (Four) Hours. 30 mL 0    • haloperidol (HALDOL) 5 MG tablet Take 7.5 mg by mouth every night at bedtime.   8/5/2020 at Unknown time   • HYDROcodone-acetaminophen (NORCO)  MG per tablet Take 1 tablet by mouth Every 8 (Eight) Hours As Needed for Moderate Pain .   8/5/2020 at Unknown time   • hydrOXYzine pamoate (VISTARIL) 50 MG capsule Take 50 mg by mouth every night at bedtime.   8/5/2020 at Unknown time   • insulin glargine (LANTUS) 100 UNIT/ML  injection Inject 14 Units under the skin into the appropriate area as directed Every Night.   8/5/2020 at Unknown time   • ipratropium-albuterol (DUO-NEB) 0.5-2.5 mg/3 ml nebulizer Take 3 mL by nebulization 4 (Four) Times a Day. 360 mL 0    • loperamide (IMODIUM) 2 MG capsule Take 2 mg by mouth 4 (Four) Times a Day As Needed for Diarrhea.   Past Month at Unknown time   • methylPREDNISolone sodium succinate (SOLU-Medrol) 40 MG injection Infuse 1 mL into a venous catheter Every 12 (Twelve) Hours. 1 each 0    • metoclopramide (REGLAN) 5 MG tablet Take 5 mg by mouth 4 (Four) Times a Day Before Meals & at Bedtime.   8/5/2020 at Unknown time   • Multiple Vitamins-Minerals (THERA-M PO) Take 1 tablet by mouth Daily.   8/5/2020 at Unknown time   • Omega-3 Fatty Acids (OMEGA-3 FISH OIL) 1000 MG capsule Take 2 g by mouth 2 (Two) Times a Day.   8/5/2020 at Unknown time   • omeprazole (priLOSEC) 20 MG capsule Take 20 mg by mouth Daily.   8/5/2020 at Unknown time   • propofol (DIPRIVAN) 10 mg/mL emulsion infusion Infuse 465-6,975 mcg/min into a venous catheter Dose Adjusted By Provider As Needed.      • risperiDONE (risperDAL M-TABS) 0.5 MG disintegrating tablet Place 0.5 mg on the tongue 2 (Two) Times a Day.   8/5/2020 at Unknown time   • rOPINIRole (REQUIP) 2 MG tablet Take 2 mg by mouth Every Night.   8/5/2020 at Unknown time   • sennosides-docusate (senna-docusate sodium) 8.6-50 MG per tablet Take 1 tablet by mouth At Night As Needed for Constipation.   Past Week at Unknown time   • simvastatin (ZOCOR) 20 MG tablet Take 20 mg by mouth Every Night.   8/5/2020 at Unknown time   • tamsulosin (FLOMAX) 0.4 MG capsule 24 hr capsule Take 1 capsule by mouth every night at bedtime.   8/5/2020 at Unknown time   • traZODone (DESYREL) 100 MG tablet Take 100 mg by mouth Every Night.   8/5/2020 at Unknown time   • vitamin B-12 (CYANOCOBALAMIN) 1000 MCG tablet Take 1,000 mcg by mouth Daily.   8/5/2020 at Unknown time        Medicines:  Current Facility-Administered Medications   Medication Dose Route Frequency Provider Last Rate Last Dose   • acetaminophen (TYLENOL) 160 MG/5ML solution 650 mg  650 mg Per G Tube Q4H PRN Bal Manning MD       • albuterol (PROVENTIL) nebulizer solution 0.083% 2.5 mg/3mL  2.5 mg Nebulization Q6H PRN Bal Manning MD       • amLODIPine (NORVASC) tablet 10 mg  10 mg Per G Tube Q24H Tripp Felipe MD       • atropine sulfate injection 0.5 mg  0.5 mg Intravenous PRN Bal Manning MD       • bumetanide (BUMEX) injection 1 mg  1 mg Intravenous Daily Eduardo Tolliver MD       • busPIRone (BUSPAR) tablet 10 mg  10 mg Per G Tube Q8H Bal Manning MD       • cetirizine (zyrTEC) tablet 10 mg  10 mg Per G Tube Daily Bal Manning MD       • fentaNYL (DURAGESIC) 12 MCG/HR 1 patch  1 patch Transdermal Q72H Eduardo Tolliver MD        And   • Check Fentanyl Patch Placement  1 each Does not apply Q12H Eduardo Tolliver MD       • chlorhexidine (PERIDEX) 0.12 % solution 15 mL  15 mL Mouth/Throat Q12H Bal Manning MD       • citalopram (CeleXA) tablet 20 mg  20 mg Per G Tube Daily Bal Manning MD       • dextrose (D50W) 25 g/ 50mL Intravenous Solution 25 g  25 g Intravenous Q15 Min PRN Bal Manning MD       • dextrose (GLUTOSE) oral gel 15 g  15 g Oral Q15 Min PRN Bal Manning MD       • fluticasone (FLONASE) 50 MCG/ACT nasal spray 2 spray  2 spray Each Nare Daily Bal Manning MD       • glucagon (human recombinant) (GLUCAGEN DIAGNOSTIC) injection 1 mg  1 mg Subcutaneous Q15 Min PRN Bal Manning MD       • haloperidol (HALDOL) tablet 7.5 mg  7.5 mg Per G Tube Nightly Bal Manning MD       • hydrALAZINE (APRESOLINE) injection 10 mg  10 mg Intravenous Q6H PRN Bal Manning John, MD       • hydrALAZINE (APRESOLINE) tablet 75 mg  75 mg Per G Tube Q8H Eduardo Tolliver MD       • insulin lispro  (humaLOG) injection 0-9 Units  0-9 Units Subcutaneous Q6H Bal Manning MD       • ipratropium-albuterol (DUO-NEB) nebulizer solution 3 mL  3 mL Nebulization 4x Daily - RT Bal Manning MD       • lansoprazole (FIRST) oral suspension 30 mg  30 mg Nasogastric QAM Bal Manning MD       • LORazepam (ATIVAN) injection 1 mg  1 mg Intravenous Q1H PRN Eduardo Tolliver MD       • metoclopramide (REGLAN) tablet 5 mg  5 mg Per G Tube Q8H Eduardo Tolliver MD       • Morphine sulfate (PF) injection 2 mg  2 mg Intravenous Q2H PRN Eduardo Tolliver MD       • ondansetron (ZOFRAN) tablet 4 mg  4 mg Oral Q6H PRN Bal Manning MD        Or   • ondansetron (ZOFRAN) injection 4 mg  4 mg Intravenous Q6H PRN Bal Manning MD       • polyethylene glycol (MIRALAX) packet 17 g  17 g Nasogastric BID PRN Eduardo Tolliver MD       • potassium chloride (KAYCIEL) 20 MEQ/15ML (10%) solution 20 mEq  20 mEq Per G Tube Daily Salima Oh APRN       • risperiDONE (risperDAL) tablet 1 mg  1 mg Nasogastric Nightly Bal Manning MD       • rivaroxaban (XARELTO) tablet 10 mg  10 mg Per G Tube Daily With Dinner Eduardo Tolliver MD       • saccharomyces boulardii (FLORASTOR) capsule 250 mg  250 mg Oral BID Bal Manning MD       • sennosides-docusate (PERICOLACE) 8.6-50 MG per tablet 1 tablet  1 tablet Oral BID PRN Eduardo Tolliver MD       • sodium chloride 0.9 % flush 10 mL  10 mL Intravenous Q12H Bal Manning MD       • sodium chloride 0.9 % flush 10 mL  10 mL Intravenous PRN Bal Manning MD           Past Medical History:  Past Medical History:   Diagnosis Date   • Diabetes (CMS/HCC)    • Schizo affective schizophrenia (CMS/HCC)        Past Surgical History:  Past Surgical History:   Procedure Laterality Date   • GASTROSTOMY FEEDING TUBE INSERTION N/A 8/25/2020    Procedure: GASTROSTOMY FEEDING TUBE INSERTION;  Surgeon: Kizzy Garcia MD;   Location:  [facility] OR;  Service: General;  Laterality: N/A;   • TRACHEOSTOMY N/A 8/25/2020    Procedure: TRACHEOSTOMY;  Surgeon: Bal Manning MD;  Location:  [facility] OR;  Service: ENT;  Laterality: N/A;       Family History  History reviewed. No pertinent family history.    Social History  Social History     Socioeconomic History   • Marital status: Single     Spouse name: Not on file   • Number of children: Not on file   • Years of education: Not on file   • Highest education level: Not on file         Review of Systems:  History obtained from chart review and the patient  General ROS: No fever or chills  Respiratory ROS: No cough, shortness of breath, wheezing  Cardiovascular ROS: no chest pain or dyspnea on exertion  Gastrointestinal ROS: No abdominal pain or melena  Genito-Urinary ROS: No dysuria or hematuria  14 point ROS reviewed with the patient and negative except as noted above and in the HPI unless unable to obtain.        Objective:  Patient Vitals for the past 24 hrs:   Weight   09/07/20 0300 79.1 kg (174 lb 4.8 oz)     No intake or output data in the 24 hours ending 09/07/20 1149  General: awake/alert  Chest:  clear to auscultation bilaterally without respiratory distress  CVS: regular rate and rhythm  Abdominal: soft, nontender, positive bowel sounds  Extremities: tr ble edema  Skin: warm and dry without rash      Labs:  Results from last 7 days   Lab Units 09/01/20  0506   WBC 10*3/mm3 9.59   HEMOGLOBIN g/dL 9.6*   HEMATOCRIT % 28.7*   PLATELETS 10*3/mm3 342         Results from last 7 days   Lab Units 09/01/20  0506   SODIUM mmol/L 142   POTASSIUM mmol/L 3.9   CHLORIDE mmol/L 100   CO2 mmol/L 31.0*   BUN mg/dL 47*   CREATININE mg/dL 1.88*   CALCIUM mg/dL 8.9   GLUCOSE mg/dL 146*       Radiology:   Imaging Results (Last 72 Hours)     ** No results found for the last 72 hours. **          Culture:  Blood Culture   Date Value Ref Range Status   08/27/2020 No growth at 4 days  Preliminary    08/27/2020 No growth at 4 days  Preliminary     Urine Culture   Date Value Ref Range Status   08/27/2020 No growth  Final     Respiratory Culture   Date Value Ref Range Status   08/27/2020 Light growth (2+) Delftia acidovorans (A)  Final   08/27/2020 Light growth (2+) Moraxella catarrhalis (A)  Final     Comment:     Beta lactamase positive confers resistance to ampcillin, amoxicillin, penicillin, ticarcillin, and piperacillin but NOT amoxicillin-clavulanate, ampicillin-sulbactam, or piperacillin-tazobactam.           Assessment   -Acute kidney injury/ATN  -Acute hypoxic respiratory failure which required mechanical ventilation  -Hyperkalemia  -Metabolic acidosis  -Paranoid schizophrenia  -Anemia  -Hypertension    Plan:  Continue current free water regimen  Renal function is stable for now.   Follow up labs  Will sign off, recall as needed.       Ankit Dixon MD  9/7/2020  11:49

## 2020-09-08 NOTE — PROGRESS NOTES
Unruly Tolliver M.D.  MI[facility] Whitehead        Internal Medicine Progress Note    9/7/2020   20:21    Name:  Mohan Villatoro  MRN:    [id number]     Acct:     [id number]   Room:  [address] [place] [address] Day: 0     Admit Date: 8/12/2020  2:37 PM  PCP: Provider, No Known    Subjective:     C/C: need for continued vent weaning    Interval History: Status:  stayed the same/stable.  Up in chair.  Responds appropriately.  No family present. tolerating room air without difficulty.  Afebrile.      Review of Systems   Constitution: Positive for malaise/fatigue. Negative for chills, decreased appetite and fever.   HENT: Negative for congestion, hoarse voice, nosebleeds and sore throat.    Eyes: Negative for blurred vision, discharge, pain and visual disturbance.   Cardiovascular: Negative for chest pain, dyspnea on exertion, irregular heartbeat, leg swelling, orthopnea and palpitations.   Respiratory: Negative for cough, shortness of breath, sputum production and wheezing.    Hematologic/Lymphatic: Negative for bleeding problem. Does not bruise/bleed easily.   Skin: Negative for dry skin, flushing, itching, poor wound healing, rash and suspicious lesions.   Musculoskeletal: Positive for muscle weakness. Negative for arthritis, back pain, falls, joint pain, joint swelling and myalgias.   Gastrointestinal: Negative for bloating, abdominal pain, change in bowel habit, diarrhea, flatus, heartburn, melena and nausea.   Genitourinary: Negative for frequency, hesitancy, incomplete emptying, pelvic pain and urgency.   Neurological: Negative for difficulty with concentration, disturbances in coordination, dizziness, headaches, numbness, paresthesias, tremors and weakness.   Psychiatric/Behavioral: Negative for altered mental status, hallucinations and memory loss. The patient is not nervous/anxious.          Medications:     Allergies:   Allergies   Allergen Reactions   • Niacin Unknown - High Severity       Current Meds:    Current Facility-Administered Medications:   •  acetaminophen (TYLENOL) 160 MG/5ML solution 650 mg, 650 mg, Per G Tube, Q4H PRN, Bal Manning MD  •  albuterol (PROVENTIL) nebulizer solution 0.083% 2.5 mg/3mL, 2.5 mg, Nebulization, Q6H PRN, Bal Manning MD  •  amLODIPine (NORVASC) tablet 10 mg, 10 mg, Per G Tube, Q24H, Tripp Felipe MD  •  atropine sulfate injection 0.5 mg, 0.5 mg, Intravenous, PRN, Bal Manning MD  •  bumetanide (BUMEX) injection 1 mg, 1 mg, Intravenous, Daily, Eduardo Tolliver MD  •  busPIRone (BUSPAR) tablet 10 mg, 10 mg, Per G Tube, Q8H, Bal Manning MD  •  cetirizine (zyrTEC) tablet 10 mg, 10 mg, Per G Tube, Daily, Bal Manning MD  •  fentaNYL (DURAGESIC) 12 MCG/HR 1 patch, 1 patch, Transdermal, Q72H **AND** Check Fentanyl Patch Placement, 1 each, Does not apply, Q12H, Eduardo Tolliver MD  •  chlorhexidine (PERIDEX) 0.12 % solution 15 mL, 15 mL, Mouth/Throat, Q12H, Bal Manning MD  •  citalopram (CeleXA) tablet 20 mg, 20 mg, Per G Tube, Daily, Bal Manning MD  •  dextrose (D50W) 25 g/ 50mL Intravenous Solution 25 g, 25 g, Intravenous, Q15 Min PRN, Bal Manning MD  •  dextrose (GLUTOSE) oral gel 15 g, 15 g, Oral, Q15 Min PRN, Bal Manning MD  •  fluticasone (FLONASE) 50 MCG/ACT nasal spray 2 spray, 2 spray, Each Nare, Daily, Bal Manning MD  •  glucagon (human recombinant) (GLUCAGEN DIAGNOSTIC) injection 1 mg, 1 mg, Subcutaneous, Q15 Min PRN, Bal Manning MD  •  haloperidol (HALDOL) tablet 7.5 mg, 7.5 mg, Per G Tube, Nightly, Bal Manning MD  •  hydrALAZINE (APRESOLINE) injection 10 mg, 10 mg, Intravenous, Q6H PRN, Bal Manning MD  •  hydrALAZINE (APRESOLINE) tablet 75 mg, 75 mg, Per G Tube, Q8H, Eduardo Tolliver MD  •  insulin lispro (humaLOG) injection 0-9 Units, 0-9 Units, Subcutaneous, Q6H, Bal Manning MD  •  ipratropium-albuterol (DUO-NEB)  nebulizer solution 3 mL, 3 mL, Nebulization, 4x Daily - RT, Bal Manning MD  •  lansoprazole (FIRST) oral suspension 30 mg, 30 mg, Nasogastric, QAM, Bal Manning MD  •  LORazepam (ATIVAN) injection 1 mg, 1 mg, Intravenous, Q1H PRN, Eduardo Tolliver MD  •  metoclopramide (REGLAN) tablet 5 mg, 5 mg, Per G Tube, Q8H, Eduardo Tolliver MD  •  Morphine sulfate (PF) injection 2 mg, 2 mg, Intravenous, Q2H PRN, Eduardo Tolliver MD  •  ondansetron (ZOFRAN) tablet 4 mg, 4 mg, Oral, Q6H PRN **OR** ondansetron (ZOFRAN) injection 4 mg, 4 mg, Intravenous, Q6H PRN, Bal Manning MD  •  polyethylene glycol (MIRALAX) packet 17 g, 17 g, Nasogastric, BID PRN, Eduardo Tolliver MD  •  potassium chloride (KAYCIEL) 20 MEQ/15ML (10%) solution 20 mEq, 20 mEq, Per G Tube, Daily, Salima Oh APRN  •  risperiDONE (risperDAL) tablet 1 mg, 1 mg, Nasogastric, Nightly, Bal Manning MD  •  rivaroxaban (XARELTO) tablet 10 mg, 10 mg, Per G Tube, Daily With Dinner, Eduardo Tolliver MD  •  saccharomyces boulardii (FLORASTOR) capsule 250 mg, 250 mg, Oral, BID, Bal Manning MD  •  sennosides-docusate (PERICOLACE) 8.6-50 MG per tablet 1 tablet, 1 tablet, Oral, BID PRN, Eduardo Tolliver MD  •  sodium chloride 0.9 % flush 10 mL, 10 mL, Intravenous, Q12H, Bal Manning MD  •  sodium chloride 0.9 % flush 10 mL, 10 mL, Intravenous, PRN, Bal Manning MD    Data:     Code Status:    Code Status and Medical Interventions:   Ordered at: 08/25/20 0701     Level Of Support Discussed With:    Health Care Surrogate     Code Status:    CPR     Medical Interventions (Level of Support Prior to Arrest):    Full       History reviewed. No pertinent family history.    Social History     Socioeconomic History   • Marital status: Single     Spouse name: Not on file   • Number of children: Not on file   • Years of education: Not on file   • Highest education level: Not on file  "      Vitals:  BP (!) 188/86   Pulse 72   Temp 97.9 °F (36.6 °C) (Temporal)   Resp 18   Ht 180.3 cm (70.98\")   Wt 79.1 kg (174 lb 4.8 oz)   SpO2 97%   BMI 24.32 kg/m²   T 98.2 P 84 R 16 /75 Sp02 100% (RA)      I/O (24Hr):  No intake or output data in the 24 hours ending 09/07/20 2021    Labs and imaging:      Recent Results (from the past 12 hour(s))   POC Glucose Once    Collection Time: 09/07/20 12:09 PM   Result Value Ref Range    Glucose 164 (H) 70 - 130 mg/dL   POC Glucose Once    Collection Time: 09/07/20  5:17 PM   Result Value Ref Range    Glucose 179 (H) 70 - 130 mg/dL         Physical Examination:        Physical Exam   Constitutional: Vital signs are normal. He appears well-developed and well-nourished.   HENT:   Head: Normocephalic and atraumatic.   Nose: Nose normal.   Mouth/Throat: Oropharynx is clear and moist.   Eyes: Pupils are equal, round, and reactive to light. Conjunctivae, EOM and lids are normal.   Neck: Trachea normal and normal range of motion. Neck supple.   Dressing to neck clean dry and intact   Cardiovascular: Normal rate, regular rhythm and normal heart sounds.   Pulmonary/Chest: Effort normal and breath sounds normal.   Abdominal: Soft. Normal appearance and bowel sounds are normal.   g tube  Midline dressing c/d/i   Musculoskeletal: Normal range of motion. He exhibits edema.   Generalized weakness   Neurological: He is alert. No cranial nerve deficit or sensory deficit.        Skin: Skin is warm, dry and intact. No petechiae and no rash noted. No cyanosis or erythema. Nails show no clubbing.   Dressing c/d/i   Psychiatric: He has a normal mood and affect. His behavior is normal.   Nursing note and vitals reviewed.        Assessment:          Respiratory insufficiency    Acute on chronic respiratory failure with hypoxia and hypercapnia (CMS/HCC)    Ventilator dependence (CMS/HCC)    Past Medical History:   Diagnosis Date   • Diabetes (CMS/HCC)    • Schizo affective " schizophrenia (CMS/[facility])         Plan:        1. Acute hypoxic respiratory failure  2. Acute renal failure on CKD3  3. S/p Cardiac arrest  4. Schizophrenia  5. Multifactorial anemia  6. Hyponatremia  7. COPD  8. Leukocytosis  9. Dysphagia  10. Tobacco abuse  11. DM2 with hyperglycemia on long term insulin  12. Bradycardia  13. Hypokalemia    Continue current treatment. Monitor counts. Increase activity as tolerated.  Monitor oxygenation,  Monitor electrolytes and renal function closely. Continue antibiotics.  Wean reglan as able - change to per g tube. Wean fentanyl patch.  Labs Tuesday.      Electronically signed by MIKEY David on 9/7/2020 at 20:21   I have discussed the care of Mohan Villatoro, including pertinent history and exam findings, with the nurse practitioner.    I have seen and examined the patient and the key elements of all parts of the encounter have been performed by me.  I agree with the assessment, plan and orders as documented by MIKEY Omer, after I modified the exam findings and the plan of treatments and the final version is my approved version of the assessment.        Electronically signed by Eduardo Tolliver MD on 9/7/2020 at 21:26

## 2020-09-08 NOTE — PROGRESS NOTES
"[facility] Fall Assessment Note    Mohan Villatoro is a 60 y.o.male  [Ht: 180.3 cm (70.98\"); Wt: 79.1 kg (174 lb 4.8 oz)] admitted 8/12/2020  2:37 PM.    Current medications associated with an increased risk for fall include:    Current Facility-Administered Medications:     amLODIPine (NORVASC) tablet 10 mg, 10 mg, Per G Tube, Q24H    [START ON 9/9/2020] bumetanide (BUMEX) tablet 1 mg, 1 mg, Per G Tube, Daily    busPIRone (BUSPAR) tablet 10 mg, 10 mg, Per G Tube, Q8H    cetirizine (zyrTEC) tablet 10 mg, 10 mg, Per G Tube, Daily    fentaNYL (DURAGESIC) 12 MCG/HR 1 patch, 1 patch, Transdermal, Q72H    citalopram (CeleXA) tablet 20 mg, 20 mg, Per G Tube, Daily    haloperidol (HALDOL) tablet 7.5 mg, 7.5 mg, Per G Tube, Nightly    hydrALAZINE (APRESOLINE) injection 10 mg, 10 mg, Intravenous, Q6H PRN    hydrALAZINE (APRESOLINE) tablet 75 mg, 75 mg, Per G Tube, Q8H,    HYDROcodone-acetaminophen (HYCET) 7.5-325 MG/15ML solution 15 mL, 15 mL, Per G Tube, Q8H PRN    insulin lispro (humaLOG) injection 0-9 Units, 0-9 Units, Subcutaneous, Q6HD    metoclopramide (REGLAN) tablet 5 mg, 5 mg, Per G Tube, Q8H    ondansetron (ZOFRAN) tablet 4 mg, 4 mg, Oral, Q6H PRN     risperiDONE (risperDAL) tablet 1 mg, 1 mg, Nasogastric, Nightly      Bal Perea, PharmD  09/08/20  17:45       "

## 2020-09-08 NOTE — PROGRESS NOTES
Unruly Tolliver M.D.  MIKEY Whitehead        Internal Medicine Progress Note    9/8/2020   12:11    Name:  Mohan Villatoro  MRN:    [id number]     Acct:     [id number]   Room:  [address] [place] [address] Day: 0     Admit Date: 8/12/2020  2:37 PM  PCP: Provider, No Known    Subjective:     C/C: weakness    Interval History: Status:  stayed the same/stable. Up to chair. No family at bedside. Alert. Oriented to person only. Interacting appropriately with staff. Doing well s/p decannulation. 02 sats stable on room air. Afebrile. C/o some pain to hands. Counts stable. Renal function slightly improved. Progressing with therapy.  No new concerns.     Review of Systems   Constitution: Positive for malaise/fatigue. Negative for chills, decreased appetite and fever.   HENT: Negative for congestion, hoarse voice, nosebleeds and sore throat.    Eyes: Negative for blurred vision, discharge, pain and visual disturbance.   Cardiovascular: Negative for chest pain, dyspnea on exertion, irregular heartbeat, leg swelling, orthopnea and palpitations.   Respiratory: Negative for cough, shortness of breath, sputum production and wheezing.    Hematologic/Lymphatic: Negative for bleeding problem. Does not bruise/bleed easily.   Skin: Negative for dry skin, flushing, itching, poor wound healing, rash and suspicious lesions.   Musculoskeletal: Positive for muscle weakness. Negative for arthritis, back pain, falls, joint pain, joint swelling and myalgias.   Gastrointestinal: Negative for bloating, abdominal pain, change in bowel habit, diarrhea, flatus, heartburn, melena and nausea.   Genitourinary: Negative for frequency, hesitancy, incomplete emptying, pelvic pain and urgency.   Neurological: Negative for difficulty with concentration, disturbances in coordination, dizziness, headaches, numbness, paresthesias, tremors and weakness.   Psychiatric/Behavioral: Negative for altered mental status, hallucinations and memory loss. The  patient is not nervous/anxious.          Medications:     Allergies:   Allergies   Allergen Reactions   • Niacin Unknown - High Severity       Current Meds:   Current Facility-Administered Medications:   •  acetaminophen (TYLENOL) 160 MG/5ML solution 650 mg, 650 mg, Per G Tube, Q4H PRN, Bal Manning MD  •  albuterol (PROVENTIL) nebulizer solution 0.083% 2.5 mg/3mL, 2.5 mg, Nebulization, Q6H PRN, Bal Manning MD  •  amLODIPine (NORVASC) tablet 10 mg, 10 mg, Per G Tube, Q24H, Tripp Felipe MD  •  atropine sulfate injection 0.5 mg, 0.5 mg, Intravenous, PRN, Bal Manning MD  •  bumetanide (BUMEX) injection 1 mg, 1 mg, Intravenous, Daily, Eduardo Tolliver MD  •  busPIRone (BUSPAR) tablet 10 mg, 10 mg, Per G Tube, Q8H, Bal Manning MD  •  cetirizine (zyrTEC) tablet 10 mg, 10 mg, Per G Tube, Daily, Bal Manning MD  •  fentaNYL (DURAGESIC) 12 MCG/HR 1 patch, 1 patch, Transdermal, Q72H **AND** Check Fentanyl Patch Placement, 1 each, Does not apply, Q12H, Eduardo Tolliver MD  •  chlorhexidine (PERIDEX) 0.12 % solution 15 mL, 15 mL, Mouth/Throat, Q12H, Bal Manning MD  •  citalopram (CeleXA) tablet 20 mg, 20 mg, Per G Tube, Daily, Bal Manning MD  •  dextrose (D50W) 25 g/ 50mL Intravenous Solution 25 g, 25 g, Intravenous, Q15 Min PRN, Bal Manning MD  •  dextrose (GLUTOSE) oral gel 15 g, 15 g, Oral, Q15 Min PRN, Bal Manning MD  •  fluticasone (FLONASE) 50 MCG/ACT nasal spray 2 spray, 2 spray, Each Nare, Daily, Bal Manning MD  •  glucagon (human recombinant) (GLUCAGEN DIAGNOSTIC) injection 1 mg, 1 mg, Subcutaneous, Q15 Min PRN, Bal Manning MD  •  haloperidol (HALDOL) tablet 7.5 mg, 7.5 mg, Per G Tube, Nightly, Bal Manning MD  •  hydrALAZINE (APRESOLINE) injection 10 mg, 10 mg, Intravenous, Q6H PRN, Bal Manning MD  •  hydrALAZINE (APRESOLINE) tablet 75 mg, 75 mg, Per G Tube, Q8H, Eduardo Tolliver  MD Jovanny  •  insulin lispro (humaLOG) injection 0-9 Units, 0-9 Units, Subcutaneous, Q6H, Bal Manning MD  •  ipratropium-albuterol (DUO-NEB) nebulizer solution 3 mL, 3 mL, Nebulization, 4x Daily - RT, Bal Manning MD  •  lansoprazole (FIRST) oral suspension 30 mg, 30 mg, Nasogastric, QAM, Bal Manning MD  •  metoclopramide (REGLAN) tablet 5 mg, 5 mg, Per G Tube, Q8H, Eduardo Tolliver MD  •  Morphine sulfate (PF) injection 2 mg, 2 mg, Intravenous, Q2H PRN, Eduardo Tolliver MD  •  ondansetron (ZOFRAN) tablet 4 mg, 4 mg, Oral, Q6H PRN **OR** ondansetron (ZOFRAN) injection 4 mg, 4 mg, Intravenous, Q6H PRN, Bal Manning MD  •  polyethylene glycol (MIRALAX) packet 17 g, 17 g, Nasogastric, BID PRN, Eduardo Tolliver MD  •  potassium chloride (KAYCIEL) 20 MEQ/15ML (10%) solution 20 mEq, 20 mEq, Per G Tube, Daily, Salima Oh APRN  •  risperiDONE (risperDAL) tablet 1 mg, 1 mg, Nasogastric, Nightly, Bal Manning MD  •  rivaroxaban (XARELTO) tablet 10 mg, 10 mg, Per G Tube, Daily With Dinner, Eduardo Tolliver MD  •  saccharomyces boulardii (FLORASTOR) capsule 250 mg, 250 mg, Oral, BID, Bal Manning MD  •  sennosides-docusate (PERICOLACE) 8.6-50 MG per tablet 1 tablet, 1 tablet, Oral, BID PRN, Eduardo Tolliver MD  •  sodium chloride 0.9 % flush 10 mL, 10 mL, Intravenous, Q12H, Bal Manning MD  •  sodium chloride 0.9 % flush 10 mL, 10 mL, Intravenous, PRN, Bal Manning MD    Data:     Code Status:    Code Status and Medical Interventions:   Ordered at: 08/25/20 0701     Level Of Support Discussed With:    Health Care Surrogate     Code Status:    CPR     Medical Interventions (Level of Support Prior to Arrest):    Full       History reviewed. No pertinent family history.    Social History     Socioeconomic History   • Marital status: Single     Spouse name: Not on file   • Number of children: Not on file   • Years of  "education: Not on file   • Highest education level: Not on file       Vitals:  BP (!) 188/86   Pulse 72   Temp 97.9 °F (36.6 °C) (Temporal)   Resp 18   Ht 180.3 cm (70.98\")   Wt 79.1 kg (174 lb 4.8 oz)   SpO2 97%   BMI 24.32 kg/m²   T 98.7 P 79 R 10 /76 Sp02 95% (room air)      I/O (24Hr):  No intake or output data in the 24 hours ending 09/08/20 1211    Labs and imaging:      Recent Results (from the past 12 hour(s))   POC Glucose Once    Collection Time: 09/08/20 12:38 AM   Result Value Ref Range    Glucose 147 (H) 70 - 130 mg/dL   POC Glucose Once    Collection Time: 09/08/20  4:56 AM   Result Value Ref Range    Glucose 143 (H) 70 - 130 mg/dL   Basic Metabolic Panel    Collection Time: 09/08/20  5:49 AM   Result Value Ref Range    Glucose 148 (H) 65 - 99 mg/dL    BUN 39 (H) 6 - 20 mg/dL    Creatinine 1.72 (H) 0.76 - 1.27 mg/dL    Sodium 138 136 - 145 mmol/L    Potassium 3.9 3.5 - 5.2 mmol/L    Chloride 99 98 - 107 mmol/L    CO2 28.0 22.0 - 29.0 mmol/L    Calcium 8.8 8.6 - 10.5 mg/dL    eGFR Non African Amer 41 (L) >60 mL/min/1.73    BUN/Creatinine Ratio 22.7 7.0 - 25.0    Anion Gap 11.0 5.0 - 15.0 mmol/L   Sedimentation Rate    Collection Time: 09/08/20  5:49 AM   Result Value Ref Range    Sed Rate 40 (H) 0 - 15 mm/hr   C-reactive Protein    Collection Time: 09/08/20  5:49 AM   Result Value Ref Range    C-Reactive Protein 0.86 (H) 0.00 - 0.50 mg/dL   CBC Auto Differential    Collection Time: 09/08/20  5:49 AM   Result Value Ref Range    WBC 9.41 3.40 - 10.80 10*3/mm3    RBC 3.21 (L) 4.14 - 5.80 10*6/mm3    Hemoglobin 10.1 (L) 13.0 - 17.7 g/dL    Hematocrit 29.9 (L) 37.5 - 51.0 %    MCV 93.1 79.0 - 97.0 fL    MCH 31.5 26.6 - 33.0 pg    MCHC 33.8 31.5 - 35.7 g/dL    RDW 13.5 12.3 - 15.4 %    RDW-SD 45.0 37.0 - 54.0 fl    MPV 11.4 6.0 - 12.0 fL    Platelets 362 140 - 450 10*3/mm3    Neutrophil % 54.5 42.7 - 76.0 %    Lymphocyte % 22.6 19.6 - 45.3 %    Monocyte % 14.1 (H) 5.0 - 12.0 %    Eosinophil % 6.2 " "0.3 - 6.2 %    Basophil % 1.4 0.0 - 1.5 %    Immature Grans % 1.2 (H) 0.0 - 0.5 %    Neutrophils, Absolute 5.13 1.70 - 7.00 10*3/mm3    Lymphocytes, Absolute 2.13 0.70 - 3.10 10*3/mm3    Monocytes, Absolute 1.33 (H) 0.10 - 0.90 10*3/mm3    Eosinophils, Absolute 0.58 (H) 0.00 - 0.40 10*3/mm3    Basophils, Absolute 0.13 0.00 - 0.20 10*3/mm3    Immature Grans, Absolute 0.11 (H) 0.00 - 0.05 10*3/mm3    nRBC 0.0 0.0 - 0.2 /100 WBC         Physical Examination:        Physical Exam   Constitutional: Vital signs are normal. He appears well-developed and well-nourished.   HENT:   Head: Normocephalic and atraumatic.   Nose: Nose normal.   Mouth/Throat: Oropharynx is clear and moist.   Eyes: Pupils are equal, round, and reactive to light. Conjunctivae, EOM and lids are normal.   Neck: Trachea normal and normal range of motion. Neck supple.   Dressing to neck clean dry and intact   Cardiovascular: Normal rate, regular rhythm, normal heart sounds and intact distal pulses.   Pulmonary/Chest: Effort normal and breath sounds normal.   Abdominal: Soft. Normal appearance and bowel sounds are normal.   g tube   Musculoskeletal: Normal range of motion.   Generalized weakness   Neurological: He is alert. No cranial nerve deficit or sensory deficit.   Oriented to person only  Disoriented to place (\"rest home\") and president (\"[place]\")  Interacting appropriately and following commands   Skin: Skin is warm, dry and intact. No petechiae and no rash noted. No cyanosis or erythema. Nails show no clubbing.   Dressing c/d/i   Psychiatric: He has a normal mood and affect. His behavior is normal.   Nursing note and vitals reviewed.        Assessment:          Respiratory insufficiency    Acute on chronic respiratory failure with hypoxia and hypercapnia (CMS/HCC)    Ventilator dependence (CMS/HCC)    Past Medical History:   Diagnosis Date   • Diabetes (CMS/HCC)    • Schizo affective schizophrenia (CMS/HCC)         Plan:        1. Acute hypoxic " respiratory failure  2. Acute renal failure on CKD3  3. S/p Cardiac arrest  4. Schizophrenia  5. Multifactorial anemia  6. Hyponatremia  7. COPD  8. Leukocytosis  9. Dysphagia  10. Tobacco abuse  11. DM2 with hyperglycemia on long term insulin  12. Bradycardia  13. Hypokalemia    Continue current treatment. Monitor counts. Increase activity as tolerated.  Monitor respiratory status closely s/p decannulation.  Monitor electrolytes and renal function closely. Watch off antibiotics. Continue diuresis - change bumex to per g tube. DC IV ativan and morphine. DC peridex. Labs Thursday.     Electronically signed by MIKEY Powell on 9/8/2020 at 12:11

## 2020-09-09 NOTE — THERAPY DISCHARGE NOTE
Outpatient Speech Language Pathology   Adult Swallow Initial Eval/Discharge  [facility]     Patient Name: Mohan Villatoro  : 1960  MRN: [id number]  Today's Date: 2020       SPEECH-LANGUAGE PATHOLOGY EVALUTION - VFSS  Subjective: The patient was seen on this date for a VFSS(Videofluoroscopic Swallowing Study).  Patient was alert and cooperative.      Objective: Risks/benefits were reviewed with the patient, and consent was obtained. The study was completed with SLP and Radiologist present. The patient was seen in lateral view(s). Textures given included thin liquid, nectar thick liquid, honey thick liquid, puree consistency, mechanical soft consistency and regular consistency.  Assessment: Pt with poor bolus formation with all consistencies. He was noted to lose all liquids to the floor of his mouth with decreased anterior-posterior bolus transit. Pt is edentulous with poor rotary chew which lead to prolonged oral manipulation and preparation of mechanical soft and regular solids. Due to decreased back and base of tongue strength as well as delay in swallow initiation he exhibited premature spillage to the point of the vallecula with all consistencies except thin liquids, in which he exhibited loss to the lateral channels. Throughout evaluation he experienced decreased hyolaryngeal excursion and epiglottic inversion with poor laryngeal elevation. Due to these deficits he was noted to have trace penetration with honey thick liquids and flash penetration with nectar thick liquids 1x. Flash penetration was also noted with initial, secondary swallow, and with residue during swallow of solid consistencies with thin liquids. All instances of penetration into the laryngeal vestibule were silent. No definite aspiration was observed.   SLP Findings: Patient presents with moderate to severe oropharyngeal dysphagia.   Recommendations: Diet Textures: nectar thick liquid, puree consistency food. Medications should  be taken whole or crushed with thickened liquids or puree. May have water and Ice between meals after oral care, under staff or family supervision and with the recommended strategies for safe swallowing.  Recommended Strategies: Upright for PO and small bites and sips. Oral care before breakfast, after all meals and PRN.  Other Recommended Evaluations: VFSS, FEES and Cognitive-Linguistic Evaluation    Dysphagia therapy is recommended.   Cornell Uribe MS CCC-SLP 9/9/2020 14:01        Visit Date: 08/12/2020   Patient Active Problem List   Diagnosis   • Hyponatremia   • Hyperkalemia   • Nausea and vomiting   • Acute kidney injury (CMS/HCC)   • Hypoxia   • COPD (chronic obstructive pulmonary disease) (CMS/HCC)   • Tobacco dependence   • Anemia   • Paranoid schizophrenia (CMS/HCC)   • Essential hypertension   • Hyperlipidemia   • Acute on chronic respiratory failure with hypoxia and hypercapnia (CMS/HCC)   • Ventilator dependence (CMS/HCC)   • Respiratory insufficiency        Past Medical History:   Diagnosis Date   • Diabetes (CMS/HCC)    • Schizo affective schizophrenia (CMS/HCC)         Past Surgical History:   Procedure Laterality Date   • GASTROSTOMY FEEDING TUBE INSERTION N/A 8/25/2020    Procedure: GASTROSTOMY FEEDING TUBE INSERTION;  Surgeon: Kizzy Garcia MD;  Location: [facility] OR;  Service: General;  Laterality: N/A;   • TRACHEOSTOMY N/A 8/25/2020    Procedure: TRACHEOSTOMY;  Surgeon: Bal Manning MD;  Location: [facility] OR;  Service: ENT;  Laterality: N/A;         Visit Dx:     ICD-10-CM ICD-9-CM   1. Acute on chronic respiratory failure with hypoxia and hypercapnia (CMS/HCC) J96.21 518.84    J96.22 786.09     799.02   2. Ventilator dependence (CMS/HCC) Z99.11 V46.11   3. Respiratory insufficiency R06.89 786.09   4. Dysphagia, unspecified type R13.10 787.20           SLP Adult Swallow Evaluation     Row Name 09/09/20 1215       Rehab Evaluation    Document Type  evaluation  -CS    Subjective  Information  no complaints  -CS    Patient Observations  alert;cooperative;agree to therapy  -CS    Patient/Family Observations  No family present  -CS    Patient Effort  good  -CS       General Information    Patient Profile Reviewed  yes  -CS    Pertinent History Of Current Problem  JIM, cariac arrest, tracheostomy  -CS    Current Method of Nutrition  NPO  -CS    Precautions/Limitations, Vision  WFL;for purposes of eval  -CS    Precautions/Limitations, Hearing  WFL;for purposes of eval  -CS    Prior Level of Function-Communication  WFL  -CS    Prior Level of Function-Swallowing  no diet consistency restrictions  -CS    Plans/Goals Discussed with  patient  -CS    Barriers to Rehab  none identified  -CS    Patient's Goals for Discharge  patient did not state  -CS       Pain Assessment    Additional Documentation  Pain Scale: FACES Pre/Post-Treatment (Group)  -CS       Pain Scale: FACES Pre/Post-Treatment    Pain: FACES Scale, Pretreatment  0-->no hurt  -CS    Pain: FACES Scale, Post-Treatment  0-->no hurt  -CS       Oral Motor and Function    Dentition Assessment  edentulous, does not have dentures  -CS    Secretion Management  WNL/WFL  -CS    Mucosal Quality  moist, healthy  -CS    Volitional Swallow  delayed  -CS    Volitional Cough  WFL  -CS       Oral Musculature and Cranial Nerve Assessment    Oral Motor General Assessment  generalized oral motor weakness  -CS       MBS/VFSS    Utensils Used  spoon;straw  -CS    Consistencies Trialed  regular textures;honey-thick liquids;nectar/syrup-thick liquids;thin liquids;pureed  -CS       MBS/VFSS Interpretation    Oral Prep Phase  impaired oral phase of swallowing  -CS    Oral Transit Phase  impaired  -CS    Oral Residue  impaired  -CS    VFSS Summary  See discharge note  -CS       Oral Preparatory Phase    Oral Preparatory Phase  inadequate manipulation;poor rotary chew  -CS    Inadequate Manipulation  all consistencies tested  -CS    Poor Rotary Chew  regular  textures;mechanical soft  -CS       Oral Transit Phase    Impaired Oral Transit Phase  premature spillage of liquids into pharynx;increased A-P transit time  -CS    Increased A-P Transit Time  all consistencies tested  -CS    Premature Spillage of Liquids into Pharynx  all consistencies tested  -CS       Oral Residue    Impaired Oral Residue  lingual residue;floor of mouth residue  -CS    Floor of Mouth Residue  all consistencies tested  -CS    Lingual Residue  all consistencies tested  -CS       Initiation of Pharyngeal Swallow    Initiation of Pharyngeal Swallow  bolus in valleculae  -CS    Pharyngeal Phase  impaired pharyngeal phase of swallowing  -CS    Penetration During the Swallow  thin liquids;nectar-thick liquids;honey-thick liquids  -CS    Response to Penetration  no response  -CS    Pharyngeal Residue  diffuse within pharynx;secondary to reduced posterior pharyngeal wall stripping;secondary to reduced laryngeal elevation;secondary to reduced hyolaryngeal excursion  -CS       Clinical Impression    SLP Swallowing Diagnosis  mod-severe  -CS    Functional Impact  risk of aspiration/pneumonia  -CS    Swallow Criteria for Skilled Therapeutic Interventions Met  demonstrates skilled criteria  -CS       Recommendations    Therapy Frequency (Swallow)  at least;3 days per week  -CS    Predicted Duration Therapy Intervention (Days)  until discharge  -CS    SLP Diet Recommendation  puree;honey thick liquids;water between meals after oral care, with supervision;ice chips between meals after oral care, with supervision  -CS    Recommended Precautions and Strategies  upright posture during/after eating;small bites of food and sips of liquid  -CS    SLP Rec. for Method of Medication Administration  meds whole;meds crushed;with pudding or applesauce;as tolerated  -CS    Monitor for Signs of Aspiration  yes;cough;gurgly voice;throat clearing;notify SLP if any concerns  -CS    Anticipated Dischage Disposition (SLP)  long  term acute care facility  -CS      User Key  (r) = Recorded By, (t) = Taken By, (c) = Cosigned By    Initials Name Provider Type    CS Cornell Uribe MS CCC-SLP Speech and Language Pathologist                        OP SLP Education     Row Name 09/09/20 1347       Education    Barriers to Learning  Decreased comprehension  -CS    Education Provided  Described results of evaluation  -CS    Assessed  Learning needs;Learning motivation;Learning preferences;Learning readiness  -CS    Learning Motivation  Moderate  -CS    Learning Method  Explanation  -CS    Teaching Response  Verbalized understanding  -CS    Education Comments  See VFSS note  -CS      User Key  (r) = Recorded By, (t) = Taken By, (c) = Cosigned By    Initials Name Effective Dates    CS Cornell Uribe MS CCC-SLP 04/03/18 -           SLP OP Goals     Row Name 09/09/20 1348          SLP Time Calculation    SLP Goal Re-Cert Due Date  10/07/20  -CS       User Key  (r) = Recorded By, (t) = Taken By, (c) = Cosigned By    Initials Name Provider Type    Cornell Sanon MS CCC-SLP Speech and Language Pathologist                     Time Calculation:   SLP Start Time: 1215  SLP Stop Time: 1348  SLP Time Calculation (min): 93 min    Therapy Charges for Today     Code Description Service Date Service Provider Modifiers Qty    98030219552 HC ST TREATMENT SWALLOW 2 9/8/2020 Cornell Uribe MS CCC-SLP GN 1    44567942243 HC ST MOTION FLUORO EVAL SWALLOW 6 9/9/2020 Cornell Uribe MS CCC-SLP GN 1                      Cornell Uribe MS CCC-SLP  9/9/2020

## 2020-09-09 NOTE — PROGRESS NOTES
Unruly Tolliver M.D.  [facility] Whitehead        Internal Medicine Progress Note    9/9/2020   14:52    Name:  Mohan Villatoro  MRN:    [id number]     Acct:     [id number]   Room:  [facility] Day: 0     Admit Date: 8/12/2020  2:37 PM  PCP: Provider, No Known    Subjective:     C/C: weakness    Interval History: Status:  stayed the same/stable. Up to chair. No family at bedside. Alert. Oriented to person only. Interacting appropriately with staff. Doing well s/p decannulation. 02 sats stable on room air. Afebrile. Plans for dysphagia study today.  Progressing with therapy.  No new concerns.     Review of Systems   Constitution: Positive for malaise/fatigue. Negative for chills, decreased appetite and fever.   HENT: Negative for congestion, hoarse voice, nosebleeds and sore throat.    Eyes: Negative for blurred vision, discharge, pain and visual disturbance.   Cardiovascular: Negative for chest pain, dyspnea on exertion, irregular heartbeat, leg swelling, orthopnea and palpitations.   Respiratory: Negative for cough, shortness of breath, sputum production and wheezing.    Hematologic/Lymphatic: Negative for bleeding problem. Does not bruise/bleed easily.   Skin: Negative for dry skin, flushing, itching, poor wound healing, rash and suspicious lesions.   Musculoskeletal: Positive for muscle weakness. Negative for arthritis, back pain, falls, joint pain, joint swelling and myalgias.   Gastrointestinal: Negative for bloating, abdominal pain, change in bowel habit, diarrhea, flatus, heartburn, melena and nausea.   Genitourinary: Negative for frequency, hesitancy, incomplete emptying, pelvic pain and urgency.   Neurological: Negative for difficulty with concentration, disturbances in coordination, dizziness, headaches, numbness, paresthesias, tremors and weakness.   Psychiatric/Behavioral: Negative for altered mental status, hallucinations and memory loss. The patient is not nervous/anxious.           Medications:     Allergies:   Allergies   Allergen Reactions   • Niacin Unknown - High Severity       Current Meds:   Current Facility-Administered Medications:   •  acetaminophen (TYLENOL) 160 MG/5ML solution 650 mg, 650 mg, Per G Tube, Q4H PRN, Bal Manning MD  •  albuterol (PROVENTIL) nebulizer solution 0.083% 2.5 mg/3mL, 2.5 mg, Nebulization, Q6H PRN, Bal Manning MD  •  amLODIPine (NORVASC) tablet 10 mg, 10 mg, Per G Tube, Q24H, Tripp Felipe MD  •  atropine sulfate injection 0.5 mg, 0.5 mg, Intravenous, PRN, Bal Manning MD  •  bumetanide (BUMEX) tablet 1 mg, 1 mg, Per G Tube, Daily, Salima Oh APRN  •  busPIRone (BUSPAR) tablet 10 mg, 10 mg, Per G Tube, Q8H, Bal Manning MD  •  cetirizine (zyrTEC) tablet 10 mg, 10 mg, Per G Tube, Daily, Bal Manning MD  •  fentaNYL (DURAGESIC) 12 MCG/HR 1 patch, 1 patch, Transdermal, Q72H **AND** Check Fentanyl Patch Placement, 1 each, Does not apply, Q12H, Eduardo Tolliver MD  •  citalopram (CeleXA) tablet 20 mg, 20 mg, Per G Tube, Daily, Bal Manning MD  •  dextrose (D50W) 25 g/ 50mL Intravenous Solution 25 g, 25 g, Intravenous, Q15 Min PRN, Bal Manning MD  •  dextrose (GLUTOSE) oral gel 15 g, 15 g, Oral, Q15 Min PRN, Bal Manning MD  •  fluticasone (FLONASE) 50 MCG/ACT nasal spray 2 spray, 2 spray, Each Nare, Daily, Bal Manning MD  •  glucagon (human recombinant) (GLUCAGEN DIAGNOSTIC) injection 1 mg, 1 mg, Subcutaneous, Q15 Min PRN, Bal Manning MD  •  haloperidol (HALDOL) tablet 7.5 mg, 7.5 mg, Per G Tube, Nightly, Bal Manning MD  •  hydrALAZINE (APRESOLINE) injection 10 mg, 10 mg, Intravenous, Q6H PRN, Bal Manning MD  •  hydrALAZINE (APRESOLINE) tablet 75 mg, 75 mg, Per G Tube, Q8H, Eduardo Tolliver MD  •  HYDROcodone-acetaminophen (HYCET) 7.5-325 MG/15ML solution 15 mL, 15 mL, Per G Tube, Q8H PRN, Salima Oh APRN  •   insulin lispro (humaLOG) injection 0-9 Units, 0-9 Units, Subcutaneous, Q6H, Bal Manning MD  •  ipratropium-albuterol (DUO-NEB) nebulizer solution 3 mL, 3 mL, Nebulization, 4x Daily - RT, Bal Manning MD  •  lansoprazole (FIRST) oral suspension 30 mg, 30 mg, Nasogastric, QAM, Bal Manning MD  •  metoclopramide (REGLAN) tablet 5 mg, 5 mg, Per G Tube, Q8H, Eduardo Tolliver MD  •  ondansetron (ZOFRAN) tablet 4 mg, 4 mg, Oral, Q6H PRN **OR** ondansetron (ZOFRAN) injection 4 mg, 4 mg, Intravenous, Q6H PRN, Bal Manning MD  •  polyethylene glycol (MIRALAX) packet 17 g, 17 g, Nasogastric, BID PRN, Eduardo Tolliver MD  •  potassium chloride (KAYCIEL) 20 MEQ/15ML (10%) solution 10 mEq, 10 mEq, Per G Tube, Daily, Salima Oh APRN  •  risperiDONE (risperDAL) tablet 1 mg, 1 mg, Nasogastric, Nightly, Bal Manning MD  •  rivaroxaban (XARELTO) tablet 10 mg, 10 mg, Per G Tube, Daily With Dinner, Eduardo Tolliver MD  •  saccharomyces boulardii (FLORASTOR) capsule 250 mg, 250 mg, Oral, BID, Bal Manning MD  •  sennosides-docusate (PERICOLACE) 8.6-50 MG per tablet 1 tablet, 1 tablet, Oral, BID PRN, Eduardo Tolliver MD  •  sodium chloride 0.9 % flush 10 mL, 10 mL, Intravenous, Q12H, Bal Manning MD  •  sodium chloride 0.9 % flush 10 mL, 10 mL, Intravenous, PRN, Bal Manning MD    Data:     Code Status:    Code Status and Medical Interventions:   Ordered at: 08/25/20 0701     Level Of Support Discussed With:    Health Care Surrogate     Code Status:    CPR     Medical Interventions (Level of Support Prior to Arrest):    Full       History reviewed. No pertinent family history.    Social History     Socioeconomic History   • Marital status: Single     Spouse name: Not on file   • Number of children: Not on file   • Years of education: Not on file   • Highest education level: Not on file       Vitals:  BP (!) 188/86   Pulse 72   Temp 97.9  "°F (36.6 °C) (Temporal)   Resp 18   Ht 180.3 cm (70.98\")   Wt 79.1 kg (174 lb 4.8 oz)   SpO2 97%   BMI 24.32 kg/m²   T 97.5 P 80 R 11 /65 Sp02 100% (room air)      I/O (24Hr):  No intake or output data in the 24 hours ending 09/09/20 1452    Labs and imaging:      Recent Results (from the past 12 hour(s))   POC Glucose Once    Collection Time: 09/09/20  6:06 AM   Result Value Ref Range    Glucose 126 70 - 130 mg/dL   POC Glucose Once    Collection Time: 09/09/20 12:02 PM   Result Value Ref Range    Glucose 161 (H) 70 - 130 mg/dL         Physical Examination:        Physical Exam   Constitutional: Vital signs are normal. He appears well-developed and well-nourished.   HENT:   Head: Normocephalic and atraumatic.   Nose: Nose normal.   Mouth/Throat: Oropharynx is clear and moist.   Eyes: Pupils are equal, round, and reactive to light. Conjunctivae, EOM and lids are normal.   Neck: Trachea normal and normal range of motion. Neck supple.   Dressing to neck clean dry and intact   Cardiovascular: Normal rate, regular rhythm, normal heart sounds and intact distal pulses.   Pulmonary/Chest: Effort normal and breath sounds normal.   Abdominal: Soft. Normal appearance and bowel sounds are normal.   g tube   Musculoskeletal: Normal range of motion.   Generalized weakness   Neurological: He is alert. No cranial nerve deficit or sensory deficit.   Oriented to person only  Disoriented to place (\"[place]\") and president (\"[place]\")  Interacting appropriately and following commands   Skin: Skin is warm, dry and intact. No petechiae and no rash noted. No cyanosis or erythema. Nails show no clubbing.   Dressing c/d/i   Psychiatric: He has a normal mood and affect. His behavior is normal.   Nursing note and vitals reviewed.        Assessment:          Respiratory insufficiency    Acute on chronic respiratory failure with hypoxia and hypercapnia (CMS/HCC)    Ventilator dependence (CMS/HCC)    Past Medical History: "   Diagnosis Date   • Diabetes (CMS/[facility])    • Schizo affective schizophrenia (CMS/[facility])         Plan:        1. Acute hypoxic respiratory failure  2. Acute renal failure on CKD3  3. S/p Cardiac arrest  4. Schizophrenia  5. Multifactorial anemia  6. Hyponatremia  7. COPD  8. Leukocytosis  9. Dysphagia  10. Tobacco abuse  11. DM2 with hyperglycemia on long term insulin  12. Bradycardia  13. Hypokalemia    Continue current treatment. Monitor counts. Increase activity as tolerated.  Monitor respiratory status closely s/p decannulation.  Monitor electrolytes and renal function closely. Watch off antibiotics. Continue diuresis. Labs in am.     Electronically signed by MIKEY Powell on 9/9/2020 at 14:52     I have discussed the care of Mohan Villatoro, including pertinent history and exam findings, with the nurse practitioner.    I have seen and examined the patient and the key elements of all parts of the encounter have been performed by me.  I agree with the assessment, plan and orders as documented by MIKEY Whitehead, after I modified the exam findings and the plan of treatments and the final version is my approved version of the assessment.        Electronically signed by Eduardo Tolliver MD on 9/9/2020 at 21:56

## 2020-09-10 NOTE — PROGRESS NOTES
Unruly Tolliver M.D.  MIKEY Whitehead        Internal Medicine Progress Note    9/10/2020   10:46    Name:  Mohan Villatoro  MRN:    [id number]     Acct:     [id number]   Room:  [facility] Day: 0     Admit Date: 8/12/2020  2:37 PM  PCP: Provider, No Known    Subjective:     C/C: weakness    Interval History: Status:  stayed the same/stable. Up to chair. No family at bedside. Alert. Oriented to person only. Interacting appropriately with staff. Doing well s/p decannulation. 02 sats stable on room air. Afebrile. Tolerating modified po diet.   Progressing with therapy. Renal function continues to improve.   No new concerns.     Review of Systems   Constitution: Positive for malaise/fatigue. Negative for chills, decreased appetite and fever.   HENT: Negative for congestion, hoarse voice, nosebleeds and sore throat.    Eyes: Negative for blurred vision, discharge, pain and visual disturbance.   Cardiovascular: Negative for chest pain, dyspnea on exertion, irregular heartbeat, leg swelling, orthopnea and palpitations.   Respiratory: Negative for cough, shortness of breath, sputum production and wheezing.    Hematologic/Lymphatic: Negative for bleeding problem. Does not bruise/bleed easily.   Skin: Negative for dry skin, flushing, itching, poor wound healing, rash and suspicious lesions.   Musculoskeletal: Positive for muscle weakness. Negative for arthritis, back pain, falls, joint pain, joint swelling and myalgias.   Gastrointestinal: Negative for bloating, abdominal pain, change in bowel habit, diarrhea, flatus, heartburn, melena and nausea.   Genitourinary: Negative for frequency, hesitancy, incomplete emptying, pelvic pain and urgency.   Neurological: Negative for difficulty with concentration, disturbances in coordination, dizziness, headaches, numbness, paresthesias, tremors and weakness.   Psychiatric/Behavioral: Negative for altered mental status, hallucinations and memory loss. The  patient is not nervous/anxious.          Medications:     Allergies:   Allergies   Allergen Reactions   • Niacin Unknown - High Severity       Current Meds:   Current Facility-Administered Medications:   •  acetaminophen (TYLENOL) 160 MG/5ML solution 650 mg, 650 mg, Per G Tube, Q4H PRN, Bal Manning MD  •  albuterol (PROVENTIL) nebulizer solution 0.083% 2.5 mg/3mL, 2.5 mg, Nebulization, Q6H PRN, Bal Manning MD  •  amLODIPine (NORVASC) tablet 10 mg, 10 mg, Per G Tube, Q24H, Tripp Felipe MD  •  atropine sulfate injection 0.5 mg, 0.5 mg, Intravenous, PRN, Bal Manning MD  •  bumetanide (BUMEX) tablet 1 mg, 1 mg, Per G Tube, Daily, Salima Oh APRN  •  busPIRone (BUSPAR) tablet 10 mg, 10 mg, Per G Tube, Q8H, Bal Manning MD  •  cetirizine (zyrTEC) tablet 10 mg, 10 mg, Per G Tube, Daily, Bal Manning MD  •  fentaNYL (DURAGESIC) 12 MCG/HR 1 patch, 1 patch, Transdermal, Q72H **AND** Check Fentanyl Patch Placement, 1 each, Does not apply, Q12H, Eduardo Tolliver MD  •  citalopram (CeleXA) tablet 20 mg, 20 mg, Per G Tube, Daily, Bal Manning MD  •  dextrose (D50W) 25 g/ 50mL Intravenous Solution 25 g, 25 g, Intravenous, Q15 Min PRN, Bal Manning MD  •  dextrose (GLUTOSE) oral gel 15 g, 15 g, Oral, Q15 Min PRN, Bal Manning MD  •  fluticasone (FLONASE) 50 MCG/ACT nasal spray 2 spray, 2 spray, Each Nare, Daily, Bal Manning MD  •  glucagon (human recombinant) (GLUCAGEN DIAGNOSTIC) injection 1 mg, 1 mg, Subcutaneous, Q15 Min PRN, Bal Manning MD  •  haloperidol (HALDOL) tablet 7.5 mg, 7.5 mg, Per G Tube, Nightly, Bal Manning MD  •  hydrALAZINE (APRESOLINE) injection 10 mg, 10 mg, Intravenous, Q6H PRN, Bal Manning MD  •  hydrALAZINE (APRESOLINE) tablet 75 mg, 75 mg, Per G Tube, Q8H, Eduardo Tolliver MD  •  HYDROcodone-acetaminophen (HYCET) 7.5-325 MG/15ML solution 15 mL, 15 mL, Per G Tube, Q8H  PRN, Salima Oh, APRN  •  insulin lispro (humaLOG) injection 0-9 Units, 0-9 Units, Subcutaneous, Q6H, Bal Manning MD  •  ipratropium-albuterol (DUO-NEB) nebulizer solution 3 mL, 3 mL, Nebulization, 4x Daily - RT, Bal Manning MD  •  lansoprazole (FIRST) oral suspension 30 mg, 30 mg, Nasogastric, QAM, Bal Manning MD  •  metoclopramide (REGLAN) tablet 5 mg, 5 mg, Per G Tube, Q8H, Eduardo Tolliver MD  •  ondansetron (ZOFRAN) tablet 4 mg, 4 mg, Oral, Q6H PRN **OR** ondansetron (ZOFRAN) injection 4 mg, 4 mg, Intravenous, Q6H PRN, Bal Manning MD  •  polyethylene glycol (MIRALAX) packet 17 g, 17 g, Nasogastric, BID PRN, Eduardo Tolliver MD  •  potassium chloride (KAYCIEL) 20 MEQ/15ML (10%) solution 10 mEq, 10 mEq, Per G Tube, Daily, Salima Oh, APRN  •  risperiDONE (risperDAL) tablet 1 mg, 1 mg, Nasogastric, Nightly, Bal Manning MD  •  rivaroxaban (XARELTO) tablet 10 mg, 10 mg, Per G Tube, Daily With Dinner, Eduardo Tolliver MD  •  saccharomyces boulardii (FLORASTOR) capsule 250 mg, 250 mg, Oral, BID, Bal Manning MD  •  sennosides-docusate (PERICOLACE) 8.6-50 MG per tablet 1 tablet, 1 tablet, Oral, BID PRN, Eduardo Tolliver MD  •  sodium chloride 0.9 % flush 10 mL, 10 mL, Intravenous, Q12H, Bal Manning MD  •  sodium chloride 0.9 % flush 10 mL, 10 mL, Intravenous, PRN, Bal Manning MD    Data:     Code Status:    Code Status and Medical Interventions:   Ordered at: 08/25/20 0701     Level Of Support Discussed With:    Health Care Surrogate     Code Status:    CPR     Medical Interventions (Level of Support Prior to Arrest):    Full       History reviewed. No pertinent family history.    Social History     Socioeconomic History   • Marital status: Single     Spouse name: Not on file   • Number of children: Not on file   • Years of education: Not on file   • Highest education level: Not on file       Vitals:  BP  "(!) 188/86   Pulse 72   Temp 97.9 °F (36.6 °C) (Temporal)   Resp 18   Ht 180.3 cm (70.98\")   Wt 79.1 kg (174 lb 4.8 oz)   SpO2 97%   BMI 24.32 kg/m²   T 97.8 P 75 R 19 /58 Sp02 98% (room air)      I/O (24Hr):  No intake or output data in the 24 hours ending 09/10/20 1046    Labs and imaging:      Recent Results (from the past 12 hour(s))   POC Glucose Once    Collection Time: 09/10/20 12:10 AM   Result Value Ref Range    Glucose 105 70 - 130 mg/dL   Basic Metabolic Panel    Collection Time: 09/10/20  4:47 AM   Result Value Ref Range    Glucose 113 (H) 65 - 99 mg/dL    BUN 37 (H) 6 - 20 mg/dL    Creatinine 1.66 (H) 0.76 - 1.27 mg/dL    Sodium 138 136 - 145 mmol/L    Potassium 4.0 3.5 - 5.2 mmol/L    Chloride 99 98 - 107 mmol/L    CO2 30.0 (H) 22.0 - 29.0 mmol/L    Calcium 8.9 8.6 - 10.5 mg/dL    eGFR Non African Amer 43 (L) >60 mL/min/1.73    BUN/Creatinine Ratio 22.3 7.0 - 25.0    Anion Gap 9.0 5.0 - 15.0 mmol/L   BNP    Collection Time: 09/10/20  4:47 AM   Result Value Ref Range    proBNP 18,393.0 (H) 0.0 - 900.0 pg/mL   CBC Auto Differential    Collection Time: 09/10/20  4:47 AM   Result Value Ref Range    WBC 12.75 (H) 3.40 - 10.80 10*3/mm3    RBC 3.18 (L) 4.14 - 5.80 10*6/mm3    Hemoglobin 10.0 (L) 13.0 - 17.7 g/dL    Hematocrit 29.9 (L) 37.5 - 51.0 %    MCV 94.0 79.0 - 97.0 fL    MCH 31.4 26.6 - 33.0 pg    MCHC 33.4 31.5 - 35.7 g/dL    RDW 13.4 12.3 - 15.4 %    RDW-SD 45.8 37.0 - 54.0 fl    MPV 10.7 6.0 - 12.0 fL    Platelets 375 140 - 450 10*3/mm3    Neutrophil % 63.5 42.7 - 76.0 %    Lymphocyte % 17.6 (L) 19.6 - 45.3 %    Monocyte % 12.3 (H) 5.0 - 12.0 %    Eosinophil % 4.6 0.3 - 6.2 %    Basophil % 1.1 0.0 - 1.5 %    Immature Grans % 0.9 (H) 0.0 - 0.5 %    Neutrophils, Absolute 8.09 (H) 1.70 - 7.00 10*3/mm3    Lymphocytes, Absolute 2.24 0.70 - 3.10 10*3/mm3    Monocytes, Absolute 1.57 (H) 0.10 - 0.90 10*3/mm3    Eosinophils, Absolute 0.59 (H) 0.00 - 0.40 10*3/mm3    Basophils, Absolute 0.14 0.00 " "- 0.20 10*3/mm3    Immature Grans, Absolute 0.12 (H) 0.00 - 0.05 10*3/mm3    nRBC 0.0 0.0 - 0.2 /100 WBC   POC Glucose Once    Collection Time: 09/10/20  6:21 AM   Result Value Ref Range    Glucose 110 70 - 130 mg/dL         Physical Examination:        Physical Exam   Constitutional: Vital signs are normal. He appears well-developed and well-nourished.   HENT:   Head: Normocephalic and atraumatic.   Nose: Nose normal.   Mouth/Throat: Oropharynx is clear and moist.   Eyes: Pupils are equal, round, and reactive to light. Conjunctivae, EOM and lids are normal.   Neck: Trachea normal and normal range of motion. Neck supple.   Dressing to neck clean dry and intact   Cardiovascular: Normal rate, regular rhythm, normal heart sounds and intact distal pulses.   Pulmonary/Chest: Effort normal and breath sounds normal.   Abdominal: Soft. Normal appearance and bowel sounds are normal.   g tube   Musculoskeletal: Normal range of motion.   Generalized weakness   Neurological: He is alert. No cranial nerve deficit or sensory deficit.   Oriented to person only  Disoriented to place (\"rest home\") and president (\"Regis\")  Interacting appropriately and following commands   Skin: Skin is warm, dry and intact. No petechiae and no rash noted. No cyanosis or erythema. Nails show no clubbing.   Dressing c/d/i   Psychiatric: He has a normal mood and affect. His behavior is normal.   Nursing note and vitals reviewed.        Assessment:          Respiratory insufficiency    Acute on chronic respiratory failure with hypoxia and hypercapnia (CMS/[facility])    Ventilator dependence (CMS/[facility])    Past Medical History:   Diagnosis Date   • Diabetes (CMS/HCC)    • Schizo affective schizophrenia (CMS/[facility])         Plan:        1. Acute hypoxic respiratory failure  2. Acute renal failure on CKD3  3. S/p Cardiac arrest  4. Schizophrenia  5. Multifactorial anemia  6. Hyponatremia  7. COPD  8. Leukocytosis  9. Dysphagia  10. Tobacco abuse  11. DM2 with " hyperglycemia on long term insulin  12. Bradycardia  13. Hypokalemia    Continue current treatment. Monitor counts. Increase activity as tolerated.  Monitor respiratory status closely s/p decannulation.  Monitor electrolytes and renal function closely. Watch off antibiotics. Continue diuresis. Labs Monday. Advance diet and wean/dc tube feeding as able based on intake. Discharge planning.      Electronically signed by MIKEY Powell on 9/10/2020 at 10:46   I have discussed the care of Mohan Villatoro, including pertinent history and exam findings, with the nurse practitioner.    I have seen and examined the patient and the key elements of all parts of the encounter have been performed by me.  I agree with the assessment, plan and orders as documented by MIKEY Whitehead, after I modified the exam findings and the plan of treatments and the final version is my approved version of the assessment.        Electronically signed by Eduardo Tolliver MD on 9/10/2020 at 13:23

## 2020-09-10 NOTE — PROGRESS NOTES
Adult Nutrition  Assessment/PES    Patient Name:  Mohan Villatoro  YOB: 1960  MRN: [id number]  Admit Date:  8/12/2020    Assessment Date:  9/10/2020    Comments:  Pt was started on Pureed; Nectar thick liquids yesterday. He ate 100% of his breakfast this AM. Discontinued TF order and will monitor PO intake. If PO intake cannot adequately meet nutrition needs, will add supplements. Will continue to follow for nutrition needs.     Reason for Assessment     Row Name 09/10/20 1343          Reason for Assessment    Reason For Assessment  follow-up protocol         Nutrition/Diet History     Row Name 09/10/20 1348          Nutrition/Diet History    Typical Food/Fluid Intake  Pt was started on a PO diet of Pureed; Nectar thick liquids. This morning he ate 100% of his breakfast. Discontinued TF order and will monitor PO intake and add supplements as needed.          Anthropometrics     Row Name 09/10/20 1341          Usual Body Weight (UBW)    Weight Loss Time Frame  current wt documented as 174#        Body Mass Index (BMI)    BMI Assessment  BMI 18.5-24.9: normal         Labs/Tests/Procedures/Meds     Row Name 09/10/20 1346          Labs/Procedures/Meds    Lab Results Reviewed  reviewed        Medications    Pertinent Medications Reviewed  reviewed, pertinent     Pertinent Medications Comments  bumex; buspar; haldol; insulin; reglan; florastor         Physical Findings     Row Name 09/10/20 1350          Physical Findings    Overall Physical Appearance  edematous     Tubes  PEG     Skin  edema         Estimated/Assessed Needs     Row Name 09/10/20 1355          Calculation Measurements    Weight Used For Calculations  78.9 kg (174 lb)        Estimated/Assessed Needs    Additional Documentation  Calorie Requirements (Group);Fluid Requirements (Group);KCAL/KG (Group);Protein Requirements (Group)        Calorie Requirements    Estimated Calorie Need Method  kcal/kg        KCAL/KG    KCAL/KG  30 Kcal/Kg  (kcal)     30 Kcal/Kg (kcal)  2367.78        Protein Requirements    Weight Used For Protein Calculations  78.9 kg (174 lb)     Est Protein Requirement Amount (gms/kg)  1.2 gm protein     Estimated Protein Requirements (gms/day)  94.71        Fluid Requirements    Estimated Fluid Requirements (mL/day)  2367     Estimated Fluid Requirement Method  RDA Method     RDA Method (mL)  2367         Nutrition Prescription Ordered     Row Name 09/10/20 1351          Nutrition Prescription PO    Current PO Diet  Pureed     Fluid Consistency  Nectar/syrup thick         Evaluation of Received Nutrient/Fluid Intake     Row Name 09/10/20 1350          Nutrient/Fluid Evaluation    Number of Days Evaluated  3 days     Additional Documentation  Fluid Intake Evaluation (Group);Calories Evaluation (Group);Intake Assessment (Group);Protein Evaluation (Group)        Calories Evaluation    Enteral Calories (kcal)  1812        Protein Evaluation    Enteral Protein (gm)  82        Intake Assessment    Energy/Calorie Requirement Assessment  meeting needs     Protein Requirement Assessment  meeting needs     Fluid Requirement Assessment  meeting needs     Average Calorie Intake (days)  3     Average Protein Intake (days)  3     Tolerance  tolerating        Fluid Intake Evaluation    Enteral (Free Water) Fluid (mL)  991     Free Water Flush Fluid (mL)  951     Total Free Water Intake (mL)  1942 mL        PO Evaluation    Number of Days PO Intake Evaluated  1 day     Number of Meals  1     % PO Intake  100        EN Evaluation    Number of Days EN Intake Evaluated  3 days     EN Average Volume Delivered (mL/day)  1208 mL/day     % Goal Volume   100 %     TF Changes  Discontinued     HOB  Greater than or equal to 30 degress               Problem/Interventions:  Problem 1     Row Name 09/10/20 1353          Nutrition Diagnoses Problem 1    Problem 1  Predicted Suboptimal Intake     Etiology (related to)  Medical Diagnosis     Pulmonary/Critical  Care  Ventilator;Acute respiratory failure     Signs/Symptoms (evidenced by)  SLP/Swallow eval;PO Intake     Swallow eval status  Done     Type of SLP Evaluation  MBS               Intervention Goal     Row Name 09/10/20 1350          Intervention Goal    General  Reduce/improve symptoms;Maintain nutrition;Meet nutritional needs for age/condition;Disease management/therapy;Improved nutrition related lab(s)     PO  Maintain intake;Continue positive trend;Meet estimated needs;Advance diet     Weight  Maintain weight         Nutrition Intervention     Row Name 09/10/20 1355          Nutrition Intervention    RD/Tech Action  Follow Tx progress;Care plan reviewd         Nutrition Prescription     Row Name 09/10/20 1355          Nutrition Prescription EN    Enteral Prescription  Discontinue enteral feeding         Education/Evaluation     Row Name 09/10/20 1355          Education    Education  No discharge needs identified at this time        Monitor/Evaluation    Monitor  Per protocol           Electronically signed by:  Catherine España  09/10/20 13:55

## 2020-09-11 NOTE — PROGRESS NOTES
Unruly Tolliver M.D.  MIKEY Whitehead        Internal Medicine Progress Note    9/11/2020   12:53    Name:  Mohan Villatoro  MRN:    [id number]     Acct:     [id number]   Room:  [facility] Day: 0     Admit Date: 8/12/2020  2:37 PM  PCP: Provider, No Known    Subjective:     C/C: weakness    Interval History: Status:  stayed the same/stable. Resting in bed. Woke from sleep.  No family at bedside.  Oriented to person only. Interacting appropriately with staff. Doing well s/p decannulation. 02 sats stable on room air. Afebrile. Tolerating modified po diet.   Progressing with therapy.  No new concerns.     Review of Systems   Constitution: Positive for malaise/fatigue. Negative for chills, decreased appetite and fever.   HENT: Negative for congestion, hoarse voice, nosebleeds and sore throat.    Eyes: Negative for blurred vision, discharge, pain and visual disturbance.   Cardiovascular: Negative for chest pain, dyspnea on exertion, irregular heartbeat, leg swelling, orthopnea and palpitations.   Respiratory: Negative for cough, shortness of breath, sputum production and wheezing.    Hematologic/Lymphatic: Negative for bleeding problem. Does not bruise/bleed easily.   Skin: Negative for dry skin, flushing, itching, poor wound healing, rash and suspicious lesions.   Musculoskeletal: Positive for muscle weakness. Negative for arthritis, back pain, falls, joint pain, joint swelling and myalgias.   Gastrointestinal: Negative for bloating, abdominal pain, change in bowel habit, diarrhea, flatus, heartburn, melena and nausea.   Genitourinary: Negative for frequency, hesitancy, incomplete emptying, pelvic pain and urgency.   Neurological: Negative for difficulty with concentration, disturbances in coordination, dizziness, headaches, numbness, paresthesias, tremors and weakness.   Psychiatric/Behavioral: Negative for altered mental status, hallucinations and memory loss. The patient is not  nervous/anxious.          Medications:     Allergies:   Allergies   Allergen Reactions   • Niacin Unknown - High Severity       Current Meds:   Current Facility-Administered Medications:   •  acetaminophen (TYLENOL) 160 MG/5ML solution 650 mg, 650 mg, Per G Tube, Q4H PRN, Bal Manning MD  •  albuterol (PROVENTIL) nebulizer solution 0.083% 2.5 mg/3mL, 2.5 mg, Nebulization, Q6H PRN, Bal Manning MD  •  amLODIPine (NORVASC) tablet 10 mg, 10 mg, Per G Tube, Q24H, Tripp Felipe MD  •  atropine sulfate injection 0.5 mg, 0.5 mg, Intravenous, PRN, Bal Manning MD  •  bumetanide (BUMEX) tablet 1 mg, 1 mg, Per G Tube, Daily, Salima Oh APRN  •  busPIRone (BUSPAR) tablet 10 mg, 10 mg, Per G Tube, Q8H, Bal Manning MD  •  cetirizine (zyrTEC) tablet 10 mg, 10 mg, Per G Tube, Daily, Bal Manning MD  •  fentaNYL (DURAGESIC) 12 MCG/HR 1 patch, 1 patch, Transdermal, Q72H **AND** Check Fentanyl Patch Placement, 1 each, Does not apply, Q12H, Eduardo Tolliver MD  •  citalopram (CeleXA) tablet 20 mg, 20 mg, Per G Tube, Daily, Bal Manning MD  •  dextrose (D50W) 25 g/ 50mL Intravenous Solution 25 g, 25 g, Intravenous, Q15 Min PRN, Bal Manning MD  •  dextrose (GLUTOSE) oral gel 15 g, 15 g, Oral, Q15 Min PRN, Bal Manning MD  •  fluticasone (FLONASE) 50 MCG/ACT nasal spray 2 spray, 2 spray, Each Nare, Daily, Bal Manning MD  •  glucagon (human recombinant) (GLUCAGEN DIAGNOSTIC) injection 1 mg, 1 mg, Subcutaneous, Q15 Min PRN, Bal Manning MD  •  haloperidol (HALDOL) tablet 7.5 mg, 7.5 mg, Per G Tube, Nightly, Bal Manning MD  •  hydrALAZINE (APRESOLINE) injection 10 mg, 10 mg, Intravenous, Q6H PRN, Bal Manning MD  •  hydrALAZINE (APRESOLINE) tablet 75 mg, 75 mg, Per G Tube, Q8H, Eduardo Tolliver MD  •  HYDROcodone-acetaminophen (HYCET) 7.5-325 MG/15ML solution 15 mL, 15 mL, Per G Tube, Q8H PRN, Adriano  MIKEY Oliveira  •  insulin lispro (humaLOG) injection 0-9 Units, 0-9 Units, Subcutaneous, Q6H, Bal Manning MD  •  ipratropium-albuterol (DUO-NEB) nebulizer solution 3 mL, 3 mL, Nebulization, 4x Daily - RT, Bal Manning MD  •  lansoprazole (FIRST) oral suspension 30 mg, 30 mg, Nasogastric, QAM, Bal Manning MD  •  metoclopramide (REGLAN) tablet 5 mg, 5 mg, Per G Tube, Q8H, Eduardo Tolliver MD  •  ondansetron (ZOFRAN) tablet 4 mg, 4 mg, Oral, Q6H PRN **OR** ondansetron (ZOFRAN) injection 4 mg, 4 mg, Intravenous, Q6H PRN, Bal Manning MD  •  polyethylene glycol (MIRALAX) packet 17 g, 17 g, Nasogastric, BID PRN, Eduardo Tolliver MD  •  potassium chloride (KAYCIEL) 20 MEQ/15ML (10%) solution 10 mEq, 10 mEq, Per G Tube, Daily, Salima Oh APRN  •  risperiDONE (risperDAL) tablet 1 mg, 1 mg, Nasogastric, Nightly, Bal Manning MD  •  rivaroxaban (XARELTO) tablet 10 mg, 10 mg, Per G Tube, Daily With Dinner, Eduardo Tolliver MD  •  saccharomyces boulardii (FLORASTOR) capsule 250 mg, 250 mg, Oral, BID, Bal Manning MD  •  sennosides-docusate (PERICOLACE) 8.6-50 MG per tablet 1 tablet, 1 tablet, Oral, BID PRN, Edaurdo Tolliver MD  •  sodium chloride 0.9 % flush 10 mL, 10 mL, Intravenous, Q12H, Bal Manning MD  •  sodium chloride 0.9 % flush 10 mL, 10 mL, Intravenous, PRN, Bal Manning MD    Data:     Code Status:    Code Status and Medical Interventions:   Ordered at: 08/25/20 0701     Level Of Support Discussed With:    Health Care Surrogate     Code Status:    CPR     Medical Interventions (Level of Support Prior to Arrest):    Full       History reviewed. No pertinent family history.    Social History     Socioeconomic History   • Marital status: Single     Spouse name: Not on file   • Number of children: Not on file   • Years of education: Not on file   • Highest education level: Not on file       Vitals:  BP (!) 188/86    "Pulse 72   Temp 97.9 °F (36.6 °C) (Temporal)   Resp 18   Ht 180.3 cm (70.98\")   Wt 79.1 kg (174 lb 4.8 oz)   SpO2 97%   BMI 24.32 kg/m²   T 97.6 P 71 R 16 /67 Sp02 97% (room air)      I/O (24Hr):  No intake or output data in the 24 hours ending 09/11/20 1253    Labs and imaging:      Recent Results (from the past 12 hour(s))   POC Glucose Once    Collection Time: 09/11/20  5:39 AM   Result Value Ref Range    Glucose 104 70 - 130 mg/dL   POC Glucose Once    Collection Time: 09/11/20 12:35 PM   Result Value Ref Range    Glucose 223 (H) 70 - 130 mg/dL         Physical Examination:        Physical Exam   Constitutional: Vital signs are normal. He appears well-developed and well-nourished.   HENT:   Head: Normocephalic and atraumatic.   Nose: Nose normal.   Mouth/Throat: Oropharynx is clear and moist.   Eyes: Pupils are equal, round, and reactive to light. Conjunctivae, EOM and lids are normal.   Neck: Trachea normal and normal range of motion. Neck supple.   Dressing to neck clean dry and intact   Cardiovascular: Normal rate, regular rhythm, normal heart sounds and intact distal pulses.   Pulmonary/Chest: Effort normal and breath sounds normal.   Abdominal: Soft. Normal appearance and bowel sounds are normal.   g tube   Musculoskeletal: Normal range of motion.   Generalized weakness   Neurological: He is alert. No cranial nerve deficit or sensory deficit.   Oriented to person only  Disoriented to place (\"rest home\") and president (\"Regis\")  Interacting appropriately and following commands   Skin: Skin is warm, dry and intact. No petechiae and no rash noted. No cyanosis or erythema. Nails show no clubbing.   Dressing c/d/i   Psychiatric: He has a normal mood and affect. His behavior is normal.   Nursing note and vitals reviewed.        Assessment:          Respiratory insufficiency    Acute on chronic respiratory failure with hypoxia and hypercapnia (CMS/HCC)    Ventilator dependence (CMS/HCC)    Past " Medical History:   Diagnosis Date   • Diabetes (CMS/[facility])    • Schizo affective schizophrenia (CMS/[facility])         Plan:        1. Acute hypoxic respiratory failure  2. Acute renal failure on CKD3  3. S/p Cardiac arrest  4. Schizophrenia  5. Multifactorial anemia  6. Hyponatremia  7. COPD  8. Leukocytosis  9. Dysphagia  10. Tobacco abuse  11. DM2 with hyperglycemia on long term insulin  12. Bradycardia  13. Hypokalemia    Continue current treatment. Monitor counts. Increase activity as tolerated.  Monitor respiratory status closely s/p decannulation.  Monitor electrolytes and renal function closely. Watch off antibiotics. Continue diuresis. Labs Monday. Continue modified po diet.  Discharge planning.      Electronically signed by MIKEY Powell on 9/11/2020 at 12:53

## 2020-09-12 NOTE — PROGRESS NOTES
Unruly Tolliver M.D.  MIKEY Whitehead        Internal Medicine Progress Note    9/12/2020   05:24 CDT    Name:  Mohan Villatoro  MRN:    [id number]     Acct:     [id number]   Room:  [facility] Day: 0     Admit Date: 8/12/2020  2:37 PM  PCP: Provider, No Known    Subjective:     C/C: weakness    Interval History: Status:  stayed the same/stable. Resting in bed. Woke from sleep.  No family at bedside.  Oriented to person only. Interacting appropriately with staff. Doing well s/p decannulation. 02 sats stable on room air.Progressing with therapy.  No new concerns.     Review of Systems   Constitution: Positive for malaise/fatigue. Negative for chills, decreased appetite and fever.   HENT: Negative for congestion, hoarse voice, nosebleeds and sore throat.    Eyes: Negative for blurred vision, discharge, pain and visual disturbance.   Cardiovascular: Negative for chest pain, dyspnea on exertion, irregular heartbeat, leg swelling, orthopnea and palpitations.   Respiratory: Negative for cough, shortness of breath, sputum production and wheezing.    Hematologic/Lymphatic: Negative for bleeding problem. Does not bruise/bleed easily.   Skin: Negative for dry skin, flushing, itching, poor wound healing, rash and suspicious lesions.   Musculoskeletal: Positive for muscle weakness. Negative for arthritis, back pain, falls, joint pain, joint swelling and myalgias.   Gastrointestinal: Negative for bloating, abdominal pain, change in bowel habit, diarrhea, flatus, heartburn, melena and nausea.   Genitourinary: Negative for frequency, hesitancy, incomplete emptying, pelvic pain and urgency.   Neurological: Negative for difficulty with concentration, disturbances in coordination, dizziness, headaches, numbness, paresthesias, tremors and weakness.   Psychiatric/Behavioral: Negative for altered mental status, hallucinations and memory loss. The patient is not nervous/anxious.          Medications:      Allergies:   Allergies   Allergen Reactions   • Niacin Unknown - High Severity       Current Meds:   Current Facility-Administered Medications:   •  acetaminophen (TYLENOL) 160 MG/5ML solution 650 mg, 650 mg, Per G Tube, Q4H PRN, Bal Manning MD  •  albuterol (PROVENTIL) nebulizer solution 0.083% 2.5 mg/3mL, 2.5 mg, Nebulization, Q6H PRN, Bal Manning MD  •  amLODIPine (NORVASC) tablet 10 mg, 10 mg, Per G Tube, Q24H, Tripp Felipe MD  •  atropine sulfate injection 0.5 mg, 0.5 mg, Intravenous, PRN, Bal Manning MD  •  bumetanide (BUMEX) tablet 1 mg, 1 mg, Per G Tube, Daily, Salima Oh APRN  •  busPIRone (BUSPAR) tablet 10 mg, 10 mg, Per G Tube, Q8H, Bal Manning MD  •  cetirizine (zyrTEC) tablet 10 mg, 10 mg, Per G Tube, Daily, Bal Manning MD  •  fentaNYL (DURAGESIC) 12 MCG/HR 1 patch, 1 patch, Transdermal, Q72H **AND** Check Fentanyl Patch Placement, 1 each, Does not apply, Q12H, Eduardo Tolliver MD  •  citalopram (CeleXA) tablet 20 mg, 20 mg, Per G Tube, Daily, Bal Manning MD  •  dextrose (D50W) 25 g/ 50mL Intravenous Solution 25 g, 25 g, Intravenous, Q15 Min PRN, Bal Manning MD  •  dextrose (GLUTOSE) oral gel 15 g, 15 g, Oral, Q15 Min PRN, Bal Manning MD  •  fluticasone (FLONASE) 50 MCG/ACT nasal spray 2 spray, 2 spray, Each Nare, Daily, Bal Manning MD  •  glucagon (human recombinant) (GLUCAGEN DIAGNOSTIC) injection 1 mg, 1 mg, Subcutaneous, Q15 Min PRN, Bal Manning MD  •  haloperidol (HALDOL) tablet 7.5 mg, 7.5 mg, Per G Tube, Nightly, Bal Manning MD  •  hydrALAZINE (APRESOLINE) injection 10 mg, 10 mg, Intravenous, Q6H PRN, Bal Manning MD  •  hydrALAZINE (APRESOLINE) tablet 75 mg, 75 mg, Per G Tube, Q8H, Eduardo Tolliver MD  •  HYDROcodone-acetaminophen (HYCET) 7.5-325 MG/15ML solution 15 mL, 15 mL, Per G Tube, Q8H PRN, Salima Oh APRN  •  insulin lispro  (humaLOG) injection 0-9 Units, 0-9 Units, Subcutaneous, Q6H, Bal Manning MD  •  ipratropium-albuterol (DUO-NEB) nebulizer solution 3 mL, 3 mL, Nebulization, 4x Daily - RT, Bal Manning MD  •  lansoprazole (FIRST) oral suspension 30 mg, 30 mg, Nasogastric, QAM, Bal Manning MD  •  metoclopramide (REGLAN) tablet 5 mg, 5 mg, Per G Tube, Q8H, Eduardo Tolliver MD  •  ondansetron (ZOFRAN) tablet 4 mg, 4 mg, Oral, Q6H PRN **OR** ondansetron (ZOFRAN) injection 4 mg, 4 mg, Intravenous, Q6H PRN, Bal Manning MD  •  polyethylene glycol (MIRALAX) packet 17 g, 17 g, Nasogastric, BID PRN, Eduardo Tolliver MD  •  potassium chloride (KAYCIEL) 20 MEQ/15ML (10%) solution 10 mEq, 10 mEq, Per G Tube, Daily, Salima Oh, MIKEY  •  risperiDONE (risperDAL) tablet 1 mg, 1 mg, Nasogastric, Nightly, Bal Manning MD  •  rivaroxaban (XARELTO) tablet 10 mg, 10 mg, Per G Tube, Daily With Dinner, Eduardo Tolliver MD  •  saccharomyces boulardii (FLORASTOR) capsule 250 mg, 250 mg, Oral, BID, Bal Manning MD  •  sennosides-docusate (PERICOLACE) 8.6-50 MG per tablet 1 tablet, 1 tablet, Oral, BID PRN, Eduardo Tolliver MD  •  sodium chloride 0.9 % flush 10 mL, 10 mL, Intravenous, Q12H, Bal Manning MD  •  sodium chloride 0.9 % flush 10 mL, 10 mL, Intravenous, PRN, Bal Manning MD    Data:     Code Status:    Code Status and Medical Interventions:   Ordered at: 08/25/20 0701     Level Of Support Discussed With:    Health Care Surrogate     Code Status:    CPR     Medical Interventions (Level of Support Prior to Arrest):    Full       History reviewed. No pertinent family history.    Social History     Socioeconomic History   • Marital status: Single     Spouse name: Not on file   • Number of children: Not on file   • Years of education: Not on file   • Highest education level: Not on file       Vitals:  BP (!) 188/86   Pulse 72   Temp 97.9 °F (36.6 °C)  "(Temporal)   Resp 18   Ht 180.3 cm (70.98\")   Wt 79.1 kg (174 lb 4.8 oz)   SpO2 97%   BMI 24.32 kg/m²   T 98.6 P 74 R 20 /71 Sp02 96% (room air)      I/O (24Hr):  No intake or output data in the 24 hours ending 09/12/20 0524    Labs and imaging:      Recent Results (from the past 12 hour(s))   POC Glucose Once    Collection Time: 09/11/20  6:16 PM    Specimen: Blood   Result Value Ref Range    Glucose 197 (H) 70 - 130 mg/dL   POC Glucose Once    Collection Time: 09/12/20 12:07 AM    Specimen: Blood   Result Value Ref Range    Glucose 139 (H) 70 - 130 mg/dL         Physical Examination:        Physical Exam   Constitutional: Vital signs are normal. He appears well-developed and well-nourished.   HENT:   Head: Normocephalic and atraumatic.   Nose: Nose normal.   Mouth/Throat: Oropharynx is clear and moist.   Eyes: Pupils are equal, round, and reactive to light. Conjunctivae, EOM and lids are normal.   Neck: Trachea normal and normal range of motion. Neck supple.   Dressing to neck clean dry and intact   Cardiovascular: Normal rate, regular rhythm, normal heart sounds and intact distal pulses.   Pulmonary/Chest: Effort normal and breath sounds normal.   Abdominal: Soft. Normal appearance and bowel sounds are normal.   g tube   Musculoskeletal: Normal range of motion.      Comments: Generalized weakness   Neurological: He is alert. No cranial nerve deficit or sensory deficit.   Oriented to person only  Disoriented to place (\"rest home\") and president (\"Regis\")  Interacting appropriately and following commands   Skin: Skin is warm and dry. No petechiae and no rash noted. No erythema. Nails show no clubbing.   Dressing c/d/i   Psychiatric: He has a normal mood and affect. His behavior is normal.   Nursing note and vitals reviewed.        Assessment:          Respiratory insufficiency    Acute on chronic respiratory failure with hypoxia and hypercapnia (CMS/HCC)    Ventilator dependence (CMS/HCC)    Past " Medical History:   Diagnosis Date   • Diabetes (CMS/[facility])    • Schizo affective schizophrenia (CMS/[facility])         Plan:        1. Acute hypoxic respiratory failure  2. Acute renal failure on CKD3  3. S/p Cardiac arrest  4. Schizophrenia  5. Multifactorial anemia  6. Hyponatremia  7. COPD  8. Leukocytosis  9. Dysphagia  10. Tobacco abuse  11. DM2 with hyperglycemia on long term insulin  12. Bradycardia  13. Hypokalemia    Continue current treatment. Monitor counts. Increase activity as tolerated.  Monitor respiratory status closely s/p decannulation.  Monitor electrolytes and renal function closely. Watch off antibiotics. Continue diuresis. Labs Monday. Continue modified po diet.  Discharge planning.      Electronically signed by MIKEY David on 9/12/2020 at 05:24 CDT   I have discussed the care of Mohan Villatoro, including pertinent history and exam findings, with the nurse practitioner.    I have seen and examined the patient and the key elements of all parts of the encounter have been performed by me.  I agree with the assessment, plan and orders as documented by MIKEY Omer, after I modified the exam findings and the plan of treatments and the final version is my approved version of the assessment.        Electronically signed by Eduardo Tolliver MD on 9/12/2020 at 20:45 CDT

## 2020-09-14 NOTE — PROGRESS NOTES
Unruly Tolliver M.D.  MIKEY Whitehead        Internal Medicine Progress Note    9/14/2020   13:40 CDT    Name:  Mohan Villatoro  MRN:    [id number]     Acct:     [id number]   Room:  [address] [place] [address] Day: 0     Admit Date: 8/12/2020  2:37 PM  PCP: Provider, No Known    Subjective:     C/C: weakness    Interval History: Status:  stayed the same/stable. Up to chair. No family at bedside. Progressing with therapy. Afebrile. Denies pain. Asking for a coke. 02 sats stable on room air.   Review of Systems   Constitution: Positive for malaise/fatigue. Negative for chills, decreased appetite and fever.   HENT: Negative for congestion, hoarse voice, nosebleeds and sore throat.    Eyes: Negative for blurred vision, discharge, pain and visual disturbance.   Cardiovascular: Negative for chest pain, dyspnea on exertion, irregular heartbeat, leg swelling, orthopnea and palpitations.   Respiratory: Negative for cough, shortness of breath, sputum production and wheezing.    Hematologic/Lymphatic: Negative for bleeding problem. Does not bruise/bleed easily.   Skin: Negative for dry skin, flushing, itching, poor wound healing, rash and suspicious lesions.   Musculoskeletal: Positive for muscle weakness. Negative for arthritis, back pain, falls, joint pain, joint swelling and myalgias.   Gastrointestinal: Negative for bloating, abdominal pain, change in bowel habit, diarrhea, flatus, heartburn, melena and nausea.   Genitourinary: Negative for frequency, hesitancy, incomplete emptying, pelvic pain and urgency.   Neurological: Negative for difficulty with concentration, disturbances in coordination, dizziness, headaches, numbness, paresthesias, tremors and weakness.   Psychiatric/Behavioral: Negative for altered mental status, hallucinations and memory loss. The patient is not nervous/anxious.          Medications:     Allergies:   Allergies   Allergen Reactions   • Niacin Unknown - High Severity       Current Meds:    Current Facility-Administered Medications:   •  acetaminophen (TYLENOL) 160 MG/5ML solution 650 mg, 650 mg, Per G Tube, Q4H PRN, Bal Manning MD  •  albuterol (PROVENTIL) nebulizer solution 0.083% 2.5 mg/3mL, 2.5 mg, Nebulization, Q6H PRN, Bal Manning MD  •  amLODIPine (NORVASC) tablet 10 mg, 10 mg, Per G Tube, Q24H, Tripp Felipe MD  •  atropine sulfate injection 0.5 mg, 0.5 mg, Intravenous, PRN, Bal Manning MD  •  bumetanide (BUMEX) tablet 1 mg, 1 mg, Per G Tube, Daily, Salima Oh APRN  •  busPIRone (BUSPAR) tablet 10 mg, 10 mg, Per G Tube, Q8H, Bal Manning MD  •  cetirizine (zyrTEC) tablet 10 mg, 10 mg, Per G Tube, Daily, Bal Manning MD  •  [] fentaNYL (DURAGESIC) 12 MCG/HR 1 patch, 1 patch, Transdermal, Q72H **AND** Check Fentanyl Patch Placement, 1 each, Does not apply, Q12H, Eduardo Tolliver MD  •  citalopram (CeleXA) tablet 20 mg, 20 mg, Per G Tube, Daily, Bal Manning MD  •  dextrose (D50W) 25 g/ 50mL Intravenous Solution 25 g, 25 g, Intravenous, Q15 Min PRN, Bal Manning MD  •  dextrose (GLUTOSE) oral gel 15 g, 15 g, Oral, Q15 Min PRN, Bal Manning MD  •  fluticasone (FLONASE) 50 MCG/ACT nasal spray 2 spray, 2 spray, Each Nare, Daily, Bal Manning MD  •  glucagon (human recombinant) (GLUCAGEN DIAGNOSTIC) injection 1 mg, 1 mg, Subcutaneous, Q15 Min PRN, Bal Manning MD  •  haloperidol (HALDOL) tablet 7.5 mg, 7.5 mg, Per G Tube, Nightly, Bal Manning MD  •  hydrALAZINE (APRESOLINE) injection 10 mg, 10 mg, Intravenous, Q6H PRN, Bal Manning MD  •  hydrALAZINE (APRESOLINE) tablet 75 mg, 75 mg, Per G Tube, Q8H, Eduardo Tolliver MD  •  HYDROcodone-acetaminophen (HYCET) 7.5-325 MG/15ML solution 15 mL, 15 mL, Per G Tube, Q8H PRN, Salima Oh APRN  •  ipratropium-albuterol (DUO-NEB) nebulizer solution 3 mL, 3 mL, Nebulization, Q6H PRN, Eduardo Tolliver MD  •  " lansoprazole (FIRST) oral suspension 30 mg, 30 mg, Nasogastric, QAM, Bal Manning MD  •  metoclopramide (REGLAN) tablet 5 mg, 5 mg, Per G Tube, Q8H, Eduardo Tolliver MD  •  ondansetron (ZOFRAN) tablet 4 mg, 4 mg, Oral, Q6H PRN **OR** ondansetron (ZOFRAN) injection 4 mg, 4 mg, Intravenous, Q6H PRN, Bal Manning MD  •  polyethylene glycol (MIRALAX) packet 17 g, 17 g, Nasogastric, BID PRN, Eduardo Tolliver MD  •  potassium chloride (KAYCIEL) 20 MEQ/15ML (10%) solution 10 mEq, 10 mEq, Per G Tube, Daily, Salima Oh APRN  •  risperiDONE (risperDAL) tablet 1 mg, 1 mg, Nasogastric, Nightly, Bal Manning MD  •  rivaroxaban (XARELTO) tablet 10 mg, 10 mg, Per G Tube, Daily With Dinner, Eduardo Tolliver MD  •  saccharomyces boulardii (FLORASTOR) capsule 250 mg, 250 mg, Oral, BID, Bal Manning MD  •  sennosides-docusate (PERICOLACE) 8.6-50 MG per tablet 1 tablet, 1 tablet, Oral, BID PRN, Eduardo Tolliver MD  •  sodium chloride 0.9 % flush 10 mL, 10 mL, Intravenous, Q12H, Bal Manning MD  •  sodium chloride 0.9 % flush 10 mL, 10 mL, Intravenous, PRN, Bal Manning MD    Data:     Code Status:    Code Status and Medical Interventions:   Ordered at: 08/25/20 0701     Level Of Support Discussed With:    Health Care Surrogate     Code Status:    CPR     Medical Interventions (Level of Support Prior to Arrest):    Full       History reviewed. No pertinent family history.    Social History     Socioeconomic History   • Marital status: Single     Spouse name: Not on file   • Number of children: Not on file   • Years of education: Not on file   • Highest education level: Not on file       Vitals:  BP (!) 188/86   Pulse 72   Temp 97.9 °F (36.6 °C) (Temporal)   Resp 18   Ht 180.3 cm (70.98\")   Wt 79.1 kg (174 lb 4.8 oz)   SpO2 97%   BMI 24.32 kg/m²   T 98.5 P 66 R 16 /54 Sp02 98% (room air)      I/O (24Hr):  No intake or output data in the 24 " hours ending 09/14/20 1340    Labs and imaging:      No results found for this or any previous visit (from the past 12 hour(s)).      Physical Examination:        Physical Exam   Constitutional: Vital signs are normal. He appears well-developed and well-nourished.   HENT:   Head: Normocephalic and atraumatic.   Nose: Nose normal.   Mouth/Throat: Oropharynx is clear and moist.   Eyes: Pupils are equal, round, and reactive to light. Conjunctivae, EOM and lids are normal.   Neck: Trachea normal and normal range of motion. Neck supple.   Dressing to neck clean dry and intact   Cardiovascular: Normal rate, regular rhythm, normal heart sounds and intact distal pulses.   Pulmonary/Chest: Effort normal and breath sounds normal.   Abdominal: Soft. Normal appearance and bowel sounds are normal.   g tube   Musculoskeletal: Normal range of motion.      Comments: Generalized weakness   Neurological: He is alert. No cranial nerve deficit or sensory deficit.   Oriented to person only  Interacting appropriately and following commands   Skin: Skin is warm and dry. No petechiae and no rash noted. No erythema. Nails show no clubbing.   Dressing c/d/i   Psychiatric: He has a normal mood and affect. His behavior is normal.   Nursing note and vitals reviewed.        Assessment:          Respiratory insufficiency    Acute on chronic respiratory failure with hypoxia and hypercapnia (CMS/HCC)    Ventilator dependence (CMS/HCC)    Past Medical History:   Diagnosis Date   • Diabetes (CMS/HCC)    • Schizo affective schizophrenia (CMS/HCC)         Plan:        1. Acute hypoxic respiratory failure  2. Acute renal failure on CKD3  3. S/p Cardiac arrest  4. Schizophrenia  5. Multifactorial anemia  6. Hyponatremia  7. COPD  8. Leukocytosis  9. Dysphagia  10. Tobacco abuse  11. DM2 with hyperglycemia on long term insulin  12. Bradycardia  13. Hypokalemia    Continue current treatment. Monitor counts. Increase activity as tolerated.  Monitor  respiratory status closely s/p decannulation.  Monitor electrolytes and renal function closely. Watch off antibiotics. Continue diuresis. Labs Thursday. Continue modified po diet.  Discharge planning.      Electronically signed by MIKEY Powell on 9/14/2020 at 13:40 CDT   I have discussed the care of Mohan Villatoro, including pertinent history and exam findings, with the nurse practitioner.    I have seen and examined the patient and the key elements of all parts of the encounter have been performed by me.  I agree with the assessment, plan and orders as documented by MIKEY Whitehead, after I modified the exam findings and the plan of treatments and the final version is my approved version of the assessment.        Electronically signed by Eduardo Tolliver MD on 9/14/2020 at 19:22 CDT

## 2020-09-14 NOTE — PROGRESS NOTES
Unruly Tolliver M.D.  MIKEY Whitehead        Internal Medicine Progress Note    9/13/2020   20:30 CDT    Name:  Mohan Villatoro  MRN:    [id number]     Acct:     [id number]   Room:  6/1   Day: 0     Admit Date: 8/12/2020  2:37 PM  PCP: Provider, No Known    Subjective:     C/C: weakness    Interval History: Status:  stayed the same/stable. Resting in bed, just finished dinner.  No family at bedside.  Oriented to person only. Interacting appropriately with staff. Doing well s/p decannulation. 02 sats stable on room air.Progressing with therapy.  No new concerns.     Review of Systems   Constitution: Positive for malaise/fatigue. Negative for chills, decreased appetite and fever.   HENT: Negative for congestion, hoarse voice, nosebleeds and sore throat.    Eyes: Negative for blurred vision, discharge, pain and visual disturbance.   Cardiovascular: Negative for chest pain, dyspnea on exertion, irregular heartbeat, leg swelling, orthopnea and palpitations.   Respiratory: Negative for cough, shortness of breath, sputum production and wheezing.    Hematologic/Lymphatic: Negative for bleeding problem. Does not bruise/bleed easily.   Skin: Negative for dry skin, flushing, itching, poor wound healing, rash and suspicious lesions.   Musculoskeletal: Positive for muscle weakness. Negative for arthritis, back pain, falls, joint pain, joint swelling and myalgias.   Gastrointestinal: Negative for bloating, abdominal pain, change in bowel habit, diarrhea, flatus, heartburn, melena and nausea.   Genitourinary: Negative for frequency, hesitancy, incomplete emptying, pelvic pain and urgency.   Neurological: Negative for difficulty with concentration, disturbances in coordination, dizziness, headaches, numbness, paresthesias, tremors and weakness.   Psychiatric/Behavioral: Negative for altered mental status, hallucinations and memory loss. The patient is not nervous/anxious.          Medications:      Allergies:   Allergies   Allergen Reactions   • Niacin Unknown - High Severity       Current Meds:   Current Facility-Administered Medications:   •  acetaminophen (TYLENOL) 160 MG/5ML solution 650 mg, 650 mg, Per G Tube, Q4H PRN, Bal Manning MD  •  albuterol (PROVENTIL) nebulizer solution 0.083% 2.5 mg/3mL, 2.5 mg, Nebulization, Q6H PRN, Bal Manning MD  •  amLODIPine (NORVASC) tablet 10 mg, 10 mg, Per G Tube, Q24H, Tripp Felipe MD  •  atropine sulfate injection 0.5 mg, 0.5 mg, Intravenous, PRN, Bal Manning MD  •  bumetanide (BUMEX) tablet 1 mg, 1 mg, Per G Tube, Daily, Salima Oh APRN  •  busPIRone (BUSPAR) tablet 10 mg, 10 mg, Per G Tube, Q8H, Bal Manning MD  •  cetirizine (zyrTEC) tablet 10 mg, 10 mg, Per G Tube, Daily, Bal Manning MD  •  [] fentaNYL (DURAGESIC) 12 MCG/HR 1 patch, 1 patch, Transdermal, Q72H **AND** Check Fentanyl Patch Placement, 1 each, Does not apply, Q12H, Eduardo Tolliver MD  •  citalopram (CeleXA) tablet 20 mg, 20 mg, Per G Tube, Daily, Bal Manning MD  •  dextrose (D50W) 25 g/ 50mL Intravenous Solution 25 g, 25 g, Intravenous, Q15 Min PRN, Bal Manning MD  •  dextrose (GLUTOSE) oral gel 15 g, 15 g, Oral, Q15 Min PRN, Bal Manning MD  •  fluticasone (FLONASE) 50 MCG/ACT nasal spray 2 spray, 2 spray, Each Nare, Daily, Bal Manning MD  •  glucagon (human recombinant) (GLUCAGEN DIAGNOSTIC) injection 1 mg, 1 mg, Subcutaneous, Q15 Min PRN, Bal Manning MD  •  haloperidol (HALDOL) tablet 7.5 mg, 7.5 mg, Per G Tube, Nightly, Bal Manning MD  •  hydrALAZINE (APRESOLINE) injection 10 mg, 10 mg, Intravenous, Q6H PRN, Bal Manning MD  •  hydrALAZINE (APRESOLINE) tablet 75 mg, 75 mg, Per G Tube, Q8H, Eduardo Tolliver MD  •  HYDROcodone-acetaminophen (HYCET) 7.5-325 MG/15ML solution 15 mL, 15 mL, Per G Tube, Q8H PRN, Salima Oh APRN  •   "ipratropium-albuterol (DUO-NEB) nebulizer solution 3 mL, 3 mL, Nebulization, Q6H PRN, Eduardo Tolliver MD  •  lansoprazole (FIRST) oral suspension 30 mg, 30 mg, Nasogastric, QAM, Bal Manning MD  •  metoclopramide (REGLAN) tablet 5 mg, 5 mg, Per G Tube, Q8H, Eduardo Tolliver MD  •  ondansetron (ZOFRAN) tablet 4 mg, 4 mg, Oral, Q6H PRN **OR** ondansetron (ZOFRAN) injection 4 mg, 4 mg, Intravenous, Q6H PRN, Bal Manning MD  •  polyethylene glycol (MIRALAX) packet 17 g, 17 g, Nasogastric, BID PRN, Eduardo Tolliver MD  •  potassium chloride (KAYCIEL) 20 MEQ/15ML (10%) solution 10 mEq, 10 mEq, Per G Tube, Daily, Salima Oh APRN  •  risperiDONE (risperDAL) tablet 1 mg, 1 mg, Nasogastric, Nightly, Bal Manning MD  •  rivaroxaban (XARELTO) tablet 10 mg, 10 mg, Per G Tube, Daily With Dinner, Eduardo Tolliver MD  •  saccharomyces boulardii (FLORASTOR) capsule 250 mg, 250 mg, Oral, BID, Bal Manning MD  •  sennosides-docusate (PERICOLACE) 8.6-50 MG per tablet 1 tablet, 1 tablet, Oral, BID PRN, Eduardo Tolliver MD  •  sodium chloride 0.9 % flush 10 mL, 10 mL, Intravenous, Q12H, Bal Manning MD  •  sodium chloride 0.9 % flush 10 mL, 10 mL, Intravenous, PRN, Bal Manning MD    Data:     Code Status:    Code Status and Medical Interventions:   Ordered at: 08/25/20 0701     Level Of Support Discussed With:    Health Care Surrogate     Code Status:    CPR     Medical Interventions (Level of Support Prior to Arrest):    Full       History reviewed. No pertinent family history.    Social History     Socioeconomic History   • Marital status: Single     Spouse name: Not on file   • Number of children: Not on file   • Years of education: Not on file   • Highest education level: Not on file       Vitals:  BP (!) 188/86   Pulse 72   Temp 97.9 °F (36.6 °C) (Temporal)   Resp 18   Ht 180.3 cm (70.98\")   Wt 79.1 kg (174 lb 4.8 oz)   SpO2 97%   BMI " "24.32 kg/m²   T 98.5 P 70 R 14 /62 Sp02 96% (room air)      I/O (24Hr):  No intake or output data in the 24 hours ending 09/13/20 2030    Labs and imaging:      No results found for this or any previous visit (from the past 12 hour(s)).      Physical Examination:        Physical Exam   Constitutional: Vital signs are normal. He appears well-developed and well-nourished.   HENT:   Head: Normocephalic and atraumatic.   Nose: Nose normal.   Mouth/Throat: Oropharynx is clear and moist.   Eyes: Pupils are equal, round, and reactive to light. Conjunctivae, EOM and lids are normal.   Neck: Trachea normal and normal range of motion. Neck supple.   Dressing to neck clean dry and intact   Cardiovascular: Normal rate, regular rhythm, normal heart sounds and intact distal pulses.   Pulmonary/Chest: Effort normal and breath sounds normal.   Abdominal: Soft. Normal appearance and bowel sounds are normal.   g tube   Musculoskeletal: Normal range of motion.      Comments: Generalized weakness   Neurological: He is alert. No cranial nerve deficit or sensory deficit.   Oriented to person only  Disoriented to place (\"[facility]\") and president (\"[place]\")  Interacting appropriately and following commands   Skin: Skin is warm and dry. No petechiae and no rash noted. No erythema. Nails show no clubbing.   Dressing c/d/i   Psychiatric: He has a normal mood and affect. His behavior is normal.   Nursing note and vitals reviewed.        Assessment:          Respiratory insufficiency    Acute on chronic respiratory failure with hypoxia and hypercapnia (CMS/[facility])    Ventilator dependence (CMS/[facility])    Past Medical History:   Diagnosis Date   • Diabetes (CMS/HCC)    • Schizo affective schizophrenia (CMS/HCC)         Plan:        1. Acute hypoxic respiratory failure  2. Acute renal failure on CKD3  3. S/p Cardiac arrest  4. Schizophrenia  5. Multifactorial anemia  6. Hyponatremia  7. COPD  8. Leukocytosis  9. Dysphagia  10. Tobacco " abuse  11. DM2 with hyperglycemia on long term insulin  12. Bradycardia  13. Hypokalemia    Continue current treatment. Monitor counts. Increase activity as tolerated.  Monitor respiratory status closely s/p decannulation.  Monitor electrolytes and renal function closely. Watch off antibiotics. Continue diuresis. Labs Monday. Continue modified po diet.  Discharge planning.      Electronically signed by MIKEY David on 9/13/2020 at 20:30 CDT   I have discussed the care of Mohan Villatoro, including pertinent history and exam findings, with the nurse practitioner.    I have seen and examined the patient and the key elements of all parts of the encounter have been performed by me.  I agree with the assessment, plan and orders as documented by MIKEY Omer, after I modified the exam findings and the plan of treatments and the final version is my approved version of the assessment.        Electronically signed by MIKEY David on 9/13/2020 at 20:30 CDT

## 2020-09-15 NOTE — PROGRESS NOTES
PRATEEK Baca APRN        Internal Medicine Progress Note    9/15/2020   13:10 CDT    Name:  Mohan Villatoro  MRN:    [id number]     Acct:     [id number]   Room:  [facility] Day: 0     Admit Date: 8/12/2020  2:37 PM  PCP: Provider, No Known    Subjective:     C/C: need for continued vent weaning    Interval History: Status:  stayed the same/stable.  Up to chair. No family at bedside. Afebrile. Progressing with therapy. Alert and oriented with intermittent confusion. Continues to do well s/p decannulation. BNP elevated. Counts otherwise stable. No new concerns.     Review of Systems   Constitution: Negative for chills, decreased appetite, malaise/fatigue, weight gain and weight loss.   HENT: Negative for congestion, ear discharge, hoarse voice and tinnitus.    Eyes: Negative for blurred vision, discharge, visual disturbance and visual halos.   Cardiovascular: Negative for chest pain, claudication, dyspnea on exertion, irregular heartbeat, leg swelling, orthopnea and paroxysmal nocturnal dyspnea.   Respiratory: Negative for cough, shortness of breath, sputum production and wheezing.    Endocrine: Negative for cold intolerance, heat intolerance and polyuria.   Hematologic/Lymphatic: Negative for adenopathy. Does not bruise/bleed easily.   Skin: Negative for dry skin, itching and suspicious lesions.   Musculoskeletal: Negative for arthritis, back pain, falls, joint pain, muscle weakness and myalgias.   Gastrointestinal: Negative for abdominal pain, constipation, diarrhea, dysphagia and hematemesis.   Genitourinary: Negative for bladder incontinence, dysuria and frequency.   Neurological: Negative for aphonia, disturbances in coordination, dizziness and weakness.   Psychiatric/Behavioral: Negative for altered mental status, depression, memory loss and substance abuse. The patient does not have insomnia and is not nervous/anxious.          Medications:     Allergies:    Allergies   Allergen Reactions   • Niacin Unknown - High Severity       Current Meds:   Current Facility-Administered Medications:   •  acetaminophen (TYLENOL) 160 MG/5ML solution 650 mg, 650 mg, Per G Tube, Q4H PRN, Bal Manning MD  •  albuterol (PROVENTIL) nebulizer solution 0.083% 2.5 mg/3mL, 2.5 mg, Nebulization, Q6H PRN, Bal Manning MD  •  amLODIPine (NORVASC) tablet 10 mg, 10 mg, Per G Tube, Q24H, Tripp Felipe MD  •  atropine sulfate injection 0.5 mg, 0.5 mg, Intravenous, PRN, Bal Manning MD  •  bumetanide (BUMEX) tablet 1 mg, 1 mg, Per G Tube, Daily, Salima Oh APRN  •  busPIRone (BUSPAR) tablet 10 mg, 10 mg, Per G Tube, Q8H, Bal Manning MD  •  cetirizine (zyrTEC) tablet 10 mg, 10 mg, Per G Tube, Daily, Bal Manning MD  •  fentaNYL (DURAGESIC) 12 MCG/HR 1 patch, 1 patch, Transdermal, Q72H **AND** Check Fentanyl Patch Placement, 1 each, Does not apply, Q12H, Eduardo Tolliver MD  •  citalopram (CeleXA) tablet 20 mg, 20 mg, Per G Tube, Daily, Bal Manning MD  •  dextrose (D50W) 25 g/ 50mL Intravenous Solution 25 g, 25 g, Intravenous, Q15 Min PRN, Bal Manning MD  •  dextrose (GLUTOSE) oral gel 15 g, 15 g, Oral, Q15 Min PRN, Bal Manning MD  •  fluticasone (FLONASE) 50 MCG/ACT nasal spray 2 spray, 2 spray, Each Nare, Daily, Bal Manning MD  •  glucagon (human recombinant) (GLUCAGEN DIAGNOSTIC) injection 1 mg, 1 mg, Subcutaneous, Q15 Min PRN, Bal Manning MD  •  haloperidol (HALDOL) tablet 7.5 mg, 7.5 mg, Per G Tube, Nightly, Bal Manning MD  •  hydrALAZINE (APRESOLINE) injection 10 mg, 10 mg, Intravenous, Q6H PRN, Bal Manning MD  •  hydrALAZINE (APRESOLINE) tablet 75 mg, 75 mg, Per G Tube, Q8H, Eduardo Tolliver MD  •  HYDROcodone-acetaminophen (HYCET) 7.5-325 MG/15ML solution 15 mL, 15 mL, Per G Tube, Q8H PRN, Eduardo Tolliver MD  •  ipratropium-albuterol (DUO-NEB)  "nebulizer solution 3 mL, 3 mL, Nebulization, Q6H PRN, Eduardo Tolliver MD  •  lansoprazole (FIRST) oral suspension 30 mg, 30 mg, Nasogastric, QAM, Bal Manning MD  •  metoclopramide (REGLAN) tablet 5 mg, 5 mg, Per G Tube, Q8H, Eduardo Tolliver MD  •  ondansetron (ZOFRAN) tablet 4 mg, 4 mg, Oral, Q6H PRN **OR** ondansetron (ZOFRAN) injection 4 mg, 4 mg, Intravenous, Q6H PRN, Bal Manning MD  •  polyethylene glycol (MIRALAX) packet 17 g, 17 g, Nasogastric, BID PRN, Eduardo Tolliver MD  •  potassium chloride (KAYCIEL) 20 MEQ/15ML (10%) solution 10 mEq, 10 mEq, Per G Tube, Daily, Salima Oh APRN  •  risperiDONE (risperDAL) tablet 1 mg, 1 mg, Nasogastric, Nightly, Bal Manning MD  •  rivaroxaban (XARELTO) tablet 10 mg, 10 mg, Per G Tube, Daily With Dinner, Eduardo Tolliver MD  •  saccharomyces boulardii (FLORASTOR) capsule 250 mg, 250 mg, Oral, BID, Bal Manning MD  •  sennosides-docusate (PERICOLACE) 8.6-50 MG per tablet 1 tablet, 1 tablet, Oral, BID PRN, Eduardo Tolliver MD  •  sodium chloride 0.9 % flush 10 mL, 10 mL, Intravenous, Q12H, Bal Manning MD  •  sodium chloride 0.9 % flush 10 mL, 10 mL, Intravenous, PRN, Bal Manning MD    Data:     Code Status:    Code Status and Medical Interventions:   Ordered at: 08/25/20 0701     Level Of Support Discussed With:    Health Care Surrogate     Code Status:    CPR     Medical Interventions (Level of Support Prior to Arrest):    Full       History reviewed. No pertinent family history.    Social History     Socioeconomic History   • Marital status: Single     Spouse name: Not on file   • Number of children: Not on file   • Years of education: Not on file   • Highest education level: Not on file       Vitals:  BP (!) 188/86   Pulse 72   Temp 97.9 °F (36.6 °C) (Temporal)   Resp 18   Ht 180.3 cm (70.98\")   Wt 79.1 kg (174 lb 4.8 oz)   SpO2 97%   BMI 24.32 kg/m²   T 98.8 P 72 R 16 BP " 159/60 Sp02 97% (room air)      I/O (24Hr):  No intake or output data in the 24 hours ending 09/15/20 1310    Labs and imaging:      Recent Results (from the past 12 hour(s))   Basic Metabolic Panel    Collection Time: 09/15/20  5:54 AM    Specimen: Blood   Result Value Ref Range    Glucose 133 (H) 65 - 99 mg/dL    BUN 16 6 - 20 mg/dL    Creatinine 1.21 0.76 - 1.27 mg/dL    Sodium 140 136 - 145 mmol/L    Potassium 3.5 3.5 - 5.2 mmol/L    Chloride 102 98 - 107 mmol/L    CO2 29.0 22.0 - 29.0 mmol/L    Calcium 8.7 8.6 - 10.5 mg/dL    eGFR Non African Amer 62 >60 mL/min/1.73    BUN/Creatinine Ratio 13.2 7.0 - 25.0    Anion Gap 9.0 5.0 - 15.0 mmol/L   BNP    Collection Time: 09/15/20  5:54 AM    Specimen: Blood   Result Value Ref Range    proBNP 21,702.0 (H) 0.0 - 900.0 pg/mL   CBC Auto Differential    Collection Time: 09/15/20  5:54 AM    Specimen: Blood   Result Value Ref Range    WBC 13.26 (H) 3.40 - 10.80 10*3/mm3    RBC 3.23 (L) 4.14 - 5.80 10*6/mm3    Hemoglobin 9.9 (L) 13.0 - 17.7 g/dL    Hematocrit 29.2 (L) 37.5 - 51.0 %    MCV 90.4 79.0 - 97.0 fL    MCH 30.7 26.6 - 33.0 pg    MCHC 33.9 31.5 - 35.7 g/dL    RDW 13.2 12.3 - 15.4 %    RDW-SD 43.0 37.0 - 54.0 fl    MPV 10.4 6.0 - 12.0 fL    Platelets 342 140 - 450 10*3/mm3    Neutrophil % 62.9 42.7 - 76.0 %    Lymphocyte % 17.3 (L) 19.6 - 45.3 %    Monocyte % 11.5 5.0 - 12.0 %    Eosinophil % 6.5 (H) 0.3 - 6.2 %    Basophil % 1.1 0.0 - 1.5 %    Immature Grans % 0.7 (H) 0.0 - 0.5 %    Neutrophils, Absolute 8.34 (H) 1.70 - 7.00 10*3/mm3    Lymphocytes, Absolute 2.30 0.70 - 3.10 10*3/mm3    Monocytes, Absolute 1.53 (H) 0.10 - 0.90 10*3/mm3    Eosinophils, Absolute 0.86 (H) 0.00 - 0.40 10*3/mm3    Basophils, Absolute 0.14 0.00 - 0.20 10*3/mm3    Immature Grans, Absolute 0.09 (H) 0.00 - 0.05 10*3/mm3    nRBC 0.0 0.0 - 0.2 /100 WBC         Physical Examination:        Physical Exam   Constitutional: He is oriented to person, place, and time. Vital signs are normal. He  appears well-developed and well-nourished.   HENT:   Head: Normocephalic and atraumatic.   Nose: Nose normal.   Mouth/Throat: Oropharynx is clear and moist.   Eyes: Pupils are equal, round, and reactive to light. Conjunctivae, EOM and lids are normal.   Neck: Trachea normal and normal range of motion. Neck supple.   Band aid intact   Cardiovascular: Normal rate, regular rhythm, S1 normal, S2 normal and normal heart sounds. Pulses are palpable.   Pulmonary/Chest: Effort normal and breath sounds normal.   Abdominal: Soft. Normal appearance and bowel sounds are normal.   g tube   Musculoskeletal: Normal range of motion.      Comments: Generalized weakness   Neurological: He is alert and oriented to person, place, and time. No cranial nerve deficit or sensory deficit.   Intermittent confusion   Skin: Skin is warm and dry. No petechiae and no rash noted. No erythema. Nails show no clubbing.   Dressing c/d/i   Psychiatric: He has a normal mood and affect. His behavior is normal.   Nursing note and vitals reviewed.        Assessment:          Respiratory insufficiency    Acute on chronic respiratory failure with hypoxia and hypercapnia (CMS/[facility])    Ventilator dependence (CMS/[facility])    Past Medical History:   Diagnosis Date   • Diabetes (CMS/HCC)    • Schizo affective schizophrenia (CMS/HCC)         Plan:        1. Acute hypoxic respiratory failure  2. Acute renal failure on CKD3  3. S/p Cardiac arrest  4. Schizophrenia  5. Multifactorial anemia  6. Hyponatremia  7. COPD  8. Leukocytosis  9. Dysphagia  10. Tobacco abuse  11. DM2 with hyperglycemia on long term insulin  12. Bradycardia  13. Hypokalemia    Continue current treatment. Monitor counts. Increase activity. Aggressive therapies. Maintain patient safety. Labs Thursday. Monitor s/p decannulation.. Continue diuresis - extra dose bumex today. CXR today.  Discharge planning.     Electronically signed by MIKEY Powell on 9/15/2020 at 13:10 CDT   I have  discussed the care of Mohan Villatoro, including pertinent history and exam findings, with the nurse practitioner.    I have seen and examined the patient and the key elements of all parts of the encounter have been performed by me.  I agree with the assessment, plan and orders as documented by MIKEY Whitehead, after I modified the exam findings and the plan of treatments and the final version is my approved version of the assessment.        Electronically signed by Eduardo Tolliver MD on 9/15/2020 at 19:11 CDT

## 2020-09-16 NOTE — PROGRESS NOTES
Unruly Tolliver M.D.  [facility]        Internal Medicine Progress Note    9/16/2020   10:53 CDT    Name:  Mohan Villatoro  MRN:    [id number]     Acct:     [id number]   Room:  6/[facility] Day: 0     Admit Date: 8/12/2020  2:37 PM  PCP: Provider, No Known    Subjective:     C/C: need for continued vent weaning    Interval History: Status:  stayed the same/stable.  Ambulating in hallway with therapy. No family at bedside. Afebrile. Progressing with therapy. Alert and oriented with intermittent confusion. Continues to do well s/p decannulation. Slight decrease in bnp with slight decline in renal function.  Counts otherwise stable. No new concerns.     Review of Systems   Constitution: Negative for chills, decreased appetite, malaise/fatigue, weight gain and weight loss.   HENT: Negative for congestion, ear discharge, hoarse voice and tinnitus.    Eyes: Negative for blurred vision, discharge, visual disturbance and visual halos.   Cardiovascular: Negative for chest pain, claudication, dyspnea on exertion, irregular heartbeat, leg swelling, orthopnea and paroxysmal nocturnal dyspnea.   Respiratory: Negative for cough, shortness of breath, sputum production and wheezing.    Endocrine: Negative for cold intolerance, heat intolerance and polyuria.   Hematologic/Lymphatic: Negative for adenopathy. Does not bruise/bleed easily.   Skin: Negative for dry skin, itching and suspicious lesions.   Musculoskeletal: Negative for arthritis, back pain, falls, joint pain, muscle weakness and myalgias.   Gastrointestinal: Negative for abdominal pain, constipation, diarrhea, dysphagia and hematemesis.   Genitourinary: Negative for bladder incontinence, dysuria and frequency.   Neurological: Negative for aphonia, disturbances in coordination, dizziness and weakness.   Psychiatric/Behavioral: Negative for altered mental status, depression, memory loss and substance abuse. The patient does not have insomnia  and is not nervous/anxious.          Medications:     Allergies:   Allergies   Allergen Reactions   • Niacin Unknown - High Severity       Current Meds:   Current Facility-Administered Medications:   •  acetaminophen (TYLENOL) 160 MG/5ML solution 650 mg, 650 mg, Per G Tube, Q4H PRN, Bal Manning MD  •  albuterol (PROVENTIL) nebulizer solution 0.083% 2.5 mg/3mL, 2.5 mg, Nebulization, Q6H PRN, Bal Manning MD  •  amLODIPine (NORVASC) tablet 10 mg, 10 mg, Per G Tube, Q24H, Tripp Felipe MD  •  atropine sulfate injection 0.5 mg, 0.5 mg, Intravenous, PRN, Bal Manning MD  •  bumetanide (BUMEX) tablet 1 mg, 1 mg, Per G Tube, Daily, Salima Oh APRN  •  busPIRone (BUSPAR) tablet 10 mg, 10 mg, Per G Tube, Q8H, Bal Manning MD  •  cetirizine (zyrTEC) tablet 10 mg, 10 mg, Per G Tube, Daily, Bal Manning MD  •  fentaNYL (DURAGESIC) 12 MCG/HR 1 patch, 1 patch, Transdermal, Q72H **AND** Check Fentanyl Patch Placement, 1 each, Does not apply, Q12H, Eduardo Tolliver MD  •  citalopram (CeleXA) tablet 20 mg, 20 mg, Per G Tube, Daily, Bal Manning MD  •  dextrose (D50W) 25 g/ 50mL Intravenous Solution 25 g, 25 g, Intravenous, Q15 Min PRN, Bal Manning MD  •  dextrose (GLUTOSE) oral gel 15 g, 15 g, Oral, Q15 Min PRN, Bal Manning MD  •  fluticasone (FLONASE) 50 MCG/ACT nasal spray 2 spray, 2 spray, Each Nare, Daily, Bal Manning MD  •  glucagon (human recombinant) (GLUCAGEN DIAGNOSTIC) injection 1 mg, 1 mg, Subcutaneous, Q15 Min PRN, Bal Manning MD  •  haloperidol (HALDOL) tablet 7.5 mg, 7.5 mg, Per G Tube, Nightly, Bal Manning MD  •  hydrALAZINE (APRESOLINE) injection 10 mg, 10 mg, Intravenous, Q6H PRN, Bal Manning MD  •  hydrALAZINE (APRESOLINE) tablet 75 mg, 75 mg, Per G Tube, Q8H, Eduardo Tolliver MD  •  HYDROcodone-acetaminophen (HYCET) 7.5-325 MG/15ML solution 15 mL, 15 mL, Per G Tube, Q8H PRN,  "Eduardo Tolliver MD  •  ipratropium-albuterol (DUO-NEB) nebulizer solution 3 mL, 3 mL, Nebulization, Q6H PRN, Eduardo Tolliver MD  •  lansoprazole (FIRST) oral suspension 30 mg, 30 mg, Nasogastric, QAM, Bal Manning MD  •  metoclopramide (REGLAN) tablet 5 mg, 5 mg, Per G Tube, Q8H, Eduadro Tolliver MD  •  ondansetron (ZOFRAN) tablet 4 mg, 4 mg, Oral, Q6H PRN **OR** ondansetron (ZOFRAN) injection 4 mg, 4 mg, Intravenous, Q6H PRN, Bal Manning MD  •  polyethylene glycol (MIRALAX) packet 17 g, 17 g, Nasogastric, BID PRN, Eduardo Tolliver MD  •  potassium chloride (KAYCIEL) 20 MEQ/15ML (10%) solution 10 mEq, 10 mEq, Per G Tube, Daily, Salima Oh APRN  •  risperiDONE (risperDAL) tablet 1 mg, 1 mg, Nasogastric, Nightly, Bal Manning MD  •  rivaroxaban (XARELTO) tablet 10 mg, 10 mg, Per G Tube, Daily With Dinner, Eduardo Tolliver MD  •  saccharomyces boulardii (FLORASTOR) capsule 250 mg, 250 mg, Oral, BID, Bal Manning MD  •  sennosides-docusate (PERICOLACE) 8.6-50 MG per tablet 1 tablet, 1 tablet, Oral, BID PRN, Eduardo Tolliver MD  •  sodium chloride 0.9 % flush 10 mL, 10 mL, Intravenous, Q12H, Bal Manning MD  •  sodium chloride 0.9 % flush 10 mL, 10 mL, Intravenous, PRN, Bal Manning MD    Data:     Code Status:    Code Status and Medical Interventions:   Ordered at: 08/25/20 0701     Level Of Support Discussed With:    Health Care Surrogate     Code Status:    CPR     Medical Interventions (Level of Support Prior to Arrest):    Full       History reviewed. No pertinent family history.    Social History     Socioeconomic History   • Marital status: Single     Spouse name: Not on file   • Number of children: Not on file   • Years of education: Not on file   • Highest education level: Not on file       Vitals:  BP (!) 188/86   Pulse 72   Temp 97.9 °F (36.6 °C) (Temporal)   Resp 18   Ht 180.3 cm (70.98\")   Wt 79.1 kg (174 " lb 4.8 oz)   SpO2 97%   BMI 24.32 kg/m²   T 98.7 P 75 R 16 /74 Sp02 97% (room air)      I/O (24Hr):  No intake or output data in the 24 hours ending 09/16/20 1053    Labs and imaging:      Recent Results (from the past 12 hour(s))   Basic Metabolic Panel    Collection Time: 09/16/20  5:34 AM    Specimen: Blood   Result Value Ref Range    Glucose 139 (H) 65 - 99 mg/dL    BUN 14 6 - 20 mg/dL    Creatinine 1.35 (H) 0.76 - 1.27 mg/dL    Sodium 137 136 - 145 mmol/L    Potassium 3.8 3.5 - 5.2 mmol/L    Chloride 99 98 - 107 mmol/L    CO2 28.0 22.0 - 29.0 mmol/L    Calcium 8.5 (L) 8.6 - 10.5 mg/dL    eGFR Non African Amer 54 (L) >60 mL/min/1.73    BUN/Creatinine Ratio 10.4 7.0 - 25.0    Anion Gap 10.0 5.0 - 15.0 mmol/L   BNP    Collection Time: 09/16/20  5:34 AM    Specimen: Blood   Result Value Ref Range    proBNP 18,598.0 (H) 0.0 - 900.0 pg/mL         Physical Examination:        Physical Exam   Constitutional: He is oriented to person, place, and time. Vital signs are normal. He appears well-developed and well-nourished.   HENT:   Head: Normocephalic and atraumatic.   Nose: Nose normal.   Mouth/Throat: Oropharynx is clear and moist.   Eyes: Pupils are equal, round, and reactive to light. Conjunctivae, EOM and lids are normal.   Neck: Trachea normal and normal range of motion. Neck supple.   Band aid intact   Cardiovascular: Normal rate, regular rhythm, S1 normal, S2 normal and normal heart sounds. Pulses are palpable.   Pulmonary/Chest: Effort normal and breath sounds normal.   Abdominal: Soft. Normal appearance and bowel sounds are normal.   g tube   Musculoskeletal: Normal range of motion.      Comments: Generalized weakness   Neurological: He is alert and oriented to person, place, and time. No cranial nerve deficit or sensory deficit.   Intermittent confusion   Skin: Skin is warm and dry. No petechiae and no rash noted. No erythema. Nails show no clubbing.   Dressing c/d/i   Psychiatric: He has a normal mood  and affect. His behavior is normal.   Nursing note and vitals reviewed.        Assessment:          Respiratory insufficiency    Acute on chronic respiratory failure with hypoxia and hypercapnia (CMS/HCC)    Ventilator dependence (CMS/HCC)    Past Medical History:   Diagnosis Date   • Diabetes (CMS/HCC)    • Schizo affective schizophrenia (CMS/HCC)         Plan:        1. Acute hypoxic respiratory failure  2. Acute renal failure on CKD3  3. S/p Cardiac arrest  4. Schizophrenia  5. Multifactorial anemia  6. Hyponatremia  7. COPD  8. Leukocytosis  9. Dysphagia  10. Tobacco abuse  11. DM2 with hyperglycemia on long term insulin  12. Bradycardia  13. Hypokalemia    Continue current treatment. Monitor counts. Increase activity. Aggressive therapies. Maintain patient safety. Labs in am. Monitor s/p decannulation.. Continue diuresis. Monitor renal function.  Discharge planning.     Electronically signed by MIKEY Powell on 9/16/2020 at 10:53 CDT

## 2020-09-17 NOTE — PROGRESS NOTES
Adult Nutrition  Assessment/PES    Patient Name:  Mohan Villatoro  YOB: 1960  MRN: [id number]  Admit Date:  8/12/2020    Assessment Date:  9/17/2020    Comments:  Pt's PO diet has been upgraded to Regular with Thin liquids. He has been tolerating well and his PO intake is great with 100% documented x4 meals. Unsure of accuracy of current wt, possible 44# loss compared to admission wt. Spoke with PT, they will obtain a standing scale wt when they walk with pt again. Discharge planning underway, will continue to follow.     Reason for Assessment     Row Name 09/17/20 155          Reason for Assessment    Reason For Assessment  follow-up protocol         Nutrition/Diet History     Row Name 09/17/20 1558          Nutrition/Diet History    Typical Food/Fluid Intake  Pt's PO diet has been upgraded per ST to Regular and thin liquids. He has been tolerating well and his PO intake is adequate. Discharge planning underway.         Anthropometrics     Row Name 09/17/20 1556          Usual Body Weight (UBW)    Weight Loss Time Frame  current wt 163# -- 44# loss compared to admission wt        Body Mass Index (BMI)    BMI Assessment  BMI 18.5-24.9: normal         Labs/Tests/Procedures/Meds     Row Name 09/17/20 1600          Labs/Procedures/Meds    Lab Results Reviewed  reviewed     Lab Results Comments  glucose; Cr; wbc; GFR 54        Medications    Pertinent Medications Reviewed  reviewed, pertinent     Pertinent Medications Comments  bumex; buspar; haldol; reglan; florastor         Physical Findings     Row Name 09/17/20 1601          Physical Findings    Tubes  PEG           Nutrition Prescription Ordered     Row Name 09/17/20 1601          Nutrition Prescription PO    Current PO Diet  Regular     Fluid Consistency  Thin         Evaluation of Received Nutrient/Fluid Intake     Row Name 09/17/20 1601          Nutrient/Fluid Evaluation    Number of Days Evaluated  2 days     Additional Documentation  Fluid  Intake Evaluation (Group)        Fluid Intake Evaluation    Oral Fluid (mL)  1030     IV Fluid (mL)  20        PO Evaluation    Number of Days PO Intake Evaluated  2 days     Number of Meals  4     % PO Intake  100               Problem/Interventions:  Problem 1     Row Name 09/17/20 1602          Nutrition Diagnoses Problem 1    Problem 1  Nutrition Appropriate for Condition at this Time     Etiology (related to)  Medical Diagnosis     Pulmonary/Critical Care  Ventilator;Acute respiratory failure no longer on mech vent     Signs/Symptoms (evidenced by)  SLP/Swallow eval;PO Intake     Percent (%) intake recorded  100 %     Over number of meals  4     Swallow eval status  Done     Type of SLP Evaluation  Bedside               Intervention Goal     Row Name 09/17/20 1602          Intervention Goal    General  Maintain nutrition;Meet nutritional needs for age/condition     PO  Maintain intake;Continue positive trend;Meet estimated needs     Weight  Maintain weight         Nutrition Intervention     Row Name 09/17/20 1602          Nutrition Intervention    RD/Tech Action  Follow Tx progress;Care plan reviewd;Encourage intake           Education/Evaluation     Row Name 09/17/20 1602          Education    Education  No discharge needs identified at this time        Monitor/Evaluation    Monitor  Per protocol           Electronically signed by:  Catherine España  09/17/20 16:03 CDT

## 2020-09-17 NOTE — PROGRESS NOTES
Unruly Tolliver M.D.  [facility]        Internal Medicine Progress Note    9/17/2020   09:39 CDT    Name:  Mohan Villatoro  MRN:    [id number]     Acct:     [id number]   Room:  6/[facility] Day: 0     Admit Date: 8/12/2020  2:37 PM  PCP: Provider, No Known    Subjective:     C/C: need for continued vent weaning    Interval History: Status:  stayed the same/stable.  Sitting up in bed. No family at bedside. Afebrile. Progressing with therapy. Alert and oriented with intermittent confusion. Continues to do well s/p decannulation. Hopeful for discharge back to assisted living. No new concerns.     Review of Systems   Constitution: Negative for chills, decreased appetite, malaise/fatigue, weight gain and weight loss.   HENT: Negative for congestion, ear discharge, hoarse voice and tinnitus.    Eyes: Negative for blurred vision, discharge, visual disturbance and visual halos.   Cardiovascular: Negative for chest pain, claudication, dyspnea on exertion, irregular heartbeat, leg swelling, orthopnea and paroxysmal nocturnal dyspnea.   Respiratory: Negative for cough, shortness of breath, sputum production and wheezing.    Endocrine: Negative for cold intolerance, heat intolerance and polyuria.   Hematologic/Lymphatic: Negative for adenopathy. Does not bruise/bleed easily.   Skin: Negative for dry skin, itching and suspicious lesions.   Musculoskeletal: Negative for arthritis, back pain, falls, joint pain, muscle weakness and myalgias.   Gastrointestinal: Negative for abdominal pain, constipation, diarrhea, dysphagia and hematemesis.   Genitourinary: Negative for bladder incontinence, dysuria and frequency.   Neurological: Negative for aphonia, disturbances in coordination, dizziness and weakness.   Psychiatric/Behavioral: Negative for altered mental status, depression, memory loss and substance abuse. The patient does not have insomnia and is not nervous/anxious.          Medications:      Allergies:   Allergies   Allergen Reactions   • Niacin Unknown - High Severity       Current Meds:   Current Facility-Administered Medications:   •  acetaminophen (TYLENOL) 160 MG/5ML solution 650 mg, 650 mg, Per G Tube, Q4H PRN, Bal Manning MD  •  albuterol (PROVENTIL) nebulizer solution 0.083% 2.5 mg/3mL, 2.5 mg, Nebulization, Q6H PRN, Bal Manning MD  •  amLODIPine (NORVASC) tablet 10 mg, 10 mg, Per G Tube, Q24H, Tripp Felipe MD  •  atropine sulfate injection 0.5 mg, 0.5 mg, Intravenous, PRN, Bal Manning MD  •  bumetanide (BUMEX) tablet 1 mg, 1 mg, Per G Tube, Daily, Salima Oh APRN  •  busPIRone (BUSPAR) tablet 10 mg, 10 mg, Per G Tube, Q8H, Bal Manning MD  •  cetirizine (zyrTEC) tablet 10 mg, 10 mg, Per G Tube, Daily, Bal Manning MD  •  fentaNYL (DURAGESIC) 12 MCG/HR 1 patch, 1 patch, Transdermal, Q72H **AND** Check Fentanyl Patch Placement, 1 each, Does not apply, Q12H, Eduardo Tolliver MD  •  citalopram (CeleXA) tablet 20 mg, 20 mg, Per G Tube, Daily, Bal Manning MD  •  dextrose (D50W) 25 g/ 50mL Intravenous Solution 25 g, 25 g, Intravenous, Q15 Min PRN, Bal Manning MD  •  dextrose (GLUTOSE) oral gel 15 g, 15 g, Oral, Q15 Min PRN, Bal Manning MD  •  fluticasone (FLONASE) 50 MCG/ACT nasal spray 2 spray, 2 spray, Each Nare, Daily, Bal Manning MD  •  glucagon (human recombinant) (GLUCAGEN DIAGNOSTIC) injection 1 mg, 1 mg, Subcutaneous, Q15 Min PRN, Bal Manning MD  •  haloperidol (HALDOL) tablet 7.5 mg, 7.5 mg, Per G Tube, Nightly, Bal Manning MD  •  hydrALAZINE (APRESOLINE) injection 10 mg, 10 mg, Intravenous, Q6H PRN, Bal Manning MD  •  hydrALAZINE (APRESOLINE) tablet 75 mg, 75 mg, Per G Tube, Q8H, Eduardo Tolliver MD  •  HYDROcodone-acetaminophen (HYCET) 7.5-325 MG/15ML solution 15 mL, 15 mL, Per G Tube, Q8H PRN, Eduardo Tolliver MD  •  ipratropium-albuterol  "(DUO-NEB) nebulizer solution 3 mL, 3 mL, Nebulization, Q6H PRN, Eduardo Tolliver MD  •  lansoprazole (FIRST) oral suspension 30 mg, 30 mg, Nasogastric, QAM, Bal Manning MD  •  metoclopramide (REGLAN) tablet 5 mg, 5 mg, Per G Tube, Q8H, Eduardo Tolliver MD  •  ondansetron (ZOFRAN) tablet 4 mg, 4 mg, Oral, Q6H PRN **OR** ondansetron (ZOFRAN) injection 4 mg, 4 mg, Intravenous, Q6H PRN, Bal Manning MD  •  polyethylene glycol (MIRALAX) packet 17 g, 17 g, Nasogastric, BID PRN, Eduardo Tolliver MD  •  potassium chloride (KAYCIEL) 20 MEQ/15ML (10%) solution 10 mEq, 10 mEq, Per G Tube, Daily, Salima Oh APRN  •  risperiDONE (risperDAL) tablet 1 mg, 1 mg, Nasogastric, Nightly, Bal Manning MD  •  rivaroxaban (XARELTO) tablet 10 mg, 10 mg, Per G Tube, Daily With Dinner, Eduardo Tolliver MD  •  saccharomyces boulardii (FLORASTOR) capsule 250 mg, 250 mg, Oral, BID, Bal Manning MD  •  sennosides-docusate (PERICOLACE) 8.6-50 MG per tablet 1 tablet, 1 tablet, Oral, BID PRN, Eduardo Tolliver MD  •  sodium chloride 0.9 % flush 10 mL, 10 mL, Intravenous, Q12H, Bal Manning MD  •  sodium chloride 0.9 % flush 10 mL, 10 mL, Intravenous, PRN, Bal Manning MD    Data:     Code Status:    Code Status and Medical Interventions:   Ordered at: 08/25/20 0701     Level Of Support Discussed With:    Health Care Surrogate     Code Status:    CPR     Medical Interventions (Level of Support Prior to Arrest):    Full       History reviewed. No pertinent family history.    Social History     Socioeconomic History   • Marital status: Single     Spouse name: Not on file   • Number of children: Not on file   • Years of education: Not on file   • Highest education level: Not on file       Vitals:  BP (!) 188/86   Pulse 72   Temp 97.9 °F (36.6 °C) (Temporal)   Resp 18   Ht 180.3 cm (70.98\")   Wt 79.1 kg (174 lb 4.8 oz)   SpO2 97%   BMI 24.32 kg/m²   T 98.5 P " 71 R 18 /63 Sp02 97% (room air)      I/O (24Hr):  No intake or output data in the 24 hours ending 09/17/20 0939    Labs and imaging:      No results found for this or any previous visit (from the past 12 hour(s)).      Physical Examination:        Physical Exam   Constitutional: He is oriented to person, place, and time. Vital signs are normal. He appears well-developed and well-nourished.   HENT:   Head: Normocephalic and atraumatic.   Nose: Nose normal.   Mouth/Throat: Oropharynx is clear and moist.   Eyes: Pupils are equal, round, and reactive to light. Conjunctivae, EOM and lids are normal.   Neck: Trachea normal and normal range of motion. Neck supple.   Band aid intact   Cardiovascular: Normal rate, regular rhythm, S1 normal, S2 normal and normal heart sounds. Pulses are palpable.   Pulmonary/Chest: Effort normal and breath sounds normal.   Abdominal: Soft. Normal appearance and bowel sounds are normal.   g tube   Musculoskeletal: Normal range of motion.      Comments: Generalized weakness   Neurological: He is alert and oriented to person, place, and time. No cranial nerve deficit or sensory deficit.   Intermittent confusion   Skin: Skin is warm and dry. No petechiae and no rash noted. No erythema. Nails show no clubbing.   Dressing c/d/i   Psychiatric: He has a normal mood and affect. His behavior is normal.   Nursing note and vitals reviewed.        Assessment:          Respiratory insufficiency    Acute on chronic respiratory failure with hypoxia and hypercapnia (CMS/HCC)    Ventilator dependence (CMS/HCC)    Past Medical History:   Diagnosis Date   • Diabetes (CMS/HCC)    • Schizo affective schizophrenia (CMS/HCC)         Plan:        1. Acute hypoxic respiratory failure  2. Acute renal failure on CKD3  3. S/p Cardiac arrest  4. Schizophrenia  5. Multifactorial anemia  6. Hyponatremia  7. COPD  8. Leukocytosis  9. Dysphagia  10. Tobacco abuse  11. DM2 with hyperglycemia on long term  insulin  12. Bradycardia  13. Hypokalemia    Continue current treatment. Monitor counts. Increase activity. Aggressive therapies. Maintain patient safety. Labs in am Monitor s/p decannulation. Continue diuresis. Monitor renal function.  Discharge planning.     Electronically signed by MIKEY Powell on 9/17/2020 at 09:39 CDT

## 2020-09-18 NOTE — PROGRESS NOTES
Unruly Tolliver M.D.  MIKEY Whitehead        Internal Medicine Progress Note    9/18/2020   13:05 CDT    Name:  Mohan Villatoro  MRN:    [id number]     Acct:     [id number]   Room:  [address] [place] [address] Day: 0     Admit Date: 8/12/2020  2:37 PM  PCP: Provider, No Known    Subjective:     C/C: need for continued vent weaning    Interval History: Status:  stayed the same/stable.  Resting in bed.  No family at bedside. Low grade temp this morning.  Progressing with therapy. Alert and oriented with intermittent confusion. Continues to do well s/p decannulation. Hopeful for discharge back to assisted living. No new concerns.     Review of Systems   Constitution: Negative for chills, decreased appetite, malaise/fatigue, weight gain and weight loss.   HENT: Negative for congestion, ear discharge, hoarse voice and tinnitus.    Eyes: Negative for blurred vision, discharge, visual disturbance and visual halos.   Cardiovascular: Negative for chest pain, claudication, dyspnea on exertion, irregular heartbeat, leg swelling, orthopnea and paroxysmal nocturnal dyspnea.   Respiratory: Negative for cough, shortness of breath, sputum production and wheezing.    Endocrine: Negative for cold intolerance, heat intolerance and polyuria.   Hematologic/Lymphatic: Negative for adenopathy. Does not bruise/bleed easily.   Skin: Negative for dry skin, itching and suspicious lesions.   Musculoskeletal: Negative for arthritis, back pain, falls, joint pain, muscle weakness and myalgias.   Gastrointestinal: Negative for abdominal pain, constipation, diarrhea, dysphagia and hematemesis.   Genitourinary: Negative for bladder incontinence, dysuria and frequency.   Neurological: Negative for aphonia, disturbances in coordination, dizziness and weakness.   Psychiatric/Behavioral: Negative for altered mental status, depression, memory loss and substance abuse. The patient does not have insomnia and is not nervous/anxious.           Medications:     Allergies:   Allergies   Allergen Reactions   • Niacin Unknown - High Severity       Current Meds:   Current Facility-Administered Medications:   •  acetaminophen (TYLENOL) 160 MG/5ML solution 650 mg, 650 mg, Per G Tube, Q4H PRN, Bal Manning MD  •  albuterol (PROVENTIL) nebulizer solution 0.083% 2.5 mg/3mL, 2.5 mg, Nebulization, Q6H PRN, Bal Manning MD  •  amLODIPine (NORVASC) tablet 10 mg, 10 mg, Per G Tube, Q24H, Tripp Felipe MD  •  atropine sulfate injection 0.5 mg, 0.5 mg, Intravenous, PRN, Bal Manning MD  •  bumetanide (BUMEX) tablet 1 mg, 1 mg, Per G Tube, Daily, Salima Oh APRN  •  busPIRone (BUSPAR) tablet 10 mg, 10 mg, Per G Tube, Q8H, Bal Manning MD  •  cetirizine (zyrTEC) tablet 10 mg, 10 mg, Per G Tube, Daily, Bal Manning MD  •  fentaNYL (DURAGESIC) 12 MCG/HR 1 patch, 1 patch, Transdermal, Q72H **AND** Check Fentanyl Patch Placement, 1 each, Does not apply, Q12H, Eduardo Tolliver MD  •  citalopram (CeleXA) tablet 20 mg, 20 mg, Per G Tube, Daily, Bal Manning MD  •  dextrose (D50W) 25 g/ 50mL Intravenous Solution 25 g, 25 g, Intravenous, Q15 Min PRN, Bal Manning MD  •  dextrose (GLUTOSE) oral gel 15 g, 15 g, Oral, Q15 Min PRN, Bal Manning MD  •  fluticasone (FLONASE) 50 MCG/ACT nasal spray 2 spray, 2 spray, Each Nare, Daily, Bal Manning MD  •  glucagon (human recombinant) (GLUCAGEN DIAGNOSTIC) injection 1 mg, 1 mg, Subcutaneous, Q15 Min PRN, Bal Manning MD  •  haloperidol (HALDOL) tablet 7.5 mg, 7.5 mg, Per G Tube, Nightly, Bal Manning MD  •  hydrALAZINE (APRESOLINE) injection 10 mg, 10 mg, Intravenous, Q6H PRN, Bal Manning MD  •  hydrALAZINE (APRESOLINE) tablet 75 mg, 75 mg, Per G Tube, Q8H, Eduardo Tolliver MD  •  HYDROcodone-acetaminophen (HYCET) 7.5-325 MG/15ML solution 15 mL, 15 mL, Per G Tube, Q8H PRN, Eduardo Tolliver MD  •   "ipratropium-albuterol (DUO-NEB) nebulizer solution 3 mL, 3 mL, Nebulization, Q6H PRN, Eduardo Tolliver MD  •  lansoprazole (FIRST) oral suspension 30 mg, 30 mg, Nasogastric, QAM, Bal Manning MD  •  metoclopramide (REGLAN) tablet 5 mg, 5 mg, Per G Tube, Q8H, Eduardo Tolliver MD  •  ondansetron (ZOFRAN) tablet 4 mg, 4 mg, Oral, Q6H PRN **OR** ondansetron (ZOFRAN) injection 4 mg, 4 mg, Intravenous, Q6H PRN, Bal Manning MD  •  polyethylene glycol (MIRALAX) packet 17 g, 17 g, Nasogastric, BID PRN, Eduardo Tolliver MD  •  potassium chloride (MICRO-K) CR capsule 10 mEq, 10 mEq, Oral, Daily, Eduardo Tolliver MD  •  risperiDONE (risperDAL) tablet 1 mg, 1 mg, Nasogastric, Nightly, Bal Manning MD  •  rivaroxaban (XARELTO) tablet 10 mg, 10 mg, Per G Tube, Daily With Dinner, Eduardo Tolliver MD  •  saccharomyces boulardii (FLORASTOR) capsule 250 mg, 250 mg, Oral, BID, Bal Manning MD  •  sennosides-docusate (PERICOLACE) 8.6-50 MG per tablet 1 tablet, 1 tablet, Oral, BID PRN, Eduardo Tolliver MD  •  sodium chloride 0.9 % flush 10 mL, 10 mL, Intravenous, Q12H, Bal Manning MD  •  sodium chloride 0.9 % flush 10 mL, 10 mL, Intravenous, PRN, Bal Manning MD    Data:     Code Status:    Code Status and Medical Interventions:   Ordered at: 08/25/20 0701     Level Of Support Discussed With:    Health Care Surrogate     Code Status:    CPR     Medical Interventions (Level of Support Prior to Arrest):    Full       History reviewed. No pertinent family history.    Social History     Socioeconomic History   • Marital status: Single     Spouse name: Not on file   • Number of children: Not on file   • Years of education: Not on file   • Highest education level: Not on file       Vitals:  BP (!) 188/86   Pulse 72   Temp 97.9 °F (36.6 °C) (Temporal)   Resp 18   Ht 180.3 cm (70.98\")   Wt 79.1 kg (174 lb 4.8 oz)   SpO2 97%   BMI 24.32 kg/m²   T 99.3 P " 76 R 26 /71 Sp02 97% (room air)      I/O (24Hr):  No intake or output data in the 24 hours ending 09/18/20 1305    Labs and imaging:      Recent Results (from the past 12 hour(s))   Basic Metabolic Panel    Collection Time: 09/18/20  4:12 AM    Specimen: Blood   Result Value Ref Range    Glucose 153 (H) 65 - 99 mg/dL    BUN 16 6 - 20 mg/dL    Creatinine 1.36 (H) 0.76 - 1.27 mg/dL    Sodium 134 (L) 136 - 145 mmol/L    Potassium 3.9 3.5 - 5.2 mmol/L    Chloride 95 (L) 98 - 107 mmol/L    CO2 31.0 (H) 22.0 - 29.0 mmol/L    Calcium 8.6 8.6 - 10.5 mg/dL    eGFR Non African Amer 54 (L) >60 mL/min/1.73    BUN/Creatinine Ratio 11.8 7.0 - 25.0    Anion Gap 8.0 5.0 - 15.0 mmol/L   BNP    Collection Time: 09/18/20  4:12 AM    Specimen: Blood   Result Value Ref Range    proBNP 17,245.0 (H) 0.0 - 900.0 pg/mL   CBC Auto Differential    Collection Time: 09/18/20  4:12 AM    Specimen: Blood   Result Value Ref Range    WBC 14.78 (H) 3.40 - 10.80 10*3/mm3    RBC 3.02 (L) 4.14 - 5.80 10*6/mm3    Hemoglobin 9.3 (L) 13.0 - 17.7 g/dL    Hematocrit 27.6 (L) 37.5 - 51.0 %    MCV 91.4 79.0 - 97.0 fL    MCH 30.8 26.6 - 33.0 pg    MCHC 33.7 31.5 - 35.7 g/dL    RDW 12.9 12.3 - 15.4 %    RDW-SD 42.7 37.0 - 54.0 fl    MPV 10.0 6.0 - 12.0 fL    Platelets 313 140 - 450 10*3/mm3    Neutrophil % 61.3 42.7 - 76.0 %    Lymphocyte % 16.2 (L) 19.6 - 45.3 %    Monocyte % 11.9 5.0 - 12.0 %    Eosinophil % 9.0 (H) 0.3 - 6.2 %    Basophil % 0.7 0.0 - 1.5 %    Immature Grans % 0.9 (H) 0.0 - 0.5 %    Neutrophils, Absolute 9.05 (H) 1.70 - 7.00 10*3/mm3    Lymphocytes, Absolute 2.40 0.70 - 3.10 10*3/mm3    Monocytes, Absolute 1.76 (H) 0.10 - 0.90 10*3/mm3    Eosinophils, Absolute 1.33 (H) 0.00 - 0.40 10*3/mm3    Basophils, Absolute 0.11 0.00 - 0.20 10*3/mm3    Immature Grans, Absolute 0.13 (H) 0.00 - 0.05 10*3/mm3    nRBC 0.0 0.0 - 0.2 /100 WBC         Physical Examination:        Physical Exam   Constitutional: He is oriented to person, place, and time.  Vital signs are normal. He appears well-developed and well-nourished.   HENT:   Head: Normocephalic and atraumatic.   Nose: Nose normal.   Mouth/Throat: Oropharynx is clear and moist.   Eyes: Pupils are equal, round, and reactive to light. Conjunctivae, EOM and lids are normal.   Neck: Trachea normal and normal range of motion. Neck supple.   Band aid intact   Cardiovascular: Normal rate, regular rhythm, S1 normal, S2 normal and normal heart sounds. Pulses are palpable.   Pulmonary/Chest: Effort normal and breath sounds normal.   Abdominal: Soft. Normal appearance and bowel sounds are normal.   g tube   Musculoskeletal: Normal range of motion.      Comments: Generalized weakness   Neurological: He is alert and oriented to person, place, and time. No cranial nerve deficit or sensory deficit.   Intermittent confusion   Skin: Skin is warm and dry. No petechiae and no rash noted. No erythema. Nails show no clubbing.   Dressing c/d/i   Psychiatric: He has a normal mood and affect. His behavior is normal.   Nursing note and vitals reviewed.        Assessment:          Respiratory insufficiency    Acute on chronic respiratory failure with hypoxia and hypercapnia (CMS/HCC)    Ventilator dependence (CMS/HCC)    Past Medical History:   Diagnosis Date   • Diabetes (CMS/HCC)    • Schizo affective schizophrenia (CMS/HCC)         Plan:        1. Acute hypoxic respiratory failure  2. Acute renal failure on CKD3  3. S/p Cardiac arrest  4. Schizophrenia  5. Multifactorial anemia  6. Hyponatremia  7. COPD  8. Leukocytosis  9. Dysphagia  10. Tobacco abuse  11. DM2 with hyperglycemia on long term insulin  12. Bradycardia  13. Hypokalemia    Continue current treatment. Monitor counts. Increase activity. Aggressive therapies. Maintain patient safety. Labs Monday.  Monitor s/p decannulation. Continue diuresis. Monitor renal function closely.  Discharge planning.     Electronically signed by MIKEY Powell on 9/18/2020 at 13:05  CDT   I have discussed the care of Mohan Villatoro, including pertinent history and exam findings, with the nurse practitioner.    I have seen and examined the patient and the key elements of all parts of the encounter have been performed by me.  I agree with the assessment, plan and orders as documented by MIKEY Whitehead, after I modified the exam findings and the plan of treatments and the final version is my approved version of the assessment.        Electronically signed by Eduardo Tolliver MD on 9/18/2020 at 20:59 CDT

## 2020-09-20 NOTE — PROGRESS NOTES
PRATEEK Baca APRN        Internal Medicine Progress Note    9/19/2020   20:05 CDT    Name:  Mohan Villatoro  MRN:    [id number]     Acct:     [id number]   Room:  [address] [place] [address] Day: 0     Admit Date: 8/12/2020  2:37 PM  PCP: Provider, No Known    Subjective:     C/C: need for continued vent weaning    Interval History: Status:  stayed the same/stable.  Resting in bed.  No family at bedside.  Progressing with therapy. Alert and oriented with intermittent confusion. Continues to do well s/p decannulation.  No new concerns.     Review of Systems   Constitution: Negative for chills, decreased appetite, malaise/fatigue, weight gain and weight loss.   HENT: Negative for congestion, ear discharge, hoarse voice and tinnitus.    Eyes: Negative for blurred vision, discharge, visual disturbance and visual halos.   Cardiovascular: Negative for chest pain, claudication, dyspnea on exertion, irregular heartbeat, leg swelling, orthopnea and paroxysmal nocturnal dyspnea.   Respiratory: Negative for cough, shortness of breath, sputum production and wheezing.    Endocrine: Negative for cold intolerance, heat intolerance and polyuria.   Hematologic/Lymphatic: Negative for adenopathy. Does not bruise/bleed easily.   Skin: Negative for dry skin, itching and suspicious lesions.   Musculoskeletal: Negative for arthritis, back pain, falls, joint pain, muscle weakness and myalgias.   Gastrointestinal: Negative for abdominal pain, constipation, diarrhea, dysphagia and hematemesis.   Genitourinary: Negative for bladder incontinence, dysuria and frequency.   Neurological: Negative for aphonia, disturbances in coordination, dizziness and weakness.   Psychiatric/Behavioral: Negative for altered mental status, depression, memory loss and substance abuse. The patient does not have insomnia and is not nervous/anxious.          Medications:     Allergies:   Allergies   Allergen Reactions   • Niacin Unknown  - High Severity       Current Meds:   Current Facility-Administered Medications:   •  acetaminophen (TYLENOL) 160 MG/5ML solution 650 mg, 650 mg, Per G Tube, Q4H PRN, Bal Manning MD  •  albuterol (PROVENTIL) nebulizer solution 0.083% 2.5 mg/3mL, 2.5 mg, Nebulization, Q6H PRN, Bal Manning MD  •  amLODIPine (NORVASC) tablet 10 mg, 10 mg, Per G Tube, Q24H, Tripp Felipe MD  •  atropine sulfate injection 0.5 mg, 0.5 mg, Intravenous, PRN, Bal Manning MD  •  bumetanide (BUMEX) tablet 1 mg, 1 mg, Per G Tube, Daily, Salima Oh APRN  •  busPIRone (BUSPAR) tablet 10 mg, 10 mg, Per G Tube, Q8H, Bal Manning MD  •  cetirizine (zyrTEC) tablet 10 mg, 10 mg, Per G Tube, Daily, Bal Manning MD  •  fentaNYL (DURAGESIC) 12 MCG/HR 1 patch, 1 patch, Transdermal, Q72H **AND** Check Fentanyl Patch Placement, 1 each, Does not apply, Q12H, Eduardo Tolliver MD  •  citalopram (CeleXA) tablet 20 mg, 20 mg, Per G Tube, Daily, Bal Manning MD  •  dextrose (D50W) 25 g/ 50mL Intravenous Solution 25 g, 25 g, Intravenous, Q15 Min PRN, Bal Manning MD  •  dextrose (GLUTOSE) oral gel 15 g, 15 g, Oral, Q15 Min PRN, Bal Manning MD  •  fluticasone (FLONASE) 50 MCG/ACT nasal spray 2 spray, 2 spray, Each Nare, Daily, Bal Manning MD  •  glucagon (human recombinant) (GLUCAGEN DIAGNOSTIC) injection 1 mg, 1 mg, Subcutaneous, Q15 Min PRN, Bal Manning MD  •  haloperidol (HALDOL) tablet 7.5 mg, 7.5 mg, Per G Tube, Nightly, Bal Manning MD  •  hydrALAZINE (APRESOLINE) injection 10 mg, 10 mg, Intravenous, Q6H PRN, Bal Manning MD  •  hydrALAZINE (APRESOLINE) tablet 75 mg, 75 mg, Per G Tube, Q8H, Eduardo Tolliver MD  •  HYDROcodone-acetaminophen (HYCET) 7.5-325 MG/15ML solution 15 mL, 15 mL, Per G Tube, Q8H PRN, Eduardo Tolliver MD  •  ipratropium-albuterol (DUO-NEB) nebulizer solution 3 mL, 3 mL, Nebulization, Q6H PRN,  "Eduardo Tolliver MD  •  lansoprazole (FIRST) oral suspension 30 mg, 30 mg, Nasogastric, QAM, Bal Manning MD  •  metoclopramide (REGLAN) tablet 5 mg, 5 mg, Per G Tube, Q8H, Eduardo Tolliver MD  •  ondansetron (ZOFRAN) tablet 4 mg, 4 mg, Oral, Q6H PRN **OR** ondansetron (ZOFRAN) injection 4 mg, 4 mg, Intravenous, Q6H PRN, Bal Manning MD  •  polyethylene glycol (MIRALAX) packet 17 g, 17 g, Nasogastric, BID PRN, Eduardo Tolliver MD  •  potassium chloride (MICRO-K) CR capsule 10 mEq, 10 mEq, Oral, Daily, Eduardo Tolliver MD  •  risperiDONE (risperDAL) tablet 1 mg, 1 mg, Nasogastric, Nightly, Bal Manning MD  •  rivaroxaban (XARELTO) tablet 10 mg, 10 mg, Per G Tube, Daily With Dinner, Eduardo Tolliver MD  •  saccharomyces boulardii (FLORASTOR) capsule 250 mg, 250 mg, Oral, BID, Bal Manning MD  •  sennosides-docusate (PERICOLACE) 8.6-50 MG per tablet 1 tablet, 1 tablet, Oral, BID PRN, Eduardo Tolliver MD  •  sodium chloride 0.9 % flush 10 mL, 10 mL, Intravenous, Q12H, Bal Manning MD  •  sodium chloride 0.9 % flush 10 mL, 10 mL, Intravenous, PRN, Bal Manning MD    Data:     Code Status:    Code Status and Medical Interventions:   Ordered at: 08/25/20 0701     Level Of Support Discussed With:    Health Care Surrogate     Code Status:    CPR     Medical Interventions (Level of Support Prior to Arrest):    Full       History reviewed. No pertinent family history.    Social History     Socioeconomic History   • Marital status: Single     Spouse name: Not on file   • Number of children: Not on file   • Years of education: Not on file   • Highest education level: Not on file       Vitals:  BP (!) 188/86   Pulse 72   Temp 97.9 °F (36.6 °C) (Temporal)   Resp 18   Ht 180.3 cm (70.98\")   Wt 79.1 kg (174 lb 4.8 oz)   SpO2 97%   BMI 24.32 kg/m²   T 98.1 P 71 R 18 /58 Sp02 98% (room air)      I/O (24Hr):  No intake or output data in the " 24 hours ending 09/19/20 2005    Labs and imaging:      No results found for this or any previous visit (from the past 12 hour(s)).      Physical Examination:        Physical Exam   Constitutional: He is oriented to person, place, and time. Vital signs are normal. He appears well-developed and well-nourished.   HENT:   Head: Normocephalic and atraumatic.   Nose: Nose normal.   Mouth/Throat: Oropharynx is clear and moist.   Eyes: Pupils are equal, round, and reactive to light. Conjunctivae, EOM and lids are normal.   Neck: Trachea normal and normal range of motion. Neck supple.   Band aid intact   Cardiovascular: Normal rate, regular rhythm, S1 normal, S2 normal and normal heart sounds. Pulses are palpable.   Pulmonary/Chest: Effort normal and breath sounds normal.   Abdominal: Soft. Normal appearance and bowel sounds are normal.   g tube   Musculoskeletal: Normal range of motion.      Comments: Generalized weakness   Neurological: He is alert and oriented to person, place, and time. No cranial nerve deficit or sensory deficit.   Intermittent confusion   Skin: Skin is warm and dry. No petechiae and no rash noted. No erythema. Nails show no clubbing.   Dressing c/d/i   Psychiatric: He has a normal mood and affect. His behavior is normal.   Nursing note and vitals reviewed.        Assessment:          Respiratory insufficiency    Acute on chronic respiratory failure with hypoxia and hypercapnia (CMS/HCC)    Ventilator dependence (CMS/HCC)    Past Medical History:   Diagnosis Date   • Diabetes (CMS/HCC)    • Schizo affective schizophrenia (CMS/HCC)         Plan:        1. Acute hypoxic respiratory failure  2. Acute renal failure on CKD3  3. S/p Cardiac arrest  4. Schizophrenia  5. Multifactorial anemia  6. Hyponatremia  7. COPD  8. Leukocytosis  9. Dysphagia  10. Tobacco abuse  11. DM2 with hyperglycemia on long term insulin  12. Bradycardia  13. Hypokalemia    Continue current treatment. Monitor counts. Increase  activity. Aggressive therapies. Maintain patient safety. Labs Monday.  Monitor s/p decannulation. Continue diuresis. Monitor renal function closely.  Discharge planning.     Electronically signed by MIKEY David on 9/19/2020 at 20:05 CDT   I have discussed the care of Mohan Villatoro, including pertinent history and exam findings, with the nurse practitioner.    I have seen and examined the patient and the key elements of all parts of the encounter have been performed by me.  I agree with the assessment, plan and orders as documented by MIKEY Omer, after I modified the exam findings and the plan of treatments and the final version is my approved version of the assessment.        Electronically signed by Eduardo Tolliver MD on 9/19/2020 at 20:47 CDT

## 2020-09-20 NOTE — PROGRESS NOTES
PRATEEK Baca APRN        Internal Medicine Progress Note    9/20/2020   08:52 CDT    Name:  Mohan Villatoro  MRN:    [id number]     Acct:     [id number]   Room:  [address] [place] [address] Day: 0     Admit Date: 8/12/2020  2:37 PM  PCP: Provider, No Known    Subjective:     C/C: need for continued vent weaning    Interval History: Status:  stayed the same/stable.  Resting in bed.  No family at bedside.  Progressing with therapy. Alert and oriented with intermittent confusion. Just finished breakfast.  No new concerns.     Review of Systems   Constitution: Negative for chills, decreased appetite, malaise/fatigue, weight gain and weight loss.   HENT: Negative for congestion, ear discharge, hoarse voice and tinnitus.    Eyes: Negative for blurred vision, discharge, visual disturbance and visual halos.   Cardiovascular: Negative for chest pain, claudication, dyspnea on exertion, irregular heartbeat, leg swelling, orthopnea and paroxysmal nocturnal dyspnea.   Respiratory: Negative for cough, shortness of breath, sputum production and wheezing.    Endocrine: Negative for cold intolerance, heat intolerance and polyuria.   Hematologic/Lymphatic: Negative for adenopathy. Does not bruise/bleed easily.   Skin: Negative for dry skin, itching and suspicious lesions.   Musculoskeletal: Negative for arthritis, back pain, falls, joint pain, muscle weakness and myalgias.   Gastrointestinal: Negative for abdominal pain, constipation, diarrhea, dysphagia and hematemesis.   Genitourinary: Negative for bladder incontinence, dysuria and frequency.   Neurological: Negative for aphonia, disturbances in coordination, dizziness and weakness.   Psychiatric/Behavioral: Negative for altered mental status, depression, memory loss and substance abuse. The patient does not have insomnia and is not nervous/anxious.          Medications:     Allergies:   Allergies   Allergen Reactions   • Niacin Unknown - High Severity        Current Meds:   Current Facility-Administered Medications:   •  acetaminophen (TYLENOL) 160 MG/5ML solution 650 mg, 650 mg, Per G Tube, Q4H PRN, Bal Manning MD  •  albuterol (PROVENTIL) nebulizer solution 0.083% 2.5 mg/3mL, 2.5 mg, Nebulization, Q6H PRN, Bal Manning MD  •  amLODIPine (NORVASC) tablet 10 mg, 10 mg, Per G Tube, Q24H, Tripp Felipe MD  •  atropine sulfate injection 0.5 mg, 0.5 mg, Intravenous, PRN, Bal Manning MD  •  bumetanide (BUMEX) tablet 1 mg, 1 mg, Per G Tube, Daily, Salima Oh APRN  •  busPIRone (BUSPAR) tablet 10 mg, 10 mg, Per G Tube, Q8H, Bal Manning MD  •  cetirizine (zyrTEC) tablet 10 mg, 10 mg, Per G Tube, Daily, Bal Manning MD  •  fentaNYL (DURAGESIC) 12 MCG/HR 1 patch, 1 patch, Transdermal, Q72H **AND** Check Fentanyl Patch Placement, 1 each, Does not apply, Q12H, Eduardo Tolliver MD  •  citalopram (CeleXA) tablet 20 mg, 20 mg, Per G Tube, Daily, Bal Manning MD  •  dextrose (D50W) 25 g/ 50mL Intravenous Solution 25 g, 25 g, Intravenous, Q15 Min PRN, aBl Manning MD  •  dextrose (GLUTOSE) oral gel 15 g, 15 g, Oral, Q15 Min PRN, Bal Manning MD  •  fluticasone (FLONASE) 50 MCG/ACT nasal spray 2 spray, 2 spray, Each Nare, Daily, Bal Manning MD  •  glucagon (human recombinant) (GLUCAGEN DIAGNOSTIC) injection 1 mg, 1 mg, Subcutaneous, Q15 Min PRN, Bal Manning MD  •  haloperidol (HALDOL) tablet 7.5 mg, 7.5 mg, Per G Tube, Nightly, Bal Manning MD  •  hydrALAZINE (APRESOLINE) injection 10 mg, 10 mg, Intravenous, Q6H PRN, Bal Manning MD  •  hydrALAZINE (APRESOLINE) tablet 75 mg, 75 mg, Per G Tube, Q8H, Eduardo Tolliver MD  •  HYDROcodone-acetaminophen (HYCET) 7.5-325 MG/15ML solution 15 mL, 15 mL, Per G Tube, Q8H PRN, Eduardo Tolliver MD  •  ipratropium-albuterol (DUO-NEB) nebulizer solution 3 mL, 3 mL, Nebulization, Q6H PRN, Eduardo Tolliver  "MD Jovanny  •  lansoprazole (FIRST) oral suspension 30 mg, 30 mg, Nasogastric, QAM, Bal Manning MD  •  metoclopramide (REGLAN) tablet 5 mg, 5 mg, Per G Tube, Q8H, Eduardo Tolliver MD  •  ondansetron (ZOFRAN) tablet 4 mg, 4 mg, Oral, Q6H PRN **OR** ondansetron (ZOFRAN) injection 4 mg, 4 mg, Intravenous, Q6H PRN, Bal Manning MD  •  polyethylene glycol (MIRALAX) packet 17 g, 17 g, Nasogastric, BID PRN, Eduardo Tolliver MD  •  potassium chloride (MICRO-K) CR capsule 10 mEq, 10 mEq, Oral, Daily, Eduardo Tolliver MD  •  risperiDONE (risperDAL) tablet 1 mg, 1 mg, Nasogastric, Nightly, Bal Manning MD  •  rivaroxaban (XARELTO) tablet 10 mg, 10 mg, Per G Tube, Daily With Dinner, Eduardo Tolliver MD  •  saccharomyces boulardii (FLORASTOR) capsule 250 mg, 250 mg, Oral, BID, Bal Manning MD  •  sennosides-docusate (PERICOLACE) 8.6-50 MG per tablet 1 tablet, 1 tablet, Oral, BID PRN, Eduardo Tolliver MD  •  sodium chloride 0.9 % flush 10 mL, 10 mL, Intravenous, Q12H, Bal Manning MD  •  sodium chloride 0.9 % flush 10 mL, 10 mL, Intravenous, PRN, Bal Manning MD    Data:     Code Status:    Code Status and Medical Interventions:   Ordered at: 08/25/20 0701     Level Of Support Discussed With:    Health Care Surrogate     Code Status:    CPR     Medical Interventions (Level of Support Prior to Arrest):    Full       History reviewed. No pertinent family history.    Social History     Socioeconomic History   • Marital status: Single     Spouse name: Not on file   • Number of children: Not on file   • Years of education: Not on file   • Highest education level: Not on file       Vitals:  BP (!) 188/86   Pulse 72   Temp 97.9 °F (36.6 °C) (Temporal)   Resp 18   Ht 180.3 cm (70.98\")   Wt 79.1 kg (174 lb 4.8 oz)   SpO2 97%   BMI 24.32 kg/m²   T 98.2 P 70 R 14 /65 Sp02 100% (room air)      I/O (24Hr):  No intake or output data in the 24 hours " ending 09/20/20 0834    Labs and imaging:      No results found for this or any previous visit (from the past 12 hour(s)).      Physical Examination:        Physical Exam   Constitutional: He is oriented to person, place, and time. Vital signs are normal. He appears well-developed and well-nourished.   HENT:   Head: Normocephalic and atraumatic.   Nose: Nose normal.   Mouth/Throat: Oropharynx is clear and moist.   Eyes: Pupils are equal, round, and reactive to light. Conjunctivae, EOM and lids are normal.   Neck: Trachea normal and normal range of motion. Neck supple.   Band aid intact   Cardiovascular: Normal rate, regular rhythm, S1 normal, S2 normal and normal heart sounds. Pulses are palpable.   Pulmonary/Chest: Effort normal and breath sounds normal.   Abdominal: Soft. Normal appearance and bowel sounds are normal.   g tube   Musculoskeletal: Normal range of motion.      Comments: Generalized weakness   Neurological: He is alert and oriented to person, place, and time. No cranial nerve deficit or sensory deficit.   Intermittent confusion   Skin: Skin is warm and dry. No petechiae and no rash noted. No erythema. Nails show no clubbing.   Dressing c/d/i   Psychiatric: He has a normal mood and affect. His behavior is normal.   Nursing note and vitals reviewed.        Assessment:          Respiratory insufficiency    Acute on chronic respiratory failure with hypoxia and hypercapnia (CMS/HCC)    Ventilator dependence (CMS/HCC)    Past Medical History:   Diagnosis Date   • Diabetes (CMS/HCC)    • Schizo affective schizophrenia (CMS/HCC)         Plan:        1. Acute hypoxic respiratory failure  2. Acute renal failure on CKD3  3. S/p Cardiac arrest  4. Schizophrenia  5. Multifactorial anemia  6. Hyponatremia  7. COPD  8. Leukocytosis  9. Dysphagia  10. Tobacco abuse  11. DM2 with hyperglycemia on long term insulin  12. Bradycardia  13. Hypokalemia    Continue current treatment. Monitor counts. Increase activity.  Aggressive therapies. Maintain patient safety. Labs Monday.  Monitor s/p decannulation. Continue diuresis. Monitor renal function closely.  Discharge planning.     Electronically signed by MIKEY David on 9/20/2020 at 08:52 CDT   I have discussed the care of Mohan Villatoro, including pertinent history and exam findings, with the nurse practitioner.    I have seen and examined the patient and the key elements of all parts of the encounter have been performed by me.  I agree with the assessment, plan and orders as documented by MIKEY Omer, after I modified the exam findings and the plan of treatments and the final version is my approved version of the assessment.        Electronically signed by Eduardo Tolliver MD on 9/20/2020 at 21:03 CDT

## 2020-09-21 NOTE — PROGRESS NOTES
Unruly Tolliver M.D.  MIKEY Whitehead        Internal Medicine Progress Note    9/21/2020   12:13 CDT    Name:  Mohan Villatoro  MRN:    [id number]     Acct:     [id number]   Room:  [facility] Day: 0     Admit Date: 8/12/2020  2:37 PM  PCP: Provider, No Known    Subjective:     C/C: need for continued vent weaning    Interval History: Status:  stayed the same/stable.  Up to chair.  No family at bedside. Progressing with therapy. Alert and oriented with intermittent confusion.  No new concerns. Sodium level low. Plans for discharge tomorrow back to [facility].     Review of Systems   Constitution: Negative for chills, decreased appetite, malaise/fatigue, weight gain and weight loss.   HENT: Negative for congestion, ear discharge, hoarse voice and tinnitus.    Eyes: Negative for blurred vision, discharge, visual disturbance and visual halos.   Cardiovascular: Negative for chest pain, claudication, dyspnea on exertion, irregular heartbeat, leg swelling, orthopnea and paroxysmal nocturnal dyspnea.   Respiratory: Negative for cough, shortness of breath, sputum production and wheezing.    Endocrine: Negative for cold intolerance, heat intolerance and polyuria.   Hematologic/Lymphatic: Negative for adenopathy. Does not bruise/bleed easily.   Skin: Negative for dry skin, itching and suspicious lesions.   Musculoskeletal: Negative for arthritis, back pain, falls, joint pain, muscle weakness and myalgias.   Gastrointestinal: Negative for abdominal pain, constipation, diarrhea, dysphagia and hematemesis.   Genitourinary: Negative for bladder incontinence, dysuria and frequency.   Neurological: Negative for aphonia, disturbances in coordination, dizziness and weakness.   Psychiatric/Behavioral: Negative for altered mental status, depression, memory loss and substance abuse. The patient does not have insomnia and is not nervous/anxious.          Medications:     Allergies:   Allergies   Allergen Reactions   •  Niacin Unknown - High Severity       Current Meds:   Current Facility-Administered Medications:   •  acetaminophen (TYLENOL) 160 MG/5ML solution 650 mg, 650 mg, Per G Tube, Q4H PRN, Bal Manning MD  •  albuterol (PROVENTIL) nebulizer solution 0.083% 2.5 mg/3mL, 2.5 mg, Nebulization, Q6H PRN, Bal Manning MD  •  amLODIPine (NORVASC) tablet 10 mg, 10 mg, Per G Tube, Q24H, Tripp Felipe MD  •  atropine sulfate injection 0.5 mg, 0.5 mg, Intravenous, PRN, Bal Manning MD  •  bumetanide (BUMEX) tablet 1 mg, 1 mg, Per G Tube, Daily, Salima Oh APRN  •  busPIRone (BUSPAR) tablet 10 mg, 10 mg, Per G Tube, Q8H, Bal Manning MD  •  cetirizine (zyrTEC) tablet 10 mg, 10 mg, Per G Tube, Daily, Bal Manning MD  •  fentaNYL (DURAGESIC) 12 MCG/HR 1 patch, 1 patch, Transdermal, Q72H **AND** Check Fentanyl Patch Placement, 1 each, Does not apply, Q12H, Eduardo Tolliver MD  •  citalopram (CeleXA) tablet 20 mg, 20 mg, Per G Tube, Daily, Bal Manning MD  •  dextrose (D50W) 25 g/ 50mL Intravenous Solution 25 g, 25 g, Intravenous, Q15 Min PRN, Bal Manning MD  •  dextrose (GLUTOSE) oral gel 15 g, 15 g, Oral, Q15 Min PRN, Bal Manning MD  •  fluticasone (FLONASE) 50 MCG/ACT nasal spray 2 spray, 2 spray, Each Nare, Daily, Bal Manning MD  •  glucagon (human recombinant) (GLUCAGEN DIAGNOSTIC) injection 1 mg, 1 mg, Subcutaneous, Q15 Min PRN, Bal Manning MD  •  haloperidol (HALDOL) tablet 7.5 mg, 7.5 mg, Per G Tube, Nightly, Bal Manning MD  •  hydrALAZINE (APRESOLINE) injection 10 mg, 10 mg, Intravenous, Q6H PRN, Bal Manning MD  •  hydrALAZINE (APRESOLINE) tablet 75 mg, 75 mg, Per G Tube, Q8H, Eduardo Tolliver MD  •  HYDROcodone-acetaminophen (HYCET) 7.5-325 MG/15ML solution 15 mL, 15 mL, Per G Tube, Q8H PRN, Eduardo Tolliver MD  •  ipratropium-albuterol (DUO-NEB) nebulizer solution 3 mL, 3 mL,  "Nebulization, Q6H PRN, Eduardo Tolliver MD  •  lansoprazole (FIRST) oral suspension 30 mg, 30 mg, Nasogastric, QAM, Bal Manning MD  •  metoclopramide (REGLAN) tablet 5 mg, 5 mg, Per G Tube, Q8H, Eduardo Tolliver MD  •  ondansetron (ZOFRAN) tablet 4 mg, 4 mg, Oral, Q6H PRN **OR** ondansetron (ZOFRAN) injection 4 mg, 4 mg, Intravenous, Q6H PRN, Bal Manning MD  •  polyethylene glycol (MIRALAX) packet 17 g, 17 g, Nasogastric, BID PRN, Eduardo Tolliver MD  •  potassium chloride (MICRO-K) CR capsule 10 mEq, 10 mEq, Oral, Daily, Eudardo Tolliver MD  •  risperiDONE (risperDAL) tablet 1 mg, 1 mg, Nasogastric, Nightly, Bal Manning MD  •  rivaroxaban (XARELTO) tablet 10 mg, 10 mg, Per G Tube, Daily With Dinner, Eduardo Tolliver MD  •  saccharomyces boulardii (FLORASTOR) capsule 250 mg, 250 mg, Oral, BID, Bal Manning MD  •  sennosides-docusate (PERICOLACE) 8.6-50 MG per tablet 1 tablet, 1 tablet, Oral, BID PRN, Eduardo Tolliver MD  •  sodium chloride 0.9 % flush 10 mL, 10 mL, Intravenous, Q12H, Bal Manning MD  •  sodium chloride 0.9 % flush 10 mL, 10 mL, Intravenous, PRN, Bal Manning MD    Data:     Code Status:    Code Status and Medical Interventions:   Ordered at: 08/25/20 0701     Level Of Support Discussed With:    Health Care Surrogate     Code Status:    CPR     Medical Interventions (Level of Support Prior to Arrest):    Full       History reviewed. No pertinent family history.    Social History     Socioeconomic History   • Marital status: Single     Spouse name: Not on file   • Number of children: Not on file   • Years of education: Not on file   • Highest education level: Not on file       Vitals:  BP (!) 188/86   Pulse 72   Temp 97.9 °F (36.6 °C) (Temporal)   Resp 18   Ht 180.3 cm (70.98\")   Wt 79.1 kg (174 lb 4.8 oz)   SpO2 97%   BMI 24.32 kg/m²   T 98.1 P 80 R 15 /69 Sp02 99% (room air)      I/O (24Hr):  No " intake or output data in the 24 hours ending 09/21/20 1213    Labs and imaging:      Recent Results (from the past 12 hour(s))   Basic Metabolic Panel    Collection Time: 09/21/20  4:40 AM    Specimen: Blood   Result Value Ref Range    Glucose 196 (H) 65 - 99 mg/dL    BUN 19 6 - 20 mg/dL    Creatinine 1.51 (H) 0.76 - 1.27 mg/dL    Sodium 127 (L) 136 - 145 mmol/L    Potassium 3.9 3.5 - 5.2 mmol/L    Chloride 90 (L) 98 - 107 mmol/L    CO2 26.0 22.0 - 29.0 mmol/L    Calcium 8.5 (L) 8.6 - 10.5 mg/dL    eGFR Non African Amer 48 (L) >60 mL/min/1.73    BUN/Creatinine Ratio 12.6 7.0 - 25.0    Anion Gap 11.0 5.0 - 15.0 mmol/L   BNP    Collection Time: 09/21/20  4:40 AM    Specimen: Blood   Result Value Ref Range    proBNP 15,973.0 (H) 0.0 - 900.0 pg/mL   CBC Auto Differential    Collection Time: 09/21/20  4:40 AM    Specimen: Blood   Result Value Ref Range    WBC 15.06 (H) 3.40 - 10.80 10*3/mm3    RBC 3.05 (L) 4.14 - 5.80 10*6/mm3    Hemoglobin 9.3 (L) 13.0 - 17.7 g/dL    Hematocrit 27.1 (L) 37.5 - 51.0 %    MCV 88.9 79.0 - 97.0 fL    MCH 30.5 26.6 - 33.0 pg    MCHC 34.3 31.5 - 35.7 g/dL    RDW 13.1 12.3 - 15.4 %    RDW-SD 42.5 37.0 - 54.0 fl    MPV 9.7 6.0 - 12.0 fL    Platelets 294 140 - 450 10*3/mm3    Neutrophil % 61.2 42.7 - 76.0 %    Lymphocyte % 13.7 (L) 19.6 - 45.3 %    Monocyte % 13.8 (H) 5.0 - 12.0 %    Eosinophil % 8.9 (H) 0.3 - 6.2 %    Basophil % 0.7 0.0 - 1.5 %    Immature Grans % 1.7 (H) 0.0 - 0.5 %    Neutrophils, Absolute 9.21 (H) 1.70 - 7.00 10*3/mm3    Lymphocytes, Absolute 2.07 0.70 - 3.10 10*3/mm3    Monocytes, Absolute 2.08 (H) 0.10 - 0.90 10*3/mm3    Eosinophils, Absolute 1.34 (H) 0.00 - 0.40 10*3/mm3    Basophils, Absolute 0.10 0.00 - 0.20 10*3/mm3    Immature Grans, Absolute 0.26 (H) 0.00 - 0.05 10*3/mm3    nRBC 0.0 0.0 - 0.2 /100 WBC         Physical Examination:        Physical Exam   Constitutional: He is oriented to person, place, and time. Vital signs are normal. He appears well-developed and  well-nourished.   HENT:   Head: Normocephalic and atraumatic.   Nose: Nose normal.   Mouth/Throat: Oropharynx is clear and moist.   Eyes: Pupils are equal, round, and reactive to light. Conjunctivae, EOM and lids are normal.   Neck: Trachea normal and normal range of motion. Neck supple.   Band aid intact   Cardiovascular: Normal rate, regular rhythm, S1 normal, S2 normal and normal heart sounds. Pulses are palpable.   Pulmonary/Chest: Effort normal and breath sounds normal.   Abdominal: Soft. Normal appearance and bowel sounds are normal.   g tube   Musculoskeletal: Normal range of motion.      Comments: Generalized weakness   Neurological: He is alert and oriented to person, place, and time. No cranial nerve deficit or sensory deficit.   Intermittent confusion   Skin: Skin is warm and dry. No petechiae and no rash noted. No erythema. Nails show no clubbing.   Dressing c/d/i   Psychiatric: He has a normal mood and affect. His behavior is normal.   Nursing note and vitals reviewed.        Assessment:          Respiratory insufficiency    Acute on chronic respiratory failure with hypoxia and hypercapnia (CMS/[facility])    Ventilator dependence (CMS/[facility])    Past Medical History:   Diagnosis Date   • Diabetes (CMS/HCC)    • Schizo affective schizophrenia (CMS/HCC)         Plan:        1. Acute hypoxic respiratory failure  2. Acute renal failure on CKD3  3. S/p Cardiac arrest  4. Schizophrenia  5. Multifactorial anemia  6. Hyponatremia  7. COPD  8. Leukocytosis  9. Dysphagia  10. Tobacco abuse  11. DM2 with hyperglycemia on long term insulin  12. Bradycardia  13. Hypokalemia      Continue current treatment. Monitor counts. Increase activity. Aggressive therapies. Maintain patient safety. Labs in am.  Monitor s/p decannulation. Continue diuresis - decrease bumex. Monitor renal function closely.  Discharge planning.     Electronically signed by MIKEY Powell on 9/21/2020 at 12:13 CDT

## 2020-09-22 NOTE — DISCHARGE SUMMARY
Unruly Tolliver M.D.  MIKEY Whitehead      Internal Medicine Discharge Summary    Patient ID: Mohan Villatoro  MRN: [id number]     Acct:  [id number]       Patient's PCP: Provider, No Known    Admit Date: 8/12/2020     Discharge Date:   9/22/20    Admitting Physician: Eduardo Tolliver MD    Discharge Physician: MIKEY Powell     Active Discharge Diagnoses:  1. Acute hypoxic respiratory failure  2. Acute renal failure on CKD3  3. S/p Cardiac arrest  4. Schizophrenia  5. Multifactorial anemia  6. Hyponatremia  7. COPD  8. Leukocytosis  9. Dysphagia  10. Tobacco abuse  11. DM2 with hyperglycemia on long term insulin    Primary Problem  Respiratory insufficiency      Hospital Problems    Respiratory insufficiency    Acute on chronic respiratory failure with hypoxia and hypercapnia (CMS/HCC)    Ventilator dependence (CMS/HCC)     Past Medical History:   Diagnosis Date   • Diabetes (CMS/HCC)    • Schizo affective schizophrenia (CMS/HCC)        The patient was seen and examined on the day of discharge and this discharge summary is in conjunction with any daily progress note from day of discharge.    Code Status:    Code Status and Medical Interventions:   Ordered at: 08/25/20 0701     Level Of Support Discussed With:    Health Care Surrogate     Code Status:    CPR     Medical Interventions (Level of Support Prior to Arrest):    Full       Hospital Course: Mohan Villatoro is a  60 y.o.  male who presents with need for continued vent and sedation weaning as well as rehabilitation efforts following recent acute care stay. The patient had been in his usual state of health at the SNF where he resides when he presented to an outside facility with reports of dysuria and constipation as well as shortness of breath. On intake at the facility, he was noted to have significant hyponatremia with sodium level 114 and hyperkalemia. He was treated with kayexalate and transferred to  [facility] for higher level of care. He was noted to have acute kidney injury and was hydrated with IV fluids and seen in consultation by nephrology. Adjustments were made to his medication regimen to address hyponatremia.  He developed worsening respiratory function with bradycardia and suffered cardiac arrest on 8/7. ROSC achieved after approximately 5 minutes of resuscitation efforts. He was intubated and placed on mechanical ventilation. He was seen in consultation by pulmonology for vent management and required gentle diuresis. He continued on sedation and was able to follow commands at times, but did not tolerate vent weaning trials. The patient's renal function has improved. He has had issues tolerating sedation weaning which has impeded vent weaning to this point. He transferred to our facility for continued vent and sedation weaning as well as rehabilitation efforts and nutrition support.   Initially, the patient did not tolerate sedation weaning which impeded progress. He developed bloody secretions from NGT with worsening renal function. Gastric OB positive and patient started on protonix IV. Hgb down to 7.4. Sodium remained elevated with decreased renal function. Due to inability to wean from ventilator and need for AMONs, the patient underwent tracheostomy tube and gastrostomy tube placement on 8/25. From that point, the patient began to have fairly rapid improvement. Empiric antibiotics were started for low grade temp. Sputum cultures positive for pseudomonas and moraxella catarrhalis.  Hgb down to 6.8 requiring transfusion of blood products. Patient weaned to pressure support per vent and had improvement in renal function. He transitioned to trach collar beginning 9/4 and tolerated without difficulty. Trach capped on 9/5. Patient self-decannulated twice on 9/6. He was evaluated by ENT who recommended leaving trach out. Since that time, patient has continued to improve. Renal function is stable. He is at or  near his neurologic baseline and is largely independent for ADLs. He has been weaned to room air. He has been transitioned to regular diet with thin liquids that he has tolerated without difficulty. He is now felt stable for discharge to assisted living with the assistance of home health for continued rehabilitation efforts. His gastrostomy tube will require flushing and he will need outpatient re-evaluation for possible removal of the tube in the near future. Would recommend close monitoring of renal function and electrolytes as outpatient.     Consults:  Dr. Garcia (general surgery)  Dr. Reno (ENT)  Dr. Felipe (nephrology)  Dr. Tafoya (pulmonology)    Disposition: assisted living    Discharged Condition: Stable    Physical Exam   Constitutional: He is oriented to person, place, and time. Vital signs are normal. He appears well-developed and well-nourished.   HENT:   Head: Normocephalic and atraumatic.   Nose: Nose normal.   Mouth/Throat: Oropharynx is clear and moist.   Eyes: Pupils are equal, round, and reactive to light. Conjunctivae, EOM and lids are normal.   Neck: Trachea normal and normal range of motion. Neck supple.   Band aid intact   Cardiovascular: Normal rate, regular rhythm, S1 normal, S2 normal and normal heart sounds. Pulses are palpable.   Pulmonary/Chest: Effort normal and breath sounds normal.   Abdominal: Soft. Normal appearance and bowel sounds are normal.   g tube   Musculoskeletal: Normal range of motion.      Comments: Generalized weakness   Neurological: He is alert and oriented to person, place, and time. No cranial nerve deficit or sensory deficit.   Intermittent confusion   Skin: Skin is warm and dry. No petechiae and no rash noted. No erythema. Nails show no clubbing.   Dressing c/d/i   Psychiatric: He has a normal mood and affect. His behavior is normal.   Nursing note and vitals reviewed.    Follow Up: PCP 1 week with BMP    Diet: Diet Regular; Thin    Discharge Medications:    See computer generated medication reconciliation form    Time Spent on discharge is  32 minutes in patient examination, evaluation, patient/family counseling as well as medication reconciliation, prescriptions for required medications, discharge plan and follow up.     Electronically signed by MIKEY Powell on 9/22/2020 at 09:28 CDT     I have discussed the care of Mohan Villatoro, including pertinent history and exam findings, with the nurse practitioner.    I have seen and examined the patient and the key elements of all parts of the encounter have been performed by me.  I agree with the assessment, plan and orders as documented by MIKEY Whitehead, after I modified the exam findings and the plan of treatments and the final version is my approved version of the assessment.        Electronically signed by Eduardo Tolliver MD on 9/22/2020 at 09:58 CDT

## 2020-11-05 PROBLEM — I70.209 ATHEROSCLEROSIS OF NATIVE ARTERY OF EXTREMITY (HCC): Status: ACTIVE | Noted: 2020-01-01

## 2020-11-05 PROBLEM — L97.511 DIABETIC ULCER OF TOE OF RIGHT FOOT ASSOCIATED WITH TYPE 2 DIABETES MELLITUS, LIMITED TO BREAKDOWN OF SKIN (HCC): Status: ACTIVE | Noted: 2020-01-01

## 2020-11-05 PROBLEM — E11.621 DIABETIC ULCER OF TOE OF RIGHT FOOT ASSOCIATED WITH TYPE 2 DIABETES MELLITUS, LIMITED TO BREAKDOWN OF SKIN (HCC): Status: ACTIVE | Noted: 2020-01-01

## 2020-11-05 NOTE — PATIENT INSTRUCTIONS
Severe reduction RIGHT lower extremity with ulceration all 5 toes  Medical Management: ASA,STATIN   Will need Angiogram for possible revascularization balloon/stent  Same day surgery information sheet is given to the patient regarding time of arrival for procedure and pre-procedure instructions.    Follow up as scheduled    Antibiotic for foot wound

## 2020-11-07 PROBLEM — I50.9 CHF EXACERBATION (HCC): Status: ACTIVE | Noted: 2020-01-01

## 2020-11-07 PROBLEM — J96.01 ACUTE RESPIRATORY FAILURE WITH HYPOXIA (HCC): Status: ACTIVE | Noted: 2020-01-01

## 2020-11-07 PROBLEM — E11.9 TYPE 2 DIABETES MELLITUS (HCC): Status: ACTIVE | Noted: 2020-01-01

## 2020-11-07 PROBLEM — A41.9 SEPSIS, UNSPECIFIED ORGANISM (HCC): Status: ACTIVE | Noted: 2020-01-01

## 2020-11-08 PROBLEM — J44.1 COPD WITH ACUTE EXACERBATION (HCC): Status: ACTIVE | Noted: 2020-01-01

## 2020-11-08 PROBLEM — J12.82 PNEUMONIA DUE TO COVID-19 VIRUS: Chronic | Status: ACTIVE | Noted: 2020-01-01

## 2020-11-08 PROBLEM — U07.1 PNEUMONIA DUE TO COVID-19 VIRUS: Chronic | Status: ACTIVE | Noted: 2020-01-01

## 2020-11-08 PROBLEM — J18.9 BILATERAL PNEUMONIA: Chronic | Status: ACTIVE | Noted: 2020-01-01

## 2020-11-08 PROBLEM — L97.509 DIABETIC FOOT ULCER (HCC): Chronic | Status: ACTIVE | Noted: 2020-01-01

## 2020-11-08 PROBLEM — E11.621 DIABETIC FOOT ULCER (HCC): Chronic | Status: ACTIVE | Noted: 2020-01-01

## 2020-11-08 NOTE — ED PROVIDER NOTES
Subjective   61yo male pmh significant hyperlipidemia/dm2/copd/schizophrenia/ckd, discharged [facility] 09.22.2020 secondary to cardiac arrest/respiratory failure presents ED c/o acute onset severe soa approximately 1hr prior to arrival.  Pt unable to provide hx secondary to respiratory distress.      History provided by:  Patient and relative  Shortness of Breath  Severity:  Severe  Onset quality:  Sudden  Duration:  1 hour      Review of Systems   Unable to perform ROS: Severe respiratory distress   Respiratory: Positive for shortness of breath.        Past Medical History:   Diagnosis Date   • Diabetes (CMS/HCC)    • Schizo affective schizophrenia (CMS/HCC)        Allergies   Allergen Reactions   • Niacin Unknown - High Severity   • Penicillins Rash       Past Surgical History:   Procedure Laterality Date   • GASTROSTOMY FEEDING TUBE INSERTION N/A 8/25/2020    Procedure: GASTROSTOMY FEEDING TUBE INSERTION;  Surgeon: Kizzy Garcia MD;  Location: [facility];  Service: General;  Laterality: N/A;   • TRACHEOSTOMY N/A 8/25/2020    Procedure: TRACHEOSTOMY;  Surgeon: Bal Manning MD;  Location: [facility] OR;  Service: ENT;  Laterality: N/A;       No family history on file.    Social History     Socioeconomic History   • Marital status: Single     Spouse name: Not on file   • Number of children: Not on file   • Years of education: Not on file   • Highest education level: Not on file   Tobacco Use   • Smoking status: Current Every Day Smoker   • Smokeless tobacco: Never Used   Substance and Sexual Activity   • Alcohol use: Not Currently   • Drug use: Never           Objective   Physical Exam  Vitals signs and nursing note reviewed.   Constitutional:       General: He is in acute distress.      Appearance: He is well-developed. He is ill-appearing.   HENT:      Head: Normocephalic and atraumatic.      Mouth/Throat:      Mouth: Mucous membranes are moist.   Eyes:      Pupils: Pupils are equal, round, and reactive to light.     Neck:      Musculoskeletal: Normal range of motion and neck supple.      Vascular: No JVD.   Cardiovascular:      Rate and Rhythm: Tachycardia present.      Pulses: Normal pulses.      Heart sounds: Normal heart sounds. No murmur. No friction rub. No gallop.    Pulmonary:      Effort: Tachypnea, accessory muscle usage and respiratory distress present.      Breath sounds: Examination of the right-upper field reveals wheezing. Examination of the left-upper field reveals wheezing. Examination of the right-lower field reveals decreased breath sounds and rales. Examination of the left-lower field reveals decreased breath sounds. Decreased breath sounds, wheezing and rales present. No rhonchi.   Abdominal:      General: Abdomen is flat. Bowel sounds are normal. There is no distension.      Palpations: Abdomen is soft.      Tenderness: There is no abdominal tenderness. There is no guarding.      Hernia: No hernia is present.   Musculoskeletal:        Feet:    Skin:     General: Skin is warm and dry.      Coloration: Skin is pale.   Neurological:      Mental Status: He is alert.      GCS: GCS eye subscore is 4. GCS verbal subscore is 5. GCS motor subscore is 6.         ECG 12 Lead      Date/Time: 11/7/2020 9:02 PM  Performed by: Jani Salgado MD  Authorized by: Jani Salgado MD   Interpreted by physician  Rhythm: sinus tachycardia  Rate: tachycardic  BPM: 109  QRS axis: normal  Conduction: conduction normal  ST Segments: ST segments normal  T Waves: T waves normal  Other findings: LAE  Clinical impression: abnormal ECG                 ED Course      Labs Reviewed   COMPREHENSIVE METABOLIC PANEL - Abnormal; Notable for the following components:       Result Value    Glucose 229 (*)     BUN 34 (*)     Creatinine 1.57 (*)     Sodium 130 (*)     Chloride 97 (*)     Calcium 8.1 (*)     Albumin 3.00 (*)     eGFR Non  Amer 46 (*)     All other components within normal limits    Narrative:     GFR Normal >60  Chronic  Kidney Disease <60  Kidney Failure <15     CBC WITH AUTO DIFFERENTIAL - Abnormal; Notable for the following components:    WBC 17.97 (*)     RBC 3.23 (*)     Hemoglobin 10.1 (*)     Hematocrit 29.6 (*)     Lymphocyte % 11.2 (*)     Immature Grans % 2.1 (*)     Neutrophils, Absolute 12.91 (*)     Monocytes, Absolute 1.96 (*)     Eosinophils, Absolute 0.57 (*)     Immature Grans, Absolute 0.38 (*)     All other components within normal limits   BNP (IN-HOUSE) - Abnormal; Notable for the following components:    proBNP 20,227.0 (*)     All other components within normal limits    Narrative:     Among patients with dyspnea, NT-proBNP is highly sensitive for the detection of acute congestive heart failure. In addition NT-proBNP of <300 pg/ml effectively rules out acute congestive heart failure with 99% negative predictive value.    Results may be falsely decreased if patient taking Biotin.     D-DIMER, QUANTITATIVE - Abnormal; Notable for the following components:    D-Dimer, Quantitative 1,748 (*)     All other components within normal limits    Narrative:     Dimer values <500 ng/ml FEU are FDA approved as aid in diagnosis of deep venous thrombosis and pulmonary embolism.  This test should not be used in an exclusion strategy with pretest probability alone.    A recent guideline regarding diagnosis for pulmonary thromboembolism recommends an adjusted exclusion criterion of age x 10 ng/ml FEU for patients >50 years of age (Sun Intern Med 2015; 163: 701-711).     TROPONIN (IN-HOUSE) - Abnormal; Notable for the following components:    Troponin T 0.074 (*)     All other components within normal limits    Narrative:     Troponin T Reference Range:  <= 0.03 ng/mL-   Negative for AMI  >0.03 ng/mL-     Abnormal for myocardial necrosis.  Clinicians would have to utilize clinical acumen, EKG, Troponin and serial changes to determine if it is an Acute Myocardial Infarction or myocardial injury due to an underlying chronic  condition.       Results may be falsely decreased if patient taking Biotin.     BLOOD GAS, ARTERIAL - Abnormal; Notable for the following components:    pO2, Arterial 230.0 (*)     Base Excess, Arterial -2.0 (*)     O2 Saturation, Arterial 100.0 (*)     All other components within normal limits   LACTIC ACID, PLASMA - Normal   BLOOD CULTURE   BLOOD CULTURE   WOUND CULTURE   RAINBOW DRAW    Narrative:     The following orders were created for panel order [place] Draw.  Procedure                               Abnormality         Status                     ---------                               -----------         ------                     Light Blue Top[[id number]]                                   Final result               Green Top (Gel)[[id number]]                                  Final result               Lavender Top[[id number]]                                     Final result               Gold Top - SST[[id number]]                                   Final result                 Please view results for these tests on the individual orders.   TROPONIN (IN-HOUSE)   BLOOD GAS, ARTERIAL   CBC AND DIFFERENTIAL    Narrative:     The following orders were created for panel order CBC & Differential.  Procedure                               Abnormality         Status                     ---------                               -----------         ------                     CBC Auto Differential[[id number]]        Abnormal            Final result                 Please view results for these tests on the individual orders.   LIGHT BLUE TOP   GREEN TOP   LAVENDER TOP   GOLD TOP - SST     Xr Chest 1 View    Result Date: 11/7/2020  Narrative: PROCEDURE: XR CHEST 1 VW VIEWS:Single INDICATION: Shortness of breath COMPARISON: None FINDINGS:   - lines/tubes: None   - cardiac: Size within normal limits.   - mediastinum: Contour within normal limits.   - lungs: No evidence of a focal air space process. There are interstitial and mild  patchy airspace opacities - pleura: No evidence of  fluid.    - osseous: Unremarkable for age.     Impression: Interstitial and patchy airspace opacities, may represent asymmetric pulmonary edema or infectious/inflammatory process including viral pneumonia. Clinical correlation needed. Electronically signed by:  Lamar Lopez MD  11/7/2020 8:15 PM CST Workstation: [id number]                                         [facility]    Final diagnoses:   Acute respiratory failure with hypoxia (CMS/HCC)   Acute on chronic systolic congestive heart failure (CMS/HCC)   Sepsis with acute hypoxic respiratory failure without septic shock, due to unspecified organism (CMS/HCC)            Jani Salgado MD  11/07/20 4409

## 2020-11-08 NOTE — PROGRESS NOTES
[facility] Medicine Services  INPATIENT PROGRESS NOTE    Length of Stay: 1  Date of Admission: 11/7/2020  Primary Care Physician: Provider, No Known    Subjective   Chief Complaint: soa  HPI:  Pt remains SOB beyond baseline but improved from admission.  He has been weaned from bipap to NC oxygen.  He denies any other complaints.  Staff is currently preparing for midline to be placed.      Review of Systems   Constitutional: Negative for chills and fever.   HENT: Negative for congestion, rhinorrhea and sore throat.    Eyes: Negative for discharge.   Respiratory: Positive for cough, shortness of breath and wheezing.    Cardiovascular: Negative for chest pain and palpitations.   Gastrointestinal: Negative for abdominal pain, nausea and vomiting.   Genitourinary: Negative for dysuria, frequency and urgency.   Musculoskeletal: Negative for arthralgias and myalgias.   Skin: Negative for rash.   Neurological: Negative for dizziness, weakness, light-headedness and headaches.          Objective    Temp:  [97.4 °F (36.3 °C)-98.9 °F (37.2 °C)] 98.9 °F (37.2 °C)  Heart Rate:  [] 90  Resp:  [14-36] 20  BP: (147-222)/() 147/65    Physical Exam  Constitutional:       Appearance: Normal appearance.   HENT:      Head: Normocephalic and atraumatic.      Right Ear: External ear normal.      Left Ear: External ear normal.      Nose: Nose normal.      Mouth/Throat:      Mouth: Mucous membranes are moist.      Pharynx: Oropharynx is clear.   Eyes:      General: No scleral icterus.     Extraocular Movements: Extraocular movements intact.      Pupils: Pupils are equal, round, and reactive to light.   Neck:      Musculoskeletal: Neck supple. No muscular tenderness.   Cardiovascular:      Rate and Rhythm: Normal rate and regular rhythm.      Heart sounds: Normal heart sounds.   Pulmonary:      Breath sounds: Normal breath sounds. No wheezing, rhonchi or rales.   Abdominal:       Palpations: Abdomen is soft.      Tenderness: There is no abdominal tenderness. There is no guarding.   Musculoskeletal:      Right lower leg: No edema.      Left lower leg: No edema.   Lymphadenopathy:      Cervical: No cervical adenopathy.   Skin:     General: Skin is warm and dry.      Findings: No erythema or rash.   Neurological:      Mental Status: He is alert and oriented to person, place, and time.      Cranial Nerves: No cranial nerve deficit.      Sensory: Sensory deficit present.      Motor: No weakness.   Psychiatric:         Mood and Affect: Mood normal.         Behavior: Behavior normal.             Results Review:  I have reviewed the labs, radiology results, and diagnostic studies.    Laboratory Data:   Results from last 7 days   Lab Units 11/08/20  0154 11/07/20  1950   SODIUM mmol/L 128* 130*   POTASSIUM mmol/L 4.2 4.0   CHLORIDE mmol/L 95* 97*   CO2 mmol/L 22.0 23.0   BUN mg/dL 36* 34*   CREATININE mg/dL 1.60* 1.57*   GLUCOSE mg/dL 352* 229*   CALCIUM mg/dL 8.2* 8.1*   BILIRUBIN mg/dL 0.2 0.2   ALK PHOS U/L 81 88   ALT (SGPT) U/L 8 6   AST (SGOT) U/L 20 11   ANION GAP mmol/L 11.0 10.0     Estimated Creatinine Clearance: 62.7 mL/min (A) (by C-G formula based on SCr of 1.6 mg/dL (H)).  Results from last 7 days   Lab Units 11/08/20  0154   MAGNESIUM mg/dL 1.6   PHOSPHORUS mg/dL 2.5         Results from last 7 days   Lab Units 11/08/20  0154 11/07/20  1950   WBC 10*3/mm3 19.45* 17.97*   HEMOGLOBIN g/dL 9.6* 10.1*   HEMATOCRIT % 27.7* 29.6*   PLATELETS 10*3/mm3 352 417           Culture Data:   No results found for: BLOODCX  No results found for: URINECX  No results found for: RESPCX  Wound Culture   Date Value Ref Range Status   11/07/2020 Growth present, too young to evaluate  Preliminary     No results found for: STOOLCX  No components found for: BODYFLD    Radiology Data:   Imaging Results (Last 24 Hours)     Procedure Component Value Units Date/Time    US Guided Vascular Access [[id number]]  Resulted: 11/08/20 0950     Updated: 11/08/20 0950    Narrative:      This procedure was auto-finalized with no dictation required.    IR Insert Midline Without Port Pump 5 Plus [[id number]] Resulted: 11/08/20 0941     Updated: 11/08/20 0941    Narrative:      This procedure was auto-finalized with no dictation required.    CT Angiogram Chest [[id number]] Collected: 11/07/20 2121     Updated: 11/07/20 2219    Narrative:      EXAM DESCRIPTION:     CT ANGIOGRAM CHEST    CLINICAL HISTORY:     60 years  Male  soa, J96.01 Acute respiratory failure with  hypoxia I50.23 Acute on chronic systolic (congestive) heart  failure A41.9 Sepsis, unspecified organism R65.20 Severe sepsis  without septic shock J96.01 Acute respiratory failure with  hypoxia    COMPARISON:     Radiograph of the chest performed on the same day.    TECHNIQUE:     Images were obtained in axial, sagittal, and coronal planes.  Intravenous contrast was administered.      This exam was performed according to our departmental  dose-optimization program which includes use of Automated  Exposure Control, adjustment of the mA and/or kV according to  patient size and/or use of iterative reconstruction technique.     FINDINGS:     No filling defects pulmonary arteries bilaterally. No aortic  dissection or dilatation. Enlarged heart.    Mediastinal adenopathy. 2.4 cm right paratracheal lymph node.  Lymph nodes aortopulmonary window with the largest measuring 2  cm. Calcified mediastinal and right hilar lymph nodes.    Moderate to large bilateral pleural effusions. Extensive airspace  opacities with groundglass component lung fields bilaterally.  Confluent air space attenuation lower lobes bilaterally  consistent with infiltrate and atelectatic change. Thickening  interlobular septa. No pneumothorax. No discrete lung parenchymal  nodules noted.    No acute osseous abnormality. Incomplete healed fracture  anterolateral right fifth, sixth, and seventh ribs as well  as  anterolateral left fourth, fifth, and sixth ribs.      Impression:        No evidence for pulmonary embolus. No aortic dissection.    Enlarged heart with moderate to marked congestive heart failure.    Extensive bilateral infiltrates present with inflammatory process  and pulmonary congestion.    Electronically signed by:  Yolanda Richards MD  11/7/2020 10:18 PM CST  Workstation: [id number]    XR Foot 3+ View Right [[id number]] Collected: 11/07/20 2058     Updated: 11/07/20 2139    Narrative:      EXAM DESCRIPTION:     XR FOOT 3+ VW RIGHT    CLINICAL HISTORY:     60 years  Male  gangrene, J96.01 Acute respiratory failure with  hypoxia I50.23 Acute on chronic systolic (congestive) heart  failure A41.9 Sepsis, unspecified organism R65.20 Severe sepsis  without septic shock J96.01 Acute respiratory failure with  hypoxia    COMPARISON:     None    TECHNIQUE:     Three images of the right foot were obtained.    FINDINGS:     No fracture seen.    Normal bony mineralization. No erosive or lytic lesions seen. No  cortical disruption. No abnormal periosteal reaction.    Moderate size calcaneal spur. Well-circumscribed bone densities  medial malleolar region likely related to normal, or degenerative  change.      Impression:        No fracture or dislocation seen.    No radiographic evidence for osteomyelitis. Correlation with  magnetic resonance study suggested if clinical suspicion for  osteomyelitis persists.    Electronically signed by:  Yolanda Richards MD  11/7/2020 9:38 PM CST  Workstation: [id number]    XR Chest 1 View [[id number]] Collected: 11/07/20 2001     Updated: 11/07/20 2016    Narrative:      PROCEDURE: XR CHEST 1 VW    VIEWS:Single    INDICATION: Shortness of breath    COMPARISON: None    FINDINGS:       - lines/tubes: None    - cardiac: Size within normal limits.    - mediastinum: Contour within normal limits.     - lungs: No evidence of a focal air space process. There are  interstitial and mild patchy airspace  opacities  - pleura: No evidence of  fluid.      - osseous: Unremarkable for age.        Impression:      Interstitial and patchy airspace opacities, may represent  asymmetric pulmonary edema or infectious/inflammatory process  including viral pneumonia. Clinical correlation needed.      Electronically signed by:  Lamar Lopez MD  11/7/2020 8:15 PM CST  Workstation: [id number]          I have reviewed the patient's current medications.     Assessment/Plan     Active Hospital Problems    Diagnosis   • **Acute respiratory failure with hypoxia (CMS/HCC)            • Diabetic foot ulcer (CMS/HCC)   • Bilateral pneumonia   • Diabetes mellitus (CMS/HCC)            • Sepsis (CMS/HCC)            • Acute kidney injury (CMS/HCC)              • COPD with acute exacerbation (CMS/HCC)              • Uncontrolled hypertension              • Paranoid schizophrenia (CMS/HCC)       Plan:     Sepsis  - improved.  Cont treatment for suspected pneumonia (HCAP).       AcuteHypoxic respiratory failure - Improving.  COVID negative.  Cont vanc and zosyn for HCAP.  Cont diuresis for acute CHF exacerbation.  Weaned to NC O2.      HCAP - CT chest shows extensive bilateral infiltrates.  Cont vanc and zosyn.  Follow blood cultures.       Acute exacerbation of heart failure with preserved EF - cont IV lasix (home bumex on hold).    Last echocardiogram was done in August 2020 showing an EF of 56 to 60%.  IV nitro was started in the ED and efforts being made to dc this medication. Coreg resumed.  BNP 20K.  Follow I/Os.       Mild elevation of troponin  Most likely strain from CHF.  Trend Trops.       Diabetes  Accu-Cheks and sliding scale insulin     Diabetic ulcer of the toe-patient - ongoing treatment as an outpatient.  Patient was supposed to get an arteriogram on Monday and can look into that if he is still in the hospital.     Acute on chronic renal insufficiency CKD stage I.  Baseline creatinine is 1.2  And admission creatinine is 1.57.   Follow cr with diuresis.       History of COPD does not appear to be in acute exacerbation of COPD at this time he is currently on BiPAP and antibiotics.  Bronchodialtors and IV steroids.       Paranoid schizophrenia -  continue home medications      Hyperlipidemia - continue home medications     Essential hypertension - home meds have been resumed.  Plan to dc nitro gtt as tolerated and adjust oral meds for control.       DVT prophylaxis Lovenox     Full code            Discharge Planning: cont ICU care for now.  If able to stop nitro gtt, then can be transferred to the floor.     Joby Mills MD

## 2020-11-08 NOTE — PROGRESS NOTES
"Pharmacokinetics by Pharmacy - Vancomycin Initial Consult    Mohan Villatoro is a 60 y.o. male being initiated on vancomycin for skin and soft tissue infection / upper respiratory infection. Patient is also receiving zosyn.    Objective:     [Ht: 180.3 cm (71\"); Wt: 88.1 kg (194 lb 3.6 oz)]     WBC   Date Value Ref Range Status   11/08/2020 19.45 (H) 3.40 - 10.80 10*3/mm3 Final   11/07/2020 17.97 (H) 3.40 - 10.80 10*3/mm3 Final   09/22/2020 15.04 (H) 3.40 - 10.80 10*3/mm3 Final      C-Reactive Protein   Date Value Ref Range Status   09/08/2020 0.86 (H) 0.00 - 0.50 mg/dL Final     Lactate   Date Value Ref Range Status   11/07/2020 1.0 0.5 - 2.0 mmol/L Final   08/07/2020 1.3 0.5 - 2.0 mmol/L Final   08/06/2020 1.1 0.5 - 2.0 mmol/L Final      Temp Readings from Last 1 Encounters:   11/08/20 97.6 °F (36.4 °C) (Temporal)     Estimated Creatinine Clearance: 62.7 mL/min (A) (by C-G formula based on SCr of 1.6 mg/dL (H)).   Creatinine   Date Value Ref Range Status   11/08/2020 1.60 (H) 0.76 - 1.27 mg/dL Final   11/07/2020 1.57 (H) 0.76 - 1.27 mg/dL Final   09/22/2020 1.48 (H) 0.76 - 1.27 mg/dL Final       Baseline culture results:  Microbiology Results (last 10 days)       Procedure Component Value - Date/Time    CRE Screen by PCR - Swab, Large Intestine, Rectum [[id number]] Collected: 11/08/20 0250    Lab Status: Final result Specimen: Swab from Large Intestine, Rectum Updated: 11/08/20 0428     CRE SCREEN Not Detected     Comment: Test performed by real-time polymerase chain reaction (qPCR).        OXA 48 Strain Not Detected     IMP STRAIN Not Detected     VIM STRAIN Not Detected     NDM Strain Not Detected     KPC Strain Not Detected    COVID-19, [facility] IN-HOUSE, NP SWAB IN TRANSPORT MEDIA 8-10 HR TAT - Swab, Nasopharynx [[id number]]  (Normal) Collected: 11/07/20 2137    Lab Status: Final result Specimen: Swab from Nasopharynx Updated: 11/08/20 0250     COVID19 Not Detected    Narrative:      Testing performed by Real " Time RT-PCR  This test has not been approved by the [employer] but is authorized under the Emergency Use Act (EUA)    Fact sheet for providers: https://www.fda.gov/media/015712/download    Fact sheet for patients: https://www.fda.gov/media/620179/download        Wound Culture - Wound, Foot, Right [[id number]] Collected: 11/07/20 2055    Lab Status: Preliminary result Specimen: Wound from Foot, Right Updated: 11/07/20 2122     Gram Stain Many (4+) WBCs seen      Many (4+) Gram negative bacilli      Many (4+) Gram positive cocci in pairs      Many (4+) Gram positive bacilli    Narrative:      PEA PLATE ADDED           No results found for: RESPCX    Assessment  WBC above normal limits  SCr trending 1.6  Afebrile    Labs in progress:  11/7 wound; GNR / gram positive cocci pairs /gram positive rods  11/8 MRSA PCR    Vancomycin 1500 mg IV q24h gives estimated .44 / trough 10.2.      Utilizing AUC dosing   Goal AUC for SSTI/URTI: 400-600    Plan  1. Give vancomycin 1750mg IV x 1 11/7 2130 followed by vancomycin 1500mg IV Q24H  2. Will order vancomycin peak 11/9 1930 and vancomycin trough 11/10 1630  3. Pharmacy will monitor renal function and adjust dose accordingly.    Everardo Guzman, PharmD  11/08/20 08:56 CST

## 2020-11-08 NOTE — H&P
.        [facility] Medicine Admission      Date of Admission: 11/7/2020  Patient seen and evaluated on 10/7/2020 although note is after midnight    Primary Care Physician: Provider, No Known      Chief Complaint: **Shortness of breath*    HPI:    Schizophrenia   Diabetes   Hypertension   COPD   Gangrene of the toe present on admission currently being worked up by vascular    **Patient is a 60-year-old male who presents to the [facility] emergency room with some shortness of breath.  Patient was diagnosed in July 2020 with congestive heart failure.  He was intubated in October with acute respiratory failure and hypoxia.  He currently lives in a nursing home.  Wife is at the bedside who provides most of the history and states the patient's been coughing a lot and had increased sputum production.  Around an hour before he came in he had some shortness of breath.  He had gone for Covid swab testing for procedure that is scheduled for next week.  He is not currently on home oxygen or BiPAP.  They deny fever nausea vomiting diarrhea chest pain they report shortness of breath weakness    Concurrent Medical History:  has a past medical history of Diabetes (CMS/[facility]) and Schizo affective schizophrenia (CMS/[facility]).    Past Surgical History:  has a past surgical history that includes Tracheostomy tube placement (N/A, 8/25/2020) and gastrostomy feeding tube insertion (N/A, 8/25/2020).    Family History: family history is not on file.  Hypertension    Social History:  reports that he has been smoking. He has never used smokeless tobacco. He reports previous alcohol use. He reports that he does not use drugs.    Allergies:   Allergies   Allergen Reactions   • Niacin Unknown - High Severity   • Penicillins Rash       Medications:   Prior to Admission medications    Medication Sig Start Date End Date Taking? Authorizing Provider   albuterol sulfate  (90 Base) MCG/ACT  inhaler Inhale 2 puffs 3 (Three) Times a Day As Needed (copd).    Florentino Drummond MD   aspirin 325 MG tablet Take 325 mg by mouth Daily.    Florentino Drummond MD   budesonide (PULMICORT) 0.25 MG/2ML nebulizer solution Take 4 mL by nebulization 2 (Two) Times a Day. 8/12/20   Tripp Bruner MD   busPIRone (BUSPAR) 5 MG tablet Take 5 mg by mouth 3 (Three) Times a Day.    Florentino Drummond MD   calcium polycarbophil (FIBERCON) 625 MG tablet Take 625 mg by mouth 2 (Two) Times a Day.    Florentino Drummond MD   Cholecalciferol (VITAMIN D3) 125 MCG (5000 UT) capsule capsule Take 5,000 Units by mouth Daily.    Florentino Drummond MD   citalopram (CeleXA) 20 MG tablet Take 20 mg by mouth Daily.    Florentino Drummond MD   dicyclomine (BENTYL) 20 MG tablet Take 20 mg by mouth 3 (Three) Times a Day.    Florentino Drummond MD   diphenhydrAMINE (BENADRYL) 50 MG capsule Take 50 mg by mouth At Night As Needed for Sleep.    Florentino Drummond MD   doxycycline (VIBRAMYCIN) 100 MG capsule Take 1 capsule by mouth 2 (Two) Times a Day. 11/5/20   Lima Flores APRN   famotidine (PEPCID) 10 MG/ML solution injection Infuse 2 mL into a venous catheter Daily. 8/12/20   Tripp Bruner MD   fentaNYL citrate (PF) 2,500 mcg in sodium chloride 0.9 % 200 mL Infuse  mcg/hr into a venous catheter Continuous. 8/12/20   Tripp Bruner MD   finasteride (PROSCAR) 5 MG tablet Take 5 mg by mouth Daily.    Florentino Drummond MD   fluticasone (FLONASE) 50 MCG/ACT nasal spray 1 spray into the nostril(s) as directed by provider Daily.    Florentino Drummond MD   folic acid (FOLVITE) 1 MG tablet Take 1 mg by mouth Daily.    Florentino Drummond MD   furosemide (LASIX) 20 MG tablet Take 20 mg by mouth Daily.    Florentino Drummond MD   guaiFENesin (ROBITUSSIN) 100 MG/5ML solution oral solution Take 10 mL by mouth Every 4 (Four) Hours. 8/12/20   Tripp Bruner MD   haloperidol (HALDOL)  5 MG tablet Take 7.5 mg by mouth every night at bedtime.    Florentino Drummond MD   HYDROcodone-acetaminophen (NORCO)  MG per tablet Take 1 tablet by mouth Every 8 (Eight) Hours As Needed for Moderate Pain .    Florentino Drummond MD   hydrOXYzine pamoate (VISTARIL) 50 MG capsule Take 50 mg by mouth every night at bedtime.    Florentino Drummond MD   insulin glargine (LANTUS) 100 UNIT/ML injection Inject 14 Units under the skin into the appropriate area as directed Every Night.    Florentino rDummond MD   ipratropium-albuterol (DUO-NEB) 0.5-2.5 mg/3 ml nebulizer Take 3 mL by nebulization 4 (Four) Times a Day. 8/12/20   Tripp Bruner MD   loperamide (IMODIUM) 2 MG capsule Take 2 mg by mouth 4 (Four) Times a Day As Needed for Diarrhea.    Florentino Drummond MD   methylPREDNISolone sodium succinate (SOLU-Medrol) 40 MG injection Infuse 1 mL into a venous catheter Every 12 (Twelve) Hours. 8/12/20   Tripp Bruner MD   metoclopramide (REGLAN) 5 MG tablet Take 5 mg by mouth 4 (Four) Times a Day Before Meals & at Bedtime.    Florentino Drummond MD   Multiple Vitamins-Minerals (THERA-M PO) Take 1 tablet by mouth Daily.    Florentino Drummond MD   Omega-3 Fatty Acids (OMEGA-3 FISH OIL) 1000 MG capsule Take 2 g by mouth 2 (Two) Times a Day.    Florentino Drummond MD   omeprazole (priLOSEC) 20 MG capsule Take 20 mg by mouth Daily.    Florentino Drummond MD   propofol (DIPRIVAN) 10 mg/mL emulsion infusion Infuse 465-6,975 mcg/min into a venous catheter Dose Adjusted By Provider As Needed. 8/12/20   Tripp Bruner MD   risperiDONE (risperDAL M-TABS) 0.5 MG disintegrating tablet Place 0.5 mg on the tongue 2 (Two) Times a Day.    Florentino Drummond MD   rOPINIRole (REQUIP) 2 MG tablet Take 2 mg by mouth Every Night.    Florentino Drummond MD   sennosides-docusate (senna-docusate sodium) 8.6-50 MG per tablet Take 1 tablet by mouth At Night As Needed for Constipation.     Florentino Drummond MD   simvastatin (ZOCOR) 20 MG tablet Take 20 mg by mouth Every Night.    Florentino Drummond MD   tamsulosin (FLOMAX) 0.4 MG capsule 24 hr capsule Take 1 capsule by mouth every night at bedtime.    Florentino Drummond MD   traZODone (DESYREL) 100 MG tablet Take 100 mg by mouth Every Night.    Florentino Drummond MD   vitamin B-12 (CYANOCOBALAMIN) 1000 MCG tablet Take 1,000 mcg by mouth Daily.    Florentino Drummond MD       Review of Systems:  Review of Systems   Point review of systems obtained pertinent positives and negatives noted in HPI otherwise complete ROS is negative except as mentioned above.    Physical Exam:   Temp:  [98.7 °F (37.1 °C)] 98.7 °F (37.1 °C)  Heart Rate:  [] 80  Resp:  [20-36] 21  BP: (173-222)/() 191/86  Physical Exam  Vitals signs and nursing note reviewed.   Constitutional:       General: He is in acute distress.      Appearance: He is normal weight. He is ill-appearing. He is not toxic-appearing or diaphoretic.   HENT:      Head: Normocephalic.      Nose: Nose normal.      Mouth/Throat:      Mouth: Mucous membranes are dry.   Eyes:      Extraocular Movements: Extraocular movements intact.      Conjunctiva/sclera: Conjunctivae normal.   Neck:      Musculoskeletal: Neck supple.   Cardiovascular:      Rate and Rhythm: Normal rate and regular rhythm.      Pulses: Normal pulses.      Heart sounds: Normal heart sounds.   Pulmonary:      Comments: Moderate respiratory distress on BiPAP no wheezes audible faint Rales faint rhonchi  Abdominal:      General: Bowel sounds are normal. There is no distension.      Palpations: Abdomen is soft.      Tenderness: There is no abdominal tenderness. There is no guarding.   Musculoskeletal:      Right lower leg: Edema present.      Left lower leg: Edema present.   Skin:     General: Skin is warm and dry.      Capillary Refill: Capillary refill takes less than 2 seconds.   Neurological:      General: No focal deficit  present.      Mental Status: He is alert. Mental status is at baseline.      Cranial Nerves: No cranial nerve deficit.   Psychiatric:      Comments: Flat affect           Results Reviewed:  I have personally reviewed current lab, radiology, and data and agree with results.  Lab Results (last 24 hours)     Procedure Component Value Units Date/Time    COVID-19, [facility] IN-HOUSE, NP SWAB IN TRANSPORT MEDIA 8-10 HR TAT - Swab, Nasopharynx [[id number]] Collected: 11/07/20 2137    Specimen: Swab from Nasopharynx Updated: 11/07/20 2147    Wound Culture - Wound, Foot, Right [[id number]] Collected: 11/07/20 2055    Specimen: Wound from Foot, Right Updated: 11/07/20 2122     Gram Stain Many (4+) WBCs seen      Many (4+) Gram negative bacilli      Many (4+) Gram positive cocci in pairs      Many (4+) Gram positive bacilli    Narrative:      PEA PLATE ADDED     [place] Draw [[id number]] Collected: 11/07/20 1950    Specimen: Blood Updated: 11/07/20 2100    Narrative:      The following orders were created for panel order [place] Draw.  Procedure                               Abnormality         Status                     ---------                               -----------         ------                     Light Blue Top[[id number]]                                   Final result               Green Top (Gel)[[id number]]                                  Final result               Lavender Top[[id number]]                                     Final result               Gold Top - SST[[id number]]                                   Final result                 Please view results for these tests on the individual orders.    Light Blue Top [[id number]] Collected: 11/07/20 1950    Specimen: Blood Updated: 11/07/20 2100     Extra Tube hold for add-on     Comment: Auto resulted       Green Top (Gel) [[id number]] Collected: 11/07/20 1950    Specimen: Blood Updated: 11/07/20 2100     Extra Tube Hold for add-ons.     Comment: Auto resulted.        Lavender Top [[id number]] Collected: 11/07/20 1950    Specimen: Blood Updated: 11/07/20 2100     Extra Tube hold for add-on     Comment: Auto resulted       Gold Top - SST [[id number]] Collected: 11/07/20 1950    Specimen: Blood Updated: 11/07/20 2100     Extra Tube Hold for add-ons.     Comment: Auto resulted.       Blood Gas, Arterial - [[id number]]  (Abnormal) Collected: 11/07/20 2020    Specimen: Arterial Blood Updated: 11/07/20 2039     Site Right Radial     Anjel's Test N/A     pH, Arterial 7.359 pH units      pCO2, Arterial 41.6 mm Hg      pO2, Arterial 230.0 mm Hg      Comment: 83 Value above reference range        HCO3, Arterial 23.4 mmol/L      Base Excess, Arterial -2.0 mmol/L      Comment: 84 Value below reference range        O2 Saturation, Arterial 100.0 %      Comment: 83 Value above reference range        Barometric Pressure for Blood Gas 752 mmHg      Modality BiPap     FIO2 60 %      Ventilator Mode BiPAP     Collected by DANIELLE     Comment: Meter: [id number]     :  [id number]       Troponin [[id number]]  (Abnormal) Collected: 11/07/20 1950    Specimen: Blood Updated: 11/07/20 2035     Troponin T 0.074 ng/mL     Narrative:      Troponin T Reference Range:  <= 0.03 ng/mL-   Negative for AMI  >0.03 ng/mL-     Abnormal for myocardial necrosis.  Clinicians would have to utilize clinical acumen, EKG, Troponin and serial changes to determine if it is an Acute Myocardial Infarction or myocardial injury due to an underlying chronic condition.       Results may be falsely decreased if patient taking Biotin.      Blood Culture - Blood, Arm, Left [[id number]] Collected: 11/07/20 2027    Specimen: Blood from Arm, Left Updated: 11/07/20 2034    BNP [[id number]]  (Abnormal) Collected: 11/07/20 1950    Specimen: Blood Updated: 11/07/20 2030     proBNP 20,227.0 pg/mL     Narrative:      Among patients with dyspnea, NT-proBNP is highly sensitive for the detection of acute congestive heart failure. In addition  NT-proBNP of <300 pg/ml effectively rules out acute congestive heart failure with 99% negative predictive value.    Results may be falsely decreased if patient taking Biotin.      D-dimer, Quantitative [[id number]]  (Abnormal) Collected: 11/07/20 1950    Specimen: Blood Updated: 11/07/20 2022     D-Dimer, Quantitative 1,748 ng/mL (FEU)     Narrative:      Dimer values <500 ng/ml FEU are FDA approved as aid in diagnosis of deep venous thrombosis and pulmonary embolism.  This test should not be used in an exclusion strategy with pretest probability alone.    A recent guideline regarding diagnosis for pulmonary thromboembolism recommends an adjusted exclusion criterion of age x 10 ng/ml FEU for patients >50 years of age (Sun Intern Med 2015; 163: 701-711).      Comprehensive Metabolic Panel [[id number]]  (Abnormal) Collected: 11/07/20 1950    Specimen: Blood Updated: 11/07/20 2018     Glucose 229 mg/dL      BUN 34 mg/dL      Creatinine 1.57 mg/dL      Sodium 130 mmol/L      Potassium 4.0 mmol/L      Chloride 97 mmol/L      CO2 23.0 mmol/L      Calcium 8.1 mg/dL      Total Protein 6.3 g/dL      Albumin 3.00 g/dL      ALT (SGPT) 6 U/L      AST (SGOT) 11 U/L      Alkaline Phosphatase 88 U/L      Total Bilirubin 0.2 mg/dL      eGFR Non African Amer 46 mL/min/1.73      Globulin 3.3 gm/dL      A/G Ratio 0.9 g/dL      BUN/Creatinine Ratio 21.7     Anion Gap 10.0 mmol/L     Narrative:      GFR Normal >60  Chronic Kidney Disease <60  Kidney Failure <15      Lactic Acid, Plasma [[id number]]  (Normal) Collected: 11/07/20 1950    Specimen: Blood Updated: 11/07/20 2016     Lactate 1.0 mmol/L     CBC & Differential [[id number]]  (Abnormal) Collected: 11/07/20 1950    Specimen: Blood Updated: 11/07/20 2007    Narrative:      The following orders were created for panel order CBC & Differential.  Procedure                               Abnormality         Status                     ---------                               -----------          ------                     CBC Auto Differential[[id number]]        Abnormal            Final result                 Please view results for these tests on the individual orders.    CBC Auto Differential [[id number]]  (Abnormal) Collected: 11/07/20 1950    Specimen: Blood Updated: 11/07/20 2007     WBC 17.97 10*3/mm3      RBC 3.23 10*6/mm3      Hemoglobin 10.1 g/dL      Hematocrit 29.6 %      MCV 91.6 fL      MCH 31.3 pg      MCHC 34.1 g/dL      RDW 13.8 %      RDW-SD 47.0 fl      MPV 10.0 fL      Platelets 417 10*3/mm3      Neutrophil % 71.8 %      Lymphocyte % 11.2 %      Monocyte % 10.9 %      Eosinophil % 3.2 %      Basophil % 0.8 %      Immature Grans % 2.1 %      Neutrophils, Absolute 12.91 10*3/mm3      Lymphocytes, Absolute 2.01 10*3/mm3      Monocytes, Absolute 1.96 10*3/mm3      Eosinophils, Absolute 0.57 10*3/mm3      Basophils, Absolute 0.14 10*3/mm3      Immature Grans, Absolute 0.38 10*3/mm3      nRBC 0.0 /100 WBC     Blood Culture - Blood, Arm, Right [[id number]] Collected: 11/07/20 1950    Specimen: Blood from Arm, Right Updated: 11/07/20 1956        Imaging Results (Last 24 Hours)     Procedure Component Value Units Date/Time    CT Angiogram Chest [[id number]] Collected: 11/07/20 2121     Updated: 11/07/20 2219    Narrative:      EXAM DESCRIPTION:     CT ANGIOGRAM CHEST    CLINICAL HISTORY:     60 years  Male  soa, J96.01 Acute respiratory failure with  hypoxia I50.23 Acute on chronic systolic (congestive) heart  failure A41.9 Sepsis, unspecified organism R65.20 Severe sepsis  without septic shock J96.01 Acute respiratory failure with  hypoxia    COMPARISON:     Radiograph of the chest performed on the same day.    TECHNIQUE:     Images were obtained in axial, sagittal, and coronal planes.  Intravenous contrast was administered.      This exam was performed according to our departmental  dose-optimization program which includes use of Automated  Exposure Control, adjustment of the mA and/or kV  according to  patient size and/or use of iterative reconstruction technique.     FINDINGS:     No filling defects pulmonary arteries bilaterally. No aortic  dissection or dilatation. Enlarged heart.    Mediastinal adenopathy. 2.4 cm right paratracheal lymph node.  Lymph nodes aortopulmonary window with the largest measuring 2  cm. Calcified mediastinal and right hilar lymph nodes.    Moderate to large bilateral pleural effusions. Extensive airspace  opacities with groundglass component lung fields bilaterally.  Confluent air space attenuation lower lobes bilaterally  consistent with infiltrate and atelectatic change. Thickening  interlobular septa. No pneumothorax. No discrete lung parenchymal  nodules noted.    No acute osseous abnormality. Incomplete healed fracture  anterolateral right fifth, sixth, and seventh ribs as well as  anterolateral left fourth, fifth, and sixth ribs.      Impression:        No evidence for pulmonary embolus. No aortic dissection.    Enlarged heart with moderate to marked congestive heart failure.    Extensive bilateral infiltrates present with inflammatory process  and pulmonary congestion.    Electronically signed by:  Yolanda Richards MD  11/7/2020 10:18 PM CST  Workstation: [id number]    XR Foot 3+ View Right [[id number]] Collected: 11/07/20 2058     Updated: 11/07/20 2139    Narrative:      EXAM DESCRIPTION:     XR FOOT 3+ VW RIGHT    CLINICAL HISTORY:     60 years  Male  gangrene, J96.01 Acute respiratory failure with  hypoxia I50.23 Acute on chronic systolic (congestive) heart  failure A41.9 Sepsis, unspecified organism R65.20 Severe sepsis  without septic shock J96.01 Acute respiratory failure with  hypoxia    COMPARISON:     None    TECHNIQUE:     Three images of the right foot were obtained.    FINDINGS:     No fracture seen.    Normal bony mineralization. No erosive or lytic lesions seen. No  cortical disruption. No abnormal periosteal reaction.    Moderate size calcaneal spur.  Well-circumscribed bone densities  medial malleolar region likely related to normal, or degenerative  change.      Impression:        No fracture or dislocation seen.    No radiographic evidence for osteomyelitis. Correlation with  magnetic resonance study suggested if clinical suspicion for  osteomyelitis persists.    Electronically signed by:  Yolanda Richards MD  11/7/2020 9:38 PM CST  Workstation: [id number]    XR Chest 1 View [[id number]] Collected: 11/07/20 2001     Updated: 11/07/20 2016    Narrative:      PROCEDURE: XR CHEST 1 VW    VIEWS:Single    INDICATION: Shortness of breath    COMPARISON: None    FINDINGS:       - lines/tubes: None    - cardiac: Size within normal limits.    - mediastinum: Contour within normal limits.     - lungs: No evidence of a focal air space process. There are  interstitial and mild patchy airspace opacities  - pleura: No evidence of  fluid.      - osseous: Unremarkable for age.        Impression:      Interstitial and patchy airspace opacities, may represent  asymmetric pulmonary edema or infectious/inflammatory process  including viral pneumonia. Clinical correlation needed.      Electronically signed by:  Lamar Lopez MD  11/7/2020 8:15 PM CST  Workstation: [id number]            Assessment:    Active Hospital Problems    Diagnosis   • Acute respiratory failure with hypoxia (CMS/HCC)     Most likely secondary to chf exacerbation or pneumonia  Patient currently on bipap  Will start on lasix 40mg bid  Will start on vanc and zosyn       • CHF exacerbation (CMS/HCC)     HFpEF  8/20 echo EF of 56-60 percent  Will start on lasix 40mg BID  Will hold bumex   Will continue with coreg, aldactone, hydralazine once off nitroprusside drip  Continue with statin and asa  Bnp: 20227, will continue to trend  Mg and phosphorous pending, will replace as needed   cta chest shows moderate to marked congestive heart failure       • Type 2 diabetes mellitus (CMS/HCC)     Will start on sliding scale  insulin         • Sepsis, unspecified organism (CMS/[facility])     Wbc 17.97, resp rate >20, hr >90  Secondary to possible pneumonia  cxr shows bilateral infiltrates  cta chest shows Extensive bilateral infiltrates present with inflammatory process  and pulmonary congestion.  Blood culture pending  resp panel pending  Will start on vanc and zosyn       • Diabetic ulcer of toe of right foot associated with type 2 diabetes mellitus, limited to breakdown of skin (CMS/[facility])     Patient getting worked up for this outpatient  Consult vascular surgery in the AM, was suppose to get an arteriogram tomorrow        • Acute kidney injury (CMS/[facility])     Creatinine on admission 1.57, baseline 1.2  Will hold iv fluids as patient is in chf exacerbation         • COPD (chronic obstructive pulmonary disease) (CMS/[facility])     duonebs   pulmicort  On bipap  resp panel pending  Increased cough and sputum production, will start on vanc and zosyn         • Essential hypertension     Patient currently on nitroprusside drip due to blood pressure being above >180/120  at home patient takes hydralazine 75mg tid, coreg 12.5 bid, aldactone 25 mg  Once off nitroprusside, will continue with patients home medications          • Hyperlipidemia     Continue with atorvastatin 40mg      • Paranoid schizophrenia (CMS/[facility])     Will continue with risperdal, haldol, and celexa                Plan:*  Sepsis present on admission with elevated respiratory rate elevated heart rate and elevated white count.  Most likely secondary to lung/heart process both pneumonia and congestive heart failure exacerbation.  Due to the CHF exacerbation patient will not get a fluid bolus load.  We will continue antibiotics blood cultures are pending    Hypoxic respiratory failure*  Unclear etiology at this time.  Patient does not have a history of CHF but does have an elevated BNP so could be CHF exacerbation or pneumonia.  Patient been placed on the BiPAP by the ER.  Has been given  Lasix and we will continue.  Has also been started on vancomycin and Zosyn.  Covid swab is pending    Possible pneumonia CT chest shows extensive bilateral infiltrates however does have congestive heart failure also.  Will treat with antibiotics at this time.  Blood cultures have been sent from the ER     CHF exacerbation  Last echocardiogram was done in August 2020 showing an EF of 56 to 60%.  He has been started on Lasix in the emergency room and we will continue.  Currently holding his Bumex.  Currently is on a nitro drip at this time for blood pressure and vasodilation.  Holding Coreg Aldactone hydralazine for now can resume once off the drip.  Is also noted that his BNP was 20,000.  CTA of the chest shows moderate to marked congestive heart failure    Mild elevation of troponin  Most likely strain from CHF    Diabetes  Accu-Cheks and sliding scale insulin    Diabetic ulcer of the toe-patient is being followed for this outpatient.  That is why he was at the hospital getting his preop Covid testing done.  Patient was supposed to get an arteriogram on Monday can look into that if he is still in the hospital.    Acute on chronic renal insufficiency CKD stage I.  Baseline creatinine is 1.2 admission creatinine is 1.57.  May increase as he will be on Lasix.  Will monitor    History of COPD does not appear to be in acute exacerbation of COPD at this time he is currently on BiPAP and antibiotics.  If test Covid negative then will be able to add duo nebs if test Covid positive then will need inhalers    Paranoid schizophrenia continue home medications Risperdal Haldol and Celexa    Hyperlipidemia continue home medications    Essential hypertension currently patient is on a nitro drip holding his antihypertensives at this time.  Once he is off the drip can resume those    DVT prophylaxis Lovenox    Full code      I discussed the patient's findings and my recommendations with: **Patient and family at the  bedside*    Justice Sanford MD

## 2020-11-08 NOTE — PAYOR COMM NOTE
"Elisabeth ye rn [facility] fax [phone]  Cm phone [phone]  INPATIENT ADMISSION          Mohan Huff (60 y.o. Male)     Date of Birth Social Security Number Address Home Phone MRN    1960  [facility]  [address] [place] [address] [phone] [id number]    Mandaen Marital Status          Denominational Single       Admission Date Admission Type Admitting Provider Attending Provider Department, Room/Bed    11/7/20 Emergency Justice Sanford MD Denton, Sean M, MD [facility] Emergency Department, 15/15    Discharge Date Discharge Disposition Discharge Destination                       Attending Provider: Jani Salgado MD    Allergies: Niacin, Penicillins    Isolation: None   Infection: MRSA (08/13/20), COVID Screen (preop/placement) (11/05/20)   Code Status: CPR    Ht: 180.3 cm (71\")   Wt: 88 kg (194 lb)    Admission Cmt: None   Principal Problem: ACUTE RESPIRATORY FAILURE WITH HYPOXIA/SEPSIS                Active Insurance as of 11/7/2020     Primary Coverage     Payor Plan Insurance Group Employer/Plan Group    MEDICARE MEDICARE A & B      Payor Plan Address Payor Plan Phone Number Payor Plan Fax Number Effective Dates    [address] [phone]  9/1/1985 - None Entered    [facility] [id number]       Subscriber Name Subscriber Birth Date Member ID       MOHAN HUFF 1960 [id number]           Secondary Coverage     Payor Plan Insurance Group Employer/Plan Group    [facility] MEDICAID [id number]     Payor Plan Address Payor Plan Phone Number Payor Plan Fax Number Effective Dates    [address] [address] [phone]  1/1/2019 - None Entered    [facility] [id number]       Subscriber Name Subscriber Birth Date Member ID       MOHAN HUFF 1960 [id number]                 Emergency Contacts      (Rel.) Home Phone Work Phone Mobile Phone    Jazmin Arzate (Sister) [phone] -- [phone]    Sayra Banegas " (Sister) [phone] -- [phone]            History & Physical    No notes of this type exist for this encounter.            Emergency Department Notes      Jani Salgado MD at 11/07/20 2058      Procedure Orders    1. ECG 12 Lead [[id number]] ordered by Jani Salgado MD               Subjective   59yo male pmh significant hyperlipidemia/dm2/copd/schizophrenia/ckd, discharged [facility] 09.22.2020 secondary to cardiac arrest/respiratory failure presents ED c/o acute onset severe soa approximately 1hr prior to arrival.  Pt unable to provide hx secondary to respiratory distress.      History provided by:  Patient and relative  Shortness of Breath  Severity:  Severe  Onset quality:  Sudden  Duration:  1 hour      Review of Systems   Unable to perform ROS: Severe respiratory distress   Respiratory: Positive for shortness of breath.        Past Medical History:   Diagnosis Date   • Diabetes (CMS/HCC)    • Schizo affective schizophrenia (CMS/HCC)        Allergies   Allergen Reactions   • Niacin Unknown - High Severity   • Penicillins Rash       Past Surgical History:   Procedure Laterality Date   • GASTROSTOMY FEEDING TUBE INSERTION N/A 8/25/2020    Procedure: GASTROSTOMY FEEDING TUBE INSERTION;  Surgeon: Kizzy Garcia MD;  Location: [facility];  Service: General;  Laterality: N/A;   • TRACHEOSTOMY N/A 8/25/2020    Procedure: TRACHEOSTOMY;  Surgeon: Bal Manning MD;  Location: [facility] OR;  Service: ENT;  Laterality: N/A;       No family history on file.    Social History     Socioeconomic History   • Marital status: Single     Spouse name: Not on file   • Number of children: Not on file   • Years of education: Not on file   • Highest education level: Not on file   Tobacco Use   • Smoking status: Current Every Day Smoker   • Smokeless tobacco: Never Used   Substance and Sexual Activity   • Alcohol use: Not Currently   • Drug use: Never           Objective   Physical Exam  Vitals signs and nursing note reviewed.      Constitutional:       General: He is in acute distress.      Appearance: He is well-developed. He is ill-appearing.   HENT:      Head: Normocephalic and atraumatic.      Mouth/Throat:      Mouth: Mucous membranes are moist.   Eyes:      Pupils: Pupils are equal, round, and reactive to light.   Neck:      Musculoskeletal: Normal range of motion and neck supple.      Vascular: No JVD.   Cardiovascular:      Rate and Rhythm: Tachycardia present.      Pulses: Normal pulses.      Heart sounds: Normal heart sounds. No murmur. No friction rub. No gallop.    Pulmonary:      Effort: Tachypnea, accessory muscle usage and respiratory distress present.      Breath sounds: Examination of the right-upper field reveals wheezing. Examination of the left-upper field reveals wheezing. Examination of the right-lower field reveals decreased breath sounds and rales. Examination of the left-lower field reveals decreased breath sounds. Decreased breath sounds, wheezing and rales present. No rhonchi.   Abdominal:      General: Abdomen is flat. Bowel sounds are normal. There is no distension.      Palpations: Abdomen is soft.      Tenderness: There is no abdominal tenderness. There is no guarding.      Hernia: No hernia is present.   Musculoskeletal:        Feet:    Skin:     General: Skin is warm and dry.      Coloration: Skin is pale.   Neurological:      Mental Status: He is alert.      GCS: GCS eye subscore is 4. GCS verbal subscore is 5. GCS motor subscore is 6.         ECG 12 Lead      Date/Time: 11/7/2020 9:02 PM  Performed by: Jani Salgado MD  Authorized by: Jani Salgado MD   Interpreted by physician  Rhythm: sinus tachycardia  Rate: tachycardic  BPM: 109  QRS axis: normal  Conduction: conduction normal  ST Segments: ST segments normal  T Waves: T waves normal  Other findings: LAE  Clinical impression: abnormal ECG                ED Course      Labs Reviewed   COMPREHENSIVE METABOLIC PANEL - Abnormal; Notable for the  following components:       Result Value    Glucose 229 (*)     BUN 34 (*)     Creatinine 1.57 (*)     Sodium 130 (*)     Chloride 97 (*)     Calcium 8.1 (*)     Albumin 3.00 (*)     eGFR Non  Amer 46 (*)     All other components within normal limits    Narrative:     GFR Normal >60  Chronic Kidney Disease <60  Kidney Failure <15     CBC WITH AUTO DIFFERENTIAL - Abnormal; Notable for the following components:    WBC 17.97 (*)     RBC 3.23 (*)     Hemoglobin 10.1 (*)     Hematocrit 29.6 (*)     Lymphocyte % 11.2 (*)     Immature Grans % 2.1 (*)     Neutrophils, Absolute 12.91 (*)     Monocytes, Absolute 1.96 (*)     Eosinophils, Absolute 0.57 (*)     Immature Grans, Absolute 0.38 (*)     All other components within normal limits   BNP (IN-HOUSE) - Abnormal; Notable for the following components:    proBNP 20,227.0 (*)     All other components within normal limits    Narrative:     Among patients with dyspnea, NT-proBNP is highly sensitive for the detection of acute congestive heart failure. In addition NT-proBNP of <300 pg/ml effectively rules out acute congestive heart failure with 99% negative predictive value.    Results may be falsely decreased if patient taking Biotin.     D-DIMER, QUANTITATIVE - Abnormal; Notable for the following components:    D-Dimer, Quantitative 1,748 (*)     All other components within normal limits    Narrative:     Dimer values <500 ng/ml FEU are FDA approved as aid in diagnosis of deep venous thrombosis and pulmonary embolism.  This test should not be used in an exclusion strategy with pretest probability alone.    A recent guideline regarding diagnosis for pulmonary thromboembolism recommends an adjusted exclusion criterion of age x 10 ng/ml FEU for patients >50 years of age (Sun Intern Med 2015; 163: 701-711).     TROPONIN (IN-HOUSE) - Abnormal; Notable for the following components:    Troponin T 0.074 (*)     All other components within normal limits    Narrative:     Troponin  T Reference Range:  <= 0.03 ng/mL-   Negative for AMI  >0.03 ng/mL-     Abnormal for myocardial necrosis.  Clinicians would have to utilize clinical acumen, EKG, Troponin and serial changes to determine if it is an Acute Myocardial Infarction or myocardial injury due to an underlying chronic condition.       Results may be falsely decreased if patient taking Biotin.     BLOOD GAS, ARTERIAL - Abnormal; Notable for the following components:    pO2, Arterial 230.0 (*)     Base Excess, Arterial -2.0 (*)     O2 Saturation, Arterial 100.0 (*)     All other components within normal limits   LACTIC ACID, PLASMA - Normal   BLOOD CULTURE   BLOOD CULTURE   WOUND CULTURE   RAINBOW DRAW    Narrative:     The following orders were created for panel order [place] Draw.  Procedure                               Abnormality         Status                     ---------                               -----------         ------                     Light Blue Top[[id number]]                                   Final result               Green Top (Gel)[[id number]]                                  Final result               Lavender Top[[id number]]                                     Final result               Gold Top - SST[[id number]]                                   Final result                 Please view results for these tests on the individual orders.   TROPONIN (IN-HOUSE)   BLOOD GAS, ARTERIAL   CBC AND DIFFERENTIAL    Narrative:     The following orders were created for panel order CBC & Differential.  Procedure                               Abnormality         Status                     ---------                               -----------         ------                     CBC Auto Differential[[id number]]        Abnormal            Final result                 Please view results for these tests on the individual orders.   LIGHT BLUE TOP   GREEN TOP   LAVENDER TOP   GOLD TOP - SST     Xr Chest 1 View    Result Date:  11/7/2020  Narrative: PROCEDURE: XR CHEST 1 VW VIEWS:Single INDICATION: Shortness of breath COMPARISON: None FINDINGS:   - lines/tubes: None   - cardiac: Size within normal limits.   - mediastinum: Contour within normal limits.   - lungs: No evidence of a focal air space process. There are interstitial and mild patchy airspace opacities - pleura: No evidence of  fluid.    - osseous: Unremarkable for age.     Impression: Interstitial and patchy airspace opacities, may represent asymmetric pulmonary edema or infectious/inflammatory process including viral pneumonia. Clinical correlation needed. Electronically signed by:  Lamar Lopez MD  11/7/2020 8:15 PM CST Workstation: [id number]                                         [facility]    Final diagnoses:   Acute respiratory failure with hypoxia (CMS/HCC)   Acute on chronic systolic congestive heart failure (CMS/HCC)   Sepsis with acute hypoxic respiratory failure without septic shock, due to unspecified organism (CMS/HCC)            aJni Salgado MD  11/07/20 2103      Electronically signed by Jani Salgado MD at 11/07/20 2103       Physician Progress Notes (last 24 hours) (Notes from 11/06/20 2216 through 11/07/20 2216)    No notes of this type exist for this encounter.

## 2020-11-08 NOTE — PROGRESS NOTES
TWO PATIENT IDENTIFIERS WERE USED. THE PATIENT WAS DRAPED WITH A FULL BODY DRAPE AND THE PATIENT'S RIGHT ARM WAS PREPPED WITH CHLORA PREP. ULTRASOUND WAS USED TO LOCALIZE THERIGHT BASILIC VEIN. SUBCUTANEOUS TISSUE AT THE CATHETER SITE WAS INFILTRATED WITH 2% LIDOCAINE. UNDER ULTRASOUND GUIDANCE, THE VEIN WAS ACCESSED WITH A 21 GAUGE  NEEDLE. AN 0.018 WIRE WAS THEN THREADED THROUGH THE NEEDLE. THE 21 GAUGE NEEDLE WAS REMOVED AND A 4 Divehi SHEATH WAS PLACED OVER THE WIRE INTO THE VEIN.THE MIDLINE CATHETER WAS TRIMMED TO 20CM. THE MIDLINE CATHETER WAS THEN PLACED OVER THE WIRE INTO THE VEIN, THE SHEATH WAS PEELED AWAY, WIRE WAS REMOVED. CATHETER WAS FLUSHED WITH NORMAL SALINE AND CATHETER TIP APPLIED. BIOPATCH PLACED. CATHETER SECURED WITH STAT LOCK AND TEGADERM. PATIENT TOLERATED PROCEDURE WELL. THIS WAS DONE AT BEDSIDE      IMPRESSION:SUCCESSFUL PLACEMENT OF DUAL LUMEN MIDLINE.           Joceline Case  11/8/2020  09:40 CST

## 2020-11-08 NOTE — H&P
HISTORY AND PHYSICAL  NAME: Mohan Villatoro  : 1960  MRN: [id number]    DATE OF ADMISSION:  2020     DATE & TIME SEEN: 20 at 9:45pm    PCP: Provider, No Known    CODE STATUS: Full code    CHIEF COMPLAINT:  Shortness of breath    HPI:  Mohan Villatoro is a 60 y.o. male with a CMH of shizophrenia, diabetes, hypertension, copd who presents complaining of dyspnea that started earlier today. Patient was diagnosed with chf back in July of this year. Since then patient has been in and out of the hospital for pneumonia and chf exacerbation. Was intubated last month for acute resp failure with hypoxia. Currently resides in a nursing home. Hx is obtained from wife since patient has bipap on. Wife states patient has been coughing a lot today and has increased sputum production. Around an hour before he came in, he had severe shortness of breath. Patient is on no home oxygen or bipap. Denies fever, nausea, vomiting, diarrhea, constipation, chest pain. Patient does have a necrotic ulcer on his right third toe that is being seen outpatient. Patient was to have a arteriogram or angiogram scheduled for tomorrow, so had come in earlier today for a covid screen.      CONCURRENT MEDICAL HISTORY:  Past Medical History:   Diagnosis Date   • Diabetes (CMS/[facility]ly [facility])    • Schizo affective schizophrenia (CMS/[facility]ly [facility])        PAST SURGICAL HISTORY:  Past Surgical History:   Procedure Laterality Date   • GASTROSTOMY FEEDING TUBE INSERTION N/A 2020    Procedure: GASTROSTOMY FEEDING TUBE INSERTION;  Surgeon: Kizzy Garcia MD;  Location: [facility] OR;  Service: General;  Laterality: N/A;   • TRACHEOSTOMY N/A 2020    Procedure: TRACHEOSTOMY;  Surgeon: Bal Manning MD;  Location: [facility] OR;  Service: ENT;  Laterality: N/A;       FAMILY HISTORY:  No family history on file.     SOCIAL HISTORY:  Social History     Socioeconomic History   • Marital status: Single     Spouse name: Not on file   • Number of children:  Not on file   • Years of education: Not on file   • Highest education level: Not on file   Tobacco Use   • Smoking status: Current Every Day Smoker   • Smokeless tobacco: Never Used   Substance and Sexual Activity   • Alcohol use: Not Currently   • Drug use: Never       HOME MEDICATIONS:  Prior to Admission medications    Medication Sig Start Date End Date Taking? Authorizing Provider   albuterol sulfate  (90 Base) MCG/ACT inhaler Inhale 2 puffs 3 (Three) Times a Day As Needed (copd).    Florentino Drummond MD   aspirin 325 MG tablet Take 325 mg by mouth Daily.    Florentino Drummond MD   budesonide (PULMICORT) 0.25 MG/2ML nebulizer solution Take 4 mL by nebulization 2 (Two) Times a Day. 8/12/20   Tripp Bruner MD   busPIRone (BUSPAR) 5 MG tablet Take 5 mg by mouth 3 (Three) Times a Day.    Florentino Drummond MD   calcium polycarbophil (FIBERCON) 625 MG tablet Take 625 mg by mouth 2 (Two) Times a Day.    Florentino Drummond MD   Cholecalciferol (VITAMIN D3) 125 MCG (5000 UT) capsule capsule Take 5,000 Units by mouth Daily.    Florentino Drummond MD   citalopram (CeleXA) 20 MG tablet Take 20 mg by mouth Daily.    Florentino Drummond MD   dicyclomine (BENTYL) 20 MG tablet Take 20 mg by mouth 3 (Three) Times a Day.    Florentino Drummond MD   diphenhydrAMINE (BENADRYL) 50 MG capsule Take 50 mg by mouth At Night As Needed for Sleep.    Florentino Drummond MD   doxycycline (VIBRAMYCIN) 100 MG capsule Take 1 capsule by mouth 2 (Two) Times a Day. 11/5/20   Lima Flores APRN   famotidine (PEPCID) 10 MG/ML solution injection Infuse 2 mL into a venous catheter Daily. 8/12/20   Tripp Bruner MD   fentaNYL citrate (PF) 2,500 mcg in sodium chloride 0.9 % 200 mL Infuse  mcg/hr into a venous catheter Continuous. 8/12/20   Tripp Bruner MD   finasteride (PROSCAR) 5 MG tablet Take 5 mg by mouth Daily.    Florentino Drummond MD   fluticasone (FLONASE) 50 MCG/ACT nasal  spray 1 spray into the nostril(s) as directed by provider Daily.    Florentino Drummond MD   folic acid (FOLVITE) 1 MG tablet Take 1 mg by mouth Daily.    Florentino Drummond MD   furosemide (LASIX) 20 MG tablet Take 20 mg by mouth Daily.    Florentino Drummond MD   guaiFENesin (ROBITUSSIN) 100 MG/5ML solution oral solution Take 10 mL by mouth Every 4 (Four) Hours. 8/12/20   Tripp Bruner MD   haloperidol (HALDOL) 5 MG tablet Take 7.5 mg by mouth every night at bedtime.    Florentino Drummond MD   HYDROcodone-acetaminophen (NORCO)  MG per tablet Take 1 tablet by mouth Every 8 (Eight) Hours As Needed for Moderate Pain .    Florentino Drummond MD   hydrOXYzine pamoate (VISTARIL) 50 MG capsule Take 50 mg by mouth every night at bedtime.    Florentino Drummond MD   insulin glargine (LANTUS) 100 UNIT/ML injection Inject 14 Units under the skin into the appropriate area as directed Every Night.    Florentino Drummond MD   ipratropium-albuterol (DUO-NEB) 0.5-2.5 mg/3 ml nebulizer Take 3 mL by nebulization 4 (Four) Times a Day. 8/12/20   Tripp Bruner MD   loperamide (IMODIUM) 2 MG capsule Take 2 mg by mouth 4 (Four) Times a Day As Needed for Diarrhea.    Florentino Drummond MD   methylPREDNISolone sodium succinate (SOLU-Medrol) 40 MG injection Infuse 1 mL into a venous catheter Every 12 (Twelve) Hours. 8/12/20   Tripp Bruner MD   metoclopramide (REGLAN) 5 MG tablet Take 5 mg by mouth 4 (Four) Times a Day Before Meals & at Bedtime.    Florentino Drummond MD   Multiple Vitamins-Minerals (THERA-M PO) Take 1 tablet by mouth Daily.    Florentino Drummond MD   Omega-3 Fatty Acids (OMEGA-3 FISH OIL) 1000 MG capsule Take 2 g by mouth 2 (Two) Times a Day.    Florention Drummond MD   omeprazole (priLOSEC) 20 MG capsule Take 20 mg by mouth Daily.    Florentino Drummond MD   propofol (DIPRIVAN) 10 mg/mL emulsion infusion Infuse 465-6,975 mcg/min into a venous catheter Dose  Adjusted By Provider As Needed. 8/12/20   Tripp Bruner MD   risperiDONE (risperDAL M-TABS) 0.5 MG disintegrating tablet Place 0.5 mg on the tongue 2 (Two) Times a Day.    Florentino Drummond MD   rOPINIRole (REQUIP) 2 MG tablet Take 2 mg by mouth Every Night.    Florentino Drummond MD   sennosides-docusate (senna-docusate sodium) 8.6-50 MG per tablet Take 1 tablet by mouth At Night As Needed for Constipation.    Florentino Drummond MD   simvastatin (ZOCOR) 20 MG tablet Take 20 mg by mouth Every Night.    Florentino Drummond MD   tamsulosin (FLOMAX) 0.4 MG capsule 24 hr capsule Take 1 capsule by mouth every night at bedtime.    Florentino Drummond MD   traZODone (DESYREL) 100 MG tablet Take 100 mg by mouth Every Night.    Florentino Drummond MD   vitamin B-12 (CYANOCOBALAMIN) 1000 MCG tablet Take 1,000 mcg by mouth Daily.    Florentino Drummond MD       ALLERGIES:  Niacin and Penicillins    REVIEW OF SYSTEMS  Review of Systems   Unable to perform ROS: Severe respiratory distress   Constitutional: Negative for activity change, appetite change, chills, fatigue and fever.   HENT: Negative for drooling, ear discharge, ear pain, facial swelling, hearing loss, mouth sores, rhinorrhea and sinus pain.    Eyes: Negative for pain, redness and itching.   Respiratory: Negative for cough, choking, chest tightness, shortness of breath and stridor.    Cardiovascular: Negative for chest pain, palpitations and leg swelling.   Gastrointestinal: Negative for abdominal distention, abdominal pain, anal bleeding, blood in stool, constipation, diarrhea and nausea.   Endocrine: Negative for heat intolerance, polydipsia and polyphagia.   Genitourinary: Negative for dysuria, flank pain, frequency and genital sores.   Musculoskeletal: Negative for back pain, gait problem, joint swelling and myalgias.   Skin: Negative for pallor and rash.   Neurological: Negative for seizures, facial asymmetry, speech difficulty,  light-headedness, numbness and headaches.   Hematological: Negative for adenopathy. Does not bruise/bleed easily.   Psychiatric/Behavioral: Negative for confusion, decreased concentration, dysphoric mood and hallucinations. The patient is not nervous/anxious and is not hyperactive.        PHYSICAL EXAM:  Temp:  [98.7 °F (37.1 °C)] 98.7 °F (37.1 °C)  Heart Rate:  [] 88  Resp:  [24-36] 24  BP: (173-222)/() 173/78  Body mass index is 27.06 kg/m².  Physical Exam  Constitutional:       Appearance: He is well-developed.      Comments: On bipap     HENT:      Head: Normocephalic and atraumatic.      Right Ear: External ear normal.      Left Ear: External ear normal.      Nose: Nose normal.   Eyes:      Extraocular Movements: Extraocular movements intact.      Conjunctiva/sclera: Conjunctivae normal.      Pupils: Pupils are equal, round, and reactive to light.   Neck:      Musculoskeletal: Normal range of motion and neck supple.   Cardiovascular:      Rate and Rhythm: Normal rate and regular rhythm.      Pulses: Normal pulses.      Heart sounds: Normal heart sounds.   Pulmonary:      Effort: Pulmonary effort is normal.      Breath sounds: Wheezing present.      Comments: Decreased breath sound bilaterally  On bipap    Abdominal:      General: Abdomen is flat. Bowel sounds are normal.      Palpations: Abdomen is soft.   Musculoskeletal: Normal range of motion.   Skin:     General: Skin is warm and dry.      Comments: Necrotic third toe of right foot with discharge    Neurological:      Mental Status: He is alert.         DIAGNOSTIC DATA:   Lab Results (last 24 hours)     Procedure Component Value Units Date/Time    COVID-19, [facility] IN-HOUSE, NP SWAB IN TRANSPORT MEDIA 8-10 HR TAT - Swab, Nasopharynx [[id number]] Collected: 11/07/20 2137    Specimen: Swab from Nasopharynx Updated: 11/07/20 2147    Wound Culture - Wound, Foot, Right [[id number]] Collected: 11/07/20 2055    Specimen: Wound from Foot, Right Updated:  11/07/20 2122     Gram Stain Many (4+) WBCs seen      Many (4+) Gram negative bacilli      Many (4+) Gram positive cocci in pairs      Many (4+) Gram positive bacilli    Narrative:      PEA PLATE ADDED     [place] Draw [[id number]] Collected: 11/07/20 1950    Specimen: Blood Updated: 11/07/20 2100    Narrative:      The following orders were created for panel order [place] Draw.  Procedure                               Abnormality         Status                     ---------                               -----------         ------                     Light Blue Top[[id number]]                                   Final result               Green Top (Gel)[[id number]]                                  Final result               Lavender Top[[id number]]                                     Final result               Gold Top - SST[[id number]]                                   Final result                 Please view results for these tests on the individual orders.    Light Blue Top [[id number]] Collected: 11/07/20 1950    Specimen: Blood Updated: 11/07/20 2100     Extra Tube hold for add-on     Comment: Auto resulted       Green Top (Gel) [[id number]] Collected: 11/07/20 1950    Specimen: Blood Updated: 11/07/20 2100     Extra Tube Hold for add-ons.     Comment: Auto resulted.       Lavender Top [[id number]] Collected: 11/07/20 1950    Specimen: Blood Updated: 11/07/20 2100     Extra Tube hold for add-on     Comment: Auto resulted       Gold Top - SST [[id number]] Collected: 11/07/20 1950    Specimen: Blood Updated: 11/07/20 2100     Extra Tube Hold for add-ons.     Comment: Auto resulted.       Blood Gas, Arterial - [[id number]]  (Abnormal) Collected: 11/07/20 2020    Specimen: Arterial Blood Updated: 11/07/20 2039     Site Right Radial     Anjel's Test N/A     pH, Arterial 7.359 pH units      pCO2, Arterial 41.6 mm Hg      pO2, Arterial 230.0 mm Hg      Comment: 83 Value above reference range        HCO3, Arterial 23.4 mmol/L       Base Excess, Arterial -2.0 mmol/L      Comment: 84 Value below reference range        O2 Saturation, Arterial 100.0 %      Comment: 83 Value above reference range        Barometric Pressure for Blood Gas 752 mmHg      Modality BiPap     FIO2 60 %      Ventilator Mode BiPAP     Collected by DANIELLE     Comment: Meter: [id number]     :  [id number]       Troponin [[id number]]  (Abnormal) Collected: 11/07/20 1950    Specimen: Blood Updated: 11/07/20 2035     Troponin T 0.074 ng/mL     Narrative:      Troponin T Reference Range:  <= 0.03 ng/mL-   Negative for AMI  >0.03 ng/mL-     Abnormal for myocardial necrosis.  Clinicians would have to utilize clinical acumen, EKG, Troponin and serial changes to determine if it is an Acute Myocardial Infarction or myocardial injury due to an underlying chronic condition.       Results may be falsely decreased if patient taking Biotin.      Blood Culture - Blood, Arm, Left [[id number]] Collected: 11/07/20 2027    Specimen: Blood from Arm, Left Updated: 11/07/20 2034    BNP [[id number]]  (Abnormal) Collected: 11/07/20 1950    Specimen: Blood Updated: 11/07/20 2030     proBNP 20,227.0 pg/mL     Narrative:      Among patients with dyspnea, NT-proBNP is highly sensitive for the detection of acute congestive heart failure. In addition NT-proBNP of <300 pg/ml effectively rules out acute congestive heart failure with 99% negative predictive value.    Results may be falsely decreased if patient taking Biotin.      D-dimer, Quantitative [[id number]]  (Abnormal) Collected: 11/07/20 1950    Specimen: Blood Updated: 11/07/20 2022     D-Dimer, Quantitative 1,748 ng/mL (FEU)     Narrative:      Dimer values <500 ng/ml FEU are FDA approved as aid in diagnosis of deep venous thrombosis and pulmonary embolism.  This test should not be used in an exclusion strategy with pretest probability alone.    A recent guideline regarding diagnosis for pulmonary thromboembolism recommends an adjusted  exclusion criterion of age x 10 ng/ml FEU for patients >50 years of age (Sun Intern Med 2015; 163: 701-711).      Comprehensive Metabolic Panel [[id number]]  (Abnormal) Collected: 11/07/20 1950    Specimen: Blood Updated: 11/07/20 2018     Glucose 229 mg/dL      BUN 34 mg/dL      Creatinine 1.57 mg/dL      Sodium 130 mmol/L      Potassium 4.0 mmol/L      Chloride 97 mmol/L      CO2 23.0 mmol/L      Calcium 8.1 mg/dL      Total Protein 6.3 g/dL      Albumin 3.00 g/dL      ALT (SGPT) 6 U/L      AST (SGOT) 11 U/L      Alkaline Phosphatase 88 U/L      Total Bilirubin 0.2 mg/dL      eGFR Non African Amer 46 mL/min/1.73      Globulin 3.3 gm/dL      A/G Ratio 0.9 g/dL      BUN/Creatinine Ratio 21.7     Anion Gap 10.0 mmol/L     Narrative:      GFR Normal >60  Chronic Kidney Disease <60  Kidney Failure <15      Lactic Acid, Plasma [[id number]]  (Normal) Collected: 11/07/20 1950    Specimen: Blood Updated: 11/07/20 2016     Lactate 1.0 mmol/L     CBC & Differential [[id number]]  (Abnormal) Collected: 11/07/20 1950    Specimen: Blood Updated: 11/07/20 2007    Narrative:      The following orders were created for panel order CBC & Differential.  Procedure                               Abnormality         Status                     ---------                               -----------         ------                     CBC Auto Differential[[id number]]        Abnormal            Final result                 Please view results for these tests on the individual orders.    CBC Auto Differential [[id number]]  (Abnormal) Collected: 11/07/20 1950    Specimen: Blood Updated: 11/07/20 2007     WBC 17.97 10*3/mm3      RBC 3.23 10*6/mm3      Hemoglobin 10.1 g/dL      Hematocrit 29.6 %      MCV 91.6 fL      MCH 31.3 pg      MCHC 34.1 g/dL      RDW 13.8 %      RDW-SD 47.0 fl      MPV 10.0 fL      Platelets 417 10*3/mm3      Neutrophil % 71.8 %      Lymphocyte % 11.2 %      Monocyte % 10.9 %      Eosinophil % 3.2 %      Basophil % 0.8 %       Immature Grans % 2.1 %      Neutrophils, Absolute 12.91 10*3/mm3      Lymphocytes, Absolute 2.01 10*3/mm3      Monocytes, Absolute 1.96 10*3/mm3      Eosinophils, Absolute 0.57 10*3/mm3      Basophils, Absolute 0.14 10*3/mm3      Immature Grans, Absolute 0.38 10*3/mm3      nRBC 0.0 /100 WBC     Blood Culture - Blood, Arm, Right [[id number]] Collected: 11/07/20 1950    Specimen: Blood from Arm, Right Updated: 11/07/20 1956           Imaging Results (Last 24 Hours)     Procedure Component Value Units Date/Time    CT Angiogram Chest [[id number]] Collected: 11/07/20 2121     Updated: 11/07/20 2219    Narrative:      EXAM DESCRIPTION:     CT ANGIOGRAM CHEST    CLINICAL HISTORY:     60 years  Male  soa, J96.01 Acute respiratory failure with  hypoxia I50.23 Acute on chronic systolic (congestive) heart  failure A41.9 Sepsis, unspecified organism R65.20 Severe sepsis  without septic shock J96.01 Acute respiratory failure with  hypoxia    COMPARISON:     Radiograph of the chest performed on the same day.    TECHNIQUE:     Images were obtained in axial, sagittal, and coronal planes.  Intravenous contrast was administered.      This exam was performed according to our departmental  dose-optimization program which includes use of Automated  Exposure Control, adjustment of the mA and/or kV according to  patient size and/or use of iterative reconstruction technique.     FINDINGS:     No filling defects pulmonary arteries bilaterally. No aortic  dissection or dilatation. Enlarged heart.    Mediastinal adenopathy. 2.4 cm right paratracheal lymph node.  Lymph nodes aortopulmonary window with the largest measuring 2  cm. Calcified mediastinal and right hilar lymph nodes.    Moderate to large bilateral pleural effusions. Extensive airspace  opacities with groundglass component lung fields bilaterally.  Confluent air space attenuation lower lobes bilaterally  consistent with infiltrate and atelectatic change. Thickening  interlobular  septa. No pneumothorax. No discrete lung parenchymal  nodules noted.    No acute osseous abnormality. Incomplete healed fracture  anterolateral right fifth, sixth, and seventh ribs as well as  anterolateral left fourth, fifth, and sixth ribs.      Impression:        No evidence for pulmonary embolus. No aortic dissection.    Enlarged heart with moderate to marked congestive heart failure.    Extensive bilateral infiltrates present with inflammatory process  and pulmonary congestion.    Electronically signed by:  Yolanda Richards MD  11/7/2020 10:18 PM CST  Workstation: [id number]    XR Foot 3+ View Right [[id number]] Collected: 11/07/20 2058     Updated: 11/07/20 2139    Narrative:      EXAM DESCRIPTION:     XR FOOT 3+ VW RIGHT    CLINICAL HISTORY:     60 years  Male  gangrene, J96.01 Acute respiratory failure with  hypoxia I50.23 Acute on chronic systolic (congestive) heart  failure A41.9 Sepsis, unspecified organism R65.20 Severe sepsis  without septic shock J96.01 Acute respiratory failure with  hypoxia    COMPARISON:     None    TECHNIQUE:     Three images of the right foot were obtained.    FINDINGS:     No fracture seen.    Normal bony mineralization. No erosive or lytic lesions seen. No  cortical disruption. No abnormal periosteal reaction.    Moderate size calcaneal spur. Well-circumscribed bone densities  medial malleolar region likely related to normal, or degenerative  change.      Impression:        No fracture or dislocation seen.    No radiographic evidence for osteomyelitis. Correlation with  magnetic resonance study suggested if clinical suspicion for  osteomyelitis persists.    Electronically signed by:  Yolanda Richards MD  11/7/2020 9:38 PM CST  Workstation: [id number]    XR Chest 1 View [[id number]] Collected: 11/07/20 2001     Updated: 11/07/20 2016    Narrative:      PROCEDURE: XR CHEST 1 VW    VIEWS:Single    INDICATION: Shortness of breath    COMPARISON: None    FINDINGS:       - lines/tubes: None    -  cardiac: Size within normal limits.    - mediastinum: Contour within normal limits.     - lungs: No evidence of a focal air space process. There are  interstitial and mild patchy airspace opacities  - pleura: No evidence of  fluid.      - osseous: Unremarkable for age.        Impression:      Interstitial and patchy airspace opacities, may represent  asymmetric pulmonary edema or infectious/inflammatory process  including viral pneumonia. Clinical correlation needed.      Electronically signed by:  Lamar Lopez MD  11/7/2020 8:15 PM CST  Workstation: [id number]            I reviewed the patient's new clinical results.    ASSESSMENT AND PLAN: This is a 60 y.o. male with:    Active Hospital Problems    Diagnosis POA   • Acute respiratory failure with hypoxia (CMS/HCC) [J96.01] Yes     Most likely secondary to chf exacerbation or pneumonia  Patient currently on bipap  Will start on lasix 40mg bid  Will start on vanc and zosyn       • CHF exacerbation (CMS/HCC) [I50.9] Yes     HFpEF  8/20 echo EF of 56-60 percent  Will start on lasix 40mg BID  Will hold bumex   Will continue with coreg, aldactone, hydralazine once off nitroprusside drip  Continue with statin and asa  Bnp: 20227, will continue to trend  Mg and phosphorous pending, will replace as needed   cta chest shows moderate to marked congestive heart failure       • Type 2 diabetes mellitus (CMS/HCC) [E11.9] Yes     Will start on sliding scale insulin         • Sepsis, unspecified organism (CMS/HCC) [A41.9] Yes     Wbc 17.97, resp rate >20, hr >90  Secondary to possible pneumonia  cxr shows bilateral infiltrates  cta chest shows Extensive bilateral infiltrates present with inflammatory process  and pulmonary congestion.  Blood culture pending  resp panel pending  Will start on vanc and zosyn       • Diabetic ulcer of toe of right foot associated with type 2 diabetes mellitus, limited to breakdown of skin (CMS/HCC) [E11.621, L97.511] Yes     Patient getting worked  up for this outpatient  Consult vascular surgery in the AM, was suppose to get an arteriogram tomorrow        • Acute kidney injury (CMS/HCC) [N17.9] Yes     Creatinine on admission 1.57, baseline 1.2  Will hold iv fluids as patient is in chf exacerbation         • COPD (chronic obstructive pulmonary disease) (CMS/HCC) [J44.9] Yes     duonebs   pulmicort  On bipap  resp panel pending  Increased cough and sputum production, will start on vanc and zosyn         • Essential hypertension [I10] Yes     Patient currently on nitroprusside drip due to blood pressure being above >180/120  at home patient takes hydralazine 75mg tid, coreg 12.5 bid, aldactone 25 mg  Once off nitroprusside, will continue with patients home medications          • Hyperlipidemia [E78.5] Yes     Continue with atorvastatin 40mg      • Paranoid schizophrenia (CMS/HCC) [F20.0] Yes     Will continue with risperdal, haldol, and celexa          DVT prophylaxis: NOAC     Mohan Villatoro and I have discussed pain goals for this hospitalization after reviewing his current clinical condition, medical history and prior pain experiences.  The goal is to keep the pain level at a minimum.  To help achieve this, I plan to add pain medication as needed .      Expected Length of Stay: Where: current living arrangements and When:  3-4days    I discussed the patient's findings and my recommendations with patient and family.     Dr. Sanford is the attending on record at time of admission, She is aware of the patient's status and agrees with the above history and physical.         Jossie Zamora M.D. PGY3  [facility]  [address] [place] [address]  Office: [phone]    This document has been electronically signed by Jossie Zamora MD on November 7, 2020 22:27 CST

## 2020-11-08 NOTE — ED NOTES
CCU called for report, report could not be given r/t room not being cleaned and needing a Vlad-D clean. CCU nurse will call back when room clean.      Rubén Herrera RN  11/07/20 [id number]

## 2020-11-09 NOTE — NURSING NOTE
Patient has red but blanchable coccyx -dry scar tissue noted. Needs good skin care and offloading. He has open wounds to his right foot medial great toe 1.1 x 1.8  cm 100% yellow slough, Third toe right foot medial 3 x 2 cm and second two right foot medial 3 x 0.8 cm. Patient being seen by [facility]. Santly has been ordered for wounds. He has poor arterial flow and is awaiting an arteriogram due to his pneumonia this has been delayed.

## 2020-11-09 NOTE — PROGRESS NOTES
CVTS Office Progress Note       Subjective   Patient ID: Mohan Villatoro is a 60 y.o. male is being seen for consultation today at the request of [facility]    Chief Complaint:    Chief Complaint   Patient presents with   • Peripheral Vascular Disease       The following portions of the patient's history were reviewed and updated as appropriate: allergies, current medications, past family history, past medical history, past social history, past surgical history and problem list.  Recent images independently reviewed.  Available laboratory values reviewed.    PCP:  Provider, No Known    60 y.o. male with HTN(stable, increased risk stroke, rupture), Hyperlipidemia(stable, increased risk cardiovascular events), Diabetes Mellitus(stable, increased risk cardiovascular events), Smoker(uncontrolled, increased risk cardiovascular events) and COPD(stable, increased risk pulmonary complications)  Schizo-affective disorder (stable). Foot Ulcer (new, uncontrolled) non-healing.Wound center ([facility]).  smokes 0.5 PPD.  Respiratory Failure requiring trach/peg/rehab (8/2020) Recovered.  No TIA stroke amaurosis.  No MI claudication. No other associated signs, symptoms or modifying factors. Referred for vascular evaluation.     11/5/2020: ANA: RIGHT 0.6 Tri/Biphasic (Pop/DP) LEFT 0.76 Tri/Biphasic (PT/DP)       Past Medical History:   Diagnosis Date   • Diabetes (CMS/HCC)    • Schizo affective schizophrenia (CMS/HCC)      Past Surgical History:   Procedure Laterality Date   • GASTROSTOMY FEEDING TUBE INSERTION N/A 8/25/2020    Procedure: GASTROSTOMY FEEDING TUBE INSERTION;  Surgeon: Kizzy Garcia MD;  Location:  [facility] OR;  Service: General;  Laterality: N/A;   • TRACHEOSTOMY N/A 8/25/2020    Procedure: TRACHEOSTOMY;  Surgeon: Bal Manning MD;  Location:  [facility] OR;  Service: ENT;  Laterality: N/A;     History reviewed. No pertinent family history.  Social History     Tobacco Use   • Smoking status: Current Every Day Smoker   •  Smokeless tobacco: Never Used   Substance Use Topics   • Alcohol use: Not Currently   • Drug use: Never       ALLERGIES:   Niacin and Penicillins    MEDICATIONS:    No current facility-administered medications for this visit.   No current outpatient medications on file.    Facility-Administered Medications Ordered in Other Visits:   •  ! Vancomycin Peak, , Does not apply, Once, Jossie Zamora MD  •  [START ON 11/10/2020] ! Vancomycin Trough, , Does not apply, Once, Jossie Zamora MD  •  acetaminophen (TYLENOL) tablet 650 mg, 650 mg, Oral, Q4H PRN, Jossie Zamora MD, 650 mg at 11/08/20 1721  •  acidophilus (FLORANEX) tablet 1 tablet, 1 tablet, Oral, Daily, Joby Mills MD, 1 tablet at 11/09/20 0914  •  albuterol sulfate HFA (PROVENTIL HFA;VENTOLIN HFA;PROAIR HFA) inhaler 2 puff, 2 puff, Inhalation, Once, Jossie Zamora MD  •  albuterol sulfate HFA (PROVENTIL HFA;VENTOLIN HFA;PROAIR HFA) inhaler 2 puff, 2 puff, Inhalation, TID PRN, Jossie Zamora MD  •  aspirin EC tablet 81 mg, 81 mg, Oral, Daily, Joby Mills MD, 81 mg at 11/09/20 0914  •  atorvastatin (LIPITOR) tablet 40 mg, 40 mg, Oral, Daily, Jossie Zamora MD, 40 mg at 11/09/20 0914  •  benztropine (COGENTIN) tablet 1 mg, 1 mg, Oral, Q12H, Jossie Zamora MD, 1 mg at 11/09/20 0914  •  budesonide (PULMICORT) nebulizer solution 0.5 mg, 0.5 mg, Nebulization, BID - RT, Jossie Zamora MD, 0.5 mg at 11/09/20 0638  •  busPIRone (BUSPAR) tablet 10 mg, 10 mg, Oral, Q8H, Jossie Zamora MD, 10 mg at 11/09/20 1334  •  calcium carbonate (TUMS) chewable tablet 500 mg (200 mg elemental), 2 tablet, Oral, BID PRN, Jossie Zamora MD  •  carvedilol (COREG) tablet 12.5 mg, 12.5 mg, Oral, BID With Meals, Joby Mills MD, 12.5 mg at 11/09/20 0914  •  cetirizine (zyrTEC) tablet 10 mg, 10 mg, Oral, Daily, Joby Mills MD, 10 mg at 11/09/20 0914  •  cholecalciferol (VITAMIN D3) tablet 1,000 Units, 1,000 Units, Oral, Daily, Joby Mills MD,  1,000 Units at 11/09/20 0914  •  collagenase ointment 1 application, 1 application, Topical, Daily, Joby Mills MD, 1 application at 11/08/20 2018  •  dextrose (D50W) 25 g/ 50mL Intravenous Solution 25 g, 25 g, Intravenous, Q15 Min PRN, Justice Sanford MD  •  dextrose (GLUTOSE) oral gel 15 g, 15 g, Oral, Q15 Min PRN, Justice Sanford MD  •  docusate sodium (COLACE) capsule 100 mg, 100 mg, Oral, BID PRN, Jossie Zamora MD  •  furosemide (LASIX) injection 40 mg, 40 mg, Intravenous, Q12H, Jossie Zamora MD, 40 mg at 11/09/20 1334  •  glucagon (human recombinant) (GLUCAGEN DIAGNOSTIC) injection 1 mg, 1 mg, Subcutaneous, Q15 Min PRN, Justice Sanford MD  •  haloperidol (HALDOL) half tablet 7.5 mg, 7.5 mg, Oral, Nightly, Jossie Zamora MD, 7.5 mg at 11/08/20 2107  •  hydrALAZINE (APRESOLINE) tablet 75 mg, 75 mg, Oral, Q8H, Jossie Zamora MD, 75 mg at 11/09/20 1334  •  insulin aspart (novoLOG) injection 0-9 Units, 0-9 Units, Subcutaneous, TID AC, Justice Sanford MD, 6 Units at 11/09/20 1215  •  insulin detemir (LEVEMIR) injection 14 Units, 14 Units, Subcutaneous, Nightly, Justice Sanford MD, 14 Units at 11/08/20 2107  •  ipratropium (ATROVENT HFA) inhaler 2 puff, 2 puff, Inhalation, Once, Jossie Zamora MD  •  metoclopramide (REGLAN) tablet 5 mg, 5 mg, Oral, 4x Daily AC & at Bedtime, Jossie Zamora MD, 5 mg at 11/09/20 1215  •  nicotine (NICODERM CQ) 21 MG/24HR patch 1 patch, 1 patch, Transdermal, Q24H, Justice Sanford MD, 1 patch at 11/09/20 0916  •  nitroglycerin (TRIDIL) 200 mcg/ml infusion,  mcg/min, Intravenous, Titrated, Justice Sanford MD, Last Rate: 24 mL/hr at 11/09/20 0935, 80 mcg/min at 11/09/20 0935  •  ondansetron (ZOFRAN) tablet 4 mg, 4 mg, Oral, Q6H PRN, Jossie Zamora MD, 4 mg at 11/09/20 0021  •  pantoprazole (PROTONIX) EC tablet  40 mg, 40 mg, Oral, QAM, Jossie Zamora MD, 40 mg at 11/09/20 0605  •  Pharmacy to dose vancomycin, , Does not apply, Continuous PRN, Jossie Zamora MD  •  Pharmacy to Dose Zosyn, , Does not apply, Continuous PRN, Jossie Zamora MD  •  piperacillin-tazobactam (ZOSYN) 3.375 g/100 mL 0.9% NS IVPB (mbp), 3.375 g, Intravenous, Q8H, Justice Sanford MD, 3.375 g at 11/09/20 1215  •  potassium chloride (MICRO-K) CR capsule 40 mEq, 40 mEq, Oral, PRN **OR** potassium chloride (KLOR-CON) packet 40 mEq, 40 mEq, Oral, PRN, 40 mEq at 11/09/20 0610 **OR** potassium chloride 10 mEq in 100 mL IVPB, 10 mEq, Intravenous, Q1H PRN, Justice Sanford MD  •  risperiDONE (risperDAL) tablet 1 mg, 1 mg, Oral, Nightly, Jossie Zamora MD, 1 mg at 11/08/20 2024  •  rivaroxaban (XARELTO) tablet 10 mg, 10 mg, Oral, Daily With Dinner, Jossie Zamora MD, 10 mg at 11/08/20 1721  •  rOPINIRole (REQUIP) tablet 2 mg, 2 mg, Oral, Nightly, Jossie Zamora MD, 2 mg at 11/08/20 2024  •  sodium chloride 0.9 % flush 10 mL, 10 mL, Intravenous, PRN, Jossie Zamora MD  •  sodium chloride 0.9 % flush 10 mL, 10 mL, Intravenous, Q12H, Jossie Zamora MD, 10 mL at 11/09/20 1001  •  sodium chloride 0.9 % flush 10 mL, 10 mL, Intravenous, PRN, Jossie Zamora MD  •  spironolactone (ALDACTONE) tablet 25 mg, 25 mg, Oral, QAM, Joby Mills MD, 25 mg at 11/09/20 0606  •  tamsulosin (FLOMAX) 24 hr capsule 0.4 mg, 0.4 mg, Oral, Daily, Jossie Zamora MD, 0.4 mg at 11/09/20 0913  •  vancomycin 1500 mg/500 mL 0.9% NS IVPB ([facility]), 1,500 mg, Intravenous, Q24H, Jossie Zamora MD, 1,500 mg at 11/08/20 1725  •  vitamin C (ASCORBIC ACID) tablet 500 mg, 500 mg, Oral, QALina KINSEY Jaime Rena, MD, 500 mg at 11/09/20 0605  •  zinc sulfate (ZINCATE) capsule 220 mg, 220 mg, Oral, Daily, Joby Mills MD, 220 mg at 11/09/20 0914    Review of Systems   Eyes: Negative for visual disturbance.   Cardiovascular: Positive for  "claudication. Negative for cyanosis.   Respiratory: Negative for shortness of breath.    Skin: Positive for color change, dry skin, nail changes, poor wound healing and unusual hair distribution.   Musculoskeletal: Positive for arthritis and joint pain. Negative for muscle weakness.   Gastrointestinal: Negative for dysphagia.   Neurological: Negative for focal weakness, light-headedness, loss of balance, numbness, paresthesias and weakness.   Psychiatric/Behavioral: Negative for altered mental status.   All other systems reviewed and are negative.       Objective   Vitals:    11/05/20 1342   BP: 134/76   BP Location: Left arm   Pulse: 73   SpO2: 100%   Weight: 88.4 kg (194 lb 12.8 oz)   Height: 180.3 cm (71\")     Body mass index is 27.17 kg/m².  Physical Exam  Vitals signs reviewed.   Constitutional:       Appearance: Normal appearance.   HENT:      Head: Normocephalic.      Mouth/Throat:      Mouth: Mucous membranes are moist.   Eyes:      Pupils: Pupils are equal, round, and reactive to light.   Neck:      Musculoskeletal: Normal range of motion and neck supple.      Vascular: No carotid bruit.   Cardiovascular:      Rate and Rhythm: Normal rate.      Pulses:           Dorsalis pedis pulses are 1+ on the right side and 1+ on the left side.        Posterior tibial pulses are 1+ on the right side and 1+ on the left side.   Pulmonary:      Effort: Pulmonary effort is normal.      Breath sounds: Normal breath sounds.   Abdominal:      Palpations: Abdomen is soft.   Feet:      Right foot:      Skin integrity: Ulcer present.      Comments: Ulceration all 5 digits. Purulent. Strong odor.  Skin:     Capillary Refill: Capillary refill takes 2 to 3 seconds.      Coloration: Skin is cyanotic.   Neurological:      Mental Status: He is alert.   Psychiatric:         Attention and Perception: Attention normal.         Mood and Affect: Affect is flat.         Speech: Speech normal.       Lab Results   Component Value Date    WBC " 24.55 (H) 11/09/2020    HGB 8.5 (L) 11/09/2020    HCT 24.7 (L) 11/09/2020    MCV 91.5 11/09/2020     11/09/2020     Lab Results   Component Value Date    GLUCOSE 146 (H) 11/09/2020    BUN 37 (H) 11/09/2020    CREATININE 1.67 (H) 11/09/2020    EGFRIFNONA 42 (L) 11/09/2020    EGFRIFAFRI 34 (L) 08/06/2020    BCR 22.2 11/09/2020    K 3.6 11/09/2020    CO2 25.0 11/09/2020    CALCIUM 8.2 (L) 11/09/2020    ALBUMIN 2.90 (L) 11/09/2020    AST 35 11/09/2020    ALT 9 11/09/2020         Assessment & Plan     Independent Review of Radiographic Studies:  Detailed discussion regarding risks, benefits, and treatment plan. Images independently reviewed. Patient understands, agrees, and wishes to proceed with plan.     1. Atherosclerosis of native artery of right lower extremity with ulceration of other part of foot (CMS/HCC)  Severe reduction RIGHT lower extremity with ulceration all 5 toes  Medical Management: ASA,STATIN   Will need Angiogram for possible revascularization balloon/stent  Same day surgery information sheet is given to the patient regarding time of arrival for procedure and pre-procedure instructions.    Follow up as scheduled  - Follow Anesthesia Guidelines / Protocol; Future  - Provide NPO Instructions to Patient; Future  - Chlorhexidine Skin Prep; Future  - Follow Anesthesia Guidelines / Protocol; Standing  - Obtain Informed Consent; Standing  - Case Request; Standing  - ceFAZolin (ANCEF) 2 g in sodium chloride 0.9 % 100 mL IVPB  - sodium chloride 0.9 % infusion  - Case Request    2. Essential hypertension  Controlled.     3. Mixed hyperlipidemia  Lipid-lowering therapy has been proven beneficial in patients with cardio-vascular disease. Current guidelines recommend statin treatment for all patients with PAD,CAD and carotid stenosis. Statins are beneficial in preventing cardiovascular events, increasing functional capacity and lower the risk of adverse limb loss in PAD. Statins decrease the progression of  plaque formation and may improve peripheral vessel lining, and aid in reversing atherosclerosis.      4. Simple chronic bronchitis (CMS/HCC)      5. Paranoid schizophrenia (CMS/HCC)  Controlled.     6. Tobacco dependence  Smoking cessation assistance options offered including behavioral counseling (Smoking Cessation Classes), Nicotine replacement therapy (patches or gum), pharmacologic therapy (Chantix, Wellbutrin). Understands tobacco increases risk of expanding AAA, MI, CVA, PAD, carcinoma. Discussion and question answer period 5-7 minutes. Mohan Villatoro  reports that he has been smoking. He has never used smokeless tobacco.. I have educated him on the risk of diseases from using tobacco products such as cancer, COPD and heart disease.     I advised him to quit and he is not willing to quit.    I spent 5 minutes counseling the patient.          7. Diabetic ulcer of toe of right foot associated with type 2 diabetes mellitus, limited to breakdown of skin (CMS/HCC)  Uncontrolled.   Detailed discussion regarding situation, options and plans related to severe claudication, rest pain, ischemic tissue loss.  Studies demonstrate severe peripheral vascular disease.  Requires additional urgent evaluation with arteriography to evaluate options for improving blood flow to feet, healing wounds, and avoiding limb loss. Mohan Villatoro understands risks, including but not limited to: pain, infection, bleeding, nerve or blood vessel injury, need for emergent open operation to restore blood flow.  Benefits: relief of symptoms, reduction in risk of limb loss, improved wound healing.  Options: medical therapy, alternative imaging discussed. Mohan Villatoro understands and wishes to proceed with plan.  Abdominal aortogram, bilateral lower extremity runoff, possible balloon angioplasty or stent scheduled for 11/10/2020. Thank you for the opportunity to participate in this patient's care.     - doxycycline (VIBRAMYCIN) 100  MG capsule; Take 1 capsule by mouth 2 (Two) Times a Day.  Dispense: 28 capsule; Refill: 0      Time spent with patient 40 out of 40 min face to face evaluating, treating, and discussing findings regarding vascular exam and studies. Coordination of Vascular Interpretation/PreOP/Angiogram and education to patient and family regarding treatment options, plan of care, and hoped for outcomes.           This document has been electronically signed by MIKEY Carrillo on November 9, 2020 14:01 CST

## 2020-11-09 NOTE — CONSULTS
Adult Nutrition  Assessment    Patient Name:  Mohan Villatoro  YOB: 1960  MRN: [id number]  Admit Date:  11/7/2020    Assessment Date:  11/9/2020    Comments:  Pt admitted with Acute REsp failure with bilateral pneumonia and CHF exacerbation.  He also meets criteria for sepsis which is most likely the reason for JIM.  Pt with several pressure injuries present on admit.   Pt is currently on bipap and/or Nasal canula.  RD attempted to speak with pt but he was very drowsy and it was difficult to obtain any information.  Per staff he ate almost all of his breakfast.  will add Novasource Renal (4 ounces) with meals and monitor his intake and labs.     Reason for Assessment     Row Name 11/09/20 1324          Reason for Assessment    Reason For Assessment  per organizational policy     Diagnosis  pulmonary disease;cardiac disease;metabolic state     Identified At Risk by Screening Criteria  large or nonhealing wound, burn or pressure injury         Nutrition/Diet History     Row Name 11/09/20 1327          Nutrition/Diet History    Typical Food/Fluid Intake  Pt ml y drowsey.  Only mumbled.  Shook head no--regarding food allergies and eating difficulties.  Per nsg pt did eat all his breakfast this am.         Anthropometrics     Row Name 11/09/20 0600          Anthropometrics    Weight  89.4 kg (197 lb 3.2 oz)         Labs/Tests/Procedures/Meds     Row Name 11/09/20 1328          Labs/Procedures/Meds    Lab Results Reviewed  reviewed, pertinent        Diagnostic Tests/Procedures    Diagnostic Test/Procedure Reviewed  reviewed, pertinent        Medications    Pertinent Medications Reviewed  reviewed, pertinent         Physical Findings     Row Name 11/09/20 1329          Physical Findings    Overall Physical Appearance  on oxygen therapy     Skin  pressure injury;poor skin integrity/turgor         Estimated/Assessed Needs     Row Name 11/09/20 1329          Calculation Measurements    Weight Used For  Calculations  88 kg (194 lb)        Estimated/Assessed Needs    Additional Documentation  Additional Requirements (Group);Fluid Requirements (Group);Sedgwick-St. Jeor Equation (Group);Protein Requirements (Group);Calorie Requirements (Group);KCAL/KG (Group)        Sedgwick-St. Jeor Equation    RMR (Sedgwick-St. Jeor Equation)  1722.105         Nutrition Prescription Ordered     Row Name 11/09/20 1329          Nutrition Prescription PO    Current PO Diet  Regular     Common Modifiers  Cardiac;Consistent Carbohydrate;Renal         Evaluation of Received Nutrient/Fluid Intake     Row Name 11/09/20 1330          PO Evaluation    Number of Days PO Intake Evaluated  Insufficient Data     Number of Meals  1     % PO Intake  100% this am               Electronically signed by:  Judie Angulo RD  11/09/20 13:35 CST

## 2020-11-09 NOTE — PROGRESS NOTES
[facility] Medicine Services  INPATIENT PROGRESS NOTE    Length of Stay: 2  Date of Admission: 11/7/2020  Primary Care Physician: Provider, No Known    Subjective   Chief Complaint: Shortness of breath  HPI: Patient has no specific complaints.  He does remain somewhat short of breath.    Review of Systems   Constitutional: Negative for appetite change, chills, fatigue and fever.   Respiratory: Positive for cough, shortness of breath and wheezing. Negative for chest tightness.    Cardiovascular: Negative for chest pain, palpitations and leg swelling.   Gastrointestinal: Negative for abdominal pain, constipation, diarrhea, nausea and vomiting.   Skin: Negative for wound.   Neurological: Negative for dizziness, weakness, light-headedness, numbness and headaches.        All pertinent negatives and positives are as above. All other systems have been reviewed and are negative unless otherwise stated.     Objective    Temp:  [97.7 °F (36.5 °C)-99.4 °F (37.4 °C)] 98.2 °F (36.8 °C)  Heart Rate:  [] 79  Resp:  [16-24] 18  BP: (145-193)/(65-93) 180/82    Physical Exam  Vitals signs reviewed.   Constitutional:       Appearance: He is well-developed.   HENT:      Head: Normocephalic and atraumatic.   Eyes:      Pupils: Pupils are equal, round, and reactive to light.   Neck:      Musculoskeletal: Normal range of motion and neck supple.   Cardiovascular:      Rate and Rhythm: Normal rate and regular rhythm.      Heart sounds: Normal heart sounds. No murmur. No friction rub. No gallop.    Pulmonary:      Effort: Pulmonary effort is normal. No respiratory distress.      Breath sounds: Decreased breath sounds present. No wheezing or rales.   Chest:      Chest wall: No tenderness.   Abdominal:      General: Bowel sounds are normal. There is no distension.      Palpations: Abdomen is soft.      Tenderness: There is no abdominal tenderness.   Neurological:      Mental Status: Mental status  is at baseline.   Psychiatric:         Behavior: Behavior normal.       Results Review:  I have reviewed the labs, radiology results, and diagnostic studies.    Laboratory Data:   Results from last 7 days   Lab Units 11/09/20  0338 11/08/20 0154 11/07/20  1950   SODIUM mmol/L 129* 128* 130*   POTASSIUM mmol/L 3.6 4.2 4.0   CHLORIDE mmol/L 94* 95* 97*   CO2 mmol/L 25.0 22.0 23.0   BUN mg/dL 37* 36* 34*   CREATININE mg/dL 1.67* 1.60* 1.57*   GLUCOSE mg/dL 146* 352* 229*   CALCIUM mg/dL 8.2* 8.2* 8.1*   BILIRUBIN mg/dL 0.3 0.2 0.2   ALK PHOS U/L 56 81 88   ALT (SGPT) U/L 9 8 6   AST (SGOT) U/L 35 20 11   ANION GAP mmol/L 10.0 11.0 10.0     Estimated Creatinine Clearance: 61 mL/min (A) (by C-G formula based on SCr of 1.67 mg/dL (H)).  Results from last 7 days   Lab Units 11/08/20  0154   MAGNESIUM mg/dL 1.6   PHOSPHORUS mg/dL 2.5         Results from last 7 days   Lab Units 11/09/20  0338 11/08/20 0154 11/07/20  1950   WBC 10*3/mm3 24.55* 19.45* 17.97*   HEMOGLOBIN g/dL 8.5* 9.6* 10.1*   HEMATOCRIT % 24.7* 27.7* 29.6*   PLATELETS 10*3/mm3 358 352 417           Culture Data:   Blood Culture   Date Value Ref Range Status   11/07/2020 No growth at 24 hours  Preliminary   11/07/2020 No growth at 24 hours  Preliminary     No results found for: URINECX  No results found for: RESPCX  Wound Culture   Date Value Ref Range Status   11/07/2020 Heavy growth (4+) Normal Skin Sophia  Preliminary     No results found for: STOOLCX  No components found for: BODYFLD    Radiology Data:   Imaging Results (Last 24 Hours)     ** No results found for the last 24 hours. **          I have reviewed the patient's current medications.     Assessment/Plan     Active Hospital Problems    Diagnosis   • **Acute respiratory failure with hypoxia (CMS/HCC)            • Diabetic foot ulcer (CMS/HCC)   • Bilateral pneumonia   • Diabetes mellitus (CMS/HCC)            • Sepsis (CMS/HCC)            • Acute kidney injury (CMS/HCC)              • COPD with  acute exacerbation (CMS/[facility])              • Uncontrolled hypertension              • Paranoid schizophrenia (CMS/[facility])       Plan:    1.  Hypertension: Patient has significantly elevated blood pressure.  Remains on high-dose nitroglycerin drip.  Patient states that his blood pressure is poorly controlled at home.  Will adjust medications.  2.  Acute hypoxic respiratory failure: Much improved.  Covid negative.  3.  Healthcare associated pneumonia: Seen on chest x-ray.  Continue broad-spectrum antibiotics cultures negative thus far.  4.  Heart failure with preserved ejection fraction: Acute exacerbation.  Continue to diurese.  5.  Diabetes mellitus: Continue sliding scale insulin.  6.  Diabetic ulcer of the toe: Discussed with Dr. Avila.  Will defer arteriogram for now.  7.  Paranoid schizophrenia: Continue home medication.  8.  DVT prophylaxis: Lovenox.    The patient was evaluated during the global COVID-19 pandemic, and the diagnosis was suspected/considered upon their initial presentation.  Evaluation, treatment, and testing were consistent with current guidelines for patients who present with complaints or symptoms that may be related to COVID-19.    Discharge Planning: I expect patient to be discharged to skilled nursing facility in 3-4 days.        This document has been electronically signed by Zac Terrazas MD on November 9, 2020 14:07 CST

## 2020-11-09 NOTE — NURSING NOTE
Patient refuses SCDs this shift. Explained the importance patient understands. Patient states they hurt him.

## 2020-11-09 NOTE — PLAN OF CARE
Problem: Skin Injury Risk Increased  Goal: Skin Health and Integrity  Intervention: Promote and Optimize Oral Intake  Flowsheets (Taken 11/9/2020 1339)  Oral Nutrition Promotion:   calorie-dense foods provided   calorie-dense liquids provided     Problem: Adult Inpatient Plan of Care  Goal: Plan of Care Review  Outcome: Ongoing, Progressing  Flowsheets (Taken 11/9/2020 1331)  Progress: no change  Plan of Care Reviewed With:   caregiver   patient  Outcome Summary: Initial assessment:  Pt very drowsy when I visited.  Increased hypermetabolic state with pressure injuries and current medical condition.  ATe well this am.  Will add NOvaource Renal (4ounces) with meals.

## 2020-11-09 NOTE — PROGRESS NOTES
"Pharmacokinetics by Pharmacy - Vancomycin Initial Consult    Mohan Villatoro is a 60 y.o. male on vancomycin for skin and soft tissue infection / upper respiratory infection. Patient is also receiving zosyn.    Objective:     [Ht: 180.3 cm (71\"); Wt: 89.4 kg (197 lb 3.2 oz)]     WBC   Date Value Ref Range Status   11/09/2020 24.55 (H) 3.40 - 10.80 10*3/mm3 Final   11/08/2020 19.45 (H) 3.40 - 10.80 10*3/mm3 Final   11/07/2020 17.97 (H) 3.40 - 10.80 10*3/mm3 Final      C-Reactive Protein   Date Value Ref Range Status   09/08/2020 0.86 (H) 0.00 - 0.50 mg/dL Final     Lactate   Date Value Ref Range Status   11/07/2020 1.0 0.5 - 2.0 mmol/L Final   08/07/2020 1.3 0.5 - 2.0 mmol/L Final   08/06/2020 1.1 0.5 - 2.0 mmol/L Final      Temp Readings from Last 1 Encounters:   11/09/20 99 °F (37.2 °C) (Oral)     Estimated Creatinine Clearance: 61 mL/min (A) (by C-G formula based on SCr of 1.67 mg/dL (H)).   Creatinine   Date Value Ref Range Status   11/09/2020 1.67 (H) 0.76 - 1.27 mg/dL Final   11/08/2020 1.60 (H) 0.76 - 1.27 mg/dL Final   11/07/2020 1.57 (H) 0.76 - 1.27 mg/dL Final       Baseline culture results:  Microbiology Results (last 10 days)     Procedure Component Value - Date/Time    MRSA Screen, PCR (Inpatient) - Swab, Nares [[id number]]  (Normal) Collected: 11/08/20 1543    Lab Status: Final result Specimen: Swab from Nares Updated: 11/08/20 1655     MRSA, PCR Negative    Narrative:      Performed by real-time polymerase chain reaction (qPCR).    CRE Screen by PCR - Swab, Large Intestine, Rectum [[id number]] Collected: 11/08/20 0250    Lab Status: Final result Specimen: Swab from Large Intestine, Rectum Updated: 11/08/20 0428     CRE SCREEN Not Detected     Comment: Test performed by real-time polymerase chain reaction (qPCR).        OXA 48 Strain Not Detected     IMP STRAIN Not Detected     VIM STRAIN Not Detected     NDM Strain Not Detected     KPC Strain Not Detected    COVID-19, [facility], NP SWAB IN " TRANSPORT MEDIA 8-10 HR TAT - Swab, Nasopharynx [[id number]]  (Normal) Collected: 11/07/20 2137    Lab Status: Final result Specimen: Swab from Nasopharynx Updated: 11/08/20 0250     COVID19 Not Detected    Narrative:      Testing performed by Real Time RT-PCR  This test has not been approved by the [facility] but is authorized under the Emergency Use Act (EUA)    Fact sheet for providers: https://www.fda.gov/media/487577/download    Fact sheet for patients: https://www.fda.gov/media/306402/download        Wound Culture - Wound, Foot, Right [[id number]] Collected: 11/07/20 2055    Lab Status: Preliminary result Specimen: Wound from Foot, Right Updated: 11/09/20 0701     Wound Culture Heavy growth (4+) Normal Skin Sophia     Gram Stain Many (4+) WBCs seen      Many (4+) Gram negative bacilli      Many (4+) Gram positive cocci in pairs      Many (4+) Gram positive bacilli    Narrative:      PEA PLATE ADDED     Blood Culture - Blood, Arm, Left [[id number]] Collected: 11/07/20 2027    Lab Status: Preliminary result Specimen: Blood from Arm, Left Updated: 11/08/20 2045     Blood Culture No growth at 24 hours    Blood Culture - Blood, Arm, Right [[id number]] Collected: 11/07/20 1950    Lab Status: Preliminary result Specimen: Blood from Arm, Right Updated: 11/08/20 2000     Blood Culture No growth at 24 hours    COVID PRE-OP / PRE-PROCEDURE SCREENING ORDER (NO ISOLATION) - Swab, Nasopharynx [[id number]] Collected: 11/07/20 0918    Lab Status: Final result Specimen: Swab from Nasopharynx Updated: 11/08/20 1407    Narrative:      The following orders were created for panel order COVID PRE-OP / PRE-PROCEDURE SCREENING ORDER (NO ISOLATION) - Swab, Nasopharynx.  Procedure                               Abnormality         Status                     ---------                               -----------         ------                     COVID-19,[facility] ROUTINE...[[id number]]                      Final result               COVID [facility]  Priority -...[[id number]]                      Final result                 Please view results for these tests on the individual orders.    COVID-19,[facility] ROUTINE, NP/OP SWAB IN TRANSPORT MEDIA OR ESWAB 72 HR TAT - Swab, Nasopharynx [[id number]] Collected: 11/07/20 0918    Lab Status: Final result Specimen: Swab from Nasopharynx Updated: 11/08/20 1407     SARS-CoV-2, DONNA Not Detected     Comment: This nucleic acid amplification test was developed and its performance  characteristics determined by [employer]. Nucleic acid  amplification tests include PCR and TMA. This test has not been FDA  cleared or approved. This test has been authorized by FDA under an  Emergency Use Authorization (EUA). This test is only authorized for  the duration of time the declaration that circumstances exist  justifying the authorization of the emergency use of in vitro  diagnostic tests for detection of SARS-CoV-2 virus and/or diagnosis  of COVID-19 infection under section 564(b)(1) of the Act, 21 U.S.C.  360bbb-3(b) (1), unless the authorization is terminated or revoked  sooner.  When diagnostic testing is negative, the possibility of a false  negative result should be considered in the context of a patient's  recent exposures and the presence of clinical signs and symptoms  consistent with COVID-19. An individual without symptoms of COVID-19  and who is not shedding SARS-CoV-2 virus would expect to have a  negative (not detected) result in this assay.       Narrative:      Performed at:  01 - [facility]  [address] [facility] [place]  [id number]  : Latosha Benoit MD, Phone:  [phone]    [facility] - Swab, Nasopharynx [[id number]] Collected: 11/07/20 0918    Lab Status: Final result Specimen: Swab from Nasopharynx Updated: 11/08/20 1407     COVID [facility] PRIORITY Comment     Comment: Received       Narrative:      Performed at:  02 - [facility]  [address], [place]   [id number]  : Iain Brooke [facility], Phone:  [phone]        No results found for: RESPCX    Assessment  WBC elevated and continue to trend up  SCr trending around 1.6  Afebrile  Wound culture is still pending at this time  Would de-escalate based on the wound culture and negative MRSA PCR when the culture is finalized if possible based on results  Continue vancomycin 1500 mg IV every 24 hours for no  Levels 11/9 and 11/10 as listed below    Plan  1. Continue vancomycin 1500 mg IV every 24 hours  2. Vancomycin peak 11/9 1930 and vancomycin trough 11/10 1630  3. Pharmacy will monitor renal function and adjust dose accordingly.    Magdi Sandoval, PharmD  11/09/20 10:34 CST

## 2020-11-09 NOTE — PLAN OF CARE
Goal Outcome Evaluation:  Plan of Care Reviewed With: patient  Progress: improving  Outcome Summary: Patient remains on nitro drip; attempted to titrate down, but was forced to titrate up r/t hypertension. Dressing changed on right foot. UOP adequate, vital signs otherwise stable. Will continue monitoring this patient.

## 2020-11-09 NOTE — PLAN OF CARE
Goal Outcome Evaluation:  Plan of Care Reviewed With: patient, sibling  Progress: improving  Outcome Summary: Patient is doing better this shift with his O2 status. Nitro gtt still on for elevated BP. Lasix effective with 1400 out. VSS.

## 2020-11-10 NOTE — PLAN OF CARE
Goal Outcome Evaluation:  Plan of Care Reviewed With: patient  Progress: no change  Outcome Summary: VSS, nitroglycerin discontinued, cardene continues. Pt up to chair for most of shift, resting well in chair.

## 2020-11-10 NOTE — PLAN OF CARE
Goal Outcome Evaluation:  Plan of Care Reviewed With: patient  Progress: no change  Outcome Summary: Pt bradycradic 40-50s, BP systolic 150s-170s. Cardene drip 12.5mg/hr to 15mg/hr.  Has required intermittent bipap use today.  Re-evaluated right foot wound with Tiffanie from wound care, contacted  for Podiatry consult with Dr. Valdes.

## 2020-11-10 NOTE — PROGRESS NOTES
[facility] Medicine Services  INPATIENT PROGRESS NOTE    Length of Stay: 3  Date of Admission: 11/7/2020  Primary Care Physician: Brad Mac APRN    Subjective   Chief Complaint: Shortness of breath  HPI: Patient states he feels better today.  Less shortness of breath.    Review of Systems   Constitutional: Negative for appetite change, chills, fatigue and fever.   Respiratory: Positive for cough, shortness of breath and wheezing. Negative for chest tightness.    Cardiovascular: Negative for chest pain, palpitations and leg swelling.   Gastrointestinal: Negative for abdominal pain, constipation, diarrhea, nausea and vomiting.   Skin: Negative for wound.   Neurological: Negative for dizziness, weakness, light-headedness, numbness and headaches.        All pertinent negatives and positives are as above. All other systems have been reviewed and are negative unless otherwise stated.     Objective    Temp:  [98 °F (36.7 °C)-98.7 °F (37.1 °C)] 98.5 °F (36.9 °C)  Heart Rate:  [47-90] 50  Resp:  [15-24] 15  BP: (124-201)/(55-91) 158/72    Physical Exam  Vitals signs reviewed.   Constitutional:       Appearance: He is well-developed.   HENT:      Head: Normocephalic and atraumatic.   Eyes:      Pupils: Pupils are equal, round, and reactive to light.   Neck:      Musculoskeletal: Normal range of motion and neck supple.   Cardiovascular:      Rate and Rhythm: Normal rate and regular rhythm.      Heart sounds: Normal heart sounds. No murmur. No friction rub. No gallop.    Pulmonary:      Effort: Pulmonary effort is normal. No respiratory distress.      Breath sounds: Decreased breath sounds present. No wheezing or rales.   Chest:      Chest wall: No tenderness.   Abdominal:      General: Bowel sounds are normal. There is no distension.      Palpations: Abdomen is soft.      Tenderness: There is no abdominal tenderness.   Neurological:      Mental Status: Mental status is at  baseline.   Psychiatric:         Behavior: Behavior normal.       Results Review:  I have reviewed the labs, radiology results, and diagnostic studies.    Laboratory Data:   Results from last 7 days   Lab Units 11/10/20  0510 11/09/20  2102 11/09/20  0338 11/08/20  0154   SODIUM mmol/L 127*  --  129* 128*   POTASSIUM mmol/L 4.1 4.5 3.6 4.2   CHLORIDE mmol/L 94*  --  94* 95*   CO2 mmol/L 25.0  --  25.0 22.0   BUN mg/dL 37*  --  37* 36*   CREATININE mg/dL 2.16*  --  1.67* 1.60*   GLUCOSE mg/dL 140*  --  146* 352*   CALCIUM mg/dL 8.5*  --  8.2* 8.2*   BILIRUBIN mg/dL 0.4  --  0.3 0.2   ALK PHOS U/L 60  --  56 81   ALT (SGPT) U/L 10  --  9 8   AST (SGOT) U/L 17  --  35 20   ANION GAP mmol/L 8.0  --  10.0 11.0     Estimated Creatinine Clearance: 47.1 mL/min (A) (by C-G formula based on SCr of 2.16 mg/dL (H)).  Results from last 7 days   Lab Units 11/08/20  0154   MAGNESIUM mg/dL 1.6   PHOSPHORUS mg/dL 2.5         Results from last 7 days   Lab Units 11/10/20  0510 11/09/20  0338 11/08/20  0154 11/07/20  1950   WBC 10*3/mm3 15.89* 24.55* 19.45* 17.97*   HEMOGLOBIN g/dL 9.0* 8.5* 9.6* 10.1*   HEMATOCRIT % 26.5* 24.7* 27.7* 29.6*   PLATELETS 10*3/mm3 334 358 352 417           Culture Data:   Blood Culture   Date Value Ref Range Status   11/07/2020 No growth at 2 days  Preliminary   11/07/2020 No growth at 2 days  Preliminary     No results found for: URINECX  No results found for: RESPCX  Wound Culture   Date Value Ref Range Status   11/07/2020 Heavy growth (4+) Normal Skin Sophia  Preliminary   11/07/2020 Moderate growth (3+) Staphylococcus aureus, MRSA (A)  Preliminary     Comment:     Methicillin resistant Staphylococcus aureus, Patient may be an isolation risk.     No results found for: STOOLCX  No components found for: BODYFLD    Radiology Data:   Imaging Results (Last 24 Hours)     ** No results found for the last 24 hours. **          I have reviewed the patient's current medications.     Assessment/Plan     Active  Hospital Problems    Diagnosis   • **Acute respiratory failure with hypoxia (CMS/[facility])            • Diabetic foot ulcer (CMS/[facility])   • Bilateral pneumonia   • Diabetes mellitus (CMS/HCC)            • Sepsis (CMS/HCC)            • Acute kidney injury (CMS/HCC)              • COPD with acute exacerbation (CMS/[facility])              • Uncontrolled hypertension              • Paranoid schizophrenia (CMS/[facility])       Plan:    1.  Hypertension: Blood pressure is better controlled on Cardene drip.  On Coreg and hydralazine orally.  Will adjust oral medications in order to attempt to come off Cardene.  Blood pressure is poorly controlled at home per his report.  2.  Acute hypoxic respiratory failure: Much improved.  Covid negative.  3.  Healthcare associated pneumonia: Seen on chest x-ray.  Continue broad-spectrum antibiotics cultures negative thus far.  4.  Heart failure with preserved ejection fraction: Acute exacerbation.  Will stop diuresis secondary to acute kidney injury.    5.  Diabetes mellitus: Continue sliding scale insulin.  6.  Diabetic ulcer of the toe: Discussed with Dr. Avila.  Will defer arteriogram for now.  Wound culture grew MRSA.  On vancomycin.  7.  Paranoid schizophrenia: Continue home medication.  8.  Acute kidney injury: Worsening.  Monitor closely.  Will start gentle IV fluids.  9.  DVT prophylaxis: Lovenox.    The patient was evaluated during the global COVID-19 pandemic, and the diagnosis was suspected/considered upon their initial presentation.  Evaluation, treatment, and testing were consistent with current guidelines for patients who present with complaints or symptoms that may be related to COVID-19.    Discharge Planning: I expect patient to be discharged to skilled nursing facility in 3-4 days.        This document has been electronically signed by Zac Terrazas MD on November 10, 2020 15:37 CST

## 2020-11-11 NOTE — PROGRESS NOTES
"  Pharmacokinetics by Pharmacy - Vancomycin    Mohan Villatoro is a 60 y.o. male  [Ht: 180.3 cm (71\"); Wt: 89.4 kg (197 lb 3.2 oz)]    Estimated Creatinine Clearance: 43.1 mL/min (A) (by C-G formula based on SCr of 2.36 mg/dL (H)).   Creatinine   Date Value Ref Range Status   11/11/2020 2.36 (H) 0.76 - 1.27 mg/dL Final   11/10/2020 2.16 (H) 0.76 - 1.27 mg/dL Final   11/09/2020 1.67 (H) 0.76 - 1.27 mg/dL Final      Lab Results   Component Value Date    WBC 16.36 (H) 11/11/2020    WBC 15.89 (H) 11/10/2020    WBC 24.55 (H) 11/09/2020     C-Reactive Protein   Date Value Ref Range Status   09/08/2020 0.86 (H) 0.00 - 0.50 mg/dL Final     Lactate   Date Value Ref Range Status   11/07/2020 1.0 0.5 - 2.0 mmol/L Final   08/07/2020 1.3 0.5 - 2.0 mmol/L Final   08/06/2020 1.1 0.5 - 2.0 mmol/L Final       Temp Readings from Last 1 Encounters:   11/11/20 98.2 °F (36.8 °C) (Oral)      Lab Results   Component Value Date    VANCOPEAK 41.20 (C) 11/09/2020    VANCOTROUGH 23.00 (H) 11/10/2020    VANCORANDOM 21.80 08/19/2020         Culture Results:  Microbiology Results (last 10 days)       Procedure Component Value - Date/Time    MRSA Screen, PCR (Inpatient) - Swab, Nares [[id number]]  (Normal) Collected: 11/08/20 1543    Lab Status: Final result Specimen: Swab from Nares Updated: 11/08/20 1655     MRSA, PCR Negative    Narrative:      Performed by real-time polymerase chain reaction (qPCR).    CRE Screen by PCR - Swab, Large Intestine, Rectum [[id number]] Collected: 11/08/20 0250    Lab Status: Final result Specimen: Swab from Large Intestine, Rectum Updated: 11/08/20 0428     CRE SCREEN Not Detected     Comment: Test performed by real-time polymerase chain reaction (qPCR).        OXA 48 Strain Not Detected     IMP STRAIN Not Detected     VIM STRAIN Not Detected     NDM Strain Not Detected     KPC Strain Not Detected    COVID-19,  [facility]-HOUSE, NP SWAB IN TRANSPORT MEDIA 8-10 HR TAT - Swab, Nasopharynx [[id number]]  (Normal) " Collected: 11/07/20 2137    Lab Status: Final result Specimen: Swab from Nasopharynx Updated: 11/08/20 0250     COVID19 Not Detected    Narrative:      Testing performed by Real Time RT-PCR  This test has not been approved by the [facility] but is authorized under the Emergency Use Act (EUA)    Fact sheet for providers: https://www.fda.gov/media/496907/download    Fact sheet for patients: https://www.fda.gov/media/033786/download        Wound Culture - Wound, Foot, Right [[id number]]  (Abnormal) Collected: 11/07/20 2055    Lab Status: Preliminary result Specimen: Wound from Foot, Right Updated: 11/10/20 0840     Wound Culture Heavy growth (4+) Normal Skin Sophia      Moderate growth (3+) Staphylococcus aureus, MRSA     Comment: Methicillin resistant Staphylococcus aureus, Patient may be an isolation risk.        Gram Stain Many (4+) WBCs seen      Many (4+) Gram negative bacilli      Many (4+) Gram positive cocci in pairs      Many (4+) Gram positive bacilli    Narrative:      PEA PLATE ADDED     Blood Culture - Blood, Arm, Left [[id number]] Collected: 11/07/20 2027    Lab Status: Preliminary result Specimen: Blood from Arm, Left Updated: 11/10/20 2045     Blood Culture No growth at 3 days    Blood Culture - Blood, Arm, Right [[id number]] Collected: 11/07/20 1950    Lab Status: Preliminary result Specimen: Blood from Arm, Right Updated: 11/10/20 2000     Blood Culture No growth at 3 days    COVID PRE-OP / PRE-PROCEDURE SCREENING ORDER (NO ISOLATION) - Swab, Nasopharynx [[id number]] Collected: 11/07/20 0918    Lab Status: Final result Specimen: Swab from Nasopharynx Updated: 11/08/20 1407    Narrative:      The following orders were created for panel order COVID PRE-OP / PRE-PROCEDURE SCREENING ORDER (NO ISOLATION) - Swab, Nasopharynx.  Procedure                               Abnormality         Status                     ---------                               -----------         ------                     COVID-19,[facility]  ROUTINE...[[id number]]                      Final result               COVID [facility] Priority -...[[id number]]                      Final result                 Please view results for these tests on the individual orders.    COVID-19,[facility] ROUTINE, NP/OP SWAB IN TRANSPORT MEDIA OR ESWAB 72 HR TAT - Swab, Nasopharynx [[id number]] Collected: 11/07/20 0918    Lab Status: Final result Specimen: Swab from Nasopharynx Updated: 11/08/20 1407     SARS-CoV-2, DONNA Not Detected     Comment: This nucleic acid amplification test was developed and its performance  characteristics determined by [employer]. Nucleic acid  amplification tests include PCR and TMA. This test has not been FDA  cleared or approved. This test has been authorized by FDA under an  Emergency Use Authorization (EUA). This test is only authorized for  the duration of time the declaration that circumstances exist  justifying the authorization of the emergency use of in vitro  diagnostic tests for detection of SARS-CoV-2 virus and/or diagnosis  of COVID-19 infection under section 564(b)(1) of the Act, 21 U.S.C.  360bbb-3(b) (1), unless the authorization is terminated or revoked  sooner.  When diagnostic testing is negative, the possibility of a false  negative result should be considered in the context of a patient's  recent exposures and the presence of clinical signs and symptoms  consistent with COVID-19. An individual without symptoms of COVID-19  and who is not shedding SARS-CoV-2 virus would expect to have a  negative (not detected) result in this assay.       Narrative:      Performed at:   - [facility]  [address] [place]  [id number]  : Latosha Benoit MD, Phone:  [phone]    COVID [facility] Priority - Swab, Nasopharynx [[id number]] Collected: 11/07/20 0918    Lab Status: Final result Specimen: Swab from Nasopharynx Updated: 11/08/20 1407     COVID [facility] PRIORITY Comment     Comment: Received        Narrative:      Performed at:  02 - [facility]  [address], [facility], [place]  [id number]  : Iain Brooke [facility], Phone:  [phone]          No results found for: RESPCX    Indication for use: skin and soft tissue infection, MRSA      Current Vancomycin Dose:  1000 mg IVPB x 1 dose today at 0800, day 5 of therapy.  Pulse dosed Vancomycin today due to JIM     Pt is also receiving Cefepime, changed yesterday from Zosyn    Assessment/Plan:  Reviewed above labs and cultures.   Wound cultures not final.   Ordered a Vancomycin trough level for tomorrow at 0730.  WBC is 16.36, increase. Cr is 2.36, increase. Pharmacy will continue to monitor renal function and adjust dose accordingly.    Marleny Chavarria, PharmD   11/11/20 08:15 CST

## 2020-11-11 NOTE — PLAN OF CARE
Goal Outcome Evaluation:  Plan of Care Reviewed With: patient  Progress: no change  Outcome Summary: patient remains on cardene drip for blood pressure maint. instructed to ring call bell for any needs, assist x1 for care and mobility

## 2020-11-11 NOTE — PLAN OF CARE
Problem: Adult Inpatient Plan of Care  Goal: Plan of Care Review  Outcome: Ongoing, Progressing  Flowsheets (Taken 11/11/2020 1449)  Progress: improving  Plan of Care Reviewed With:   caregiver   patient  Outcome Summary: Pt remains on Cardene drip.  EAting well.  he reports a decreased appetite pta.  ? acceptance of supplements. Wound care continues.

## 2020-11-11 NOTE — CONSULTS
Inpatient Podiatry Consult  Consult performed by: Salvador Valdes DPM  Consult ordered by: Zac Terrazas MD          Patient Care Team:  Brad Mac APRN as PCP - General (Family Medicine)    Chief complaint: gangrene of right toes    History of Present Illness    Pleasant 60-year-old male with past medical history significant for diabetes and schizophrenia seen in the CCU at the request the primary care team Dr. Terrazas for evaluation of ulcers on right foot pain against patient states that he sustained toe ulcers couple weeks ago after stubbing his right foot on a table.  He states that they did not heal and the end of his third toe has subsequently turned black.  He was scheduled for an arteriogram with intervention by Dr. Avila on the 10th.  However, prior to intervention patient was admitted for pneumonia.  He is currently on a Cardene drip.  Blood pressures have been fairly stable.  Pneumonia appears to be improving.  Wound care is seeing patient and using Santyl for the wounds.  Wounds are stable at this time.  He has no other lower extremity complaints.    Review of Systems   Constitutional: Negative for activity change, appetite change and fever.   HENT: Negative for hearing loss and nosebleeds.    Eyes: Negative.    Respiratory: Positive for shortness of breath. Negative for cough and wheezing.    Cardiovascular: Positive for leg swelling. Negative for chest pain.   Gastrointestinal: Negative for diarrhea, nausea and vomiting.   Endocrine: Negative.    Musculoskeletal: Negative.    Skin: Positive for color change and wound.   Neurological: Negative.    Psychiatric/Behavioral: Negative.         Past Medical History:   Diagnosis Date   • Diabetes (CMS/[facility])    • Schizo affective schizophrenia (CMS/[facility])    ,   Past Surgical History:   Procedure Laterality Date   • GASTROSTOMY FEEDING TUBE INSERTION N/A 8/25/2020    Procedure: GASTROSTOMY FEEDING TUBE INSERTION;  Surgeon: Kizzy Garcia MD;   Location:  [facility] OR;  Service: General;  Laterality: N/A;   • TRACHEOSTOMY N/A 8/25/2020    Procedure: TRACHEOSTOMY;  Surgeon: Bal Manning MD;  Location:  [facility] OR;  Service: ENT;  Laterality: N/A;   , No family history on file.,   Social History     Tobacco Use   • Smoking status: Current Every Day Smoker   • Smokeless tobacco: Never Used   Substance Use Topics   • Alcohol use: Not Currently   • Drug use: Never     E-cigarette/Vaping     E-cigarette/Vaping Substances     E-cigarette/Vaping Devices       ,   Medications Prior to Admission   Medication Sig Dispense Refill Last Dose   • albuterol sulfate  (90 Base) MCG/ACT inhaler Inhale 2 puffs 3 (Three) Times a Day As Needed (copd).   11/7/2020 at Unknown time   • aspirin 81 MG EC tablet Take 81 mg by mouth Daily.   11/7/2020   • atorvastatin (LIPITOR) 40 MG tablet Take 40 mg by mouth Every Night.   11/6/2020   • benztropine (COGENTIN) 1 MG tablet Take 1 mg by mouth 2 (Two) Times a Day.   11/7/2020   • budesonide-formoterol (SYMBICORT) 160-4.5 MCG/ACT inhaler Inhale 2 puffs 2 (Two) Times a Day.   11/7/2020   • bumetanide (BUMEX) 2 MG tablet Take 2 mg by mouth Every Morning.   11/7/2020   • busPIRone (BUSPAR) 10 MG tablet Take 10 mg by mouth 3 (Three) Times a Day.   11/7/2020 at Unknown time   • carvedilol (COREG) 12.5 MG tablet Take 12.5 mg by mouth 2 (Two) Times a Day With Meals.   11/7/2020   • cetirizine (zyrTEC) 10 MG tablet Take 10 mg by mouth Daily.   11/7/2020 at Unknown time   • cholecalciferol (VITAMIN D3) 25 MCG (1000 UT) tablet Take 1,000 Units by mouth Daily.   11/7/2020 at Unknown time   • collagenase 250 UNIT/GM ointment Apply 1 application topically to the appropriate area as directed Daily. Apply topically to right 3rd toe.   11/7/2020   • doxycycline (VIBRAMYCIN) 100 MG capsule Take 1 capsule by mouth 2 (Two) Times a Day. 28 capsule 0 11/7/2020 at Unknown time   • haloperidol (HALDOL) 5 MG tablet Take 7.5 mg by mouth every night at  bedtime.   11/7/2020 at Unknown time   • hydrALAZINE (APRESOLINE) 25 MG tablet Take 25 mg by mouth 3 (Three) Times a Day.   11/7/2020 at Unknown time   • insulin glargine (LANTUS) 100 UNIT/ML injection Inject 14 Units under the skin into the appropriate area as directed Every Night.   11/7/2020 at Unknown time   • insulin lispro (humaLOG) 100 UNIT/ML injection Inject 3 Units under the skin into the appropriate area as directed 3 (Three) Times a Day Before Meals. If blood sugar greater than 150.   Past Week at Unknown time   • ipratropium-albuterol (DUO-NEB) 0.5-2.5 mg/3 ml nebulizer Take 3 mL by nebulization 4 (Four) Times a Day. 360 mL 0 11/7/2020 at Unknown time   • Lactobacillus (Acidophilus) tablet Take 1 tablet by mouth 2 (two) times a day.   11/7/2020   • loperamide (IMODIUM) 2 MG capsule Take 2 mg by mouth 4 (Four) Times a Day As Needed for Diarrhea.   11/7/2020 at Unknown time   • metoclopramide (REGLAN) 5 MG tablet Take 5 mg by mouth 3 (Three) Times a Day.   11/7/2020 at Unknown time   • nicotine (NICODERM CQ) 21 MG/24HR patch Place 1 patch on the skin as directed by provider Daily.   11/7/2020 at Unknown time   • Omega-3 Fatty Acids (OMEGA-3 FISH OIL) 1000 MG capsule Take 2 g by mouth 2 (Two) Times a Day.   11/7/2020 at Unknown time   • pantoprazole (PROTONIX) 40 MG EC tablet Take 40 mg by mouth Every Morning.   11/7/2020 at Unknown time   • predniSONE (DELTASONE) 20 MG tablet Take 20 mg by mouth Every Morning.   11/7/2020   • risperiDONE (risperDAL) 1 MG tablet Take 1 mg by mouth every night at bedtime.   11/6/2020   • rivaroxaban (XARELTO) 10 MG tablet Take 10 mg by mouth Daily With Dinner.   11/7/2020 at Unknown time   • rOPINIRole (REQUIP) 2 MG tablet Take 2 mg by mouth Every Night.   11/7/2020 at Unknown time   • spironolactone (ALDACTONE) 25 MG tablet Take 25 mg by mouth Every Morning.   11/7/2020 at Unknown time   • tamsulosin (FLOMAX) 0.4 MG capsule 24 hr capsule Take 1 capsule by mouth every night  at bedtime.   11/7/2020 at Unknown time   • traZODone (DESYREL) 100 MG tablet Take 100 mg by mouth Every Night.   11/7/2020 at Unknown time   • vitamin C (ASCORBIC ACID) 500 MG tablet Take 500 mg by mouth Every Morning.   11/7/2020 at Unknown time   • Zinc 50 MG tablet Take 1 tablet by mouth Every Morning.   11/7/2020 at Unknown time   • budesonide (PULMICORT) 0.25 MG/2ML nebulizer solution Take 4 mL by nebulization 2 (Two) Times a Day. 2 mL 0 Unknown at Unknown time   • HYDROcodone-acetaminophen (NORCO)  MG per tablet Take 1 tablet by mouth Every 8 (Eight) Hours As Needed for Moderate Pain .   Unknown at Unknown time   , Scheduled Meds:  [START ON 11/12/2020] Pharmacy to dose vancomycin, , Does not apply, Once  acidophilus, 1 tablet, Oral, Daily  albuterol sulfate HFA, 2 puff, Inhalation, Once  aspirin, 81 mg, Oral, Daily  atorvastatin, 40 mg, Oral, Daily  benztropine, 1 mg, Oral, Q12H  budesonide, 0.5 mg, Nebulization, BID - RT  busPIRone, 10 mg, Oral, Q8H  carvedilol, 12.5 mg, Oral, BID With Meals  cefepime, 2 g, Intravenous, Q12H  cetirizine, 10 mg, Oral, Daily  cholecalciferol, 1,000 Units, Oral, Daily  collagenase, 1 application, Topical, Daily  haloperidol, 7.5 mg, Oral, Nightly  hydrALAZINE, 75 mg, Oral, Q8H  insulin aspart, 0-9 Units, Subcutaneous, TID AC  insulin detemir, 14 Units, Subcutaneous, Nightly  ipratropium, 2 puff, Inhalation, Once  metoclopramide, 5 mg, Oral, 4x Daily AC & at Bedtime  nicotine, 1 patch, Transdermal, Q24H  pantoprazole, 40 mg, Oral, QAM  polyethylene glycol, 17 g, Oral, Daily  risperiDONE, 1 mg, Oral, Nightly  rivaroxaban, 10 mg, Oral, Daily With Dinner  rOPINIRole, 2 mg, Oral, Nightly  sodium chloride, 10 mL, Intravenous, Q12H  spironolactone, 25 mg, Oral, QAM  tamsulosin, 0.4 mg, Oral, Daily  vitamin C, 500 mg, Oral, QAM  zinc sulfate, 220 mg, Oral, Daily    , Continuous Infusions:  niCARdipine, 5-15 mg/hr, Last Rate: 12.5 mg/hr (11/11/20 0812)  nitroglycerin,   mcg/min, Last Rate: Stopped (11/09/20 2127)  Pharmacy to Dose Cefepime,   Pharmacy to dose vancomycin,   sodium chloride, 75 mL/hr, Last Rate: 75 mL/hr (11/10/20 1712)    , PRN Meds:  acetaminophen  •  albuterol sulfate HFA  •  calcium carbonate  •  dextrose  •  dextrose  •  docusate sodium  •  glucagon (human recombinant)  •  ondansetron  •  Pharmacy to Dose Cefepime  •  Pharmacy to dose vancomycin  •  potassium chloride **OR** potassium chloride **OR** potassium chloride  •  sodium chloride  •  sodium chloride and Allergies:  Niacin and Penicillins    Objective      Vital Signs  Temp:  [97.6 °F (36.4 °C)-98.6 °F (37 °C)] 98.6 °F (37 °C)  Heart Rate:  [50-86] 59  Resp:  [13-26] 18  BP: (148-212)/(65-98) 167/75    Physical Exam  Constitutional:       General: He is not in acute distress.     Appearance: Normal appearance. He is obese.   HENT:      Head: Normocephalic and atraumatic.      Nose: Nose normal.   Eyes:      Extraocular Movements: Extraocular movements intact.      Pupils: Pupils are equal, round, and reactive to light.   Cardiovascular:      Pulses:           Dorsalis pedis pulses are detected w/ Doppler on the right side and detected w/ Doppler on the left side.        Posterior tibial pulses are detected w/ Doppler on the right side and detected w/ Doppler on the left side.   Musculoskeletal:      Right foot: Normal range of motion.      Left foot: Normal range of motion.        Feet:    Feet:      Right foot:      Protective Sensation: 5 sites tested. 0 sites sensed.      Skin integrity: Ulcer and skin breakdown present.      Toenail Condition: Right toenails are abnormally thick and long. Fungal disease present.     Left foot:      Protective Sensation: 5 sites tested. 0 sites sensed.      Skin integrity: Skin integrity normal.      Toenail Condition: Left toenails are abnormally thick and long. Fungal disease present.  Neurological:      Mental Status: He is alert and oriented to person, place, and  time.   Psychiatric:         Mood and Affect: Mood normal.         Behavior: Behavior normal.         Results Review:    I reviewed the patient's new clinical results.        Assessment/Plan       Acute respiratory failure with hypoxia (CMS/HCC)    Acute kidney injury (CMS/HCC)    COPD with acute exacerbation (CMS/HCC)    Paranoid schizophrenia (CMS/HCC)    Uncontrolled hypertension    Diabetes mellitus (CMS/HCC)    Sepsis (CMS/HCC)    Diabetic foot ulcer (CMS/HCC)    Bilateral pneumonia      Assessment & Plan    Patient examined and evaluated. Continue santyl with the exception of betadine to distal tip of the third digit. Patient may require 3rd toe amputation in the near future. However, with history of severe PVD will await vascular intervention. Patient had arteriogram scheduled for 11/10 but was postponed. Recommend daily dressing changes with santyl and betadine. Instruction given to nurse. Will reassess in a couple of days and further discuss debridement vs amputation following vascular intervention. All questions were answered. Thank you for allowing me to participate in the care of this patient. Please do not hesitate to call with questions.     I discussed the patients findings and my recommendations with patient and nursing staff    Salvador Valdes DPM  11/11/20  17:23 CST

## 2020-11-12 NOTE — PROGRESS NOTES
[facility] Medicine Services  INPATIENT PROGRESS NOTE    Length of Stay: 5  Date of Admission: 11/7/2020  Primary Care Physician: Brad Mac APRN    Subjective   Chief Complaint: Shortness of breath  HPI: Significantly more short of breath today.      Review of Systems   Constitutional: Negative for appetite change, chills, fatigue and fever.   Respiratory: Positive for cough, shortness of breath and wheezing. Negative for chest tightness.    Cardiovascular: Negative for chest pain, palpitations and leg swelling.   Gastrointestinal: Negative for abdominal pain, constipation, diarrhea, nausea and vomiting.   Skin: Negative for wound.   Neurological: Negative for dizziness, weakness, light-headedness, numbness and headaches.      All pertinent negatives and positives are as above. All other systems have been reviewed and are negative unless otherwise stated.     Objective    Temp:  [97.6 °F (36.4 °C)-98.2 °F (36.8 °C)] 97.9 °F (36.6 °C)  Heart Rate:  [50-64] 52  Resp:  [16-23] 23  BP: (136-214)/() 170/72    Physical Exam  Vitals signs reviewed.   Constitutional:       Appearance: He is well-developed.   HENT:      Head: Normocephalic and atraumatic.   Eyes:      Pupils: Pupils are equal, round, and reactive to light.   Neck:      Musculoskeletal: Normal range of motion and neck supple.   Cardiovascular:      Rate and Rhythm: Normal rate and regular rhythm.      Heart sounds: Normal heart sounds. No murmur. No friction rub. No gallop.    Pulmonary:      Effort: Pulmonary effort is normal. No respiratory distress.      Breath sounds: Decreased breath sounds and rales present. No wheezing.   Chest:      Chest wall: No tenderness.   Abdominal:      General: Bowel sounds are normal. There is no distension.      Palpations: Abdomen is soft.      Tenderness: There is no abdominal tenderness.   Neurological:      Mental Status: Mental status is at baseline.   Psychiatric:          Behavior: Behavior normal.       Results Review:  I have reviewed the labs, radiology results, and diagnostic studies.    Laboratory Data:   Results from last 7 days   Lab Units 11/12/20  0355 11/11/20  0231 11/10/20  0510   SODIUM mmol/L 123* 126* 127*   POTASSIUM mmol/L 5.2 4.1 4.1   CHLORIDE mmol/L 94* 94* 94*   CO2 mmol/L 19.0* 22.0 25.0   BUN mg/dL 49* 43* 37*   CREATININE mg/dL 2.11* 2.36* 2.16*   GLUCOSE mg/dL 196* 172* 140*   CALCIUM mg/dL 8.5* 8.6 8.5*   BILIRUBIN mg/dL 0.3 0.3 0.4   ALK PHOS U/L 65 64 60   ALT (SGPT) U/L 11 10 10   AST (SGOT) U/L 13 13 17   ANION GAP mmol/L 10.0 10.0 8.0     Estimated Creatinine Clearance: 48.3 mL/min (A) (by C-G formula based on SCr of 2.11 mg/dL (H)).  Results from last 7 days   Lab Units 11/11/20 0231 11/08/20  0154   MAGNESIUM mg/dL 1.6 1.6   PHOSPHORUS mg/dL  --  2.5         Results from last 7 days   Lab Units 11/12/20  0355 11/11/20  0231 11/10/20  0510 11/09/20  0338 11/08/20  0154   WBC 10*3/mm3 17.34* 16.36* 15.89* 24.55* 19.45*   HEMOGLOBIN g/dL 9.0* 9.2* 9.0* 8.5* 9.6*   HEMATOCRIT % 26.7* 26.9* 26.5* 24.7* 27.7*   PLATELETS 10*3/mm3 375 358 334 358 352           Culture Data:   No results found for: BLOODCX  No results found for: URINECX  No results found for: RESPCX  No results found for: WOUNDCX  No results found for: STOOLCX  No components found for: BODYFLD    Radiology Data:   Imaging Results (Last 24 Hours)     Procedure Component Value Units Date/Time    XR Chest 1 View [[id number]] Collected: 11/12/20 0730     Updated: 11/12/20 0755    Narrative:        PROCEDURE: Single chest view portable    REASON FOR EXAM:worsening shortness of air, J96.01 Acute  respiratory failure with hypoxia I50.23 Acute on chronic systolic  (congestive) heart failure A41.9 Sepsis, unspecified organism  R65.20 Severe sepsis without septic shock J96.01 Acute  respiratory failure with hypoxia    FINDINGS: Comparison exam dated November 7, 2020. Right PICC line  with tip in  the region of the right axillary vein. Cardiomegaly.  Bilateral perihilar haziness with bilateral upper lobe and lung  base perihilar interstitial opacities. Lungs are otherwise clear.  Small left pleural effusion. No acute osseous abnormality.      Impression:      1.  Right PICC line with tip in the region of the right axillary  vein.   2.  Cardiomegaly with associated bilateral perihilar haziness and  bilateral upper and lower lung field perihilar interstitial  opacities. This would be suspicious for pulmonary edema and/or  pneumonia which appears worse.  3.  Small left pleural effusion.    Electronically signed by:  Tayo Fabian MD  11/12/2020 7:53 AM CST  Workstation: [id number]          I have reviewed the patient's current medications.     Assessment/Plan     Active Hospital Problems    Diagnosis   • **Acute respiratory failure with hypoxia (CMS/HCC)            • Diabetic foot ulcer (CMS/HCC)   • Bilateral pneumonia   • Diabetes mellitus (CMS/HCC)            • Sepsis (CMS/HCC)            • Acute kidney injury (CMS/HCC)              • COPD with acute exacerbation (CMS/HCC)              • Uncontrolled hypertension              • Paranoid schizophrenia (CMS/HCC)       Plan:    1.  Hypertension: Remains on Cardene drip.  Lisinopril added yesterday.  Started on Lasix today.  Continue to attempt to wean Cardene as possible.  2.  Acute hypoxic respiratory failure: More short of breath today.  Seems to be secondary to fluid overload.  Will diurese.  Covid negative.  3.  Healthcare associated pneumonia: Seen on chest x-ray.  Continue broad-spectrum antibiotics cultures negative thus far.  4.  Heart failure with preserved ejection fraction: Acute exacerbation.  Respiratory status is worse today.  Will diurese.    5.  Diabetes mellitus: Continue sliding scale insulin.  6.  Diabetic ulcer of the toe: Discussed with Dr. Avila.  Will defer arteriogram for now.  Wound culture grew MRSA.  On vancomycin.  7.  Paranoid  schizophrenia: Continue home medication.  8.  Acute kidney injury: Better today.  Must reinstate diuretics due to worsening shortness of breath.  9.  DVT prophylaxis: Lovenox.    The patient was evaluated during the global COVID-19 pandemic, and the diagnosis was suspected/considered upon their initial presentation.  Evaluation, treatment, and testing were consistent with current guidelines for patients who present with complaints or symptoms that may be related to COVID-19.    Discharge Planning: I expect patient to be discharged to skilled nursing facility in 3-4 days.        This document has been electronically signed by Zac Terrazas MD on November 12, 2020 16:51 CST

## 2020-11-12 NOTE — PLAN OF CARE
Goal Outcome Evaluation:  Plan of Care Reviewed With: caregiver, patient  Progress: declining  Outcome Summary: Pt remains on Cardine drip, eating well , urine output adequate, increasing abdominal pain, small bowel movement , called DR and new orders are made. Increasing shortness of air noted by patinet and wearing Bipap when he wants to. Dr Valdes came to see foot, wants to dry out the eschar area and keep it dry overall with betadine. I  toes to dry the area and curlex.

## 2020-11-12 NOTE — PROGRESS NOTES
"  Pharmacokinetics by Pharmacy - Vancomycin    Mohan Villatoro is a 60 y.o. male  [Ht: 180.3 cm (71\"); Wt: 89.4 kg (197 lb 3.2 oz)]    Estimated Creatinine Clearance: 48.3 mL/min (A) (by C-G formula based on SCr of 2.11 mg/dL (H)).   Creatinine   Date Value Ref Range Status   11/12/2020 2.11 (H) 0.76 - 1.27 mg/dL Final   11/11/2020 2.36 (H) 0.76 - 1.27 mg/dL Final   11/10/2020 2.16 (H) 0.76 - 1.27 mg/dL Final      Lab Results   Component Value Date    WBC 17.34 (H) 11/12/2020    WBC 16.36 (H) 11/11/2020    WBC 15.89 (H) 11/10/2020     C-Reactive Protein   Date Value Ref Range Status   09/08/2020 0.86 (H) 0.00 - 0.50 mg/dL Final     Lactate   Date Value Ref Range Status   11/07/2020 1.0 0.5 - 2.0 mmol/L Final   08/07/2020 1.3 0.5 - 2.0 mmol/L Final   08/06/2020 1.1 0.5 - 2.0 mmol/L Final       Temp Readings from Last 1 Encounters:   11/12/20 98.1 °F (36.7 °C) (Oral)      Lab Results   Component Value Date    VANCOPEAK 41.20 (C) 11/09/2020    VANCOTROUGH 21.90 (H) 11/12/2020    VANCORANDOM 21.80 08/19/2020         Culture Results:  Microbiology Results (last 10 days)       Procedure Component Value - Date/Time    MRSA Screen, PCR (Inpatient) - Swab, Nares [[id number]]  (Normal) Collected: 11/08/20 1543    Lab Status: Final result Specimen: Swab from Nares Updated: 11/08/20 1655     MRSA, PCR Negative    Narrative:      Performed by real-time polymerase chain reaction (qPCR).    CRE Screen by PCR - Swab, Large Intestine, Rectum [[id number]] Collected: 11/08/20 0250    Lab Status: Final result Specimen: Swab from Large Intestine, Rectum Updated: 11/08/20 0428     CRE SCREEN Not Detected     Comment: Test performed by real-time polymerase chain reaction (qPCR).        OXA 48 Strain Not Detected     IMP STRAIN Not Detected     VIM STRAIN Not Detected     NDM Strain Not Detected     KPC Strain Not Detected    COVID-19,  [facility], NP SWAB IN TRANSPORT MEDIA 8-10 HR TAT - Swab, Nasopharynx [[id number]]  (Normal) " Collected: 11/07/20 2137    Lab Status: Final result Specimen: Swab from Nasopharynx Updated: 11/08/20 0250     COVID19 Not Detected    Narrative:      Testing performed by Real Time RT-PCR  This test has not been approved by the [facility] but is authorized under the Emergency Use Act (EUA)    Fact sheet for providers: https://www.fda.gov/media/417803/download    Fact sheet for patients: https://www.fda.gov/media/024774/download        Wound Culture - Wound, Foot, Right [[id number]]  (Abnormal)  (Susceptibility) Collected: 11/07/20 2055    Lab Status: Final result Specimen: Wound from Foot, Right Updated: 11/12/20 0840     Wound Culture Heavy growth (4+) Normal Skin Sophia      Moderate growth (3+) Staphylococcus aureus, MRSA     Comment: Methicillin resistant Staphylococcus aureus, Patient may be an isolation risk.         Moderate growth (3+) Enterococcus faecalis     Gram Stain Many (4+) WBCs seen      Many (4+) Gram negative bacilli      Many (4+) Gram positive cocci in pairs      Many (4+) Gram positive bacilli    Narrative:      PEA PLATE ADDED     Susceptibility        Staphylococcus aureus, MRSA     CELSO     Clindamycin Resistant     Erythromycin Resistant     Inducible Clindamycin Resistance Negative     Oxacillin Resistant     Penicillin G Resistant     Rifampin Susceptible     Tetracycline Susceptible     Trimethoprim + Sulfamethoxazole Susceptible     Vancomycin Susceptible                  Susceptibility        Enterococcus faecalis     CELSO     Ampicillin Susceptible     Vancomycin Susceptible                      Blood Culture - Blood, Arm, Left [[id number]] Collected: 11/07/20 2027    Lab Status: Preliminary result Specimen: Blood from Arm, Left Updated: 11/11/20 2046     Blood Culture No growth at 4 days    Blood Culture - Blood, Arm, Right [[id number]] Collected: 11/07/20 1950    Lab Status: Preliminary result Specimen: Blood from Arm, Right Updated: 11/11/20 2000     Blood Culture No growth at 4 days     COVID PRE-OP / PRE-PROCEDURE SCREENING ORDER (NO ISOLATION) - Swab, Nasopharynx [[id number]] Collected: 11/07/20 0918    Lab Status: Final result Specimen: Swab from Nasopharynx Updated: 11/08/20 1407    Narrative:      The following orders were created for panel order COVID PRE-OP / PRE-PROCEDURE SCREENING ORDER (NO ISOLATION) - Swab, Nasopharynx.  Procedure                               Abnormality         Status                     ---------                               -----------         ------                     COVID-19,[facility] ROUTINE...[[id number]]                      Final result               COVID [facility] Priority -...[[id number]]                      Final result                 Please view results for these tests on the individual orders.    COVID-19,[facility] ROUTINE, NP/OP SWAB IN TRANSPORT MEDIA OR ESWAB 72 HR TAT - Swab, Nasopharynx [[id number]] Collected: 11/07/20 0918    Lab Status: Final result Specimen: Swab from Nasopharynx Updated: 11/08/20 1407     SARS-CoV-2, DONNA Not Detected     Comment: This nucleic acid amplification test was developed and its performance  characteristics determined by [facility]. Nucleic acid  amplification tests include PCR and TMA. This test has not been FDA  cleared or approved. This test has been authorized by FDA under an  Emergency Use Authorization (EUA). This test is only authorized for  the duration of time the declaration that circumstances exist  justifying the authorization of the emergency use of in vitro  diagnostic tests for detection of SARS-CoV-2 virus and/or diagnosis  of COVID-19 infection under section 564(b)(1) of the Act, 21 U.S.C.  360bbb-3(b) (1), unless the authorization is terminated or revoked  sooner.  When diagnostic testing is negative, the possibility of a false  negative result should be considered in the context of a patient's  recent exposures and the presence of clinical signs and symptoms  consistent with COVID-19. An  individual without symptoms of COVID-19  and who is not shedding SARS-CoV-2 virus would expect to have a  negative (not detected) result in this assay.       Narrative:      Performed at:  01 - [facility]  [address] [place]  [id number]  : Latosha Benoit MD, Phone:  [phone]    COVID [facility] - Swab, Nasopharynx [[id number]] Collected: 11/07/20 0918    Lab Status: Final result Specimen: Swab from Nasopharynx Updated: 11/08/20 1407     COVID [facility] PRIORITY Comment     Comment: Received       Narrative:      Performed at:  02 - [facility]  [address] [place], [place]  [id number]  : Iain Brooke [facility], Phone:  [phone]          No results found for: RESPCX    Indication for use: skin and soft tissue infection, MRSA and E Faecalis in wound culture      Current Vancomycin Dose:  pulse dosing due to JIM. Pt received Vancomycin 1000 mg 11/12 at 0830.     Pt is also receiving cefepime every 12 hours    Assessment/Plan:  Reviewed above labs and cultures.   Vancomycin trough level from 11/12 at 0837 is 21.9 (goal 15-20). Lab levels were drawn at the correct time. WBC is 17.34, increasing  the last 2 days. No other medications that can increase WBC. Cr is 2.11, improving. Will give Vancomycin 750 mg today at 1600. Consult is set to end tomorrow. May need to be extended.  Pharmacy will continue to monitor renal function and adjust dose accordingly.      Marleny Chavarria, PharmD   11/12/20 10:18 CST

## 2020-11-12 NOTE — PROGRESS NOTES
[facility] Medicine Services  INPATIENT PROGRESS NOTE    Length of Stay: 4  Date of Admission: 11/7/2020  Primary Care Physician: Brad Mac APRN    Subjective   Chief Complaint: Shortness of breath  HPI: Patient states he feels better today.  Less shortness of breath.    Review of Systems   Constitutional: Negative for appetite change, chills, fatigue and fever.   Respiratory: Positive for cough, shortness of breath and wheezing. Negative for chest tightness.    Cardiovascular: Negative for chest pain, palpitations and leg swelling.   Gastrointestinal: Negative for abdominal pain, constipation, diarrhea, nausea and vomiting.   Skin: Negative for wound.   Neurological: Negative for dizziness, weakness, light-headedness, numbness and headaches.      All pertinent negatives and positives are as above. All other systems have been reviewed and are negative unless otherwise stated.     Objective    Temp:  [97.6 °F (36.4 °C)-98.6 °F (37 °C)] 98.6 °F (37 °C)  Heart Rate:  [54-86] 59  Resp:  [13-26] 18  BP: (148-212)/(65-98) 167/75    Physical Exam  Vitals signs reviewed.   Constitutional:       Appearance: He is well-developed.   HENT:      Head: Normocephalic and atraumatic.   Eyes:      Pupils: Pupils are equal, round, and reactive to light.   Neck:      Musculoskeletal: Normal range of motion and neck supple.   Cardiovascular:      Rate and Rhythm: Normal rate and regular rhythm.      Heart sounds: Normal heart sounds. No murmur. No friction rub. No gallop.    Pulmonary:      Effort: Pulmonary effort is normal. No respiratory distress.      Breath sounds: Decreased breath sounds present. No wheezing or rales.   Chest:      Chest wall: No tenderness.   Abdominal:      General: Bowel sounds are normal. There is no distension.      Palpations: Abdomen is soft.      Tenderness: There is no abdominal tenderness.   Neurological:      Mental Status: Mental status is at baseline.     Psychiatric:         Behavior: Behavior normal.       Results Review:  I have reviewed the labs, radiology results, and diagnostic studies.    Laboratory Data:   Results from last 7 days   Lab Units 11/11/20  0231 11/10/20  0510 11/09/20  2102 11/09/20  0338   SODIUM mmol/L 126* 127*  --  129*   POTASSIUM mmol/L 4.1 4.1 4.5 3.6   CHLORIDE mmol/L 94* 94*  --  94*   CO2 mmol/L 22.0 25.0  --  25.0   BUN mg/dL 43* 37*  --  37*   CREATININE mg/dL 2.36* 2.16*  --  1.67*   GLUCOSE mg/dL 172* 140*  --  146*   CALCIUM mg/dL 8.6 8.5*  --  8.2*   BILIRUBIN mg/dL 0.3 0.4  --  0.3   ALK PHOS U/L 64 60  --  56   ALT (SGPT) U/L 10 10  --  9   AST (SGOT) U/L 13 17  --  35   ANION GAP mmol/L 10.0 8.0  --  10.0     Estimated Creatinine Clearance: 43.1 mL/min (A) (by C-G formula based on SCr of 2.36 mg/dL (H)).  Results from last 7 days   Lab Units 11/11/20 0231 11/08/20  0154   MAGNESIUM mg/dL 1.6 1.6   PHOSPHORUS mg/dL  --  2.5         Results from last 7 days   Lab Units 11/11/20  0231 11/10/20  0510 11/09/20  0338 11/08/20  0154 11/07/20  1950   WBC 10*3/mm3 16.36* 15.89* 24.55* 19.45* 17.97*   HEMOGLOBIN g/dL 9.2* 9.0* 8.5* 9.6* 10.1*   HEMATOCRIT % 26.9* 26.5* 24.7* 27.7* 29.6*   PLATELETS 10*3/mm3 358 334 358 352 417           Culture Data:   No results found for: BLOODCX  No results found for: URINECX  No results found for: RESPCX  No results found for: WOUNDCX  No results found for: STOOLCX  No components found for: BODYFLD    Radiology Data:   Imaging Results (Last 24 Hours)     ** No results found for the last 24 hours. **          I have reviewed the patient's current medications.     Assessment/Plan     Active Hospital Problems    Diagnosis   • **Acute respiratory failure with hypoxia (CMS/HCC)            • Diabetic foot ulcer (CMS/HCC)   • Bilateral pneumonia   • Diabetes mellitus (CMS/HCC)            • Sepsis (CMS/HCC)            • Acute kidney injury (CMS/HCC)              • COPD with acute exacerbation (CMS/HCC)               • Uncontrolled hypertension              • Paranoid schizophrenia (CMS/[facility])       Plan:    1.  Hypertension: Remains on Cardene drip.  We will add lisinopril.    2.  Acute hypoxic respiratory failure: Essentially resolved.  Covid negative.  3.  Healthcare associated pneumonia: Seen on chest x-ray.  Continue broad-spectrum antibiotics cultures negative thus far.  4.  Heart failure with preserved ejection fraction: Acute exacerbation.  Will stop diuresis secondary to acute kidney injury.    5.  Diabetes mellitus: Continue sliding scale insulin.  6.  Diabetic ulcer of the toe: Discussed with Dr. Avila.  Will defer arteriogram for now.  Wound culture grew MRSA.  On vancomycin.  7.  Paranoid schizophrenia: Continue home medication.  8.  Acute kidney injury: Worsening.  Monitor closely.  Will start gentle IV fluids.  9.  DVT prophylaxis: Lovenox.    The patient was evaluated during the global COVID-19 pandemic, and the diagnosis was suspected/considered upon their initial presentation.  Evaluation, treatment, and testing were consistent with current guidelines for patients who present with complaints or symptoms that may be related to COVID-19.    Discharge Planning: I expect patient to be discharged to skilled nursing facility in 3-4 days.        This document has been electronically signed by Zac Terrazas MD on November 11, 2020 18:10 CST

## 2020-11-12 NOTE — PLAN OF CARE
Goal Outcome Evaluation:  Plan of Care Reviewed With: patient  Progress: declining  Outcome Summary: continue to attempt to titrate nicardipine, manage pain as approproiate and remind patient to inform staff of any concerns or needs

## 2020-11-13 NOTE — PROGRESS NOTES
[facility] Medicine Services  INPATIENT PROGRESS NOTE    Length of Stay: 6  Date of Admission: 11/7/2020  Primary Care Physician: Brad Mac APRN    Subjective   Chief Complaint: Shortness of breath  HPI: Somewhat less shortness of breath today.    Review of Systems   Constitutional: Negative for appetite change, chills, fatigue and fever.   Respiratory: Positive for cough, shortness of breath and wheezing. Negative for chest tightness.    Cardiovascular: Negative for chest pain, palpitations and leg swelling.   Gastrointestinal: Negative for abdominal pain, constipation, diarrhea, nausea and vomiting.   Skin: Negative for wound.   Neurological: Negative for dizziness, weakness, light-headedness, numbness and headaches.      All pertinent negatives and positives are as above. All other systems have been reviewed and are negative unless otherwise stated.     Objective    Temp:  [97 °F (36.1 °C)-97.4 °F (36.3 °C)] 97.4 °F (36.3 °C)  Heart Rate:  [46-67] 50  Resp:  [22-26] 22  BP: (138-185)/() 140/70    Physical Exam  Vitals signs reviewed.   Constitutional:       Appearance: He is well-developed.   HENT:      Head: Normocephalic and atraumatic.   Eyes:      Pupils: Pupils are equal, round, and reactive to light.   Neck:      Musculoskeletal: Normal range of motion and neck supple.   Cardiovascular:      Rate and Rhythm: Normal rate and regular rhythm.      Heart sounds: Normal heart sounds. No murmur. No friction rub. No gallop.    Pulmonary:      Effort: Pulmonary effort is normal. No respiratory distress.      Breath sounds: Decreased breath sounds and rales present. No wheezing.   Chest:      Chest wall: No tenderness.   Abdominal:      General: Bowel sounds are normal. There is no distension.      Palpations: Abdomen is soft.      Tenderness: There is no abdominal tenderness.   Neurological:      Mental Status: Mental status is at baseline.   Psychiatric:          Behavior: Behavior normal.       Results Review:  I have reviewed the labs, radiology results, and diagnostic studies.    Laboratory Data:   Results from last 7 days   Lab Units 11/13/20  0545 11/12/20 0355 11/11/20 0231   SODIUM mmol/L 121* 123* 126*   POTASSIUM mmol/L 5.0 5.2 4.1   CHLORIDE mmol/L 92* 94* 94*   CO2 mmol/L 16.0* 19.0* 22.0   BUN mg/dL 59* 49* 43*   CREATININE mg/dL 1.96* 2.11* 2.36*   GLUCOSE mg/dL 96 196* 172*   CALCIUM mg/dL 8.7 8.5* 8.6   BILIRUBIN mg/dL 0.4 0.3 0.3   ALK PHOS U/L 64 65 64   ALT (SGPT) U/L 10 11 10   AST (SGOT) U/L 15 13 13   ANION GAP mmol/L 13.0 10.0 10.0     Estimated Creatinine Clearance: 51.9 mL/min (A) (by C-G formula based on SCr of 1.96 mg/dL (H)).  Results from last 7 days   Lab Units 11/11/20 0231 11/08/20  0154   MAGNESIUM mg/dL 1.6 1.6   PHOSPHORUS mg/dL  --  2.5         Results from last 7 days   Lab Units 11/13/20 0545 11/12/20  0355 11/11/20  0231 11/10/20  0510 11/09/20  0338   WBC 10*3/mm3 21.09* 17.34* 16.36* 15.89* 24.55*   HEMOGLOBIN g/dL 8.7* 9.0* 9.2* 9.0* 8.5*   HEMATOCRIT % 25.3* 26.7* 26.9* 26.5* 24.7*   PLATELETS 10*3/mm3 364 375 358 334 358           Culture Data:   No results found for: BLOODCX  No results found for: URINECX  No results found for: RESPCX  No results found for: WOUNDCX  No results found for: STOOLCX  No components found for: BODYFLD    Radiology Data:   Imaging Results (Last 24 Hours)     Procedure Component Value Units Date/Time    XR Abdomen Flat & Upright [[id number]] Collected: 11/12/20 1728     Updated: 11/12/20 1817    Narrative:      EXAM: XR ABDOMEN 2 VIEWS SUPINE ERECT    COMPARISONS: None    INDICATION: abdominal distention, J96.01 Acute respiratory  failure with hypoxia I50.23 Acute on chronic systolic  (congestive) heart failure A41.9 Sepsis, unspecified organism  R65.20 Severe sepsis without septic shock J96.01 Acute  respiratory failure with hypoxia    FINDINGS:  Motion artifact and patient body habitus limits  evaluation.     Question loop of dilated small bowel in the left abdomen. There  is a significant colonic stool burden. No air-fluid levels or  pneumoperitoneum. There is gaseous distention without vicente  dilation of the large bowel. No acute osseous abnormality.      Impression:      Limited exam due to patient body habitus and motion artifact.  Questionable loop of dilated small bowel in the left abdomen.    Significant proximal colonic stool burden.    Electronically signed by:  Mandie Segal MD  11/12/2020  6:16 PM CST Workstation: [id number]          I have reviewed the patient's current medications.     Assessment/Plan     Active Hospital Problems    Diagnosis   • **Acute respiratory failure with hypoxia (CMS/HCC)            • Diabetic foot ulcer (CMS/HCC)   • Bilateral pneumonia   • Diabetes mellitus (CMS/HCC)            • Sepsis (CMS/HCC)            • Acute kidney injury (CMS/HCC)              • COPD with acute exacerbation (CMS/HCC)              • Uncontrolled hypertension              • Paranoid schizophrenia (CMS/HCC)       Plan:    1.  Hypertension: Remains on Cardene drip.  Continue to wean as tolerated.  Currently on Coreg, hydralazine, Aldactone, and Flomax.  2.  Acute hypoxic respiratory failure: Some decreased shortness of breath today.  Diuresed well.  Continue to monitor.  Covid negative.  3.  Healthcare associated pneumonia: Seen on chest x-ray.  Continue broad-spectrum antibiotics cultures negative thus far.  4.  Heart failure with preserved ejection fraction: Acute exacerbation.  Respiratory status is worse today.  Will diurese.    5.  Diabetes mellitus: Continue sliding scale insulin.  6.  Diabetic ulcer of the toe: Discussed with Dr. Avila.  Will defer arteriogram for now.  Wound culture grew MRSA.  On vancomycin.  7.  Paranoid schizophrenia: Continue home medication.  8.  Acute kidney injury: Better today.  I have discussed the case with Dr. Hatch.  He will see in consultation.     9.  DVT prophylaxis: Lovenox.    The patient was evaluated during the global COVID-19 pandemic, and the diagnosis was suspected/considered upon their initial presentation.  Evaluation, treatment, and testing were consistent with current guidelines for patients who present with complaints or symptoms that may be related to COVID-19.    Discharge Planning: I expect patient to be discharged to skilled nursing facility in 3-4 days.        This document has been electronically signed by Zac Terrazas MD on November 13, 2020 16:01 CST

## 2020-11-13 NOTE — PLAN OF CARE
Goal Outcome Evaluation:  Plan of Care Reviewed With: patient  Progress: declining   Patient remains on cardene drip, refusing treatment from nursing staff, refuses to wear bipap and o2 non compliant

## 2020-11-13 NOTE — PLAN OF CARE
Goal Outcome Evaluation:  Plan of Care Reviewed With: patient  Progress: declining  Outcome Summary: Pt remains on Cardine drip, eating well , urine output adequate, increasing abdominal pain, small bowel movement , called DR and new orders are made. Increasing shortness of air noted by shainanet and wearing Bipap when he wants to. PT is restless and complains he isn't able to move his bowels. He has had a much harder time breathing and is having problems with compliance of the bipap. Pt also got up without the call light and had a vagal episode in the bathroom and got dizzy. Will monitor.

## 2020-11-13 NOTE — PLAN OF CARE
Problem: Adult Inpatient Plan of Care  Goal: Plan of Care Review  Outcome: Ongoing, Progressing  Flowsheets (Taken 11/13/2020 1250)  Progress: no change  Plan of Care Reviewed With:   caregiver   patient  Outcome Summary: Per st aff pt continues to eat well.  Good acceptance of supplements.  Will modify diet texture per pt's request.  Monitor.

## 2020-11-13 NOTE — PROGRESS NOTES
"Pharmacokinetics by Pharmacy - Vancomycin    Mohan Villatoro is a 60 y.o. male on vancomycin for skin and soft tissue infection and upper respiratory tract infection      Objective:  [Ht: 180.3 cm (71\"); Wt: 89.4 kg (197 lb 3.2 oz)]     WBC   Date Value Ref Range Status   11/13/2020 21.09 (H) 3.40 - 10.80 10*3/mm3 Final   11/12/2020 17.34 (H) 3.40 - 10.80 10*3/mm3 Final   11/11/2020 16.36 (H) 3.40 - 10.80 10*3/mm3 Final    No results found for: CRP, LACTATE   Temp Readings from Last 1 Encounters:   11/13/20 97.2 °F (36.2 °C) (Oral)     Estimated Creatinine Clearance: 51.9 mL/min (A) (by C-G formula based on SCr of 1.96 mg/dL (H)).   Creatinine   Date Value Ref Range Status   11/13/2020 1.96 (H) 0.76 - 1.27 mg/dL Final   11/12/2020 2.11 (H) 0.76 - 1.27 mg/dL Final   11/11/2020 2.36 (H) 0.76 - 1.27 mg/dL Final       Vancomycin Trough   Date Value Ref Range Status   11/12/2020 21.90 (H) 5.00 - 20.00 mcg/mL Final   11/10/2020 23.00 (H) 5.00 - 20.00 mcg/mL Final       Culture Results:  Microbiology Results (last 10 days)     Procedure Component Value - Date/Time    MRSA Screen, PCR (Inpatient) - Swab, Nares [[id number]]  (Normal) Collected: 11/08/20 1543    Lab Status: Final result Specimen: Swab from Nares Updated: 11/08/20 1655     MRSA, PCR Negative    Narrative:      Performed by real-time polymerase chain reaction (qPCR).    CRE Screen by PCR - Swab, Large Intestine, Rectum [[id number]] Collected: 11/08/20 0250    Lab Status: Final result Specimen: Swab from Large Intestine, Rectum Updated: 11/08/20 0428     CRE SCREEN Not Detected     Comment: Test performed by real-time polymerase chain reaction (qPCR).        OXA 48 Strain Not Detected     IMP STRAIN Not Detected     VIM STRAIN Not Detected     NDM Strain Not Detected     KPC Strain Not Detected    COVID-19, [facility] IN-HOUSE, NP SWAB IN TRANSPORT MEDIA 8-10 HR TAT - Swab, Nasopharynx [[id number]]  (Normal) Collected: 11/07/20 2137    Lab Status: Final result " Specimen: Swab from Nasopharynx Updated: 11/08/20 0250     COVID19 Not Detected    Narrative:      Testing performed by Real Time RT-PCR  This test has not been approved by the [facility] but is authorized under the Emergency Use Act (EUA)    Fact sheet for providers: https://www.fda.gov/media/415187/download    Fact sheet for patients: https://www.fda.gov/media/865436/download        Wound Culture - Wound, Foot, Right [[id number]]  (Abnormal)  (Susceptibility) Collected: 11/07/20 2055    Lab Status: Final result Specimen: Wound from Foot, Right Updated: 11/12/20 0840     Wound Culture Heavy growth (4+) Normal Skin Sophia      Moderate growth (3+) Staphylococcus aureus, MRSA     Comment: Methicillin resistant Staphylococcus aureus, Patient may be an isolation risk.         Moderate growth (3+) Enterococcus faecalis     Gram Stain Many (4+) WBCs seen      Many (4+) Gram negative bacilli      Many (4+) Gram positive cocci in pairs      Many (4+) Gram positive bacilli    Narrative:      PEA PLATE ADDED     Susceptibility      Staphylococcus aureus, MRSA     CELSO     Clindamycin Resistant     Erythromycin Resistant     Inducible Clindamycin Resistance Negative     Oxacillin Resistant     Penicillin G Resistant     Rifampin Susceptible     Tetracycline Susceptible     Trimethoprim + Sulfamethoxazole Susceptible     Vancomycin Susceptible                Susceptibility      Enterococcus faecalis     CELSO     Ampicillin Susceptible     Vancomycin Susceptible                    Blood Culture - Blood, Arm, Left [[id number]] Collected: 11/07/20 2027    Lab Status: Final result Specimen: Blood from Arm, Left Updated: 11/12/20 2045     Blood Culture No growth at 5 days    Blood Culture - Blood, Arm, Right [[id number]] Collected: 11/07/20 1950    Lab Status: Final result Specimen: Blood from Arm, Right Updated: 11/12/20 2000     Blood Culture No growth at 5 days    COVID PRE-OP / PRE-PROCEDURE SCREENING ORDER (NO ISOLATION) - Swab,  Nasopharynx [[id number]] Collected: 11/07/20 0918    Lab Status: Final result Specimen: Swab from Nasopharynx Updated: 11/08/20 1407    Narrative:      The following orders were created for panel order COVID PRE-OP / PRE-PROCEDURE SCREENING ORDER (NO ISOLATION) - Swab, Nasopharynx.  Procedure                               Abnormality         Status                     ---------                               -----------         ------                     COVID-19,[facility] ROUTINE...[[id number]]                      Final result               COVID [facility] -...[[id number]]                      Final result                 Please view results for these tests on the individual orders.    COVID-19,[facility] ROUTINE, NP/OP SWAB IN TRANSPORT MEDIA OR ESWAB 72 HR TAT - Swab, Nasopharynx [[id number]] Collected: 11/07/20 0918    Lab Status: Final result Specimen: Swab from Nasopharynx Updated: 11/08/20 1407     SARS-CoV-2, DONNA Not Detected     Comment: This nucleic acid amplification test was developed and its performance  characteristics determined by [employer]. Nucleic acid  amplification tests include PCR and TMA. This test has not been FDA  cleared or approved. This test has been authorized by FDA under an  Emergency Use Authorization (EUA). This test is only authorized for  the duration of time the declaration that circumstances exist  justifying the authorization of the emergency use of in vitro  diagnostic tests for detection of SARS-CoV-2 virus and/or diagnosis  of COVID-19 infection under section 564(b)(1) of the Act, 21 U.S.C.  360bbb-3(b) (1), unless the authorization is terminated or revoked  sooner.  When diagnostic testing is negative, the possibility of a false  negative result should be considered in the context of a patient's  recent exposures and the presence of clinical signs and symptoms  consistent with COVID-19. An individual without symptoms of COVID-19  and who is not shedding SARS-CoV-2  virus would expect to have a  negative (not detected) result in this assay.       Narrative:      Performed at:  01 - [facility]  [address], [place], [place]  [id number]  : Latosha Benoit MD, Phone:  [phone]    [facility] - Swab, Nasopharynx [[id number]] Collected: 11/07/20 0918    Lab Status: Final result Specimen: Swab from Nasopharynx Updated: 11/08/20 1407     COVID [facility] PRIORITY Comment     Comment: Received       Narrative:      Performed at:  02 - [facility]  [address], [facility], [place]  [id number]  : Iain Brooke [facility], Phone:  [phone]        No results found for: RESPCX    Assessment:  Consult has been extended to 11/17  Scr has continued to fluctuate but is recently trending down  Afebrile  WBC trended up today    Trough was high yesterday and dose adjusted to 750 mg IV vancomycin once    We will continue pulse dose at this time with a random tonight before the next dose is due. I will order the 750 mg dose as well.     Plan:  1. Give vancomycin 750mg IV once tonight if random is less than 20  2. Vancomycin random at 1630  3. Pharmacy will monitor renal function and adjust dose accordingly.      Magdi Sandoval, Wilbert   11/13/20 09:19 CST     ADDENDUM  · Vancomycin 24 hour random resulted at 19.9 mcg/mL   · Patient pulled out midline and peripheral access had to be obtained. Vancomycin delayed 3.5 hours. Which allowed the trough to come down a little before next administration.   · Will maintain regimen of 750 mg IV daily and adjust time to 2000 for future doses     Dmitri Hardin RPH 11/13/20 at 21:54 CST

## 2020-11-13 NOTE — CONSULTS
Adult Nutrition  Assessment    Patient Name:  Mohan Villatoro  YOB: 1960  MRN: [id number]  Admit Date:  11/7/2020    Assessment Date:  11/13/2020    Comments:  Pt continues to eat well per staff although pt reports a decreased appetite.  Good acceptance of supplements. RD will modify diet texture per pt's request. Uncontrolled HTN requiring Cardene drip.  JIM with elevated Bun and Creat.  Persistent Hyponatremia with Na+ of 121, Nephrology consulted.  Acute REsp failure with pt more SOA, diuresis.  ABx continues for foot wound.  Will continue to encourage and monitor.     Reason for Assessment     Row Name 11/13/20 1240          Reason for Assessment    Reason For Assessment  follow-up protocol         Nutrition/Diet History     Row Name 11/13/20 1241          Nutrition/Diet History    Typical Food/Fluid Intake  Pt claims that he still isn't eating very well but per staff he is eating everything.  Pt agrees to have meat chopped up.  He likes the Novasource drink.  RN reports that Nephrology consulted.  Pt           Labs/Tests/Procedures/Meds     Row Name 11/13/20 1242          Labs/Procedures/Meds    Lab Results Reviewed  reviewed, pertinent     Lab Results Comments  Na 121; Bun 59; Creat 1.96; Alb 3.30        Diagnostic Tests/Procedures    Diagnostic Test/Procedure Reviewed  reviewed, pertinent     Diagnostic Test/Procedures Comments  Cardene drip; Wound care; O2;        Medications    Pertinent Medications Reviewed  reviewed, pertinent     Pertinent Medications Comments  Vanc; Coreg; Levemir; SSI; LActulose         Physical Findings     Row Name 11/13/20 1243          Physical Findings    Overall Physical Appearance  on oxygen therapy     Skin  pressure injury           Nutrition Prescription Ordered     Row Name 11/13/20 1245          Nutrition Prescription PO    Current PO Diet  Regular     Supplement  Novasource Renal (Nepro) 4 ounces     Supplement Frequency  3 times a day     Common  Modifiers  Cardiac;Consistent Carbohydrate;Renal         Evaluation of Received Nutrient/Fluid Intake     Row Name 11/13/20 1245          PO Evaluation    Number of Days PO Intake Evaluated  Insufficient Data     % PO Intake  25%; Per staff pt has been eating everything on tray               Electronically signed by:  Judie Angulo RD  11/13/20 12:51 CST

## 2020-11-14 NOTE — CONSULTS
[facility]  [address] [place] [address]. [id number]   - [phone]  F  [phone]     Consultation         PATIENT  DEMOGRAPHICS   PATIENT NAME: Mohan Villatoro                      PHYSICIAN: Gina Hatch MD  : 1960  MRN: [id number]    Subjective   SUBJECTIVE   Referring Provider: Dr Terrazas  Reason for Consultation: janessa ckd 3 a  History of present illness:      Mr. Villatoro is a 60-year gentleman with a past medical history of diabetes mellitus type 2 schizophrenia and CKD 3 a with baseline creatinine of 1.4-1.5.  He initially presented with hypertensive urgency/chf.  Prior to that back in July he was admitted with congestive heart failure and pneumonia requiring ventilator support and then discharged to nursing home in [place].    At this time he came in with increasing shortness of air along with phlegm production.  He also has a CT scan of the chest with contrast and there is no evidence of pulmonary embolism or aortic dissection.  He is found to have a congestive heart failure on scan. EF 55-60%.  He was started on vancomycin and Zosyn along with the intravenous Lasix.  He was also on Cardene drip but this has been stopped.  Patient is on Coreg hydralazine Aldactone and Flomax.  He also has diabetic ulcer of the toe.  He has MRSA and E faecalis in the wound    Earlier this morning patient has bradycardic PEA arrest.  He has received CPR for nearly 15 minutes along with epi and atropine.  Also has an amp of bicarbonate.  Patient is intubated this morning.  His blood pressure is in 80 systolic.  His urine output is currently stable.  He has received Lasix overnight. His cr on admission is 1.5 and then 1.7-2.0 range after iv contrast       Past Medical History:   Diagnosis Date   • Diabetes (CMS/HCC)    • Schizo affective schizophrenia (CMS/HCC)      Past Surgical History:   Procedure Laterality Date   • GASTROSTOMY FEEDING TUBE INSERTION N/A 2020    Procedure:  "GASTROSTOMY FEEDING TUBE INSERTION;  Surgeon: Kizzy Garcia MD;  Location: [facility] OR;  Service: General;  Laterality: N/A;   • TRACHEOSTOMY N/A 8/25/2020    Procedure: TRACHEOSTOMY;  Surgeon: Bal Manning MD;  Location:  [facility] OR;  Service: ENT;  Laterality: N/A;     No family history on file.  Social History     Tobacco Use   • Smoking status: Current Every Day Smoker   • Smokeless tobacco: Never Used   Substance Use Topics   • Alcohol use: Not Currently   • Drug use: Never     Allergies:  Niacin and Penicillins     REVIEW OF SYSTEMS    Review of Systems   Unable to perform ROS: Intubated       Objective   OBJECTIVE   Vital Signs  Temp:  [97.1 °F (36.2 °C)-98 °F (36.7 °C)] 97.4 °F (36.3 °C)  Heart Rate:  [31-67] 44  Resp:  [20-28] 22  BP: (111-204)/() 124/60  Arterial Line BP: (90)/(35) 90/35  FiO2 (%):  [100 %] 100 %    Flowsheet Rows      First Filed Value   Admission Height  180.3 cm (71\") Documented at 11/07/2020 1924   Admission Weight  88 kg (194 lb) Documented at 11/07/2020 1924           I/O last 3 completed shifts:  In: 2394.3 [P.O.:120; I.V.:1924.3; IV Piggyback:350]  Out: 2425 [Urine:2425]    PHYSICAL EXAM    Physical Exam  Constitutional:       General: He is in acute distress.      Appearance: He is well-developed.   HENT:      Head: Normocephalic.      Mouth/Throat:      Mouth: Mucous membranes are moist.   Eyes:      Pupils: Pupils are equal, round, and reactive to light.   Cardiovascular:      Rate and Rhythm: Normal rate and regular rhythm.      Heart sounds: Normal heart sounds.   Pulmonary:      Effort: Respiratory distress present.      Breath sounds: Rales present.   Abdominal:      General: Bowel sounds are normal. There is distension.      Palpations: Abdomen is soft.   Musculoskeletal:         General: Swelling present.   Skin:     Coloration: Skin is not jaundiced.      Findings: No bruising.   Neurological:      General: No focal deficit present.      Mental Status: He is " alert.      Motor: No weakness.      Deep Tendon Reflexes: Reflexes normal.         RESULTS   Results Review:    Results from last 7 days   Lab Units 11/14/20 0135 11/13/20 0545 11/12/20  0355   SODIUM mmol/L 123* 121* 123*   POTASSIUM mmol/L 5.6* 5.0 5.2   CHLORIDE mmol/L 96* 92* 94*   CO2 mmol/L 18.0* 16.0* 19.0*   BUN mg/dL 67* 59* 49*   CREATININE mg/dL 2.05* 1.96* 2.11*   CALCIUM mg/dL 8.3* 8.7 8.5*   BILIRUBIN mg/dL 0.3 0.4 0.3   ALK PHOS U/L 66 64 65   ALT (SGPT) U/L 11 10 11   AST (SGOT) U/L 16 15 13   GLUCOSE mg/dL 180* 96 196*       Estimated Creatinine Clearance: 47.8 mL/min (A) (by C-G formula based on SCr of 2.05 mg/dL (H)).    Results from last 7 days   Lab Units 11/14/20 0135 11/11/20 0231 11/08/20  0154   MAGNESIUM mg/dL 1.9 1.6 1.6   PHOSPHORUS mg/dL 6.0*  --  2.5             Results from last 7 days   Lab Units 11/14/20 0135 11/13/20 0545 11/12/20 0355 11/11/20  0231 11/10/20  0510   WBC 10*3/mm3 24.01* 21.09* 17.34* 16.36* 15.89*   HEMOGLOBIN g/dL 8.2* 8.7* 9.0* 9.2* 9.0*   PLATELETS 10*3/mm3 321 364 375 358 334              MEDICATIONS    !Vancomycin Level Draw Needed, , Does not apply, Once  acidophilus, 1 tablet, Oral, Daily  albumin human, 12.5 g, Intravenous, TID  albuterol sulfate HFA, 2 puff, Inhalation, Once  aspirin, 81 mg, Oral, Daily  atorvastatin, 40 mg, Oral, Daily  benztropine, 1 mg, Oral, Q12H  bisacodyl, 10 mg, Rectal, Daily  budesonide, 0.5 mg, Nebulization, BID - RT  busPIRone, 10 mg, Oral, Q8H  carvedilol, 12.5 mg, Oral, BID With Meals  cetirizine, 10 mg, Oral, Daily  cholecalciferol, 1,000 Units, Oral, Daily  collagenase, 1 application, Topical, Daily  furosemide, 40 mg, Intravenous, Once  haloperidol, 7.5 mg, Oral, Nightly  hydrALAZINE, 75 mg, Oral, Q8H  insulin aspart, 0-9 Units, Subcutaneous, TID AC  insulin detemir, 14 Units, Subcutaneous, Nightly  ipratropium, 2 puff, Inhalation, Once  lactulose, 20 g, Oral, BID  metoclopramide, 5 mg, Oral, 4x Daily AC & at  Bedtime  nicotine, 1 patch, Transdermal, Q24H  pantoprazole, 40 mg, Oral, QAM  polyethylene glycol, 17 g, Oral, Daily  risperiDONE, 1 mg, Oral, Nightly  rivaroxaban, 10 mg, Oral, Daily With Dinner  rOPINIRole, 2 mg, Oral, Nightly  sodium bicarbonate, 50 mEq, Intravenous, Q4H  sodium chloride, 10 mL, Intravenous, Q12H  sodium chloride, , ,   tamsulosin, 0.4 mg, Oral, Daily  vancomycin, 500 mg, Intravenous, Q24H  vitamin C, 500 mg, Oral, QAM  zinc sulfate, 220 mg, Oral, Daily      DOPamine, 2-5 mcg/kg/min, Last Rate: 2 mcg/kg/min (11/14/20 1010)  furosemide (LASIX) 100 mg in 100mL NS infusion, 30 mg/hr, Last Rate: 30 mg/hr (11/14/20 6138)  midazolam, 1-10 mg/hr, Last Rate: 1 mg/hr (11/14/20 1941)  niCARdipine, 5-15 mg/hr, Last Rate: Stopped (11/14/20 3619)  nitroglycerin,  mcg/min, Last Rate: Stopped (11/09/20 5763)  norepinephrine, 0.02-0.3 mcg/kg/min  Pharmacy to Dose Cefepime,   Pharmacy to dose vancomycin,       Medications Prior to Admission   Medication Sig Dispense Refill Last Dose   • albuterol sulfate  (90 Base) MCG/ACT inhaler Inhale 2 puffs 3 (Three) Times a Day As Needed (copd).   11/7/2020 at Unknown time   • aspirin 81 MG EC tablet Take 81 mg by mouth Daily.   11/7/2020   • atorvastatin (LIPITOR) 40 MG tablet Take 40 mg by mouth Every Night.   11/6/2020   • benztropine (COGENTIN) 1 MG tablet Take 1 mg by mouth 2 (Two) Times a Day.   11/7/2020   • budesonide-formoterol (SYMBICORT) 160-4.5 MCG/ACT inhaler Inhale 2 puffs 2 (Two) Times a Day.   11/7/2020   • bumetanide (BUMEX) 2 MG tablet Take 2 mg by mouth Every Morning.   11/7/2020   • busPIRone (BUSPAR) 10 MG tablet Take 10 mg by mouth 3 (Three) Times a Day.   11/7/2020 at Unknown time   • carvedilol (COREG) 12.5 MG tablet Take 12.5 mg by mouth 2 (Two) Times a Day With Meals.   11/7/2020   • cetirizine (zyrTEC) 10 MG tablet Take 10 mg by mouth Daily.   11/7/2020 at Unknown time   • cholecalciferol (VITAMIN D3) 25 MCG (1000 UT) tablet Take  1,000 Units by mouth Daily.   11/7/2020 at Unknown time   • collagenase 250 UNIT/GM ointment Apply 1 application topically to the appropriate area as directed Daily. Apply topically to right 3rd toe.   11/7/2020   • doxycycline (VIBRAMYCIN) 100 MG capsule Take 1 capsule by mouth 2 (Two) Times a Day. 28 capsule 0 11/7/2020 at Unknown time   • haloperidol (HALDOL) 5 MG tablet Take 7.5 mg by mouth every night at bedtime.   11/7/2020 at Unknown time   • hydrALAZINE (APRESOLINE) 25 MG tablet Take 25 mg by mouth 3 (Three) Times a Day.   11/7/2020 at Unknown time   • insulin glargine (LANTUS) 100 UNIT/ML injection Inject 14 Units under the skin into the appropriate area as directed Every Night.   11/7/2020 at Unknown time   • insulin lispro (humaLOG) 100 UNIT/ML injection Inject 3 Units under the skin into the appropriate area as directed 3 (Three) Times a Day Before Meals. If blood sugar greater than 150.   Past Week at Unknown time   • ipratropium-albuterol (DUO-NEB) 0.5-2.5 mg/3 ml nebulizer Take 3 mL by nebulization 4 (Four) Times a Day. 360 mL 0 11/7/2020 at Unknown time   • Lactobacillus (Acidophilus) tablet Take 1 tablet by mouth 2 (two) times a day.   11/7/2020   • loperamide (IMODIUM) 2 MG capsule Take 2 mg by mouth 4 (Four) Times a Day As Needed for Diarrhea.   11/7/2020 at Unknown time   • metoclopramide (REGLAN) 5 MG tablet Take 5 mg by mouth 3 (Three) Times a Day.   11/7/2020 at Unknown time   • nicotine (NICODERM CQ) 21 MG/24HR patch Place 1 patch on the skin as directed by provider Daily.   11/7/2020 at Unknown time   • Omega-3 Fatty Acids (OMEGA-3 FISH OIL) 1000 MG capsule Take 2 g by mouth 2 (Two) Times a Day.   11/7/2020 at Unknown time   • pantoprazole (PROTONIX) 40 MG EC tablet Take 40 mg by mouth Every Morning.   11/7/2020 at Unknown time   • predniSONE (DELTASONE) 20 MG tablet Take 20 mg by mouth Every Morning.   11/7/2020   • risperiDONE (risperDAL) 1 MG tablet Take 1 mg by mouth every night at  bedtime.   11/6/2020   • rivaroxaban (XARELTO) 10 MG tablet Take 10 mg by mouth Daily With Dinner.   11/7/2020 at Unknown time   • rOPINIRole (REQUIP) 2 MG tablet Take 2 mg by mouth Every Night.   11/7/2020 at Unknown time   • spironolactone (ALDACTONE) 25 MG tablet Take 25 mg by mouth Every Morning.   11/7/2020 at Unknown time   • tamsulosin (FLOMAX) 0.4 MG capsule 24 hr capsule Take 1 capsule by mouth every night at bedtime.   11/7/2020 at Unknown time   • traZODone (DESYREL) 100 MG tablet Take 100 mg by mouth Every Night.   11/7/2020 at Unknown time   • vitamin C (ASCORBIC ACID) 500 MG tablet Take 500 mg by mouth Every Morning.   11/7/2020 at Unknown time   • Zinc 50 MG tablet Take 1 tablet by mouth Every Morning.   11/7/2020 at Unknown time   • budesonide (PULMICORT) 0.25 MG/2ML nebulizer solution Take 4 mL by nebulization 2 (Two) Times a Day. 2 mL 0 Unknown at Unknown time   • HYDROcodone-acetaminophen (NORCO)  MG per tablet Take 1 tablet by mouth Every 8 (Eight) Hours As Needed for Moderate Pain .   Unknown at Unknown time     Assessment/Plan   ASSESSMENT / PLAN      Acute respiratory failure with hypoxia (CMS/HCC)    Acute kidney injury (CMS/HCC)    COPD with acute exacerbation (CMS/HCC)    Paranoid schizophrenia (CMS/HCC)    Uncontrolled hypertension    Diabetes mellitus (CMS/HCC)    Sepsis (CMS/HCC)    Diabetic foot ulcer (CMS/HCC)    Bilateral pneumonia    1.acute kidney injury on CKD 3 baseline creatinine of 1.4-1.5 it is up to 2.  Patient creatinine has been fluctuating on this admission.  Overall very close to his baseline.  He has good urine output with intravenous Lasix.  His chest x-ray showed marked pulmonary vascular congestion.    I will order another Lasix at 2 PM if blood pressure allows.  We will check fractional excretion of urea and Brent stain.  Avoid IV contrast agent.  I will hold the spironolactone.  Kidney ultrasound in August did not show any hydronephrosis    2.acute hypoxic  respiratory failure and PEA arrest.  Patient has nearly 15 minutes of CPR.  Currently blood pressure is running on the low side.  I will give albumin 3 times a day.  Levophed has also been ordered.  Avoid any IV fluids due to marked edema and CHF.  Patient is currently on 100% FiO2    3.hypertension / hypertensive urgency but now running low blood pressure antihypertensives are on hold.  May need renal ultrasound artery Doppler due to high blood pressure and congestive heart failure. patient has preserved EF on echo    4.necrotic wound on the toe.  Patient is on broad-spectrum antibiotic.  Vancomycin    Thank you for the referral will continue to follow patient during his hospital stay         I discussed the patients findings and my recommendations with nursing staff and consulting provider         This document has been electronically signed by Gina Hatch MD on November 14, 2020 10:48 CST

## 2020-11-14 NOTE — NURSING NOTE
Pt very restless/confused. Pt has pulled off telemetry monitoring multiples times, pt redirected back to bed, pt has multiple attempts at puling off BiPAP mask and attempted to pull out IVs. MD notified of pt's behavior. Sitter 1:1 ordered and restraints.

## 2020-11-14 NOTE — PROGRESS NOTES
"Pharmacokinetics by Pharmacy - Vancomycin    Mohan Villatoro is a 60 y.o. male on vancomycin for skin and soft tissue infection and upper respiratory tract infection      Objective:  [Ht: 180.3 cm (70.98\"); Wt: 101 kg (223 lb 12.3 oz)]     WBC   Date Value Ref Range Status   11/14/2020 24.01 (H) 3.40 - 10.80 10*3/mm3 Final   11/13/2020 21.09 (H) 3.40 - 10.80 10*3/mm3 Final   11/12/2020 17.34 (H) 3.40 - 10.80 10*3/mm3 Final      Lactate   Date Value Ref Range Status   11/14/2020 1.3 0.5 - 2.0 mmol/L Final      Temp Readings from Last 1 Encounters:   11/14/20 97.4 °F (36.3 °C) (Temporal)     Estimated Creatinine Clearance: 47.8 mL/min (A) (by C-G formula based on SCr of 2.05 mg/dL (H)).   Creatinine   Date Value Ref Range Status   11/14/2020 2.05 (H) 0.76 - 1.27 mg/dL Final   11/13/2020 1.96 (H) 0.76 - 1.27 mg/dL Final   11/12/2020 2.11 (H) 0.76 - 1.27 mg/dL Final       Vancomycin Trough   Date Value Ref Range Status   11/12/2020 21.90 (H) 5.00 - 20.00 mcg/mL Final     Vancomycin Random   Date Value Ref Range Status   11/13/2020 19.90 5.00 - 40.00 mcg/mL Final       Culture Results:  Microbiology Results (last 10 days)     Procedure Component Value - Date/Time    Respiratory Culture - Sputum, ET Suction [[id number]] Collected: 11/14/20 0903    Lab Status: Preliminary result Specimen: Sputum from ET Suction Updated: 11/14/20 0943     Gram Stain Many (4+) WBCs per low power field      Moderate (3+) Epithelial cells per low power field      Rare (1+) Mixed bacterial millie    MRSA Screen, PCR (Inpatient) - Swab, Nares [[id number]]  (Normal) Collected: 11/08/20 1543    Lab Status: Final result Specimen: Swab from Nares Updated: 11/08/20 8315     MRSA, PCR Negative    Narrative:      Performed by real-time polymerase chain reaction (qPCR).    CRE Screen by PCR - Swab, Large Intestine, Rectum [[id number]] Collected: 11/08/20 0250    Lab Status: Final result Specimen: Swab from Large Intestine, Rectum Updated: 11/08/20 0428   "     CRE SCREEN Not Detected     Comment: Test performed by real-time polymerase chain reaction (qPCR).        OXA 48 Strain Not Detected     IMP STRAIN Not Detected     VIM STRAIN Not Detected     NDM Strain Not Detected     KPC Strain Not Detected    COVID-19, [facility] IN-HOUSE, NP SWAB IN TRANSPORT MEDIA 8-10 HR TAT - Swab, Nasopharynx [[id number]]  (Normal) Collected: 11/07/20 2137    Lab Status: Final result Specimen: Swab from Nasopharynx Updated: 11/08/20 0250     COVID19 Not Detected    Narrative:      Testing performed by Real Time RT-PCR  This test has not been approved by the [employer] but is authorized under the Emergency Use Act (EUA)    Fact sheet for providers: https://www.fda.gov/media/599948/download    Fact sheet for patients: https://www.fda.gov/media/049071/download        Wound Culture - Wound, Foot, Right [[id number]]  (Abnormal)  (Susceptibility) Collected: 11/07/20 2055    Lab Status: Final result Specimen: Wound from Foot, Right Updated: 11/12/20 0840     Wound Culture Heavy growth (4+) Normal Skin Sophia      Moderate growth (3+) Staphylococcus aureus, MRSA     Comment: Methicillin resistant Staphylococcus aureus, Patient may be an isolation risk.         Moderate growth (3+) Enterococcus faecalis     Gram Stain Many (4+) WBCs seen      Many (4+) Gram negative bacilli      Many (4+) Gram positive cocci in pairs      Many (4+) Gram positive bacilli    Narrative:      PEA PLATE ADDED     Susceptibility      Staphylococcus aureus, MRSA     CELSO     Clindamycin Resistant     Erythromycin Resistant     Inducible Clindamycin Resistance Negative     Oxacillin Resistant     Penicillin G Resistant     Rifampin Susceptible     Tetracycline Susceptible     Trimethoprim + Sulfamethoxazole Susceptible     Vancomycin Susceptible                Susceptibility      Enterococcus faecalis     CELSO     Ampicillin Susceptible     Vancomycin Susceptible                    Blood Culture - Blood, Arm, Left [[id number]]  Collected: 11/07/20 2027    Lab Status: Final result Specimen: Blood from Arm, Left Updated: 11/12/20 2045     Blood Culture No growth at 5 days    Blood Culture - Blood, Arm, Right [[id number]] Collected: 11/07/20 1950    Lab Status: Final result Specimen: Blood from Arm, Right Updated: 11/12/20 2000     Blood Culture No growth at 5 days    COVID PRE-OP / PRE-PROCEDURE SCREENING ORDER (NO ISOLATION) - Swab, Nasopharynx [[id number]] Collected: 11/07/20 0918    Lab Status: Final result Specimen: Swab from Nasopharynx Updated: 11/08/20 1407    Narrative:      The following orders were created for panel order COVID PRE-OP / PRE-PROCEDURE SCREENING ORDER (NO ISOLATION) - Swab, Nasopharynx.  Procedure                               Abnormality         Status                     ---------                               -----------         ------                     COVID-19,[facility] ROUTINE...[[id number]]                      Final result               COVID [facility] Priority -...[[id number]]                      Final result                 Please view results for these tests on the individual orders.    COVID-19,[facility] ROUTINE, NP/OP SWAB IN TRANSPORT MEDIA OR ESWAB 72 HR TAT - Swab, Nasopharynx [[id number]] Collected: 11/07/20 0918    Lab Status: Final result Specimen: Swab from Nasopharynx Updated: 11/08/20 1407     SARS-CoV-2, DONNA Not Detected     Comment: This nucleic acid amplification test was developed and its performance  characteristics determined by [employer]. Nucleic acid  amplification tests include PCR and TMA. This test has not been FDA  cleared or approved. This test has been authorized by FDA under an  Emergency Use Authorization (EUA). This test is only authorized for  the duration of time the declaration that circumstances exist  justifying the authorization of the emergency use of in vitro  diagnostic tests for detection of SARS-CoV-2 virus and/or diagnosis  of COVID-19 infection under section  564(b)(1) of the Act, 21 U.S.C.  360bbb-3(b) (1), unless the authorization is terminated or revoked  sooner.  When diagnostic testing is negative, the possibility of a false  negative result should be considered in the context of a patient's  recent exposures and the presence of clinical signs and symptoms  consistent with COVID-19. An individual without symptoms of COVID-19  and who is not shedding SARS-CoV-2 virus would expect to have a  negative (not detected) result in this assay.       Narrative:      Performed at:  01 - [facility]  [address] [facility] [facility] [place]  [id number]  : Latosha Benoit MD, Phone:  [phone]    COVID [facility] - Swab, Nasopharynx [[id number]] Collected: 11/07/20 0918    Lab Status: Final result Specimen: Swab from Nasopharynx Updated: 11/08/20 1407     COVID [facility] PRIORITY Comment     Comment: Received       Narrative:      Performed at:  02 - [facility]  [address], [place]  [id number]  : aIin Bro[facility], Phone:  [phone]        No results found for: RESPCX    Assessment:  Consult has been extended to 11/17  WBC increased today  Scr is up today  Trough at 1930 tonight    Anticipate giving a smaller dose after if this trough is near 20   Trough from last night was 19.9 and then dose of 750 mg IV was given a few hours later  We will continue pulse dose at this time with a random tonight before the next dose is due.    Plan:  1. Continue pulse dosing vancomycin at this time  2. Vancomycin random at 1930  3. Pharmacy will monitor renal function and adjust dose accordingly.      Magdi Sandoval, PharmD   11/14/20 09:45 CST

## 2020-11-14 NOTE — PROGRESS NOTES
TWO PATIENT IDENTIFIERS WERE USED. CONSENT WAS SIGNED PER OTHER (DR. JUSTIN DUMONT) EDUCATION MATERIAL WAS GIVEN TO PATIENT AND / OR FAMILY. THE PATIENT WAS DRAPED WITH FULL BODY DRAPE AND PATIENT'S RIGHT ARM WAS PREPPED WITH CHLORAPREP.  ULTRASOUND WAS USED TO LOCALIZE THERIGHT CEPHALIC  VEIN. SUBCUTANEOUS TISSUE AT THE CATHETER SITE WAS INFILTRATED WITH 2% LIDOCAINE. UNDER ULTRASOUND GUIDANCE, THE VEIN WAS ACCESSED WITH A 21GAUGE  NEEDLE. AN 0.018 WIRE WAS THEN THREADED THROUGH THE NEEDLE INTO THE CENTRAL VENOUS SYSTEM. THE 21GAUGE  NEEDLE WAS REMOVED AND A 5 Frisian PEEL AWAY SHEATH WAS PLACED OVER THE WIRE. THE PICC LINE CATHETER WAS CUT AT 35 CM. THE PICC LINE CATHETER WAS THEN PLACED OVER THE WIRE INTO THE VEIN, THE SHEATH WAS PEELED AWAY,WIRE WAS REMOVED. CATHETER WAS FLUSHED WITH NORMAL SALINE AND TIPS APPLIED. BIOPATCH PLACED. CATHETER SECURED WITH STATLOCK AND TEGADERM. PATIENT TOLERATED PROCEDURE WELL. THIS WAS DONE IN THE   PATIENT ROOM      IMPRESSION: SUCCESSFUL PLACEMENT OF TRIPLE LUMEN SOLO PICC        Lilly Decker  11/14/2020  12:50 CST

## 2020-11-14 NOTE — NURSING NOTE
MD notified pt repeated the bradcardic and hypoxic episode like earlier in the shift although this time the pt wasn't undergoing activity. MD ordered to make patient CCU accomodation and to repeat ABG to assess changes. MD informed of 150 return of urine after lasix 80 mg push. MD wants to further diurese r/t to patient in overloaded state. MD states he believes episodes are a result of fluid overload.  MD ordered lasix gtt continuous at 30 mg/hr. Will continue to monitor patient.

## 2020-11-14 NOTE — PROGRESS NOTES
[facility]  INPATIENT PROGRESS NOTE    Length of Stay: 7  Date of Admission: 11/7/2020  Primary Care Physician: Brad Mac APRN    Subjective   Chief Complaint: Shortness of breath  HPI: Somewhat less shortness of breath today.    Review of Systems   Unable to perform ROS: Intubated      All pertinent negatives and positives are as above. All other systems have been reviewed and are negative unless otherwise stated.     Objective    Temp:  [96.9 °F (36.1 °C)-98 °F (36.7 °C)] 96.9 °F (36.1 °C)  Heart Rate:  [31-62] 54  Resp:  [20-28] 22  BP: (111-204)/(54-92) 151/70  Arterial Line BP: ()/(35-51) 126/51  FiO2 (%):  [80 %-100 %] 80 %    Physical Exam  Vitals signs reviewed.   Constitutional:       Appearance: He is well-developed.      Interventions: He is sedated and intubated.   HENT:      Head: Normocephalic and atraumatic.   Eyes:      Pupils: Pupils are equal, round, and reactive to light.   Neck:      Musculoskeletal: Normal range of motion and neck supple.   Cardiovascular:      Rate and Rhythm: Normal rate and regular rhythm.      Heart sounds: Normal heart sounds. No murmur. No friction rub. No gallop.    Pulmonary:      Effort: Pulmonary effort is normal. No respiratory distress. He is intubated.      Breath sounds: Decreased breath sounds and rales present. No wheezing.   Chest:      Chest wall: No tenderness.   Abdominal:      General: Bowel sounds are normal. There is no distension.      Palpations: Abdomen is soft.      Tenderness: There is no abdominal tenderness.   Neurological:      Mental Status: Mental status is at baseline.   Psychiatric:         Behavior: Behavior normal.       Results Review:  I have reviewed the labs, radiology results, and diagnostic studies.    Laboratory Data:   Results from last 7 days   Lab Units 11/14/20  0135 11/13/20  0545 11/12/20  0355   SODIUM mmol/L 123* 121* 123*   POTASSIUM mmol/L 5.6* 5.0 5.2      CHLORIDE mmol/L 96* 92* 94*   CO2 mmol/L 18.0* 16.0* 19.0*   BUN mg/dL 67* 59* 49*   CREATININE mg/dL 2.05* 1.96* 2.11*   GLUCOSE mg/dL 180* 96 196*   CALCIUM mg/dL 8.3* 8.7 8.5*   BILIRUBIN mg/dL 0.3 0.4 0.3   ALK PHOS U/L 66 64 65   ALT (SGPT) U/L 11 10 11   AST (SGOT) U/L 16 15 13   ANION GAP mmol/L 9.0 13.0 10.0     Estimated Creatinine Clearance: 47.8 mL/min (A) (by C-G formula based on SCr of 2.05 mg/dL (H)).  Results from last 7 days   Lab Units 11/14/20  0135 11/11/20 0231 11/08/20  0154   MAGNESIUM mg/dL 1.9 1.6 1.6   PHOSPHORUS mg/dL 6.0*  --  2.5         Results from last 7 days   Lab Units 11/14/20  0135 11/13/20  0545 11/12/20  0355 11/11/20  0231 11/10/20  0510   WBC 10*3/mm3 24.01* 21.09* 17.34* 16.36* 15.89*   HEMOGLOBIN g/dL 8.2* 8.7* 9.0* 9.2* 9.0*   HEMATOCRIT % 24.2* 25.3* 26.7* 26.9* 26.5*   PLATELETS 10*3/mm3 321 364 375 358 334           Culture Data:   No results found for: BLOODCX  No results found for: URINECX  No results found for: RESPCX  No results found for: WOUNDCX  No results found for: STOOLCX  No components found for: BODYFLD    Radiology Data:   Imaging Results (Last 24 Hours)     Procedure Component Value Units Date/Time    IR PICC W Imaging Guidance [[id number]] Resulted: 11/14/20 1252     Updated: 11/14/20 1252    Narrative:      This procedure was auto-finalized with no dictation required.    US Guidance PICC NC [[id number]] Resulted: 11/14/20 1229     Updated: 11/14/20 1229    Narrative:      This procedure was auto-finalized with no dictation required.    XR Chest Post CVA Port [[id number]] Collected: 11/14/20 1144     Updated: 11/14/20 1212    Narrative:      Chest x-ray single view.         CLINICAL INDICATION: Shortness of breath. Aerated.    COMPARISON: November 14, 2020 at 7:13 AM    FINDINGS: Cardiac silhouette is normal in size. Bilateral  infiltrative changes and pulmonary edema type pattern with  worsening since prior exam.    Endotracheal tube with tip 7.5 cm above  the bifurcation the  digna in satisfactory position.      Impression:      Interval placement of a right arm central venous  catheter with catheter tip in the right apex i.e. the junction of  the right subclavian vein and superior vena cava in satisfactory  position.    Electronically signed by:  Daryl Londono MD  11/14/2020 12:11 PM  CST Workstation: [id number]    XR Chest 1 View [[id number]] Collected: 11/14/20 0714     Updated: 11/14/20 0759    Narrative:      Chest x-ray single view.         CLINICAL INDICATION: As above, intubated..    COMPARISON: November 12, 2020.    FINDINGS: Cardiac silhouette is enlarged in size. Pulmonary  vascularity is increased.     Bilateral symmetrical infiltrative changes ingesting pulmonary  edema type pattern. Small bilateral pleural effusions.      Impression:      Endotracheal tube identified with tip 6.9 cm above  the bifurcation of the digna, in satisfactory position.    Electronically signed by:  Daryl Londono MD  11/14/2020 7:58 AM CST  Workstation: [id number]          I have reviewed the patient's current medications.     Assessment/Plan     Active Hospital Problems    Diagnosis   • **Acute respiratory failure with hypoxia (CMS/HCC)            • Diabetic foot ulcer (CMS/HCC)   • Bilateral pneumonia   • Diabetes mellitus (CMS/HCC)            • Sepsis (CMS/HCC)            • Acute kidney injury (CMS/HCC)              • COPD with acute exacerbation (CMS/HCC)              • Uncontrolled hypertension              • Paranoid schizophrenia (CMS/HCC)       Plan:    1.  Status post cardiac arrest: Patient had episode of bradycardia that led to PEA.  He was given 2 mg of epinephrine, 1 mg of atropine, 1 amp of bicarb, and intubated.  From time the code started to return of spontaneous circulation was 11 minutes.  Currently hemodynamically stable.  2.  Hypertension: Off Cardene drip after cardiac arrest.  Nephrology following.  3.  Acute hypoxic respiratory failure: Started on Lasix drip  overnight.  Lasix drip now off will give bolus Lasix.  Nephrology following.  Volume overloaded.  3.  Healthcare associated pneumonia: Seen on chest x-ray.  Continue broad-spectrum antibiotics cultures negative thus far.  4.  Heart failure with preserved ejection fraction: Acute exacerbation.  Continue diuretics as he tolerates.  5.  Diabetes mellitus: Continue sliding scale insulin.  6.  Diabetic ulcer of the toe: Discussed with Dr. Avila.  Will defer arteriogram for now.  Wound culture grew MRSA.  On vancomycin.  7.  Paranoid schizophrenia: Continue home medication.  8.  Acute kidney injury: Essentially the same today.  Nephrology following.  I expect renal function to worsen after cardiac arrest.    9.  DVT prophylaxis: Lovenox.    Approximately 35 minutes of critical care time were spent managing the patient exclusive of billable procedures.     The patient was evaluated during the global COVID-19 pandemic, and the diagnosis was suspected/considered upon their initial presentation.  Evaluation, treatment, and testing were consistent with current guidelines for patients who present with complaints or symptoms that may be related to COVID-19.    Discharge Planning: I expect patient to be discharged to skilled nursing facility in 3-4 days.        This document has been electronically signed by Zac Terrazas MD on November 14, 2020 13:59 CST

## 2020-11-14 NOTE — PROCEDURES
Intubation    Date/Time: 11/14/2020 7:20 AM  Performed by: Yariel aM MD  Authorized by: Yariel Ma MD   Consent: The procedure was performed in an emergent situation. Verbal consent not obtained. Written consent not obtained.  Indications: respiratory distress,  respiratory failure and  hypoxemia  Intubation method: direct  Patient status: unconscious  Preoxygenation: nonrebreather mask  Pretreatment medications: none  Laryngoscope size: Mac 4  Tube size: 7.5 mm  Tube type: cuffed  Number of attempts: 2  Cricoid pressure: yes  Cords visualized: yes  Post-procedure assessment: chest rise and ETCO2 monitor  Breath sounds: equal  Cuff inflated: yes  ETT to lip: 21 cm  Tube secured with: ETT mak  Chest x-ray interpreted by me and other physician.  Chest x-ray findings: endotracheal tube in appropriate position

## 2020-11-14 NOTE — NURSING NOTE
MD Valdez 79 to bedside. Pt has significant change in vitals with bathing. Pt has responsiveness altered, pt bradycardic and hypoxic. Activity stopped and patient supine with HOB 90's.  RT at bedside and altered BiPAP settings. Bipap changes helped. Pt oxygen sats climbed and improved bradycardia. MD at bedside ordered ABG and labs to be drawn. Pt mental status back to prior. Pt opened eyes to verbal, pt confused and restless and attempting to get back out of bed. MD states patient has improved and to call with lab results.

## 2020-11-14 NOTE — NURSING NOTE
MD Valdez notified of abnormal ABG's and labs. MD confirms that the patient has metabolic acidosis and is believes patient is in fluid overload. MD ordered lasix 80 mg x 1 and 2 amps bicarb pushes, and to IVFs discontinue fluids.

## 2020-11-14 NOTE — ANESTHESIA PROCEDURE NOTES
Central Line      Patient location during procedure: ICU  Indications: central pressure monitoring and vascular access  Staff  Anesthesiologist: Daniel Wiggins MD  Preanesthetic Checklist  Completed: patient identified and IV checked  Central Line Prep  Sterile Tech:cap, gloves, gown, mask and sterile barriers  Prep: chloraprep  Patient monitoring: continuous pulse oximetry, blood pressure monitoring and EKG  Central Line Procedure  Laterality:right  Location:internal jugular  Catheter Type:double lumen  Catheter Size:7 Fr  Guidance:landmark technique and palpation technique  Assessment  Post procedure:biopatch applied, line sutured and occlusive dressing applied  Assessement:blood return through all ports and free fluid flow  Complications:no  Patient Tolerance:patient tolerated the procedure well with no apparent complications

## 2020-11-14 NOTE — CODE DOCUMENTATION
Pt noted to become bradycardic with significant O2               Desaturation    Code started 0703    0703 CPR started, pt bagged with 100% FiO2    0704 Dr. Ma here    0705 1mg Epi given IV    0707 1mg Atropine given IV    0708 Pulse check. PEA, CPR resumed    0710 PEA. CPR resumed Pt intubated per Dr. Ma, continue bagging with 100% Epi 1mg IV    0711 1 amp Bicarb IV    0713 1 mg Epi     0714 Pulse check, sinus tachycardia rate 138 B/p 212/126    0719 Pt remains in sinus tachycardia rate 105 b/p 171/76

## 2020-11-14 NOTE — NURSING NOTE
MD Valdez notified patient continues to be very restless/agitated, pt keeps attempting to throw legs off of bed. MD ordered ativan 1 mg every 4 hours prn. to help subdue pt's restlessness/agitation.

## 2020-11-14 NOTE — PROGRESS NOTES
Informed consent has not been given for a PICC (Peripherally Inserted Central Catheter) due to patient sedation and unable to contact family. Dr. Terrazas signed consent as it is medically necessary for patient to receive PICC line today. The provider has explained to me: (1) the nature of the proposed procedure; (2) the potential benefits, risks, or side effects, including potential problems that might occur during recuperation; (3) the likelihood of achieving goals; (4) reasonable alternatives to the procedure, if any; and (5) the relevant benefits, risks, and side effects associated with those alternatives, including the possible results of not receiving care, treatment, and services.

## 2020-11-14 NOTE — ANESTHESIA PROCEDURE NOTES
Arterial Line      Patient location during procedure: ICU   Line placed for hemodynamic monitoring.  Performed By   Anesthesiologist: Daniel Wiggins MD  Preanesthetic Checklist  Completed: patient identified  Arterial Line Prep   Sterile Tech: gloves, mask and cap  Prep: ChloraPrep  Patient monitoring: blood pressure monitoring, continuous pulse oximetry and EKG  Arterial Line Procedure   Laterality:right  Location:  femoral artery  Catheter size: 20 G   Guidance: palpation technique  Number of attempts: 1  Successful placement: yes  Post Assessment   Dressing Type: occlusive dressing applied.   Complications no  Patient Tolerance: patient tolerated the procedure well with no apparent complications

## 2020-11-15 NOTE — DISCHARGE SUMMARY
[facility] Medicine Services  DEATH SUMMARY       Date of Admission: 11/7/2020  Date of Death:  11/15/2020 at approximately 1600  Primary Care Physician: Brad Mac APRN    Presenting Problem/History of Present Illness:  Acute on chronic systolic congestive heart failure (CMS/HCC) [I50.23]  Acute respiratory failure with hypoxia (CMS/HCC) [J96.01]  Sepsis with acute hypoxic respiratory failure without septic shock, due to unspecified organism (CMS/HCC) [A41.9, R65.20, J96.01]     Final Death Diagnoses:  Active Hospital Problems    Diagnosis   • **Acute respiratory failure with hypoxia (CMS/HCC)            • Diabetic foot ulcer (CMS/HCC)   • Bilateral pneumonia   • Diabetes mellitus (CMS/HCC)            • Sepsis (CMS/HCC)            • Acute kidney injury (CMS/HCC)              • COPD with acute exacerbation (CMS/HCC)              • Uncontrolled hypertension              • Paranoid schizophrenia (CMS/[facility])       Consults:   Consults     Date and Time Order Name Status Description    11/13/2020 1607 Inpatient Nephrology Consult Completed     11/10/2020 1342 Inpatient Podiatry Consult Completed     11/7/2020 2056 Hospitalist (on-call MD unless specified)            Procedures Performed:            11/14 0720 Intubation    Pertinent Test Results:   Lab Results (last 72 hours)     Procedure Component Value Units Date/Time    Urine Eosinophils, Brent's Stain - Urine, Clean Catch [[id number]]  (Abnormal) Collected: 11/14/20 1156    Specimen: Urine, Clean Catch Updated: 11/15/20 0916     Brent Stain Positive     % EOS Brent Stain 2 %     POC Glucose Once [[id number]]  (Normal) Collected: 11/15/20 0547    Specimen: Blood Updated: 11/15/20 0620     Glucose 125 mg/dL      Comment: : [id number] KRISTY Hagan ID: [id number]       Comprehensive Metabolic Panel [[id number]]  (Abnormal) Collected: 11/15/20 0459    Specimen: Blood Updated: 11/15/20 0546     Glucose 35  mg/dL      BUN 64 mg/dL      Creatinine 1.72 mg/dL      Sodium 136 mmol/L      Potassium 3.6 mmol/L      Chloride 103 mmol/L      CO2 25.0 mmol/L      Calcium 8.5 mg/dL      Total Protein 5.1 g/dL      Albumin 2.80 g/dL      ALT (SGPT) 13 U/L      AST (SGOT) 18 U/L      Alkaline Phosphatase 50 U/L      Total Bilirubin 0.4 mg/dL      eGFR Non African Amer 41 mL/min/1.73      Globulin 2.3 gm/dL      A/G Ratio 1.2 g/dL      BUN/Creatinine Ratio 37.2     Anion Gap 8.0 mmol/L     Narrative:      GFR Normal >60  Chronic Kidney Disease <60  Kidney Failure <15      CBC & Differential [[id number]]  (Abnormal) Collected: 11/15/20 0459    Specimen: Blood Updated: 11/15/20 0515    Narrative:      The following orders were created for panel order CBC & Differential.  Procedure                               Abnormality         Status                     ---------                               -----------         ------                     CBC Auto Differential[[id number]]        Abnormal            Final result                 Please view results for these tests on the individual orders.    CBC Auto Differential [[id number]]  (Abnormal) Collected: 11/15/20 0459    Specimen: Blood Updated: 11/15/20 0515     WBC 13.88 10*3/mm3      RBC 2.20 10*6/mm3      Hemoglobin 6.9 g/dL      Hematocrit 19.9 %      MCV 90.5 fL      MCH 31.4 pg      MCHC 34.7 g/dL      RDW 14.0 %      RDW-SD 45.9 fl      MPV 10.6 fL      Platelets 256 10*3/mm3      Neutrophil % 79.7 %      Lymphocyte % 5.8 %      Monocyte % 13.3 %      Eosinophil % 0.4 %      Basophil % 0.2 %      Immature Grans % 0.6 %      Neutrophils, Absolute 11.07 10*3/mm3      Lymphocytes, Absolute 0.80 10*3/mm3      Monocytes, Absolute 1.84 10*3/mm3      Eosinophils, Absolute 0.05 10*3/mm3      Basophils, Absolute 0.03 10*3/mm3      Immature Grans, Absolute 0.09 10*3/mm3      nRBC 0.0 /100 WBC     Blood Gas, Arterial - [[id number]]  (Abnormal) Collected: 11/15/20 0040    Specimen: Arterial  Blood Updated: 11/15/20 0047     Site Arterial Line     Anjel's Test N/A     pH, Arterial 7.385 pH units      pCO2, Arterial 43.7 mm Hg      pO2, Arterial 92.7 mm Hg      HCO3, Arterial 26.2 mmol/L      Comment: 83 Value above reference range        Base Excess, Arterial 1.0 mmol/L      O2 Saturation, Arterial 98.0 %      Barometric Pressure for Blood Gas 739 mmHg      Modality Ventilator     FIO2 50 %      Ventilator Mode AC     Set Tidal Volume 600     Set Mech Resp Rate 12.0     PEEP 5.0     PIP 18 cmH2O      Comment: Meter: [id number]     :  [id number]        Collected by RT    Urea Nitrogen, Urine - Urine, Clean Catch [[id number]] Collected: 11/14/20 1603    Specimen: Urine, Clean Catch Updated: 11/14/20 2212     Urea Nitrogen, Urine 164 mg/dL     Narrative:      Reference intervals for random urine have not been established.  Clinical usage is dependent upon physician's interpretation in combination with other laboratory tests.       POC Glucose Once [[id number]]  (Normal) Collected: 11/14/20 2118    Specimen: Blood Updated: 11/14/20 2205     Glucose 110 mg/dL      Comment: : [id number] KRISTY BETJESSICAYMeter ID: [id number]       Vancomycin, Trough [[id number]]  (Abnormal) Collected: 11/14/20 1902    Specimen: Blood Updated: 11/14/20 1926     Vancomycin Trough 21.10 mcg/mL     POC Glucose Once [[id number]]  (Abnormal) Collected: 11/14/20 1813    Specimen: Blood Updated: 11/14/20 1825     Glucose 165 mg/dL      Comment: Sliding Scale AdminOperator: [id number] [facility] ID: [id number]       Sodium, Urine, Random - Urine, Clean Catch [[id number]] Collected: 11/14/20 1603    Specimen: Urine, Clean Catch Updated: 11/14/20 1622     Sodium, Urine 52 mmol/L     Narrative:      Reference intervals for random urine have not been established.  Clinical usage is dependent upon physician's interpretation in combination with other laboratory tests.       Blood Gas, Arterial - [[id number]]   (Abnormal) Collected: 11/14/20 0840    Specimen: Arterial Blood Updated: 11/14/20 0851     Site Arterial Line     Anjel's Test N/A     pH, Arterial 7.228 pH units      Comment: 84 Value below reference range        pCO2, Arterial 51.9 mm Hg      Comment: 83 Value above reference range        pO2, Arterial 97.2 mm Hg      HCO3, Arterial 21.6 mmol/L      Base Excess, Arterial -5.8 mmol/L      Comment: 84 Value below reference range        O2 Saturation, Arterial 96.9 %      Barometric Pressure for Blood Gas 751 mmHg      Modality Ventilator     FIO2 100 %      Ventilator Mode AC     Set Tidal Volume 500     PEEP 5.0     Collected by RT     Comment: Meter: [id number]     :  [id number]       POC Glucose Once [[id number]]  (Abnormal) Collected: 11/14/20 0534    Specimen: Blood Updated: 11/14/20 0609     Glucose 156 mg/dL      Comment: : [id number] COOK AMYMeter ID: [id number]       POC Glucose Once [[id number]]  (Abnormal) Collected: 11/13/20 2059    Specimen: Blood Updated: 11/14/20 0553     Glucose 184 mg/dL      Comment: : [id number] KRISTY BETHANYMeter ID: [id number]       Blood Gas, Arterial - [[id number]]  (Abnormal) Collected: 11/14/20 0500    Specimen: Arterial Blood Updated: 11/14/20 0514     Site Right Radial     Anjel's Test N/A     pH, Arterial 7.196 pH units      Comment: 85 Value below critical limit        pCO2, Arterial 56.0 mm Hg      Comment: 83 Value above reference range        pO2, Arterial 126.0 mm Hg      Comment: 83 Value above reference range        HCO3, Arterial 21.7 mmol/L      Base Excess, Arterial -6.3 mmol/L      Comment: 84 Value below reference range        O2 Saturation, Arterial 98.5 %      Barometric Pressure for Blood Gas 751 mmHg      Modality BiPap     FIO2 100 %      Ventilator Mode AVAP     Set Tidal Volume 550     Set Mech Resp Rate 20.0     PIP 29 cmH2O      Comment: Meter: [id number]     :  [id number]        EPAP 10     Collected by RT     Phosphorus [[id number]]  (Abnormal) Collected: 11/14/20 0135    Specimen: Blood Updated: 11/14/20 0234     Phosphorus 6.0 mg/dL     Comprehensive Metabolic Panel [[id number]]  (Abnormal) Collected: 11/14/20 0135    Specimen: Blood Updated: 11/14/20 0232     Glucose 180 mg/dL      BUN 67 mg/dL      Creatinine 2.05 mg/dL      Sodium 123 mmol/L      Potassium 5.6 mmol/L      Chloride 96 mmol/L      CO2 18.0 mmol/L      Calcium 8.3 mg/dL      Total Protein 6.0 g/dL      Albumin 3.10 g/dL      ALT (SGPT) 11 U/L      AST (SGOT) 16 U/L      Alkaline Phosphatase 66 U/L      Total Bilirubin 0.3 mg/dL      eGFR Non African Amer 34 mL/min/1.73      Globulin 2.9 gm/dL      A/G Ratio 1.1 g/dL      BUN/Creatinine Ratio 32.7     Anion Gap 9.0 mmol/L     Narrative:      GFR Normal >60  Chronic Kidney Disease <60  Kidney Failure <15      Magnesium [[id number]]  (Normal) Collected: 11/14/20 0135    Specimen: Blood Updated: 11/14/20 0232     Magnesium 1.9 mg/dL     Lactic Acid, Plasma [[id number]]  (Normal) Collected: 11/14/20 0135    Specimen: Blood Updated: 11/14/20 0228     Lactate 1.3 mmol/L     CBC & Differential [[id number]]  (Abnormal) Collected: 11/14/20 0135    Specimen: Blood Updated: 11/14/20 0201    Narrative:      The following orders were created for panel order CBC & Differential.  Procedure                               Abnormality         Status                     ---------                               -----------         ------                     CBC Auto Differential[[id number]]        Abnormal            Final result                 Please view results for these tests on the individual orders.    CBC Auto Differential [[id number]]  (Abnormal) Collected: 11/14/20 0135    Specimen: Blood Updated: 11/14/20 0201     WBC 24.01 10*3/mm3      RBC 2.60 10*6/mm3      Hemoglobin 8.2 g/dL      Hematocrit 24.2 %      MCV 93.1 fL      MCH 31.5 pg      MCHC 33.9 g/dL      RDW 14.1 %      RDW-SD 47.9 fl      MPV 10.1 fL       Platelets 321 10*3/mm3      Neutrophil % 89.7 %      Lymphocyte % 3.0 %      Monocyte % 6.2 %      Eosinophil % 0.0 %      Basophil % 0.1 %      Immature Grans % 1.0 %      Neutrophils, Absolute 21.53 10*3/mm3      Lymphocytes, Absolute 0.71 10*3/mm3      Monocytes, Absolute 1.49 10*3/mm3      Eosinophils, Absolute 0.01 10*3/mm3      Basophils, Absolute 0.03 10*3/mm3      Immature Grans, Absolute 0.24 10*3/mm3      nRBC 0.0 /100 WBC     Blood Gas, Arterial - [[id number]]  (Abnormal) Collected: 11/14/20 0130    Specimen: Arterial Blood Updated: 11/14/20 0153     Site Right Radial     Anjel's Test Positive     pH, Arterial 7.181 pH units      Comment: 85 Value below critical limit        pCO2, Arterial 47.0 mm Hg      Comment: 83 Value above reference range        pO2, Arterial 121.0 mm Hg      Comment: 83 Value above reference range        HCO3, Arterial 17.6 mmol/L      Comment: 84 Value below reference range        Base Excess, Arterial -10.1 mmol/L      Comment: 84 Value below reference range        O2 Saturation, Arterial 98.2 %      Barometric Pressure for Blood Gas 752 mmHg      Modality BiPap     FIO2 100 %      Ventilator Mode AVAP     Set Tidal Volume 502     Set Mech Resp Rate 22.0     PIP 21 cmH2O      Comment: Meter: [id number]     :  [id number]        EPAP 10     Collected by RT    Vancomycin, Random [[id number]]  (Normal) Collected: 11/13/20 1631    Specimen: Blood Updated: 11/13/20 1651     Vancomycin Random 19.90 mcg/mL     POC Glucose Once [[id number]]  (Abnormal) Collected: 11/13/20 1139    Specimen: Blood Updated: 11/13/20 1154     Glucose 185 mg/dL      Comment: Sliding Scale AdminOperator: [id number] [facility] ID: [id number]       POC Glucose Once [[id number]]  (Normal) Collected: 11/13/20 0638    Specimen: Blood Updated: 11/13/20 1154     Glucose 90 mg/dL      Comment: : [id number] VIRGIL Montejo ID: [id number]       CBC & Differential [[id number]]   (Abnormal) Collected: 11/13/20 0545    Specimen: Blood Updated: 11/13/20 0704    Narrative:      The following orders were created for panel order CBC & Differential.  Procedure                               Abnormality         Status                     ---------                               -----------         ------                     CBC Auto Differential[[id number]]        Abnormal            Final result                 Please view results for these tests on the individual orders.    CBC Auto Differential [[id number]]  (Abnormal) Collected: 11/13/20 0545    Specimen: Blood Updated: 11/13/20 0704     WBC 21.09 10*3/mm3      RBC 2.77 10*6/mm3      Hemoglobin 8.7 g/dL      Hematocrit 25.3 %      MCV 91.3 fL      MCH 31.4 pg      MCHC 34.4 g/dL      RDW 14.1 %      RDW-SD 47.3 fl      MPV 10.4 fL      Platelets 364 10*3/mm3      Neutrophil % 85.8 %      Lymphocyte % 5.2 %      Monocyte % 7.8 %      Eosinophil % 0.1 %      Basophil % 0.2 %      Immature Grans % 0.9 %      Neutrophils, Absolute 18.08 10*3/mm3      Lymphocytes, Absolute 1.10 10*3/mm3      Monocytes, Absolute 1.65 10*3/mm3      Eosinophils, Absolute 0.03 10*3/mm3      Basophils, Absolute 0.04 10*3/mm3      Immature Grans, Absolute 0.19 10*3/mm3      nRBC 0.0 /100 WBC     Comprehensive Metabolic Panel [[id number]]  (Abnormal) Collected: 11/13/20 0545    Specimen: Blood Updated: 11/13/20 0633     Glucose 96 mg/dL      BUN 59 mg/dL      Creatinine 1.96 mg/dL      Sodium 121 mmol/L      Potassium 5.0 mmol/L      Chloride 92 mmol/L      CO2 16.0 mmol/L      Calcium 8.7 mg/dL      Total Protein 6.2 g/dL      Albumin 3.30 g/dL      ALT (SGPT) 10 U/L      AST (SGOT) 15 U/L      Alkaline Phosphatase 64 U/L      Total Bilirubin 0.4 mg/dL      eGFR Non African Amer 35 mL/min/1.73      Globulin 2.9 gm/dL      A/G Ratio 1.1 g/dL      BUN/Creatinine Ratio 30.1     Anion Gap 13.0 mmol/L     Narrative:      GFR Normal >60  Chronic Kidney Disease <60  Kidney Failure  <15      Blood Gas, Arterial - [[id number]]  (Abnormal) Collected: 11/13/20 0426    Specimen: Arterial Blood Updated: 11/13/20 0446     Site Right Radial     Anjel's Test Positive     pH, Arterial 7.385 pH units      pCO2, Arterial 32.8 mm Hg      Comment: 84 Value below reference range        pO2, Arterial 59.9 mm Hg      Comment: 84 Value below reference range        HCO3, Arterial 19.6 mmol/L      Comment: 84 Value below reference range        Base Excess, Arterial -4.8 mmol/L      Comment: 84 Value below reference range        O2 Saturation, Arterial 90.0 %      Comment: 84 Value below reference range        Barometric Pressure for Blood Gas 750 mmHg      Modality N/A     Ventilator Mode NA     Collected by RT     Comment: Meter: [id number]     :  [id number]       POC Glucose Once [[id number]]  (Abnormal) Collected: 11/12/20 2147    Specimen: Blood Updated: 11/12/20 2229     Glucose 209 mg/dL      Comment: : [id number] CHARBEL MALCOLMMeter ID: [id number]       Blood Culture - Blood, Arm, Left [[id number]] Collected: 11/07/20 2027    Specimen: Blood from Arm, Left Updated: 11/12/20 2045     Blood Culture No growth at 5 days    Blood Culture - Blood, Arm, Right [[id number]] Collected: 11/07/20 1950    Specimen: Blood from Arm, Right Updated: 11/12/20 2000     Blood Culture No growth at 5 days    POC Glucose Once [[id number]]  (Abnormal) Collected: 11/12/20 1808    Specimen: Blood Updated: 11/12/20 1824     Glucose 256 mg/dL      Comment: : [phone] ERICK Bautista ID: [id number]               Hospital Course:  The patient is a 60 y.o. male who presented to [facility] with shortness of breath.  Patient was found to have sepsis likely secondary to pneumonia.  He also had a CHF exacerbation.  Patient found to have hypoxic respiratory failure.  Patient was started on IV Lasix and continued on IV antibiotics.  He was found to have a diabetic foot ulcer requiring IV  antibiotics.  Vascular surgery was consulted for arteriogram, but the decision was made to defer this due to other acute comorbid conditions.  Patient's foot wound culture grew MRSA and he was started on vancomycin.  He developed significant shortness of breath during his course.  On the overnight of 11/13-11/14 patient had cardiac arrest.  ACLS protocol was done and return of spontaneous circulation was accomplished and 11 minutes.  Patient remained on mechanical ventilation.  Family was contacted after cardiac arrest and stated their desire to transition patient to comfort measures.  This was done.  Patient was extubated and kept comfortable.  Time of death 11/15/2020 at 1600.          This document has been electronically signed by Zac Terrazas MD on November 15, 2020 16:44 CST      Time: 35 min

## 2020-11-15 NOTE — PROGRESS NOTES
"Pharmacokinetics by Pharmacy - Vancomycin    Mohan Villatoro is a 60 y.o. male on vancomycin for skin and soft tissue infection and upper respiratory tract infection    Objective:  [Ht: 180.3 cm (70.98\"); Wt: 101 kg (222 lb 0.1 oz)]     WBC   Date Value Ref Range Status   11/15/2020 13.88 (H) 3.40 - 10.80 10*3/mm3 Final   11/14/2020 24.01 (H) 3.40 - 10.80 10*3/mm3 Final   11/13/2020 21.09 (H) 3.40 - 10.80 10*3/mm3 Final      Lactate   Date Value Ref Range Status   11/14/2020 1.3 0.5 - 2.0 mmol/L Final      Temp Readings from Last 1 Encounters:   11/15/20 97.6 °F (36.4 °C) (Oral)     Estimated Creatinine Clearance: 56.7 mL/min (A) (by C-G formula based on SCr of 1.72 mg/dL (H)).   Creatinine   Date Value Ref Range Status   11/15/2020 1.72 (H) 0.76 - 1.27 mg/dL Final   11/14/2020 2.05 (H) 0.76 - 1.27 mg/dL Final   11/13/2020 1.96 (H) 0.76 - 1.27 mg/dL Final       Vancomycin Trough   Date Value Ref Range Status   11/14/2020 21.10 (H) 5.00 - 20.00 mcg/mL Final     Vancomycin Random   Date Value Ref Range Status   11/13/2020 19.90 5.00 - 40.00 mcg/mL Final       Culture Results:  Microbiology Results (last 10 days)     Procedure Component Value - Date/Time    Respiratory Culture - Sputum, ET Suction [[id number]] Collected: 11/14/20 0903    Lab Status: Preliminary result Specimen: Sputum from ET Suction Updated: 11/15/20 0840     Respiratory Culture Scant growth (1+) Normal Respiratory Millie: NO S.aureus/MRSA or Pseudomonas aeruginosa     Gram Stain Many (4+) WBCs per low power field      Moderate (3+) Epithelial cells per low power field      Rare (1+) Mixed bacterial millie    MRSA Screen, PCR (Inpatient) - Swab, Nares [[id number]]  (Normal) Collected: 11/08/20 1543    Lab Status: Final result Specimen: Swab from Nares Updated: 11/08/20 1655     MRSA, PCR Negative    Narrative:      Performed by real-time polymerase chain reaction (qPCR).    CRE Screen by PCR - Swab, Large Intestine, Rectum [[id number]] Collected: " 11/08/20 0250    Lab Status: Final result Specimen: Swab from Large Intestine, Rectum Updated: 11/08/20 0428     CRE SCREEN Not Detected     Comment: Test performed by real-time polymerase chain reaction (qPCR).        OXA 48 Strain Not Detected     IMP STRAIN Not Detected     VIM STRAIN Not Detected     NDM Strain Not Detected     KPC Strain Not Detected    COVID-19, [facility] IN-HOUSE, NP SWAB IN TRANSPORT MEDIA 8-10 HR TAT - Swab, Nasopharynx [[id number]]  (Normal) Collected: 11/07/20 2137    Lab Status: Final result Specimen: Swab from Nasopharynx Updated: 11/08/20 0250     COVID19 Not Detected    Narrative:      Testing performed by Real Time RT-PCR  This test has not been approved by the [employer] but is authorized under the Emergency Use Act (EUA)    Fact sheet for providers: https://www.fda.gov/media/800138/download    Fact sheet for patients: https://www.fda.gov/media/695634/download        Wound Culture - Wound, Foot, Right [[id number]]  (Abnormal)  (Susceptibility) Collected: 11/07/20 2055    Lab Status: Final result Specimen: Wound from Foot, Right Updated: 11/12/20 0840     Wound Culture Heavy growth (4+) Normal Skin Sophia      Moderate growth (3+) Staphylococcus aureus, MRSA     Comment: Methicillin resistant Staphylococcus aureus, Patient may be an isolation risk.         Moderate growth (3+) Enterococcus faecalis     Gram Stain Many (4+) WBCs seen      Many (4+) Gram negative bacilli      Many (4+) Gram positive cocci in pairs      Many (4+) Gram positive bacilli    Narrative:      PEA PLATE ADDED     Susceptibility      Staphylococcus aureus, MRSA     CELSO     Clindamycin Resistant     Erythromycin Resistant     Inducible Clindamycin Resistance Negative     Oxacillin Resistant     Penicillin G Resistant     Rifampin Susceptible     Tetracycline Susceptible     Trimethoprim + Sulfamethoxazole Susceptible     Vancomycin Susceptible                Susceptibility      Enterococcus faecalis     CELSO     Ampicillin  Susceptible     Vancomycin Susceptible                    Blood Culture - Blood, Arm, Left [[id number]] Collected: 11/07/20 2027    Lab Status: Final result Specimen: Blood from Arm, Left Updated: 11/12/20 2045     Blood Culture No growth at 5 days    Blood Culture - Blood, Arm, Right [[id number]] Collected: 11/07/20 1950    Lab Status: Final result Specimen: Blood from Arm, Right Updated: 11/12/20 2000     Blood Culture No growth at 5 days    COVID PRE-OP / PRE-PROCEDURE SCREENING ORDER (NO ISOLATION) - Swab, Nasopharynx [[id number]] Collected: 11/07/20 0918    Lab Status: Final result Specimen: Swab from Nasopharynx Updated: 11/08/20 1407    Narrative:      The following orders were created for panel order COVID PRE-OP / PRE-PROCEDURE SCREENING ORDER (NO ISOLATION) - Swab, Nasopharynx.  Procedure                               Abnormality         Status                     ---------                               -----------         ------                     COVID-19,[facility] ROUTINE...[[id number]]                      Final result               COVID [facility] Priority -...[[id number]]                      Final result                 Please view results for these tests on the individual orders.    COVID-19,[facility] ROUTINE, NP/OP SWAB IN TRANSPORT MEDIA OR ESWAB 72 HR TAT - Swab, Nasopharynx [[id number]] Collected: 11/07/20 0918    Lab Status: Final result Specimen: Swab from Nasopharynx Updated: 11/08/20 1407     SARS-CoV-2, DONNA Not Detected     Comment: This nucleic acid amplification test was developed and its performance  characteristics determined by [facility]. Nucleic acid  amplification tests include PCR and TMA. This test has not been FDA  cleared or approved. This test has been authorized by FDA under an  Emergency Use Authorization (EUA). This test is only authorized for  the duration of time the declaration that circumstances exist  justifying the authorization of the emergency use of in  vitro  diagnostic tests for detection of SARS-CoV-2 virus and/or diagnosis  of COVID-19 infection under section 564(b)(1) of the Act, 21 U.S.C.  360bbb-3(b) (1), unless the authorization is terminated or revoked  sooner.  When diagnostic testing is negative, the possibility of a false  negative result should be considered in the context of a patient's  recent exposures and the presence of clinical signs and symptoms  consistent with COVID-19. An individual without symptoms of COVID-19  and who is not shedding SARS-CoV-2 virus would expect to have a  negative (not detected) result in this assay.       Narrative:      Performed at:  01 - [facility]  [address], [place], [place]  [id number]  : Latosha Benoit MD, Phone:  [phone]    COVID [facility] Priority - Swab, Nasopharynx [[id number]] Collected: 11/07/20 0918    Lab Status: Final result Specimen: Swab from Nasopharynx Updated: 11/08/20 1407     COVID [facility] PRIORITY Comment     Comment: Received       Narrative:      Performed at:  02 - [facility]  [address], [place]  [id number]  : Iain Brooke [facility], Phone:  [phone]        No results found for: RESPCX    Assessment:  Trough last night was 21.1  Dose was held and restarted today   Smaller dose of 500 mg IV vancomycin was given today  We will check another 24 hour random before the dose tomorrow    WBC elevated but lower today  Scr improved today  Temp has been low today at 91.8    Plan:  1. Continue pulse dosing vancomycin at this time  2. Vancomycin random 11/16 @ 1100  3. Pharmacy will monitor renal function and adjust dose accordingly.      Magdi Sandoval, PharmD   11/15/20 12:46 CST

## 2020-11-15 NOTE — PROGRESS NOTES
[facility]  INPATIENT PROGRESS NOTE    Length of Stay: 8  Date of Admission: 11/7/2020  Primary Care Physician: Brad Mac APRN    Subjective   Chief Complaint: Shortness of breath  HPI: Intubated and sedated    Review of Systems   Unable to perform ROS: Intubated      All pertinent negatives and positives are as above. All other systems have been reviewed and are negative unless otherwise stated.     Objective    Temp:  [91.8 °F (33.2 °C)-98.7 °F (37.1 °C)] 97.6 °F (36.4 °C)  Heart Rate:  [50-69] 50  Resp:  [13-22] 14  BP: (126-178)/() 128/59  Arterial Line BP: (124-186)/(46-75) 131/48  FiO2 (%):  [50 %-80 %] 50 %    Physical Exam  Vitals signs reviewed.   Constitutional:       Appearance: He is well-developed.      Interventions: He is sedated and intubated.   HENT:      Head: Normocephalic and atraumatic.   Eyes:      Pupils: Pupils are equal, round, and reactive to light.   Neck:      Musculoskeletal: Normal range of motion and neck supple.   Cardiovascular:      Rate and Rhythm: Normal rate and regular rhythm.      Heart sounds: Normal heart sounds. No murmur. No friction rub. No gallop.    Pulmonary:      Effort: Pulmonary effort is normal. No respiratory distress. He is intubated.      Breath sounds: Decreased breath sounds and rales present. No wheezing.   Chest:      Chest wall: No tenderness.   Abdominal:      General: Bowel sounds are normal. There is no distension.      Palpations: Abdomen is soft.      Tenderness: There is no abdominal tenderness.   Musculoskeletal:         General: Swelling present.   Neurological:      Mental Status: Mental status is at baseline.   Psychiatric:         Behavior: Behavior normal.       Results Review:  I have reviewed the labs, radiology results, and diagnostic studies.    Laboratory Data:   Results from last 7 days   Lab Units 11/15/20  0459 11/14/20  0135 11/13/20  0545   SODIUM mmol/L 136 123*  121*   POTASSIUM mmol/L 3.6 5.6* 5.0   CHLORIDE mmol/L 103 96* 92*   CO2 mmol/L 25.0 18.0* 16.0*   BUN mg/dL 64* 67* 59*   CREATININE mg/dL 1.72* 2.05* 1.96*   GLUCOSE mg/dL 35* 180* 96   CALCIUM mg/dL 8.5* 8.3* 8.7   BILIRUBIN mg/dL 0.4 0.3 0.4   ALK PHOS U/L 50 66 64   ALT (SGPT) U/L 13 11 10   AST (SGOT) U/L 18 16 15   ANION GAP mmol/L 8.0 9.0 13.0     Estimated Creatinine Clearance: 56.7 mL/min (A) (by C-G formula based on SCr of 1.72 mg/dL (H)).  Results from last 7 days   Lab Units 11/14/20  0135 11/11/20  0231   MAGNESIUM mg/dL 1.9 1.6   PHOSPHORUS mg/dL 6.0*  --          Results from last 7 days   Lab Units 11/15/20  0459 11/14/20  0135 11/13/20  0545 11/12/20  0355 11/11/20  0231   WBC 10*3/mm3 13.88* 24.01* 21.09* 17.34* 16.36*   HEMOGLOBIN g/dL 6.9* 8.2* 8.7* 9.0* 9.2*   HEMATOCRIT % 19.9* 24.2* 25.3* 26.7* 26.9*   PLATELETS 10*3/mm3 256 321 364 375 358           Culture Data:   No results found for: BLOODCX  No results found for: URINECX  Respiratory Culture   Date Value Ref Range Status   11/14/2020   Preliminary    Scant growth (1+) Normal Respiratory Sophia: NO S.aureus/MRSA or Pseudomonas aeruginosa     No results found for: WOUNDCX  No results found for: STOOLCX  No components found for: BODYFLD    Radiology Data:   Imaging Results (Last 24 Hours)     Procedure Component Value Units Date/Time    IR PICC W Imaging Guidance [[id number]] Resulted: 11/14/20 1252     Updated: 11/14/20 1252    Narrative:      This procedure was auto-finalized with no dictation required.          I have reviewed the patient's current medications.     Assessment/Plan     Active Hospital Problems    Diagnosis   • **Acute respiratory failure with hypoxia (CMS/HCC)            • Diabetic foot ulcer (CMS/HCC)   • Bilateral pneumonia   • Diabetes mellitus (CMS/HCC)            • Sepsis (CMS/HCC)            • Acute kidney injury (CMS/HCC)              • COPD with acute exacerbation (CMS/HCC)              • Uncontrolled hypertension                 • Paranoid schizophrenia (CMS/[facility])       Plan:    1.  Status post cardiac arrest: Patient had episode of bradycardia that led to PEA.  He was given 2 mg of epinephrine, 1 mg of atropine, 1 amp of bicarb, and intubated.  From time the code started to return of spontaneous circulation was 11 minutes.  Currently hemodynamically stable.  2.  Hypertension: Off Cardene drip after cardiac arrest.  Nephrology following.  3.  Acute hypoxic respiratory failure: Started on Lasix drip overnight.  Lasix drip now off will give bolus Lasix.  Nephrology following.  Volume overloaded.  Getting albumin 3 times daily.  3.  Healthcare associated pneumonia: Seen on chest x-ray.  Continue broad-spectrum antibiotics cultures negative thus far.  4.  Heart failure with preserved ejection fraction: Acute exacerbation.  Continue diuretics as he tolerates.  5.  Diabetes mellitus: Continue sliding scale insulin.  6.  Diabetic ulcer of the toe: Discussed with Dr. Avila.  Will defer arteriogram for now.  Wound culture grew MRSA.  On vancomycin.  7.  Paranoid schizophrenia: Continue home medication.  8.  Acute kidney injury: Essentially the same today.  Nephrology following.   9.  Anemia: Hemoglobin now 6.9.  2 units packed red blood cells ordered by nephrology.  10.  DVT prophylaxis: Lovenox.    Approximately 35 minutes of critical care time were spent managing the patient exclusive of billable procedures.     The patient was evaluated during the global COVID-19 pandemic, and the diagnosis was suspected/considered upon their initial presentation.  Evaluation, treatment, and testing were consistent with current guidelines for patients who present with complaints or symptoms that may be related to COVID-19.    Discharge Planning: I expect patient to be discharged to skilled nursing facility in 3-4 days.        This document has been electronically signed by Zac Terrazas MD on November 15, 2020 12:48 CST

## 2020-11-15 NOTE — PROGRESS NOTES
"[facility]  [address] [place] [address]. [address]  T - [phone]  F - [phone]     Progress Note          PATIENT  DEMOGRAPHICS   PATIENT NAME: Mohan Villatoro                      PHYSICIAN: Gina Hatch MD  : 1960  MRN: [id number]   LOS: 8 days    Patient Care Team:  Brad Mac APRN as PCP - General (Family Medicine)  Subjective   SUBJECTIVE   Good UO bp is stable on dopamine         Objective   OBJECTIVE   Vital Signs  Temp:  [91.8 °F (33.2 °C)-98.7 °F (37.1 °C)] 97.6 °F (36.4 °C)  Heart Rate:  [39-69] 62  Resp:  [13-22] 14  BP: (126-178)/() 128/62  Arterial Line BP: (108-186)/(46-75) 126/46  FiO2 (%):  [50 %-80 %] 50 %    Flowsheet Rows      First Filed Value   Admission Height  180.3 cm (71\") Documented at 2020   Admission Weight  88 kg (194 lb) Documented at 2020           I/O last 3 completed shifts:  In: 8.4 [P.O.:120; I.V.:1408.4; Other:150; IV Piggyback:350]  Out: 5760 [Urine:4760; Emesis/NG output:1000]    PHYSICAL EXAM    Physical Exam  Constitutional:       Appearance: He is well-developed. He is ill-appearing.   HENT:      Head: Normocephalic.   Eyes:      Pupils: Pupils are equal, round, and reactive to light.   Cardiovascular:      Rate and Rhythm: Normal rate and regular rhythm.      Heart sounds: Normal heart sounds.   Pulmonary:      Effort: Pulmonary effort is normal.      Breath sounds: Normal breath sounds.   Abdominal:      General: Bowel sounds are normal.      Palpations: Abdomen is soft.   Musculoskeletal:         General: Swelling present.   Neurological:      General: No focal deficit present.      Deep Tendon Reflexes: Reflexes normal.         RESULTS   Results Review:    Results from last 7 days   Lab Units 11/15/20  0459 20  0135 20  0545   SODIUM mmol/L 136 123* 121*   POTASSIUM mmol/L 3.6 5.6* 5.0   CHLORIDE mmol/L 103 96* 92*   CO2 mmol/L 25.0 18.0* 16.0*   BUN mg/dL 64* 67* 59*   "   CREATININE mg/dL 1.72* 2.05* 1.96*   CALCIUM mg/dL 8.5* 8.3* 8.7   BILIRUBIN mg/dL 0.4 0.3 0.4   ALK PHOS U/L 50 66 64   ALT (SGPT) U/L 13 11 10   AST (SGOT) U/L 18 16 15   GLUCOSE mg/dL 35* 180* 96       Estimated Creatinine Clearance: 56.7 mL/min (A) (by C-G formula based on SCr of 1.72 mg/dL (H)).    Results from last 7 days   Lab Units 11/14/20  0135 11/11/20  0231   MAGNESIUM mg/dL 1.9 1.6   PHOSPHORUS mg/dL 6.0*  --              Results from last 7 days   Lab Units 11/15/20  0459 11/14/20  0135 11/13/20  0545 11/12/20  0355 11/11/20  0231   WBC 10*3/mm3 13.88* 24.01* 21.09* 17.34* 16.36*   HEMOGLOBIN g/dL 6.9* 8.2* 8.7* 9.0* 9.2*   PLATELETS 10*3/mm3 256 321 364 375 358               Imaging Results (Last 24 Hours)     Procedure Component Value Units Date/Time    IR PICC W Imaging Guidance [[id number]] Resulted: 11/14/20 1252     Updated: 11/14/20 1252    Narrative:      This procedure was auto-finalized with no dictation required.    US Guidance PICC NC [[id number]] Resulted: 11/14/20 1229     Updated: 11/14/20 1229    Narrative:      This procedure was auto-finalized with no dictation required.    XR Chest Post CVA Port [[id number]] Collected: 11/14/20 1144     Updated: 11/14/20 1212    Narrative:      Chest x-ray single view.         CLINICAL INDICATION: Shortness of breath. Aerated.    COMPARISON: November 14, 2020 at 7:13 AM    FINDINGS: Cardiac silhouette is normal in size. Bilateral  infiltrative changes and pulmonary edema type pattern with  worsening since prior exam.    Endotracheal tube with tip 7.5 cm above the bifurcation the  idgna in satisfactory position.      Impression:      Interval placement of a right arm central venous  catheter with catheter tip in the right apex i.e. the junction of  the right subclavian vein and superior vena cava in satisfactory  position.    Electronically signed by:  Daryl Londono MD  11/14/2020 12:11 PM  CST Workstation: [id number]           MEDICATIONS    !Vancomycin  Level Draw Needed, , Does not apply, Once  acidophilus, 1 tablet, Oral, Daily  albuterol sulfate HFA, 2 puff, Inhalation, 4x Daily - RT  aspirin, 81 mg, Oral, Daily  atorvastatin, 40 mg, Oral, Daily  benztropine, 1 mg, Oral, Q12H  bisacodyl, 10 mg, Rectal, Daily  busPIRone, 10 mg, Oral, Q8H  carvedilol, 12.5 mg, Oral, BID With Meals  cetirizine, 10 mg, Oral, Daily  chlorhexidine, 15 mL, Mouth/Throat, Q12H  cholecalciferol, 1,000 Units, Oral, Daily  collagenase, 1 application, Topical, Daily  haloperidol, 7.5 mg, Oral, Nightly  hydrALAZINE, 75 mg, Oral, Q8H  insulin aspart, 0-9 Units, Subcutaneous, TID AC  insulin detemir, 14 Units, Subcutaneous, Nightly  lactulose, 20 g, Oral, BID  metoclopramide, 5 mg, Oral, 4x Daily AC & at Bedtime  nicotine, 1 patch, Transdermal, Q24H  pantoprazole, 40 mg, Intravenous, Q24H  polyethylene glycol, 17 g, Oral, Daily  risperiDONE, 1 mg, Oral, Nightly  rivaroxaban, 10 mg, Oral, Daily With Dinner  rOPINIRole, 2 mg, Oral, Nightly  sodium chloride, 10 mL, Intravenous, Q12H  sodium chloride, 10 mL, Intravenous, Q12H  sodium chloride, 10 mL, Intravenous, Q12H  sodium chloride, 10 mL, Intravenous, Q12H  tamsulosin, 0.4 mg, Oral, Daily  vancomycin, 500 mg, Intravenous, Q24H  vitamin C, 500 mg, Oral, QAM  zinc sulfate, 220 mg, Oral, Daily      DOPamine, 2-5 mcg/kg/min, Last Rate: 2 mcg/kg/min (11/15/20 0400)  midazolam, 1-10 mg/hr, Last Rate: 4 mg/hr (11/15/20 0610)  niCARdipine, 5-15 mg/hr, Last Rate: Stopped (11/14/20 0439)  nitroglycerin,  mcg/min, Last Rate: Stopped (11/09/20 2127)  norepinephrine, 0.02-0.3 mcg/kg/min  Pharmacy to Dose Cefepime,   Pharmacy to dose vancomycin,         Assessment/Plan   ASSESSMENT / PLAN      Acute respiratory failure with hypoxia (CMS/HCC)    Acute kidney injury (CMS/HCC)    COPD with acute exacerbation (CMS/HCC)    Paranoid schizophrenia (CMS/HCC)    Uncontrolled hypertension    Diabetes mellitus (CMS/HCC)    Sepsis (CMS/HCC)    Diabetic foot ulcer  (CMS/HCC)    Bilateral pneumonia       1.acute kidney injury on CKD 3 baseline creatinine of 1.4-1.5 it is up to 2.  Patient creatinine has been fluctuating on this admission.  has contrast exposure on admission. Overall very close to his baseline.  He has good urine output with intravenous Lasix.  His chest x-ray showed marked pulmonary vascular congestion. Cr is 1.7 today. Keep iv lasix.       Brent stain + and if cr is not improving may consider prednisone for AIN treatment.  Avoid IV contrast agent.  I will hold the spironolactone.  Kidney ultrasound in August did not show any hydronephrosis     2.acute hypoxic respiratory failure and PEA arrest.  Patient has nearly 15 minutes of CPR.  Currently blood pressure is running on the low side.  on dopamine at present. Keep albumin 3 times a day.  Avoid any IV fluids due to marked edema and CHF.      3.hypertension / hypertensive urgency but now running low blood pressure antihypertensives are on hold.  May need renal ultrasound artery Doppler due to high blood pressure and congestive heart failure (recurrent) once stable. patient has preserved EF on echo. Holding hydralazine and tamsulosin due to low bp      4.necrotic wound on the toe / MRSA , E. fecalis.  Patient is on broad-spectrum antibiotic.  on Vancomycin    5. Anemia now <7 - 2 u prbc today. Will call family to get the consent                This document has been electronically signed by Gina Hatch MD on November 15, 2020 09:39 CST

## 2020-11-15 NOTE — PLAN OF CARE
Goal Outcome Evaluation:  Plan of Care Reviewed With: patient, sibling  Progress: declining  Outcome Summary: Pt placed on vent today after significent event including 3 rounds of cpr. Pt has tolerated and VSS. Enrique reviewed patient and changes were made. Family has been updated on patient condition. Family states they will be deciding on code status over the night,

## 2020-11-16 LAB
BACTERIA SPEC RESP CULT: NORMAL
GLUCOSE BLDC GLUCOMTR-MCNC: 13 MG/DL (ref 70–130)
GRAM STN SPEC: NORMAL

## 2020-11-16 NOTE — PAYOR COMM NOTE
"Leslie Schwarz  [facility]  P: [phone]  F: [phone]    [facility]#[id number]    Mohan Huff (Dcsd. Male)     Date of Birth Social Security Number Address Home Phone MRN    1960  [facility]  [address] [place] [address] [phone] [id number]    Tenriism Marital Status          [facility] Single       Admission Date Admission Type Admitting Provider Attending Provider Department, Room/Bed    20 Emergency Justice Sanford MD  [facility] CRITICAL CARE STEPDOWN, 10/A    Discharge Date Discharge Disposition Discharge Destination        11/15/2020               Attending Provider: (none)   Allergies: Niacin, Penicillins    Isolation: Contact   Infection: MRSA (11/10/20)   Code Status: Prior    Ht: 180.3 cm (70.98\")   Wt: 101 kg (222 lb 0.1 oz)    Admission Cmt: None   Principal Problem: Acute respiratory failure with hypoxia (CMS/HCC) [J96.01] More...                 Active Insurance as of 2020     Primary Coverage     Payor Plan Insurance Group Employer/Plan Group    MEDICARE MEDICARE A & B      Payor Plan Address Payor Plan Phone Number Payor Plan Fax Number Effective Dates    [address] [phone]  1985 - None Entered    [facility] [id number]       Subscriber Name Subscriber Birth Date Member ID       MOHAN HUFF 1960 [id number]           Secondary Coverage     Payor Plan Insurance Group Employer/Plan Group    [facility] MEDICAID [id number]!     Payor Plan Address Payor Plan Phone Number Payor Plan Fax Number Effective Dates    [address] [address] [phone]  2019 - None Entered    [facility] [address]       Subscriber Name Subscriber Birth Date Member ID       MOHAN HUFF 1960 [id number]                 Emergency Contacts      (Rel.) Home Phone Work Phone Mobile Phone    Jazmin Arzate (Sister) [phone] -- [phone]    Sayra Banegas (Sister) [phone] " -- [phone]            Discharge Summary    No notes of this type exist for this encounter.         Discharge Order (From admission, onward)    None

## 2020-11-17 LAB
BH BB BLOOD EXPIRATION DATE: NORMAL
BH BB BLOOD EXPIRATION DATE: NORMAL
BH BB BLOOD TYPE BARCODE: NORMAL
BH BB BLOOD TYPE BARCODE: NORMAL
BH BB DISPENSE STATUS: NORMAL
BH BB DISPENSE STATUS: NORMAL
BH BB PRODUCT CODE: NORMAL
BH BB PRODUCT CODE: NORMAL
BH BB UNIT NUMBER: NORMAL
BH BB UNIT NUMBER: NORMAL
CROSSMATCH INTERPRETATION: NORMAL
CROSSMATCH INTERPRETATION: NORMAL
UNIT  ABO: NORMAL
UNIT  ABO: NORMAL
UNIT  RH: NORMAL
UNIT  RH: NORMAL

## 2024-11-27 NOTE — PROGRESS NOTES
Pharmacokinetics by Pharmacy - Vancomycin    Mohanaddi Villatoro is a 60 y.o. male receiving vancomycin day 4 for SSTI /URTI.   Pharmacy to dose Vancomycin consult is currently for a duration of 7 total days (To end on 11/13/2020).   Patient is also receiving cefepime.    Objective:    Temp Readings from Last 1 Encounters:   11/10/20 98.5 °F (36.9 °C) (Oral)     WBC   Date Value Ref Range Status   11/10/2020 15.89 (H) 3.40 - 10.80 10*3/mm3 Final   11/09/2020 24.55 (H) 3.40 - 10.80 10*3/mm3 Final   11/08/2020 19.45 (H) 3.40 - 10.80 10*3/mm3 Final      Lactate   Date Value Ref Range Status   11/07/2020 1.0 0.5 - 2.0 mmol/L Final      Creatinine   Date Value Ref Range Status   11/10/2020 2.16 (H) 0.76 - 1.27 mg/dL Final   11/09/2020 1.67 (H) 0.76 - 1.27 mg/dL Final   11/08/2020 1.60 (H) 0.76 - 1.27 mg/dL Final       Vancomycin Peak   Date Value Ref Range Status   11/09/2020 41.20 (C) 20.00 - 40.00 mcg/mL Final     Vancomycin Trough   Date Value Ref Range Status   11/10/2020 23.00 (H) 5.00 - 20.00 mcg/mL Final       Culture Results:  Microbiology Results (last 10 days)     Procedure Component Value - Date/Time    MRSA Screen, PCR (Inpatient) - Swab, Nares [[id number]]  (Normal) Collected: 11/08/20 1543    Lab Status: Final result Specimen: Swab from Nares Updated: 11/08/20 1655     MRSA, PCR Negative    Narrative:      Performed by real-time polymerase chain reaction (qPCR).    CRE Screen by PCR - Swab, Large Intestine, Rectum [[id number]] Collected: 11/08/20 0250    Lab Status: Final result Specimen: Swab from Large Intestine, Rectum Updated: 11/08/20 0428     CRE SCREEN Not Detected     Comment: Test performed by real-time polymerase chain reaction (qPCR).        OXA 48 Strain Not Detected     IMP STRAIN Not Detected     VIM STRAIN Not Detected     NDM Strain Not Detected     KPC Strain Not Detected    COVID-19, [facility] IN-HOUSE, NP SWAB IN TRANSPORT MEDIA 8-10 HR TAT - Swab, Nasopharynx [[id number]]  (Normal) Collected:  11/07/20 2137    Lab Status: Final result Specimen: Swab from Nasopharynx Updated: 11/08/20 0250     COVID19 Not Detected    Narrative:      Testing performed by Real Time RT-PCR  This test has not been approved by the [facility] but is authorized under the Emergency Use Act (EUA)    Fact sheet for providers: https://www.fda.gov/media/788434/download    Fact sheet for patients: https://www.fda.gov/media/050977/download        Wound Culture - Wound, Foot, Right [[id number]]  (Abnormal) Collected: 11/07/20 2055    Lab Status: Preliminary result Specimen: Wound from Foot, Right Updated: 11/10/20 0840     Wound Culture Heavy growth (4+) Normal Skin Sophia      Moderate growth (3+) Staphylococcus aureus, MRSA     Comment: Methicillin resistant Staphylococcus aureus, Patient may be an isolation risk.        Gram Stain Many (4+) WBCs seen      Many (4+) Gram negative bacilli      Many (4+) Gram positive cocci in pairs      Many (4+) Gram positive bacilli    Narrative:      PEA PLATE ADDED     Blood Culture - Blood, Arm, Left [[id number]] Collected: 11/07/20 2027    Lab Status: Preliminary result Specimen: Blood from Arm, Left Updated: 11/09/20 2045     Blood Culture No growth at 2 days    Blood Culture - Blood, Arm, Right [[id number]] Collected: 11/07/20 1950    Lab Status: Preliminary result Specimen: Blood from Arm, Right Updated: 11/09/20 2000     Blood Culture No growth at 2 days    COVID PRE-OP / PRE-PROCEDURE SCREENING ORDER (NO ISOLATION) - Swab, Nasopharynx [[id number]] Collected: 11/07/20 0918    Lab Status: Final result Specimen: Swab from Nasopharynx Updated: 11/08/20 1407    Narrative:      The following orders were created for panel order COVID PRE-OP / PRE-PROCEDURE SCREENING ORDER (NO ISOLATION) - Swab, Nasopharynx.  Procedure                               Abnormality         Status                     ---------                               -----------         ------                     COVID-19,[facility]  ROUTINE...[[id number]]                      Final result               COVID [facility] Priority -...[[id number]]                      Final result                 Please view results for these tests on the individual orders.    COVID-19,[facility] ROUTINE, NP/OP SWAB IN TRANSPORT MEDIA OR ESWAB 72 HR TAT - Swab, Nasopharynx [[id number]] Collected: 11/07/20 0918    Lab Status: Final result Specimen: Swab from Nasopharynx Updated: 11/08/20 1407     SARS-CoV-2, DONNA Not Detected     Comment: This nucleic acid amplification test was developed and its performance  characteristics determined by [facility]. Nucleic acid  amplification tests include PCR and TMA. This test has not been FDA  cleared or approved. This test has been authorized by FDA under an  Emergency Use Authorization (EUA). This test is only authorized for  the duration of time the declaration that circumstances exist  justifying the authorization of the emergency use of in vitro  diagnostic tests for detection of SARS-CoV-2 virus and/or diagnosis  of COVID-19 infection under section 564(b)(1) of the Act, 21 U.S.C.  360bbb-3(b) (1), unless the authorization is terminated or revoked  sooner.  When diagnostic testing is negative, the possibility of a false  negative result should be considered in the context of a patient's  recent exposures and the presence of clinical signs and symptoms  consistent with COVID-19. An individual without symptoms of COVID-19  and who is not shedding SARS-CoV-2 virus would expect to have a  negative (not detected) result in this assay.       Narrative:      Performed at:   - [facility]  [address] [facility] [place]  [id number]  : Latosha Benoit MD, Phone:  [phone]    COVID [facility] Priority - Swab, Nasopharynx [[id number]] Collected: 11/07/20 0918    Lab Status: Final result Specimen: Swab from Nasopharynx Updated: 11/08/20 1407     COVID [facility] PRIORITY Comment     Comment: Received        "Narrative:      Performed at:  02 - [facility]  [address] [facility], [place], [place]  [id number]  : Iain Brooke [facility], Phone:  [phone]        No results found for: RESPCX    [Ht: 180.3 cm (71\"); Wt: 89.4 kg (197 lb 3.2 oz)]   Body mass index is 27.5 kg/m².  Ideal body weight: 75.3 kg (166 lb 0.1 oz)  Adjusted ideal body weight: 81 kg (178 lb 7.7 oz)      Assessment:  • WBCs remain elevated but down from yesterday.   • Renal function has worsened with an Estimated Creatinine Clearance: 47.1 mL/min (A) (by C-G formula based on SCr of 2.16 mg/dL (H)). Zosyn was switched to cefepime given patient was concurrently on zosyn and vancomycin when JIM occurred. Of note, the patient was additionally being diuresed.   • 11/8 MRSA nasal swab negative however 11/7 wound culture showing MRSA (sensitivites pending) in addition to GNRs.   • Vancomycin peak and trough resulted at 41.2 mcg/mL and 23 mcg/mL. Given JIM, patient does not qualify for AUC dosing at this time. Vancomycin half-life calculated to be 22.6 hours. Will give vancomycin x1 in the AM at 0800 which will allow the trough to come down to roughly ~15 mcg/mL. Patient will require pulse dosing at this time.   • Goal trough 10-20 mcg/mL.     Plan:  1. Vancomycin 1000 mg IV x1 to start 11/11/20 @ 0800  2. Vancomycin levels to be obtained as clinically indicated.    3. Pharmacy will monitor renal function and adjust dose accordingly.      Dmitri Hardin [facility]   11/10/20 17:37 CST    " no

## (undated) DEVICE — GLV SURG BIOGEL M LTX PF 7 1/2

## (undated) DEVICE — Device

## (undated) DEVICE — VAGINAL PREP TRAY: Brand: MEDLINE INDUSTRIES, INC.

## (undated) DEVICE — SUT SILK 2/0 SH CR8 18IN CR8 C012D

## (undated) DEVICE — TB FEED GASTRO MIC 22F7TO10ML

## (undated) DEVICE — CONN FLX BREATHE CIRCT

## (undated) DEVICE — ANTIBACTERIAL UNDYED BRAIDED (POLYGLACTIN 910), SYNTHETIC ABSORBABLE SUTURE: Brand: COATED VICRYL

## (undated) DEVICE — DRSNG SURESITE WNDW 4X4.5

## (undated) DEVICE — SUT SILK 2/0 FS BLK 18IN 685G

## (undated) DEVICE — SUT SILK 3/0 SUTUPAK TIES 24IN SA74H

## (undated) DEVICE — PK TURNOVER RM ADV

## (undated) DEVICE — PK ENT HD AND NK 30

## (undated) DEVICE — INTENDED FOR TISSUE SEPARATION, AND OTHER PROCEDURES THAT REQUIRE A SHARP SURGICAL BLADE TO PUNCTURE OR CUT.: Brand: BARD-PARKER ® STAINLESS STEEL BLADES

## (undated) DEVICE — SUT PDS 0 CT 36IN VIO PDP358T

## (undated) DEVICE — APPL CHLORAPREP HI/LITE 26ML ORNG

## (undated) DEVICE — PAD MINOR UNIVERSAL: Brand: MEDLINE INDUSTRIES, INC.

## (undated) DEVICE — RETR STAY HK ELAS SHRP 5MM 50PK

## (undated) DEVICE — DRSNG TRACH POLYMEM FEN 3.5X3.5IN

## (undated) DEVICE — SUT ETHIB 0 MO7 18IN CX41D